# Patient Record
Sex: MALE | Race: WHITE | URBAN - METROPOLITAN AREA
[De-identification: names, ages, dates, MRNs, and addresses within clinical notes are randomized per-mention and may not be internally consistent; named-entity substitution may affect disease eponyms.]

---

## 2022-10-22 ENCOUNTER — APPOINTMENT (OUTPATIENT)
Dept: GENERAL RADIOLOGY | Age: 62
End: 2022-10-22

## 2022-10-22 ENCOUNTER — HOSPITAL ENCOUNTER (EMERGENCY)
Age: 62
Discharge: HOME | End: 2022-10-23
Attending: EMERGENCY MEDICINE

## 2022-10-22 ENCOUNTER — APPOINTMENT (OUTPATIENT)
Dept: CT IMAGING | Age: 62
End: 2022-10-22

## 2022-10-22 DIAGNOSIS — R45.89 DYSPHORIC MOOD: Primary | ICD-10-CM

## 2022-10-22 LAB
ABO/RH: NORMAL
ACETAMINOPHEN LEVEL: <5 MCG/ML (ref 10–30)
ALBUMIN SERPL-MCNC: 4 G/DL (ref 3.5–5.2)
ALP BLD-CCNC: 131 U/L (ref 40–129)
ALT SERPL-CCNC: 17 U/L (ref 0–40)
AMPHETAMINE SCREEN, URINE: NOT DETECTED
ANION GAP SERPL CALCULATED.3IONS-SCNC: 13 MMOL/L (ref 7–16)
ANTIBODY SCREEN: NORMAL
APTT: 30.6 SEC (ref 24.5–35.1)
AST SERPL-CCNC: 40 U/L (ref 0–39)
BACTERIA: ABNORMAL /HPF
BARBITURATE SCREEN URINE: NOT DETECTED
BASOPHILS ABSOLUTE: 0 E9/L (ref 0–0.2)
BASOPHILS RELATIVE PERCENT: 0.4 % (ref 0–2)
BENZODIAZEPINE SCREEN, URINE: NOT DETECTED
BILIRUB SERPL-MCNC: 1.1 MG/DL (ref 0–1.2)
BILIRUBIN URINE: ABNORMAL
BLOOD, URINE: ABNORMAL
BUN BLDV-MCNC: 21 MG/DL (ref 6–23)
BURR CELLS: ABNORMAL
CALCIUM SERPL-MCNC: 9.9 MG/DL (ref 8.6–10.2)
CANNABINOID SCREEN URINE: NOT DETECTED
CHLORIDE BLD-SCNC: 111 MMOL/L (ref 98–107)
CLARITY: CLEAR
CO2: 24 MMOL/L (ref 22–29)
COCAINE METABOLITE SCREEN URINE: NOT DETECTED
COLOR: YELLOW
CREAT SERPL-MCNC: 1.3 MG/DL (ref 0.7–1.2)
EOSINOPHILS ABSOLUTE: 0.13 E9/L (ref 0.05–0.5)
EOSINOPHILS RELATIVE PERCENT: 0.9 % (ref 0–6)
ETHANOL: <10 MG/DL (ref 0–0.08)
FENTANYL SCREEN, URINE: NOT DETECTED
GFR SERPL CREATININE-BSD FRML MDRD: >60 ML/MIN/1.73
GLUCOSE BLD-MCNC: 108 MG/DL (ref 74–99)
GLUCOSE URINE: NEGATIVE MG/DL
HCT VFR BLD CALC: 43.9 % (ref 37–54)
HEMOGLOBIN: 14.7 G/DL (ref 12.5–16.5)
INFLUENZA A: NOT DETECTED
INFLUENZA B: NOT DETECTED
INR BLD: 1.3
KETONES, URINE: 40 MG/DL
LACTIC ACID: 1.8 MMOL/L (ref 0.5–2.2)
LEUKOCYTE ESTERASE, URINE: NEGATIVE
LIPASE: 38 U/L (ref 13–60)
LYMPHOCYTES ABSOLUTE: 1.41 E9/L (ref 1.5–4)
LYMPHOCYTES RELATIVE PERCENT: 10.4 % (ref 20–42)
Lab: NORMAL
MAGNESIUM: 2.1 MG/DL (ref 1.6–2.6)
MCH RBC QN AUTO: 32.2 PG (ref 26–35)
MCHC RBC AUTO-ENTMCNC: 33.5 % (ref 32–34.5)
MCV RBC AUTO: 96.3 FL (ref 80–99.9)
METHADONE SCREEN, URINE: NOT DETECTED
MONOCYTES ABSOLUTE: 0.85 E9/L (ref 0.1–0.95)
MONOCYTES RELATIVE PERCENT: 6.1 % (ref 2–12)
NEUTROPHILS ABSOLUTE: 11.7 E9/L (ref 1.8–7.3)
NEUTROPHILS RELATIVE PERCENT: 82.6 % (ref 43–80)
NITRITE, URINE: NEGATIVE
OPIATE SCREEN URINE: NOT DETECTED
OVALOCYTES: ABNORMAL
OXYCODONE URINE: NOT DETECTED
PDW BLD-RTO: 13.2 FL (ref 11.5–15)
PH UA: 6 (ref 5–9)
PHENCYCLIDINE SCREEN URINE: NOT DETECTED
PLATELET # BLD: 284 E9/L (ref 130–450)
PMV BLD AUTO: 10.8 FL (ref 7–12)
POIKILOCYTES: ABNORMAL
POLYCHROMASIA: ABNORMAL
POTASSIUM REFLEX MAGNESIUM: 3.8 MMOL/L (ref 3.5–5)
PROTEIN UA: 100 MG/DL
PROTHROMBIN TIME: 14.4 SEC (ref 9.3–12.4)
RBC # BLD: 4.56 E12/L (ref 3.8–5.8)
RBC UA: ABNORMAL /HPF (ref 0–2)
SALICYLATE, SERUM: <0.3 MG/DL (ref 0–30)
SARS-COV-2 RNA, RT PCR: NOT DETECTED
SODIUM BLD-SCNC: 148 MMOL/L (ref 132–146)
SPECIFIC GRAVITY UA: >=1.03 (ref 1–1.03)
TOTAL PROTEIN: 7.3 G/DL (ref 6.4–8.3)
TRICYCLIC ANTIDEPRESSANTS SCREEN SERUM: NEGATIVE NG/ML
TROPONIN, HIGH SENSITIVITY: 18 NG/L (ref 0–11)
TROPONIN, HIGH SENSITIVITY: 21 NG/L (ref 0–11)
UROBILINOGEN, URINE: 0.2 E.U./DL
WBC # BLD: 14.1 E9/L (ref 4.5–11.5)
WBC UA: ABNORMAL /HPF (ref 0–5)

## 2022-10-22 PROCEDURE — 80179 DRUG ASSAY SALICYLATE: CPT

## 2022-10-22 PROCEDURE — 87636 SARSCOV2 & INF A&B AMP PRB: CPT

## 2022-10-22 PROCEDURE — 86901 BLOOD TYPING SEROLOGIC RH(D): CPT

## 2022-10-22 PROCEDURE — 85730 THROMBOPLASTIN TIME PARTIAL: CPT

## 2022-10-22 PROCEDURE — 83735 ASSAY OF MAGNESIUM: CPT

## 2022-10-22 PROCEDURE — 80143 DRUG ASSAY ACETAMINOPHEN: CPT

## 2022-10-22 PROCEDURE — 82077 ASSAY SPEC XCP UR&BREATH IA: CPT

## 2022-10-22 PROCEDURE — 93005 ELECTROCARDIOGRAM TRACING: CPT | Performed by: STUDENT IN AN ORGANIZED HEALTH CARE EDUCATION/TRAINING PROGRAM

## 2022-10-22 PROCEDURE — 81001 URINALYSIS AUTO W/SCOPE: CPT

## 2022-10-22 PROCEDURE — 2580000003 HC RX 258: Performed by: STUDENT IN AN ORGANIZED HEALTH CARE EDUCATION/TRAINING PROGRAM

## 2022-10-22 PROCEDURE — 6360000004 HC RX CONTRAST MEDICATION: Performed by: RADIOLOGY

## 2022-10-22 PROCEDURE — 83690 ASSAY OF LIPASE: CPT

## 2022-10-22 PROCEDURE — 80307 DRUG TEST PRSMV CHEM ANLYZR: CPT

## 2022-10-22 PROCEDURE — 85025 COMPLETE CBC W/AUTO DIFF WBC: CPT

## 2022-10-22 PROCEDURE — 36415 COLL VENOUS BLD VENIPUNCTURE: CPT

## 2022-10-22 PROCEDURE — 83605 ASSAY OF LACTIC ACID: CPT

## 2022-10-22 PROCEDURE — 84484 ASSAY OF TROPONIN QUANT: CPT

## 2022-10-22 PROCEDURE — 99285 EMERGENCY DEPT VISIT HI MDM: CPT

## 2022-10-22 PROCEDURE — 85610 PROTHROMBIN TIME: CPT

## 2022-10-22 PROCEDURE — 74177 CT ABD & PELVIS W/CONTRAST: CPT

## 2022-10-22 PROCEDURE — 86900 BLOOD TYPING SEROLOGIC ABO: CPT

## 2022-10-22 PROCEDURE — 80053 COMPREHEN METABOLIC PANEL: CPT

## 2022-10-22 PROCEDURE — 86850 RBC ANTIBODY SCREEN: CPT

## 2022-10-22 PROCEDURE — 71045 X-RAY EXAM CHEST 1 VIEW: CPT

## 2022-10-22 RX ORDER — 0.9 % SODIUM CHLORIDE 0.9 %
1000 INTRAVENOUS SOLUTION INTRAVENOUS ONCE
Status: COMPLETED | OUTPATIENT
Start: 2022-10-22 | End: 2022-10-22

## 2022-10-22 RX ORDER — MIDAZOLAM HYDROCHLORIDE 2 MG/2ML
5 INJECTION, SOLUTION INTRAMUSCULAR; INTRAVENOUS EVERY 6 HOURS PRN
Status: DISCONTINUED | OUTPATIENT
Start: 2022-10-22 | End: 2022-10-23 | Stop reason: HOSPADM

## 2022-10-22 RX ADMIN — IOPAMIDOL 75 ML: 755 INJECTION, SOLUTION INTRAVENOUS at 20:46

## 2022-10-22 RX ADMIN — SODIUM CHLORIDE 1000 ML: 9 INJECTION, SOLUTION INTRAVENOUS at 21:07

## 2022-10-22 ASSESSMENT — PAIN - FUNCTIONAL ASSESSMENT: PAIN_FUNCTIONAL_ASSESSMENT: NONE - DENIES PAIN

## 2022-10-22 NOTE — ED PROVIDER NOTES
[facility]  Department of Emergency Medicine     Written by: Christiane Woodson DO  Patient Name: Brittany Vergara  Attending Provider: Naren Hernandez DO  Admit Date: 10/22/2022  5:26 PM  MRN: [id number]                   : 1960        Chief Complaint   Patient presents with    Psychiatric Evaluation     generations    - Chief complaint    Patient is a 78-year-old male past medical history of hypertension, hyperlipidemia as well as depression. Patient presents with chief complaint of increased depression as well as reported nausea and vomiting. According to reports patient was sent in because he was having episodes of nausea with possible bloody emesis. However on arrival patient denies any nausea or vomiting. He states that he has been feeling more depressed lately since his wife . Patient states that symptoms have been moderate in severity and constant onset he denies any exacerbating relieving factors. Symptoms have been moderate in severity and constant onset. When asked patient denies any suicidal ideations but states he does want to rip out his heart. Patient denies any fevers, chills, chest pain, cough, constipation, diarrhea, headache, lightheadedness, numbness or tingling. The history is provided by the patient. No  was used. Review of Systems   Constitutional:  Negative for chills and fever. HENT:  Negative for ear pain, sinus pressure and sore throat. Eyes:  Negative for pain, discharge and redness. Respiratory:  Negative for cough, shortness of breath and wheezing. Cardiovascular:  Negative for chest pain. Gastrointestinal:  Negative for abdominal pain, diarrhea, nausea and vomiting. Genitourinary:  Negative for dysuria and frequency. Musculoskeletal:  Negative for arthralgias and back pain. Skin:  Negative for rash and wound. Neurological:  Negative for weakness and headaches. Hematological:  Negative for adenopathy. Psychiatric/Behavioral:  Positive for dysphoric mood. Negative for suicidal ideas. All other systems reviewed and are negative. Physical Exam  Vitals and nursing note reviewed. Constitutional:       Appearance: He is well-developed. HENT:      Head: Normocephalic and atraumatic. Eyes:      Conjunctiva/sclera: Conjunctivae normal.   Cardiovascular:      Rate and Rhythm: Normal rate and regular rhythm. Heart sounds: Normal heart sounds. No murmur heard. Pulmonary:      Effort: Pulmonary effort is normal. No respiratory distress. Breath sounds: Normal breath sounds. No wheezing or rales. Abdominal:      General: Bowel sounds are normal.      Palpations: Abdomen is soft. Tenderness: There is no abdominal tenderness. There is no guarding or rebound. Comments: Midline abdominal incision well-healed no significant tenderness rigidity rebound or guarding. Musculoskeletal:         General: No tenderness or deformity. Cervical back: Normal range of motion and neck supple. Skin:     General: Skin is warm and dry. Neurological:      Mental Status: He is alert and oriented to person, place, and time. Cranial Nerves: No cranial nerve deficit. Coordination: Coordination normal.   Psychiatric:      Comments: On psychiatric exam patient does have some flight of ideas he does appear to be mildly agitated. When asked he denies any suicidal homicidal ideations but he does state that he wants to rip out hearts. Procedures   EKG #1:  Interpreted by emergency department physician unless otherwise noted. Time:  1821    Rate: 73  Rhythm: Sinus. Interpretation: EKG obtained demonstrates sinus rhythm PACs, rate 73, left axis, QTC prolonged at 522, T wave inversions noted in V2 V3 as well as V4 and lead III.     Comparison: No previous ekg      MDM  Number of Diagnoses or Management Options  Dysphoric mood  Diagnosis management comments: Patient is a 26-year-old male past medical history of hypertension, hyperlipidemia as well as depression. Patient presents with chief complaint of increased depression as well as reported nausea and vomiting. Vital signs stable presentation. Patient is without nausea and vomiting on arrival to the ER. On physical exam heart regular rate and rhythm, lungs clear to auscultation bilaterally, abdomen soft nontender no rigidity rebound or guarding. Laboratory work obtained CBC demonstrated minimal leukocytosis 14.1. CMP demonstrated mild hyponatremia of 148, chronically elevated creatinine 1.3., urine drug screen negative, magnesium 2.1, troponin 21 on repeat 18. Urinalysis did have small amount of blood. Lactic acid 1.8. COVID flu negative. Lipase was obtained and was 38. Chest x-ray obtained demonstrated no acute abnormalities. CT scan of the abdomen pelvis demonstrated increased attenuation to the posterior aspect of the right urinary bladder possibly due to contrast filling. On repeat evaluation patient is resting comfortably in bed he continues to be without nausea and vomiting. Patient does endorse dysphoric mood as well as worsening depression since his wife . Patient is medically cleared for social work evaluation. If cleared for social work patient will be discharged back to [facility]. Final disposition pending social work evaluation. Amount and/or Complexity of Data Reviewed  Clinical lab tests: ordered and reviewed  Tests in the radiology section of CPT®: ordered and reviewed    Risk of Complications, Morbidity, and/or Mortality  Presenting problems: moderate  Diagnostic procedures: moderate  Management options: moderate    Patient Progress  Patient progress: stable           --------------------------------------------- PAST HISTORY ---------------------------------------------  Past Medical History:  has no past medical history on file. Past Surgical History:  has no past surgical history on file.     Social History:      Family History: family history is not on file. The patients home medications have been reviewed. Allergies: Patient has no known allergies.     -------------------------------------------------- RESULTS -------------------------------------------------    LABS:  Results for orders placed or performed during the hospital encounter of 10/22/22   COVID-19 & Influenza Combo    Specimen: Nasopharyngeal Swab   Result Value Ref Range    SARS-CoV-2 RNA, RT PCR NOT DETECTED NOT DETECTED    INFLUENZA A NOT DETECTED NOT DETECTED    INFLUENZA B NOT DETECTED NOT DETECTED   CBC with Auto Differential   Result Value Ref Range    WBC 14.1 (H) 4.5 - 11.5 E9/L    RBC 4.56 3.80 - 5.80 E12/L    Hemoglobin 14.7 12.5 - 16.5 g/dL    Hematocrit 43.9 37.0 - 54.0 %    MCV 96.3 80.0 - 99.9 fL    MCH 32.2 26.0 - 35.0 pg    MCHC 33.5 32.0 - 34.5 %    RDW 13.2 11.5 - 15.0 fL    Platelets 652 222 - 276 E9/L    MPV 10.8 7.0 - 12.0 fL    Neutrophils % 82.6 (H) 43.0 - 80.0 %    Lymphocytes % 10.4 (L) 20.0 - 42.0 %    Monocytes % 6.1 2.0 - 12.0 %    Eosinophils % 0.9 0.0 - 6.0 %    Basophils % 0.4 0.0 - 2.0 %    Neutrophils Absolute 11.70 (H) 1.80 - 7.30 E9/L    Lymphocytes Absolute 1.41 (L) 1.50 - 4.00 E9/L    Monocytes Absolute 0.85 0.10 - 0.95 E9/L    Eosinophils Absolute 0.13 0.05 - 0.50 E9/L    Basophils Absolute 0.00 0.00 - 0.20 E9/L    Polychromasia 1+     Poikilocytes 1+     Keenan Cells 1+     Ovalocytes 1+    Comprehensive Metabolic Panel w/ Reflex to MG   Result Value Ref Range    Sodium 148 (H) 132 - 146 mmol/L    Potassium reflex Magnesium 3.8 3.5 - 5.0 mmol/L    Chloride 111 (H) 98 - 107 mmol/L    CO2 24 22 - 29 mmol/L    Anion Gap 13 7 - 16 mmol/L    Glucose 108 (H) 74 - 99 mg/dL    BUN 21 6 - 23 mg/dL    Creatinine 1.3 (H) 0.7 - 1.2 mg/dL    Est, Glom Filt Rate >60 >=60 mL/min/1.73    Calcium 9.9 8.6 - 10.2 mg/dL    Total Protein 7.3 6.4 - 8.3 g/dL    Albumin 4.0 3.5 - 5.2 g/dL    Total Bilirubin 1.1 0.0 - 1.2 mg/dL Alkaline Phosphatase 131 (H) 40 - 129 U/L    ALT 17 0 - 40 U/L    AST 40 (H) 0 - 39 U/L   Lipase   Result Value Ref Range    Lipase 38 13 - 60 U/L   Troponin   Result Value Ref Range    Troponin, High Sensitivity 21 (H) 0 - 11 ng/L   Protime-INR   Result Value Ref Range    Protime 14.4 (H) 9.3 - 12.4 sec    INR 1.3    APTT   Result Value Ref Range    aPTT 30.6 24.5 - 35.1 sec   Urinalysis with Microscopic   Result Value Ref Range    Color, UA Yellow Straw/Yellow    Clarity, UA Clear Clear    Glucose, Ur Negative Negative mg/dL    Bilirubin Urine SMALL (A) Negative    Ketones, Urine 40 (A) Negative mg/dL    Specific Gravity, UA >=1.030 1.005 - 1.030    Blood, Urine SMALL (A) Negative    pH, UA 6.0 5.0 - 9.0    Protein,  (A) Negative mg/dL    Urobilinogen, Urine 0.2 <2.0 E.U./dL    Nitrite, Urine Negative Negative    Leukocyte Esterase, Urine Negative Negative    WBC, UA 1-3 0 - 5 /HPF    RBC, UA 5-10 (A) 0 - 2 /HPF    Bacteria, UA RARE (A) None Seen /HPF   Lactic Acid   Result Value Ref Range    Lactic Acid 1.8 0.5 - 2.2 mmol/L   Serum Drug Screen   Result Value Ref Range    Ethanol Lvl <10 mg/dL    Acetaminophen Level <5.0 (L) 10.0 - 91.3 mcg/mL    Salicylate, Serum <8.8 0.0 - 30.0 mg/dL    TCA Scrn NEGATIVE Cutoff:300 ng/mL   Urine Drug Screen   Result Value Ref Range    Amphetamine Screen, Urine NOT DETECTED Negative <1000 ng/mL    Barbiturate Screen, Ur NOT DETECTED Negative < 200 ng/mL    Benzodiazepine Screen, Urine NOT DETECTED Negative < 200 ng/mL    Cannabinoid Scrn, Ur NOT DETECTED Negative < 50ng/mL    Cocaine Metabolite Screen, Urine NOT DETECTED Negative < 300 ng/mL    Opiate Scrn, Ur NOT DETECTED Negative < 300ng/mL    PCP Screen, Urine NOT DETECTED Negative < 25 ng/mL    Methadone Screen, Urine NOT DETECTED Negative <300 ng/mL    Oxycodone Urine NOT DETECTED Negative <100 ng/mL    FENTANYL SCREEN, URINE NOT DETECTED Negative <1 ng/mL    Drug Screen Comment: see below    Magnesium   Result Value Ref Range    Magnesium 2.1 1.6 - 2.6 mg/dL   Troponin   Result Value Ref Range    Troponin, High Sensitivity 18 (H) 0 - 11 ng/L   EKG 12 Lead   Result Value Ref Range    Ventricular Rate 73 BPM    Atrial Rate 73 BPM    P-R Interval 152 ms    QRS Duration 86 ms    Q-T Interval 474 ms    QTc Calculation (Bazett) 522 ms    P Axis 45 degrees    R Axis -19 degrees    T Axis -15 degrees   TYPE AND SCREEN   Result Value Ref Range    ABO/Rh A POS     Antibody Screen NEG        RADIOLOGY:  XR CHEST PORTABLE   Final Result   No acute process. CT ABDOMEN PELVIS W IV CONTRAST Additional Contrast? None   Final Result   1. Diverticulosis. 2. No bowel obstruction, free air, or focal inflammatory changes. 3. Focus of increased attenuation located in posterior right aspect of   urinary bladder measures approximately 1.9 x 1.6 cm possibly related to   contrast filling adjacent to right ureteral vesicular junction. Short-term   follow-up recommended to exclude possibility of mass. 4. Ectatic infrarenal abdominal aorta measures up to 2.7 cm with significant   atherosclerotic plaque. 5. Exophytic cyst associated with right kidney measures up to 2.8 cm. Ultrasound follow-up could be helpful for further characterization.             ------------------------- NURSING NOTES AND VITALS REVIEWED ---------------------------  Date / Time Roomed:  10/22/2022  5:26 PM  ED Bed Assignment:  01/01    The nursing notes within the ED encounter and vital signs as below have been reviewed.      Patient Vitals for the past 24 hrs:   BP Temp Pulse Resp SpO2   10/22/22 2336 (!) 154/88 -- 73 20 98 %   10/22/22 2106 (!) 148/91 -- 67 20 98 %   10/22/22 1918 (!) 170/90 -- 63 16 99 %   10/22/22 1830 (!) 157/86 -- 66 16 97 %   10/22/22 1759 (!) 152/89 97 °F (36.1 °C) 75 18 98 %       Oxygen Saturation Interpretation: Normal    ------------------------------------------ PROGRESS NOTES ------------------------------------------  Re-evaluation(s):  Time: 0673  Patients symptoms show no change  Repeat physical examination is not changed    Counseling:  I have spoken with the patient and discussed todays results, in addition to providing specific details for the plan of care and counseling regarding the diagnosis and prognosis. Their questions are answered at this time and they are agreeable with the plan of admission.    --------------------------------- ADDITIONAL PROVIDER NOTES ---------------------------------    This patient has remained hemodynamically stable during their ED course. Diagnosis:  1. Dysphoric mood        Disposition:  Patient's disposition: Admit to mental health unit - medically cleared for admission  Patient's condition is stable. Patient was seen and evaluated by myself and my attending Mathew Espino DO. Assessment and Plan discussed with attending provider, please see attestation for final plan of care.      DO Miki Juan DO  Resident  10/23/22 8923

## 2022-10-23 VITALS
DIASTOLIC BLOOD PRESSURE: 86 MMHG | SYSTOLIC BLOOD PRESSURE: 157 MMHG | TEMPERATURE: 97 F | HEART RATE: 84 BPM | RESPIRATION RATE: 18 BRPM | OXYGEN SATURATION: 97 %

## 2022-10-23 ASSESSMENT — ENCOUNTER SYMPTOMS
ABDOMINAL PAIN: 0
EYE REDNESS: 0
COUGH: 0
SHORTNESS OF BREATH: 0
DIARRHEA: 0
BACK PAIN: 0
EYE DISCHARGE: 0
NAUSEA: 0
SORE THROAT: 0
SINUS PRESSURE: 0
WHEEZING: 0
VOMITING: 0
EYE PAIN: 0

## 2022-10-23 NOTE — ED NOTES
This patient came from [facility] for a medical issue. Patient does not need to be assessed by [facility] at this time. Patient is still pink slipped to [facility].      After patient is medically clear and stable and ready to be discharged patient will need be returned to [facility] by EMS.    N2N: Anitha Cheng [id number], MSW, LSW  10/23/22 [id number]

## 2022-10-25 LAB
EKG ATRIAL RATE: 73 BPM
EKG P AXIS: 45 DEGREES
EKG P-R INTERVAL: 152 MS
EKG Q-T INTERVAL: 474 MS
EKG QRS DURATION: 86 MS
EKG QTC CALCULATION (BAZETT): 522 MS
EKG R AXIS: -19 DEGREES
EKG T AXIS: -15 DEGREES
EKG VENTRICULAR RATE: 73 BPM

## 2022-10-25 PROCEDURE — 93010 ELECTROCARDIOGRAM REPORT: CPT | Performed by: INTERNAL MEDICINE

## 2022-11-15 LAB
ALBUMIN SERPL-MCNC: 3.2 G/DL (ref 3.5–5.2)
ALP BLD-CCNC: 105 U/L (ref 40–129)
ALT SERPL-CCNC: 7 U/L (ref 0–40)
ANION GAP SERPL CALCULATED.3IONS-SCNC: 13 MMOL/L (ref 7–16)
AST SERPL-CCNC: 22 U/L (ref 0–39)
BASOPHILS ABSOLUTE: 0.09 E9/L (ref 0–0.2)
BASOPHILS RELATIVE PERCENT: 0.8 % (ref 0–2)
BILIRUB SERPL-MCNC: 0.5 MG/DL (ref 0–1.2)
BUN BLDV-MCNC: 20 MG/DL (ref 6–23)
CALCIUM SERPL-MCNC: 9.1 MG/DL (ref 8.6–10.2)
CHLORIDE BLD-SCNC: 108 MMOL/L (ref 98–107)
CO2: 26 MMOL/L (ref 22–29)
CREAT SERPL-MCNC: 1.4 MG/DL (ref 0.7–1.2)
EOSINOPHILS ABSOLUTE: 0.26 E9/L (ref 0.05–0.5)
EOSINOPHILS RELATIVE PERCENT: 2.4 % (ref 0–6)
GFR SERPL CREATININE-BSD FRML MDRD: 56 ML/MIN/1.73
GLUCOSE BLD-MCNC: 81 MG/DL (ref 74–99)
HCT VFR BLD CALC: 38.5 % (ref 37–54)
HEMOGLOBIN: 12.3 G/DL (ref 12.5–16.5)
IMMATURE GRANULOCYTES #: 0.06 E9/L
IMMATURE GRANULOCYTES %: 0.5 % (ref 0–5)
LYMPHOCYTES ABSOLUTE: 2.24 E9/L (ref 1.5–4)
LYMPHOCYTES RELATIVE PERCENT: 20.5 % (ref 20–42)
MCH RBC QN AUTO: 31.1 PG (ref 26–35)
MCHC RBC AUTO-ENTMCNC: 31.9 % (ref 32–34.5)
MCV RBC AUTO: 97.2 FL (ref 80–99.9)
MONOCYTES ABSOLUTE: 1.25 E9/L (ref 0.1–0.95)
MONOCYTES RELATIVE PERCENT: 11.4 % (ref 2–12)
NEUTROPHILS ABSOLUTE: 7.02 E9/L (ref 1.8–7.3)
NEUTROPHILS RELATIVE PERCENT: 64.4 % (ref 43–80)
PDW BLD-RTO: 13.2 FL (ref 11.5–15)
PLATELET # BLD: 356 E9/L (ref 130–450)
PMV BLD AUTO: 11.2 FL (ref 7–12)
POTASSIUM SERPL-SCNC: 4.2 MMOL/L (ref 3.5–5)
RBC # BLD: 3.96 E12/L (ref 3.8–5.8)
SODIUM BLD-SCNC: 147 MMOL/L (ref 132–146)
TOTAL PROTEIN: 6.8 G/DL (ref 6.4–8.3)
WBC # BLD: 10.9 E9/L (ref 4.5–11.5)

## 2022-12-06 LAB
ANION GAP SERPL CALCULATED.3IONS-SCNC: 15 MMOL/L (ref 7–16)
BUN BLDV-MCNC: 14 MG/DL (ref 6–23)
CALCIUM SERPL-MCNC: 9.8 MG/DL (ref 8.6–10.2)
CHLORIDE BLD-SCNC: 108 MMOL/L (ref 98–107)
CO2: 28 MMOL/L (ref 22–29)
CREAT SERPL-MCNC: 1.5 MG/DL (ref 0.7–1.2)
GFR SERPL CREATININE-BSD FRML MDRD: 51 ML/MIN/1.73
GLUCOSE BLD-MCNC: 79 MG/DL (ref 74–99)
POTASSIUM SERPL-SCNC: 4 MMOL/L (ref 3.5–5)
SODIUM BLD-SCNC: 151 MMOL/L (ref 132–146)

## 2023-01-02 LAB
ALBUMIN SERPL-MCNC: 3.2 G/DL (ref 3.5–5.2)
ALP BLD-CCNC: 94 U/L (ref 40–129)
ALT SERPL-CCNC: <5 U/L (ref 0–40)
ANION GAP SERPL CALCULATED.3IONS-SCNC: 9 MMOL/L (ref 7–16)
AST SERPL-CCNC: 16 U/L (ref 0–39)
BASOPHILS ABSOLUTE: 0.06 E9/L (ref 0–0.2)
BASOPHILS RELATIVE PERCENT: 0.9 % (ref 0–2)
BILIRUB SERPL-MCNC: 0.3 MG/DL (ref 0–1.2)
BUN BLDV-MCNC: 15 MG/DL (ref 6–23)
CALCIUM SERPL-MCNC: 8.9 MG/DL (ref 8.6–10.2)
CHLORIDE BLD-SCNC: 111 MMOL/L (ref 98–107)
CO2: 27 MMOL/L (ref 22–29)
CREAT SERPL-MCNC: 1.1 MG/DL (ref 0.7–1.2)
EOSINOPHILS ABSOLUTE: 0.16 E9/L (ref 0.05–0.5)
EOSINOPHILS RELATIVE PERCENT: 2.5 % (ref 0–6)
GFR SERPL CREATININE-BSD FRML MDRD: >60 ML/MIN/1.73
GLUCOSE BLD-MCNC: 78 MG/DL (ref 74–99)
HCT VFR BLD CALC: 36 % (ref 37–54)
HEMOGLOBIN: 11.6 G/DL (ref 12.5–16.5)
IMMATURE GRANULOCYTES #: 0.01 E9/L
IMMATURE GRANULOCYTES %: 0.2 % (ref 0–5)
LYMPHOCYTES ABSOLUTE: 2.65 E9/L (ref 1.5–4)
LYMPHOCYTES RELATIVE PERCENT: 41.4 % (ref 20–42)
MCH RBC QN AUTO: 31 PG (ref 26–35)
MCHC RBC AUTO-ENTMCNC: 32.2 % (ref 32–34.5)
MCV RBC AUTO: 96.3 FL (ref 80–99.9)
MONOCYTES ABSOLUTE: 1.18 E9/L (ref 0.1–0.95)
MONOCYTES RELATIVE PERCENT: 18.4 % (ref 2–12)
NEUTROPHILS ABSOLUTE: 2.34 E9/L (ref 1.8–7.3)
NEUTROPHILS RELATIVE PERCENT: 36.6 % (ref 43–80)
PDW BLD-RTO: 14.7 FL (ref 11.5–15)
PLATELET # BLD: 234 E9/L (ref 130–450)
PMV BLD AUTO: 11.4 FL (ref 7–12)
POTASSIUM SERPL-SCNC: 3.8 MMOL/L (ref 3.5–5)
RBC # BLD: 3.74 E12/L (ref 3.8–5.8)
SODIUM BLD-SCNC: 147 MMOL/L (ref 132–146)
TOTAL PROTEIN: 5.9 G/DL (ref 6.4–8.3)
WBC # BLD: 6.4 E9/L (ref 4.5–11.5)

## 2023-02-07 LAB
ALBUMIN SERPL-MCNC: 3.4 G/DL (ref 3.5–5.2)
ALP BLD-CCNC: 86 U/L (ref 40–129)
ALT SERPL-CCNC: 10 U/L (ref 0–40)
ANION GAP SERPL CALCULATED.3IONS-SCNC: 7 MMOL/L (ref 7–16)
ANISOCYTOSIS: ABNORMAL
AST SERPL-CCNC: 23 U/L (ref 0–39)
BASOPHILS ABSOLUTE: 0.07 E9/L (ref 0–0.2)
BASOPHILS RELATIVE PERCENT: 0.8 % (ref 0–2)
BILIRUB SERPL-MCNC: 0.2 MG/DL (ref 0–1.2)
BUN BLDV-MCNC: 22 MG/DL (ref 6–23)
BURR CELLS: ABNORMAL
CALCIUM SERPL-MCNC: 8.8 MG/DL (ref 8.6–10.2)
CHLORIDE BLD-SCNC: 109 MMOL/L (ref 98–107)
CO2: 28 MMOL/L (ref 22–29)
CREAT SERPL-MCNC: 1.2 MG/DL (ref 0.7–1.2)
EOSINOPHILS ABSOLUTE: 0.28 E9/L (ref 0.05–0.5)
EOSINOPHILS RELATIVE PERCENT: 3.1 % (ref 0–6)
GFR SERPL CREATININE-BSD FRML MDRD: >60 ML/MIN/1.73
GLUCOSE BLD-MCNC: 79 MG/DL (ref 74–99)
HCT VFR BLD CALC: 36.6 % (ref 37–54)
HEMOGLOBIN: 11.9 G/DL (ref 12.5–16.5)
IMMATURE GRANULOCYTES #: 0.02 E9/L
IMMATURE GRANULOCYTES %: 0.2 % (ref 0–5)
LYMPHOCYTES ABSOLUTE: 3.04 E9/L (ref 1.5–4)
LYMPHOCYTES RELATIVE PERCENT: 33.2 % (ref 20–42)
MCH RBC QN AUTO: 30.6 PG (ref 26–35)
MCHC RBC AUTO-ENTMCNC: 32.5 % (ref 32–34.5)
MCV RBC AUTO: 94.1 FL (ref 80–99.9)
MONOCYTES ABSOLUTE: 1.65 E9/L (ref 0.1–0.95)
MONOCYTES RELATIVE PERCENT: 18 % (ref 2–12)
NEUTROPHILS ABSOLUTE: 4.11 E9/L (ref 1.8–7.3)
NEUTROPHILS RELATIVE PERCENT: 44.7 % (ref 43–80)
OVALOCYTES: ABNORMAL
PDW BLD-RTO: 15 FL (ref 11.5–15)
PLATELET # BLD: 243 E9/L (ref 130–450)
PMV BLD AUTO: 11.1 FL (ref 7–12)
POIKILOCYTES: ABNORMAL
POLYCHROMASIA: ABNORMAL
POTASSIUM SERPL-SCNC: 3.8 MMOL/L (ref 3.5–5)
RBC # BLD: 3.89 E12/L (ref 3.8–5.8)
SCHISTOCYTES: ABNORMAL
SODIUM BLD-SCNC: 144 MMOL/L (ref 132–146)
TARGET CELLS: ABNORMAL
TOTAL PROTEIN: 6.3 G/DL (ref 6.4–8.3)
WBC # BLD: 9.2 E9/L (ref 4.5–11.5)

## 2023-02-13 LAB
ALBUMIN SERPL-MCNC: 3.5 G/DL (ref 3.5–5.2)
ALP BLD-CCNC: 88 U/L (ref 40–129)
ALT SERPL-CCNC: 10 U/L (ref 0–40)
ANION GAP SERPL CALCULATED.3IONS-SCNC: 10 MMOL/L (ref 7–16)
AST SERPL-CCNC: 21 U/L (ref 0–39)
BASOPHILS ABSOLUTE: 0.06 E9/L (ref 0–0.2)
BASOPHILS RELATIVE PERCENT: 0.7 % (ref 0–2)
BILIRUB SERPL-MCNC: 0.2 MG/DL (ref 0–1.2)
BUN BLDV-MCNC: 19 MG/DL (ref 6–23)
CALCIUM SERPL-MCNC: 8.7 MG/DL (ref 8.6–10.2)
CHLORIDE BLD-SCNC: 110 MMOL/L (ref 98–107)
CO2: 28 MMOL/L (ref 22–29)
CREAT SERPL-MCNC: 1.2 MG/DL (ref 0.7–1.2)
EOSINOPHILS ABSOLUTE: 0.26 E9/L (ref 0.05–0.5)
EOSINOPHILS RELATIVE PERCENT: 3 % (ref 0–6)
GFR SERPL CREATININE-BSD FRML MDRD: >60 ML/MIN/1.73
GLUCOSE BLD-MCNC: 76 MG/DL (ref 74–99)
HCT VFR BLD CALC: 40.2 % (ref 37–54)
HEMOGLOBIN: 12.9 G/DL (ref 12.5–16.5)
IMMATURE GRANULOCYTES #: 0.02 E9/L
IMMATURE GRANULOCYTES %: 0.2 % (ref 0–5)
LYMPHOCYTES ABSOLUTE: 2.6 E9/L (ref 1.5–4)
LYMPHOCYTES RELATIVE PERCENT: 30.4 % (ref 20–42)
MCH RBC QN AUTO: 30.9 PG (ref 26–35)
MCHC RBC AUTO-ENTMCNC: 32.1 % (ref 32–34.5)
MCV RBC AUTO: 96.2 FL (ref 80–99.9)
MONOCYTES ABSOLUTE: 1.3 E9/L (ref 0.1–0.95)
MONOCYTES RELATIVE PERCENT: 15.2 % (ref 2–12)
NEUTROPHILS ABSOLUTE: 4.32 E9/L (ref 1.8–7.3)
NEUTROPHILS RELATIVE PERCENT: 50.5 % (ref 43–80)
PDW BLD-RTO: 14.6 FL (ref 11.5–15)
PLATELET # BLD: 247 E9/L (ref 130–450)
PMV BLD AUTO: 11.1 FL (ref 7–12)
POTASSIUM SERPL-SCNC: 3.9 MMOL/L (ref 3.5–5)
RBC # BLD: 4.18 E12/L (ref 3.8–5.8)
SODIUM BLD-SCNC: 148 MMOL/L (ref 132–146)
TOTAL PROTEIN: 6.6 G/DL (ref 6.4–8.3)
WBC # BLD: 8.6 E9/L (ref 4.5–11.5)

## 2023-02-18 ENCOUNTER — HOSPITAL ENCOUNTER (EMERGENCY)
Age: 63
Discharge: HOME | End: 2023-02-19
Attending: EMERGENCY MEDICINE
Payer: MEDICARE

## 2023-02-18 ENCOUNTER — APPOINTMENT (OUTPATIENT)
Dept: GENERAL RADIOLOGY | Age: 63
End: 2023-02-18
Payer: MEDICARE

## 2023-02-18 DIAGNOSIS — R07.9 CHEST PAIN, UNSPECIFIED TYPE: Primary | ICD-10-CM

## 2023-02-18 DIAGNOSIS — N17.9 AKI (ACUTE KIDNEY INJURY) (HCC): ICD-10-CM

## 2023-02-18 LAB
ACETAMINOPHEN LEVEL: <5 MCG/ML (ref 10–30)
ALBUMIN SERPL-MCNC: 4 G/DL (ref 3.5–5.2)
ALP BLD-CCNC: 104 U/L (ref 40–129)
ALT SERPL-CCNC: 12 U/L (ref 0–40)
ANION GAP SERPL CALCULATED.3IONS-SCNC: 9 MMOL/L (ref 7–16)
AST SERPL-CCNC: 24 U/L (ref 0–39)
BASOPHILS ABSOLUTE: 0.07 E9/L (ref 0–0.2)
BASOPHILS RELATIVE PERCENT: 0.7 % (ref 0–2)
BILIRUB SERPL-MCNC: 0.2 MG/DL (ref 0–1.2)
BUN BLDV-MCNC: 18 MG/DL (ref 6–23)
CALCIUM SERPL-MCNC: 8.9 MG/DL (ref 8.6–10.2)
CHLORIDE BLD-SCNC: 106 MMOL/L (ref 98–107)
CO2: 26 MMOL/L (ref 22–29)
CREAT SERPL-MCNC: 1.6 MG/DL (ref 0.7–1.2)
EOSINOPHILS ABSOLUTE: 0.24 E9/L (ref 0.05–0.5)
EOSINOPHILS RELATIVE PERCENT: 2.5 % (ref 0–6)
ETHANOL: <10 MG/DL (ref 0–0.08)
GFR SERPL CREATININE-BSD FRML MDRD: 48 ML/MIN/1.73
GLUCOSE BLD-MCNC: 104 MG/DL (ref 74–99)
HCT VFR BLD CALC: 43.2 % (ref 37–54)
HEMOGLOBIN: 14.1 G/DL (ref 12.5–16.5)
IMMATURE GRANULOCYTES #: 0.02 E9/L
IMMATURE GRANULOCYTES %: 0.2 % (ref 0–5)
INFLUENZA A: NOT DETECTED
INFLUENZA B: NOT DETECTED
LYMPHOCYTES ABSOLUTE: 3.35 E9/L (ref 1.5–4)
LYMPHOCYTES RELATIVE PERCENT: 34.9 % (ref 20–42)
MCH RBC QN AUTO: 30.8 PG (ref 26–35)
MCHC RBC AUTO-ENTMCNC: 32.6 % (ref 32–34.5)
MCV RBC AUTO: 94.3 FL (ref 80–99.9)
MONOCYTES ABSOLUTE: 1.32 E9/L (ref 0.1–0.95)
MONOCYTES RELATIVE PERCENT: 13.8 % (ref 2–12)
NEUTROPHILS ABSOLUTE: 4.6 E9/L (ref 1.8–7.3)
NEUTROPHILS RELATIVE PERCENT: 47.9 % (ref 43–80)
PDW BLD-RTO: 14.1 FL (ref 11.5–15)
PLATELET # BLD: 271 E9/L (ref 130–450)
PMV BLD AUTO: 11 FL (ref 7–12)
POTASSIUM SERPL-SCNC: 4.2 MMOL/L (ref 3.5–5)
PRO-BNP: 262 PG/ML (ref 0–125)
RBC # BLD: 4.58 E12/L (ref 3.8–5.8)
SALICYLATE, SERUM: 0.9 MG/DL (ref 0–30)
SARS-COV-2 RNA, RT PCR: NOT DETECTED
SODIUM BLD-SCNC: 141 MMOL/L (ref 132–146)
TOTAL PROTEIN: 7.4 G/DL (ref 6.4–8.3)
TRICYCLIC ANTIDEPRESSANTS SCREEN SERUM: NEGATIVE NG/ML
TROPONIN, HIGH SENSITIVITY: 17 NG/L (ref 0–11)
TROPONIN, HIGH SENSITIVITY: 17 NG/L (ref 0–11)
WBC # BLD: 9.6 E9/L (ref 4.5–11.5)

## 2023-02-18 PROCEDURE — 71045 X-RAY EXAM CHEST 1 VIEW: CPT

## 2023-02-18 PROCEDURE — 80053 COMPREHEN METABOLIC PANEL: CPT

## 2023-02-18 PROCEDURE — 82077 ASSAY SPEC XCP UR&BREATH IA: CPT

## 2023-02-18 PROCEDURE — 99285 EMERGENCY DEPT VISIT HI MDM: CPT

## 2023-02-18 PROCEDURE — 84484 ASSAY OF TROPONIN QUANT: CPT

## 2023-02-18 PROCEDURE — 80179 DRUG ASSAY SALICYLATE: CPT

## 2023-02-18 PROCEDURE — 85025 COMPLETE CBC W/AUTO DIFF WBC: CPT

## 2023-02-18 PROCEDURE — 6370000000 HC RX 637 (ALT 250 FOR IP): Performed by: STUDENT IN AN ORGANIZED HEALTH CARE EDUCATION/TRAINING PROGRAM

## 2023-02-18 PROCEDURE — 93005 ELECTROCARDIOGRAM TRACING: CPT | Performed by: STUDENT IN AN ORGANIZED HEALTH CARE EDUCATION/TRAINING PROGRAM

## 2023-02-18 PROCEDURE — 83880 ASSAY OF NATRIURETIC PEPTIDE: CPT

## 2023-02-18 PROCEDURE — 87636 SARSCOV2 & INF A&B AMP PRB: CPT

## 2023-02-18 PROCEDURE — 80307 DRUG TEST PRSMV CHEM ANLYZR: CPT

## 2023-02-18 PROCEDURE — 80143 DRUG ASSAY ACETAMINOPHEN: CPT

## 2023-02-18 PROCEDURE — 36415 COLL VENOUS BLD VENIPUNCTURE: CPT

## 2023-02-18 RX ORDER — ASPIRIN 81 MG/1
324 TABLET, CHEWABLE ORAL ONCE
Status: COMPLETED | OUTPATIENT
Start: 2023-02-18 | End: 2023-02-18

## 2023-02-18 RX ADMIN — ASPIRIN 324 MG: 81 TABLET, CHEWABLE ORAL at 21:42

## 2023-02-18 ASSESSMENT — PAIN - FUNCTIONAL ASSESSMENT: PAIN_FUNCTIONAL_ASSESSMENT: 0-10

## 2023-02-18 ASSESSMENT — LIFESTYLE VARIABLES
HOW MANY STANDARD DRINKS CONTAINING ALCOHOL DO YOU HAVE ON A TYPICAL DAY: PATIENT DOES NOT DRINK
HOW OFTEN DO YOU HAVE A DRINK CONTAINING ALCOHOL: NEVER

## 2023-02-18 ASSESSMENT — PAIN SCALES - GENERAL: PAINLEVEL_OUTOF10: 10

## 2023-02-18 ASSESSMENT — PAIN DESCRIPTION - LOCATION: LOCATION: CHEST

## 2023-02-18 ASSESSMENT — PAIN DESCRIPTION - DESCRIPTORS: DESCRIPTORS: DISCOMFORT

## 2023-02-19 VITALS
HEART RATE: 62 BPM | WEIGHT: 185 LBS | OXYGEN SATURATION: 98 % | BODY MASS INDEX: 25.9 KG/M2 | DIASTOLIC BLOOD PRESSURE: 57 MMHG | HEIGHT: 71 IN | RESPIRATION RATE: 13 BRPM | TEMPERATURE: 97.7 F | SYSTOLIC BLOOD PRESSURE: 144 MMHG

## 2023-02-19 RX ORDER — 0.9 % SODIUM CHLORIDE 0.9 %
1000 INTRAVENOUS SOLUTION INTRAVENOUS ONCE
Status: DISCONTINUED | OUTPATIENT
Start: 2023-02-19 | End: 2023-02-19 | Stop reason: HOSPADM

## 2023-02-19 NOTE — ED PROVIDER NOTES
[address]        Pt Name: Mallory Julian  MRN: [id number]  [place] 1960  Date of evaluation: 2/18/2023  Provider: Gwen He DO  PCP: No primary care provider on file. Note Started: 9:26 PM EST 2/18/23    CHIEF COMPLAINT       Chief Complaint   Patient presents with    Chest Pain     Started earlier today pt from generations       HISTORY OF PRESENT ILLNESS: 1 or more Elements   History From: Patient     Limitations to history: Behavior, patient is agitated and resistant to giving a history. He is stating that he does not want to be here and wants to go home. He is a poor historian. Mallory Julian is a 57 y.o. male with past medical history of cervicalgia, cervical spondylosis, CAD and hypertension with who presents to the emergency department for evaluation of chest pain. Patient is coming by EMS from generations. He apparently was having chest pain earlier. Patient states that he was given nitro as well. He is not really complaining of chest pain now. He is a very poor historian and history and review of systems is limited as such. Vaguely denying any nausea, vomiting, diaphoresis, abdominal pain, urinary or bowel changes. No known sick contacts. On initial evaluation, patient is nontoxic-appearing and in no acute distress. Nursing Notes were all reviewed and agreed with or any disagreements were addressed in the HPI.    ROS:   Pertinent positives and negatives are stated within HPI, all other systems reviewed and are negative.    --------------------------------------------- PAST HISTORY ---------------------------------------------  Past Medical History:  has no past medical history on file. Past Surgical History:  has no past surgical history on file. Social History:      Family History: family history is not on file. The patients home medications have been reviewed.     Allergies: Patient has no known allergies. ---------------------------------------------------PHYSICAL EXAM--------------------------------------    Constitutional/General: Alert and oriented x3, well appearing, non toxic in NAD  Head: Normocephalic and atraumatic  Mouth: Oropharynx clear, handling secretions, no trismus  Neck: Supple, full ROM,  Pulmonary: Lungs clear to auscultation bilaterally, no wheezes, rales, or rhonchi. Not in respiratory distress  Cardiovascular:  Regular rate. Regular rhythm. No murmurs  Chest: no chest wall tenderness  Abdomen: Soft. Non tender. Non distended. No rebound, guarding, or rigidity. No pulsatile masses appreciated. Musculoskeletal: Moves all extremities x 4. Warm and well perfused, no clubbing, cyanosis, or edema. Capillary refill <3 seconds  Skin: warm and dry. No rashes. Neurologic: GCS 15, no gross focal neurologic deficits  Psych: Agitated. -------------------------------------------------- RESULTS -------------------------------------------------  I have personally reviewed all laboratory and imaging results for this patient. Results are listed below.      LABS:  Results for orders placed or performed during the hospital encounter of 02/18/23   COVID-19 & Influenza Combo    Specimen: Nasopharyngeal Swab   Result Value Ref Range    SARS-CoV-2 RNA, RT PCR NOT DETECTED NOT DETECTED    INFLUENZA A NOT DETECTED NOT DETECTED    INFLUENZA B NOT DETECTED NOT DETECTED   CBC with Auto Differential   Result Value Ref Range    WBC 9.6 4.5 - 11.5 E9/L    RBC 4.58 3.80 - 5.80 E12/L    Hemoglobin 14.1 12.5 - 16.5 g/dL    Hematocrit 43.2 37.0 - 54.0 %    MCV 94.3 80.0 - 99.9 fL    MCH 30.8 26.0 - 35.0 pg    MCHC 32.6 32.0 - 34.5 %    RDW 14.1 11.5 - 15.0 fL    Platelets 504 638 - 330 E9/L    MPV 11.0 7.0 - 12.0 fL    Neutrophils % 47.9 43.0 - 80.0 %    Immature Granulocytes % 0.2 0.0 - 5.0 %    Lymphocytes % 34.9 20.0 - 42.0 %    Monocytes % 13.8 (H) 2.0 - 12.0 %    Eosinophils % 2.5 0.0 - 6.0 % Basophils % 0.7 0.0 - 2.0 %    Neutrophils Absolute 4.60 1.80 - 7.30 E9/L    Immature Granulocytes # 0.02 E9/L    Lymphocytes Absolute 3.35 1.50 - 4.00 E9/L    Monocytes Absolute 1.32 (H) 0.10 - 0.95 E9/L    Eosinophils Absolute 0.24 0.05 - 0.50 E9/L    Basophils Absolute 0.07 0.00 - 0.20 E9/L   CMP   Result Value Ref Range    Sodium 141 132 - 146 mmol/L    Potassium 4.2 3.5 - 5.0 mmol/L    Chloride 106 98 - 107 mmol/L    CO2 26 22 - 29 mmol/L    Anion Gap 9 7 - 16 mmol/L    Glucose 104 (H) 74 - 99 mg/dL    BUN 18 6 - 23 mg/dL    Creatinine 1.6 (H) 0.7 - 1.2 mg/dL    Est, Glom Filt Rate 48 >=60 mL/min/1.73    Calcium 8.9 8.6 - 10.2 mg/dL    Total Protein 7.4 6.4 - 8.3 g/dL    Albumin 4.0 3.5 - 5.2 g/dL    Total Bilirubin 0.2 0.0 - 1.2 mg/dL    Alkaline Phosphatase 104 40 - 129 U/L    ALT 12 0 - 40 U/L    AST 24 0 - 39 U/L   Troponin   Result Value Ref Range    Troponin, High Sensitivity 17 (H) 0 - 11 ng/L   Brain Natriuretic Peptide   Result Value Ref Range    Pro- (H) 0 - 125 pg/mL   Serum Drug Screen   Result Value Ref Range    Ethanol Lvl <10 mg/dL    Acetaminophen Level <5.0 (L) 10.0 - 85.4 mcg/mL    Salicylate, Serum 0.9 0.0 - 30.0 mg/dL    TCA Scrn NEGATIVE Cutoff:300 ng/mL   Troponin   Result Value Ref Range    Troponin, High Sensitivity 17 (H) 0 - 11 ng/L       RADIOLOGY:   Interpretation per the Radiologist below, if available at the time of this note:    XR CHEST PORTABLE   Final Result   No acute disease. RECOMMENDATION:   Careful clinical correlation and follow up recommended. No results found. No results found. See preliminary interpretation by me below. EKG: This EKG is signed and interpreted by me. Normal sinus rhythm. Intervals normal.  QTc not prolonged. No evidence of acute ST elevation MI. Nonspecific T wave abnormalities.   No significant changes compared to previous EKG on 10/22/2022.      ------------------------- NURSING NOTES AND VITALS REVIEWED ---------------------------   The nursing notes within the ED encounter and vital signs as below have been reviewed by myself.  BP (!) 144/57   Pulse 62   Temp 97.7 °F (36.5 °C)   Resp 13   Ht 5' 11\" (1.803 m)   Wt 185 lb (83.9 kg)   SpO2 98%   BMI 25.80 kg/m²   Oxygen Saturation Interpretation: Normal    The patient’s available past medical records and past encounters were reviewed.        ------------------------------ ED COURSE/MEDICAL DECISION MAKING----------------------  Medications   aspirin chewable tablet 324 mg (324 mg Oral Given 2/18/23 2142)       Medical Decision Making/Differential Diagnosis:    CC/HPI Summary, Pertinent Physical Exam Findings, Social Determinants of health, Records Reviewed, DDx, testing done/not done, ED Course, Reassessment, disposition considerations/shared decision making with patient, consults, disposition:        Medical Decision Making:   I, Dr. Adalgisa Rey am the resident physician of record.      History From: Patient    Limitations to history :  Behavior, patient is agitated and resistant to giving a history.  He is stating that he does not want to be here and wants to go home.  He is a poor historian.     Ronal Pederson is a 62 y.o. male who presents to the ED for chest pain  Vital signs upon arrival BP (!) 144/57   Pulse 62   Temp 97.7 °F (36.5 °C)   Resp 13   Ht 5' 11\" (1.803 m)   Wt 185 lb (83.9 kg)   SpO2 98%   BMI 25.80 kg/m²     On initial evaluation, patient is nontoxic-appearing, afebrile, hemodynamically stable and in no acute distress. Working diagnoses include but not limited to ACS, pneumonia, pulmonary embolism, AAA, aortic dissection, costochondritis, pleurisy, pericardial effusion and pericarditis.   Physical exam was essentially benign.  Lungs were clear to auscultation with no wheezes, rales or rhonchi.  Chest wall with no tenderness or crepitus on palpation.  Heart regular rhythm with normal rate.  No significant pretibial edema.  No other  concerning physical exam findings. Work-up in the emergency department generally unremarkable. Lab work-up as interpreted by me include CBC with no leukocytosis, left shift or significant anemia. WBC 9.6 and hemoglobin stable at 14.1. CMP remarkable for acute renal insufficiency. Patient had creatinine of 1.6/BUN 18. Baseline serum creatinine 1.2. No major electrolyte abnormalities including normal potassium of 4.2 and sodium of 141. LFTs within normal limits. Viral testing negative for COVID and influenza. Serum drug screen with unremarkable levels of ethanol, acetaminophen and salicylates. TCA negative. Cardiac evaluation benign. Patient had initial troponin 17 with a repeat of 17, delta 0. EKG sinus and nonspecific with no acute ischemic changes. Chest x-ray was clear. Imaging as interpreted by me detailed below with official radiology read above. proBNP 262. No clinical signs of fluid overload on exam including no significant lower extremity edema or crackles on auscultation of the lungs. No signs of acute respiratory distress. Patient was given oral aspirin 324 mg in the emergency department. Did attempt to give patient IV fluids as well due to JOSE however patient was adamantly refusing. He stated that he would rather be discharged back to the facility and orally rehydrate. Patient competent to make this decision. He is stable for discharge. Patient agreeable with discharge after shared decision making. He was given return precautions in case of new or in symptoms including but not limited to worsening chest pain or shortness of breath. Patient discharged in stable condition.     Non-plain film images such as CT, Ultrasound and MRI are read by the radiologist. [facility] radiographic images are visualized and preliminarily interpreted by the ED Provider with the below findings:    Chest x-ray with no obvious focal consolidation suggestive of pneumonia, large pleural effusions or pneumothorax. Normal cardiac silhouette. Discussion with Other Profesionals : None    Social Determinants : None    Records Reviewed : Care Everywhere admission note from 1/12/2022 reviewed. Patient was admitted at [facility] for COVID-pneumonia with acute hypoxic respiratory failure, JOSE and hypotension. Patient was treated with azithromycin, Rocephin and dexamethasone and IV fluids. He was discharged once clinically stable. Chronic conditions: CAD and hypertension     CONSULTS: None      Disposition:   Appropriate for outpatient management      Pt will be d/c and will follow up with his PCP . He is educated on signs and symptoms that require emergent evaluation. Pt is advised to return to the ED if his symptoms change or worsen. If his pain persists, pt may need further evaluation. Pt is agreeable to plan and all questions have been answered at this time. 1. Chest pain, unspecified type    2. JOSE (acute kidney injury) [facility])          Re-Evaluations/Consultations:             ED Course as of 02/19/23 1616   Sun Feb 19, 2023   0029 Patient is refusing IV fluids at this time. I did talk to the patient personally and he states that he does not want any fluids and would rather be discharged and orally rehydrate. Patient requesting to see his heart doctor and specialist and can do this as an outpatient. [PP]      ED Course User Index  [PP] Tania Moreno,          This patient's ED course included: History, physical examination, reevaluation prior to disposition    This patient has remained hemodynamically stable during their ED course. Counseling: The emergency provider has spoken with the patient and discussed todays results, in addition to providing specific details for the plan of care and counseling regarding the diagnosis and prognosis.   Questions are answered at this time and they are agreeable with the plan.       --------------------------------- IMPRESSION AND DISPOSITION ---------------------------------    IMPRESSION  1. Chest pain, unspecified type    2. [facility]        DISPOSITION  Disposition: Discharge to [facility]  Patient condition is stable        NOTE: This report was transcribed using voice recognition software.  Every effort was made to ensure accuracy; however, inadvertent computerized transcription errors may be present          Tiarra Hatfield DO  Resident  02/19/23 5468

## 2023-02-20 ENCOUNTER — APPOINTMENT (OUTPATIENT)
Dept: GENERAL RADIOLOGY | Age: 63
End: 2023-02-20
Payer: MEDICARE

## 2023-02-20 ENCOUNTER — HOSPITAL ENCOUNTER (OUTPATIENT)
Age: 63
Setting detail: OBSERVATION
Discharge: PSYCHIATRIC OR SUBSTANCE USE FACILITY | End: 2023-02-24
Attending: STUDENT IN AN ORGANIZED HEALTH CARE EDUCATION/TRAINING PROGRAM | Admitting: INTERNAL MEDICINE
Payer: MEDICARE

## 2023-02-20 DIAGNOSIS — R07.9 CHEST PAIN, UNSPECIFIED TYPE: Primary | ICD-10-CM

## 2023-02-20 PROBLEM — I25.10 CORONARY ARTERY DISEASE: Status: ACTIVE | Noted: 2023-02-20

## 2023-02-20 PROBLEM — I50.9 CONGESTIVE HEART FAILURE (CHF) (HCC): Status: ACTIVE | Noted: 2023-02-20

## 2023-02-20 PROBLEM — E78.5 HYPERLIPIDEMIA: Status: ACTIVE | Noted: 2023-02-20

## 2023-02-20 PROBLEM — N18.9 CHRONIC KIDNEY DISEASE: Status: ACTIVE | Noted: 2023-02-20

## 2023-02-20 PROBLEM — I10 HYPERTENSION: Status: ACTIVE | Noted: 2023-02-20

## 2023-02-20 PROBLEM — F99 PSYCHIATRIC ILLNESS: Status: ACTIVE | Noted: 2023-02-20

## 2023-02-20 LAB
ALBUMIN SERPL-MCNC: 4.1 G/DL (ref 3.5–5.2)
ALP BLD-CCNC: 103 U/L (ref 40–129)
ALT SERPL-CCNC: 11 U/L (ref 0–40)
ANION GAP SERPL CALCULATED.3IONS-SCNC: 11 MMOL/L (ref 7–16)
AST SERPL-CCNC: 21 U/L (ref 0–39)
BASOPHILS ABSOLUTE: 0.07 E9/L (ref 0–0.2)
BASOPHILS RELATIVE PERCENT: 0.8 % (ref 0–2)
BILIRUB SERPL-MCNC: 0.5 MG/DL (ref 0–1.2)
BUN BLDV-MCNC: 16 MG/DL (ref 6–23)
CALCIUM SERPL-MCNC: 8.9 MG/DL (ref 8.6–10.2)
CHLORIDE BLD-SCNC: 107 MMOL/L (ref 98–107)
CO2: 25 MMOL/L (ref 22–29)
CREAT SERPL-MCNC: 1.2 MG/DL (ref 0.7–1.2)
EKG ATRIAL RATE: 69 BPM
EKG P AXIS: 48 DEGREES
EKG P-R INTERVAL: 152 MS
EKG Q-T INTERVAL: 424 MS
EKG QRS DURATION: 86 MS
EKG QTC CALCULATION (BAZETT): 454 MS
EKG R AXIS: -37 DEGREES
EKG T AXIS: 56 DEGREES
EKG VENTRICULAR RATE: 69 BPM
EOSINOPHILS ABSOLUTE: 0.25 E9/L (ref 0.05–0.5)
EOSINOPHILS RELATIVE PERCENT: 2.8 % (ref 0–6)
GFR SERPL CREATININE-BSD FRML MDRD: >60 ML/MIN/1.73
GLUCOSE BLD-MCNC: 93 MG/DL (ref 74–99)
HCT VFR BLD CALC: 42.2 % (ref 37–54)
HEMOGLOBIN: 14.1 G/DL (ref 12.5–16.5)
IMMATURE GRANULOCYTES #: 0.03 E9/L
IMMATURE GRANULOCYTES %: 0.3 % (ref 0–5)
INR BLD: 1.5
LYMPHOCYTES ABSOLUTE: 2.57 E9/L (ref 1.5–4)
LYMPHOCYTES RELATIVE PERCENT: 29.2 % (ref 20–42)
MCH RBC QN AUTO: 30.4 PG (ref 26–35)
MCHC RBC AUTO-ENTMCNC: 33.4 % (ref 32–34.5)
MCV RBC AUTO: 90.9 FL (ref 80–99.9)
MONOCYTES ABSOLUTE: 1.02 E9/L (ref 0.1–0.95)
MONOCYTES RELATIVE PERCENT: 11.6 % (ref 2–12)
NEUTROPHILS ABSOLUTE: 4.86 E9/L (ref 1.8–7.3)
NEUTROPHILS RELATIVE PERCENT: 55.3 % (ref 43–80)
PDW BLD-RTO: 14.3 FL (ref 11.5–15)
PLATELET # BLD: 264 E9/L (ref 130–450)
PMV BLD AUTO: 10.5 FL (ref 7–12)
POTASSIUM SERPL-SCNC: 3.7 MMOL/L (ref 3.5–5)
PRO-BNP: 332 PG/ML (ref 0–125)
PROTHROMBIN TIME: 16.9 SEC (ref 9.3–12.4)
RBC # BLD: 4.64 E12/L (ref 3.8–5.8)
SODIUM BLD-SCNC: 143 MMOL/L (ref 132–146)
TOTAL PROTEIN: 7.4 G/DL (ref 6.4–8.3)
TROPONIN, HIGH SENSITIVITY: 15 NG/L (ref 0–11)
TROPONIN, HIGH SENSITIVITY: 15 NG/L (ref 0–11)
WBC # BLD: 8.8 E9/L (ref 4.5–11.5)

## 2023-02-20 PROCEDURE — 83880 ASSAY OF NATRIURETIC PEPTIDE: CPT

## 2023-02-20 PROCEDURE — 93010 ELECTROCARDIOGRAM REPORT: CPT | Performed by: INTERNAL MEDICINE

## 2023-02-20 PROCEDURE — 99285 EMERGENCY DEPT VISIT HI MDM: CPT

## 2023-02-20 PROCEDURE — 84484 ASSAY OF TROPONIN QUANT: CPT

## 2023-02-20 PROCEDURE — 71045 X-RAY EXAM CHEST 1 VIEW: CPT

## 2023-02-20 PROCEDURE — G0378 HOSPITAL OBSERVATION PER HR: HCPCS

## 2023-02-20 PROCEDURE — 6370000000 HC RX 637 (ALT 250 FOR IP): Performed by: EMERGENCY MEDICINE

## 2023-02-20 PROCEDURE — 85025 COMPLETE CBC W/AUTO DIFF WBC: CPT

## 2023-02-20 PROCEDURE — 85610 PROTHROMBIN TIME: CPT

## 2023-02-20 PROCEDURE — 80053 COMPREHEN METABOLIC PANEL: CPT

## 2023-02-20 PROCEDURE — 6370000000 HC RX 637 (ALT 250 FOR IP): Performed by: INTERNAL MEDICINE

## 2023-02-20 PROCEDURE — 36415 COLL VENOUS BLD VENIPUNCTURE: CPT

## 2023-02-20 PROCEDURE — 93005 ELECTROCARDIOGRAM TRACING: CPT | Performed by: EMERGENCY MEDICINE

## 2023-02-20 RX ORDER — POTASSIUM CHLORIDE 750 MG/1
10 TABLET, EXTENDED RELEASE ORAL DAILY
Status: DISCONTINUED | OUTPATIENT
Start: 2023-02-21 | End: 2023-02-24 | Stop reason: HOSPADM

## 2023-02-20 RX ORDER — ASPIRIN 81 MG/1
81 TABLET, CHEWABLE ORAL DAILY
Status: DISCONTINUED | OUTPATIENT
Start: 2023-02-21 | End: 2023-02-24 | Stop reason: HOSPADM

## 2023-02-20 RX ORDER — ACETAMINOPHEN 650 MG/1
650 SUPPOSITORY RECTAL EVERY 6 HOURS PRN
Status: DISCONTINUED | OUTPATIENT
Start: 2023-02-20 | End: 2023-02-24 | Stop reason: HOSPADM

## 2023-02-20 RX ORDER — ONDANSETRON 4 MG/1
4 TABLET, ORALLY DISINTEGRATING ORAL EVERY 8 HOURS PRN
Status: DISCONTINUED | OUTPATIENT
Start: 2023-02-20 | End: 2023-02-24 | Stop reason: HOSPADM

## 2023-02-20 RX ORDER — SODIUM CHLORIDE 0.9 % (FLUSH) 0.9 %
5-40 SYRINGE (ML) INJECTION PRN
Status: DISCONTINUED | OUTPATIENT
Start: 2023-02-20 | End: 2023-02-24 | Stop reason: HOSPADM

## 2023-02-20 RX ORDER — CARVEDILOL 25 MG/1
25 TABLET ORAL 2 TIMES DAILY WITH MEALS
Status: ON HOLD | COMMUNITY

## 2023-02-20 RX ORDER — DOCUSATE SODIUM 100 MG/1
100 CAPSULE, LIQUID FILLED ORAL 2 TIMES DAILY PRN
Status: ON HOLD | COMMUNITY

## 2023-02-20 RX ORDER — LISINOPRIL 20 MG/1
20 TABLET ORAL DAILY
Status: DISCONTINUED | OUTPATIENT
Start: 2023-02-21 | End: 2023-02-24 | Stop reason: HOSPADM

## 2023-02-20 RX ORDER — SODIUM CHLORIDE 9 MG/ML
INJECTION, SOLUTION INTRAVENOUS PRN
Status: DISCONTINUED | OUTPATIENT
Start: 2023-02-20 | End: 2023-02-24 | Stop reason: HOSPADM

## 2023-02-20 RX ORDER — POLYETHYLENE GLYCOL 3350 17 G/17G
17 POWDER, FOR SOLUTION ORAL DAILY PRN
Status: DISCONTINUED | OUTPATIENT
Start: 2023-02-20 | End: 2023-02-24 | Stop reason: HOSPADM

## 2023-02-20 RX ORDER — MIRTAZAPINE 30 MG/1
30 TABLET, FILM COATED ORAL NIGHTLY
Status: ON HOLD | COMMUNITY

## 2023-02-20 RX ORDER — ACETAMINOPHEN 325 MG/1
650 TABLET ORAL EVERY 6 HOURS PRN
Status: ON HOLD | COMMUNITY

## 2023-02-20 RX ORDER — CYCLOBENZAPRINE HCL 10 MG
5 TABLET ORAL 2 TIMES DAILY PRN
Status: DISCONTINUED | OUTPATIENT
Start: 2023-02-20 | End: 2023-02-24 | Stop reason: HOSPADM

## 2023-02-20 RX ORDER — ENOXAPARIN SODIUM 100 MG/ML
40 INJECTION SUBCUTANEOUS DAILY
Status: DISCONTINUED | OUTPATIENT
Start: 2023-02-21 | End: 2023-02-20

## 2023-02-20 RX ORDER — POLYETHYLENE GLYCOL 3350 17 G/17G
17 POWDER, FOR SOLUTION ORAL DAILY PRN
Status: ON HOLD | COMMUNITY

## 2023-02-20 RX ORDER — AMLODIPINE BESYLATE 10 MG/1
15 TABLET ORAL DAILY
Status: ON HOLD | COMMUNITY

## 2023-02-20 RX ORDER — LISINOPRIL 20 MG/1
20 TABLET ORAL DAILY
Status: ON HOLD | COMMUNITY

## 2023-02-20 RX ORDER — POTASSIUM CHLORIDE 750 MG/1
10 CAPSULE, EXTENDED RELEASE ORAL DAILY
Status: ON HOLD | COMMUNITY

## 2023-02-20 RX ORDER — ROSUVASTATIN CALCIUM 20 MG/1
20 TABLET, COATED ORAL DAILY
Status: ON HOLD | COMMUNITY

## 2023-02-20 RX ORDER — SODIUM CHLORIDE 0.9 % (FLUSH) 0.9 %
5-40 SYRINGE (ML) INJECTION EVERY 12 HOURS SCHEDULED
Status: DISCONTINUED | OUTPATIENT
Start: 2023-02-20 | End: 2023-02-24 | Stop reason: HOSPADM

## 2023-02-20 RX ORDER — MIRTAZAPINE 15 MG/1
30 TABLET, FILM COATED ORAL NIGHTLY
Status: DISCONTINUED | OUTPATIENT
Start: 2023-02-20 | End: 2023-02-24 | Stop reason: HOSPADM

## 2023-02-20 RX ORDER — DOCUSATE SODIUM 100 MG/1
100 CAPSULE, LIQUID FILLED ORAL 2 TIMES DAILY PRN
Status: DISCONTINUED | OUTPATIENT
Start: 2023-02-20 | End: 2023-02-24 | Stop reason: HOSPADM

## 2023-02-20 RX ORDER — PANTOPRAZOLE SODIUM 20 MG/1
20 TABLET, DELAYED RELEASE ORAL DAILY
Status: DISCONTINUED | OUTPATIENT
Start: 2023-02-21 | End: 2023-02-24 | Stop reason: HOSPADM

## 2023-02-20 RX ORDER — ONDANSETRON 2 MG/ML
4 INJECTION INTRAMUSCULAR; INTRAVENOUS EVERY 6 HOURS PRN
Status: DISCONTINUED | OUTPATIENT
Start: 2023-02-20 | End: 2023-02-24 | Stop reason: HOSPADM

## 2023-02-20 RX ORDER — ONDANSETRON 4 MG/1
4 TABLET, FILM COATED ORAL EVERY 6 HOURS PRN
Status: ON HOLD | COMMUNITY

## 2023-02-20 RX ORDER — PANTOPRAZOLE SODIUM 20 MG/1
20 TABLET, DELAYED RELEASE ORAL DAILY
Status: ON HOLD | COMMUNITY

## 2023-02-20 RX ORDER — CYCLOBENZAPRINE HCL 5 MG
5 TABLET ORAL 2 TIMES DAILY PRN
Status: ON HOLD | COMMUNITY

## 2023-02-20 RX ORDER — ROSUVASTATIN CALCIUM 20 MG/1
20 TABLET, COATED ORAL NIGHTLY
Status: DISCONTINUED | OUTPATIENT
Start: 2023-02-20 | End: 2023-02-24 | Stop reason: HOSPADM

## 2023-02-20 RX ORDER — ACETAMINOPHEN 325 MG/1
650 TABLET ORAL EVERY 6 HOURS PRN
Status: DISCONTINUED | OUTPATIENT
Start: 2023-02-20 | End: 2023-02-24 | Stop reason: HOSPADM

## 2023-02-20 RX ORDER — NITROGLYCERIN 0.4 MG/1
0.4 TABLET SUBLINGUAL EVERY 5 MIN PRN
Status: ON HOLD | COMMUNITY

## 2023-02-20 RX ADMIN — MIRTAZAPINE 30 MG: 15 TABLET, FILM COATED ORAL at 23:01

## 2023-02-20 RX ADMIN — ASPIRIN 325 MG: 325 TABLET, COATED ORAL at 19:53

## 2023-02-20 RX ADMIN — APIXABAN 5 MG: 5 TABLET, FILM COATED ORAL at 23:00

## 2023-02-20 RX ADMIN — ROSUVASTATIN CALCIUM 20 MG: 20 TABLET, FILM COATED ORAL at 23:01

## 2023-02-20 ASSESSMENT — ENCOUNTER SYMPTOMS
COUGH: 0
VOMITING: 0
SHORTNESS OF BREATH: 0
SORE THROAT: 0
BACK PAIN: 0
ABDOMINAL PAIN: 0
DIARRHEA: 0
NAUSEA: 0
EYE PAIN: 0

## 2023-02-20 ASSESSMENT — PAIN DESCRIPTION - LOCATION: LOCATION: CHEST

## 2023-02-20 ASSESSMENT — PAIN DESCRIPTION - ORIENTATION: ORIENTATION: LEFT

## 2023-02-20 ASSESSMENT — PAIN SCALES - GENERAL: PAINLEVEL_OUTOF10: 6

## 2023-02-20 ASSESSMENT — PAIN - FUNCTIONAL ASSESSMENT: PAIN_FUNCTIONAL_ASSESSMENT: 0-10

## 2023-02-20 NOTE — Clinical Note
Followed up with Iain Madrid on 2/20/2023 at 8:21 PM. Patient left the ED with a disposition of AMA on . Patient cited not wanting to stay as reason. Advised patient to follow up with a primary care physician or return to the Emergency Department if sy mptoms worsen.    Guido Tijerina, DO

## 2023-02-20 NOTE — LETTER
[place] Department Medicaid  CERTIFICATION OF NECESSITY  FOR NON-EMERGENCY TRANSPORTATION   BY GROUND AMBULANCE      Individual Information   1. Name: Logan Grijalva 2. [place] Medicaid Billing Number:    3. Address: [address] [place] [address]      Transportation Provider Information   4. Provider Name:    5. [place] [employer] Provider Number:  National Provider Identifier (NPI):      Certification  7. Criteria:  During transport, this individual requires:  [x] Medical treatment or continuous     supervision by an EMT. [] The administration or regulation of oxygen by another person. [] Supervised protective restraint. 8. Period Beginning Date:    5. Length  [x] Not more than 1 day(s)  [] One Year     Additional Information Relevant to Certification   10. Comments or Explanations, If Necessary or Appropriate   Chest pain, from [facility]       Certifying Practitioner Information   11. Name of Adrien Enriquez MD   12. [place] Medicaid Provider Number, If Applicable:  [address] Provider Identifier (NPI):      Signature Information   14. Date of Signature:  13. Name of Person Signin. Signature and Professional Designation: :Ivana Morris MD     [facility] [id number]  Rev. 2015         [facility] Encounter Date/Time: 2023 [facility] Account: [de-identified]    MRN: [id number]    Patient: Logan Grijalva    Contact Serial #: [id number]      ENCOUNTER          Patient Class: OBS Private Enc? No Unit RM BD: SEYZ  [id number]   Hospital Service:  INM   Encounter DX: Chest pain [R07.9]   ADM Provider: Jovan Quinn MD   Procedure:     ATT Provider: Ivana Morris MD   REF Provider:        Admission DX: Chest pain, Chest pain, unspecified type and DX codes: R07.9, R07.9      PATIENT                 Name: Loagn Grijalva : 1960 (62 yrs)   Address: [address] Sex: Male   City: [address] [place] [address]         Marital Status: Single   Employer: NOT EMPLOYED         Orthodox: Unknown   Primary Care Provider:           Primary Phone: Belgica Kaur Neno   Contact Name Legal Guardian? Relationship to Patient Home Phone Work Phone   1. izabella coker  2. *No Contact Specified*      Child                      GUARANTOR            Guarantor: Zandra eBjarano     : 1960   Address: [address]  Sex: Male     [place] [address]     Relation to Patient: Self       Home Phone: [phone]   Guarantor ID: [id number]       Work Phone:     Guarantor Employer: NOT EMPLOYED         Status: NOT EMPLO*      COVERAGE        PRIMARY INSURANCE   Payor: [facility] MEDICARE [facility]   Payor Address: [facility] [id number]       Group Number: [id number] Type: INDEMNITY   Subscriber Name: Fam Duncan : 1960   Subscriber ID: [id number] Pat. Rel. to Sub: Self   SECONDARY INSURANCE   Payor:   Plan:     Payor Address:  ,           Group Number:   Insurance Type:     Subscriber Name:   Subscriber :     Subscriber ID:   Pat.  Rel. to Sub:        CSN: [id number]

## 2023-02-20 NOTE — ED PROVIDER NOTES
[address]        Pt Name: Chandrakant Hirsch  MRN: [id number]  [place] 1960  Date of evaluation: 2/20/2023  Provider: Leopoldo Sol DO  PCP: No primary care provider on file. Note Started: 3:44 PM EST 2/20/23    CHIEF COMPLAINT       Chief Complaint   Patient presents with    Chest Pain     Left side chest pains that started earlier today,, from [facility], pink slipped to facility for increased depressive thoughts, denies SI/HI currently        HISTORY OF PRESENT ILLNESS: 1 or more Elements   History From: Patient    Limitations to history : None    Chandrakant Hirsch is a 57 y.o. male who presents with complaint of left-sided chest pain. He says this started earlier today, nothing is made it better or worse, not associated with headaches or vision changes, no shortness of breath, no nausea, vomiting, numbness, weakness, tingling. He was seen in the emergency department for the same thing on 2/18. He is currently coming from [facility] where he was pink slipped for increased depressive thoughts. He is denying suicidal or homicidal ideations at this time. No other acute complaints. Nursing Notes were all reviewed and agreed with or any disagreements were addressed in the HPI. REVIEW OF SYSTEMS :      Positives and Pertinent negatives as per HPI.      SURGICAL HISTORY     Past Surgical History:   Procedure Laterality Date    CARDIAC SURGERY         CURRENTMEDICATIONS       Previous Medications    ACETAMINOPHEN (TYLENOL) 325 MG TABLET    Take 650 mg by mouth every 6 hours as needed for Pain    AMLODIPINE (NORVASC) 10 MG TABLET    Take 15 mg by mouth daily    APIXABAN (ELIQUIS) 5 MG TABS TABLET    Take 5 mg by mouth 2 times daily    CARVEDILOL (COREG) 25 MG TABLET    Take 25 mg by mouth 2 times daily (with meals)    CYCLOBENZAPRINE (FLEXERIL) 5 MG TABLET    Take 5 mg by mouth 2 times daily as needed for Muscle spasms DOCUSATE SODIUM (COLACE) 100 MG CAPSULE    Take 100 mg by mouth 2 times daily as needed for Constipation    LISINOPRIL (PRINIVIL;ZESTRIL) 20 MG TABLET    Take 20 mg by mouth daily    MIRTAZAPINE (REMERON) 30 MG TABLET    Take 30 mg by mouth nightly    NITROGLYCERIN (NITROSTAT) 0.4 MG SL TABLET    Place 0.4 mg under the tongue every 5 minutes as needed for Chest pain up to max of 3 total doses. If no relief after 1 dose, call 911. ONDANSETRON (ZOFRAN) 4 MG TABLET    Take 4 mg by mouth every 6 hours as needed for Nausea or Vomiting    PANTOPRAZOLE (PROTONIX) 20 MG TABLET    Take 20 mg by mouth daily    POLYETHYLENE GLYCOL (GLYCOLAX) 17 G PACKET    Take 17 g by mouth daily as needed for Constipation    POTASSIUM CHLORIDE (MICRO-K) 10 MEQ EXTENDED RELEASE CAPSULE    Take 10 mEq by mouth daily    ROSUVASTATIN (CRESTOR) 20 MG TABLET    Take 20 mg by mouth daily       ALLERGIES     Patient has no known allergies. FAMILYHISTORY       Family History   Problem Relation Age of Onset    Heart Failure Other         SOCIAL HISTORY       Social History     Tobacco Use    Smoking status: Former     Types: Cigarettes    Smokeless tobacco: Never   Substance Use Topics    Alcohol use: Not Currently       SCREENINGS        Jimmy Coma Scale  Eye Opening: Spontaneous  Best Verbal Response: Oriented  Best Motor Response: Obeys commands  [place] Coma Scale Score: 15                CIWA Assessment  BP: 121/81  Heart Rate: 60           PHYSICAL EXAM  1 or more Elements     ED Triage Vitals [02/20/23 1528]   BP Temp Temp src Heart Rate Resp SpO2 Height Weight   121/81 97.8 °F (36.6 °C) -- 60 16 97 % -- --     Constitutional/General: Alert and oriented x3  Head: Normocephalic and atraumatic  Eyes: PERRL, EOMI, conjunctiva normal, sclera non icteric  ENT:  Oropharynx clear, handling secretions  Neck: Supple, full ROM, no stridor  Respiratory: Lungs clear to auscultation bilaterally, no wheezes, rales, or rhonchi.  Not in respiratory distress  Cardiovascular:  Regular rate. Regular rhythm. 2+ distal pulses. Equal extremity pulses. Chest: No chest wall tenderness, healed large incision overlying and extending above and below sternum  GI:  Abdomen Soft, Non tender, Non distended. No rebound, guarding, or rigidity. Musculoskeletal: Moves all extremities x 4. Warm and well perfused, no cyanosis, no edema. Capillary refill <3 seconds  Integument: skin warm and dry. No rashes. Neurologic: GCS 15, no focal deficits, symmetric strength 5/5 in the upper and lower extremities bilaterally  Psychiatric: Normal Affect      DIAGNOSTIC RESULTS   LABS:    Labs Reviewed   CBC WITH AUTO DIFFERENTIAL - Abnormal; Notable for the following components:       Result Value    Monocytes Absolute 1.02 (*)     All other components within normal limits   PROTIME-INR - Abnormal; Notable for the following components:    Protime 16.9 (*)     All other components within normal limits   TROPONIN - Abnormal; Notable for the following components:    Troponin, High Sensitivity 15 (*)     All other components within normal limits   BRAIN NATRIURETIC PEPTIDE - Abnormal; Notable for the following components:    Pro- (*)     All other components within normal limits   TROPONIN - Abnormal; Notable for the following components:    Troponin, High Sensitivity 15 (*)     All other components within normal limits   COMPREHENSIVE METABOLIC PANEL   CBC   COMPREHENSIVE METABOLIC PANEL W/ REFLEX TO MG FOR LOW K   MAGNESIUM   BRAIN NATRIURETIC PEPTIDE   PROTIME-INR   APTT   TROPONIN       As interpreted by me, the above displayed labs are abnormal. All other labs obtained during this visit were within normal range or not returned as of this dictation.   RADIOLOGY:   Non-plain film images such as CT, Ultrasound and MRI are read by the radiologist. Plain radiographic images are visualized and preliminarily interpreted by the ED Provider with the below findings:    Interpretation per the Radiologist below, if available at the time of this note:    XR CHEST PORTABLE   Final Result   Mild congestive changes, no evidence of acute cardiopulmonary disease. NM Cardiac Stress Test Nuclear Imaging    (Results Pending)     XR CHEST PORTABLE    Result Date: 2/18/2023  EXAMINATION: ONE XRAY VIEW OF THE CHEST 2/18/2023 9:27 pm COMPARISON: October 22, 2022 HISTORY: ORDERING SYSTEM PROVIDED HISTORY: chest pain TECHNOLOGIST PROVIDED HISTORY: Reason for exam:->chest pain What reading provider will be dictating this exam?->CRC FINDINGS: Normal cardiomediastinal silhouette. Lungs clear. No pneumothorax or effusion. Osseous thorax intact. Sternal wires and lower cervical ACDF noted. No acute disease. RECOMMENDATION: Careful clinical correlation and follow up recommended. No results found. PROCEDURES   Unless otherwise noted below, none    PAST MEDICAL HISTORY/Chronic Conditions Affecting Care      has a past medical history of Atrial fibrillation ([address] Chronic kidney disease, Congestive heart failure (CHF) ([address] Coronary artery disease, Hyperlipidemia, and Hypertension.      EMERGENCY DEPARTMENT COURSE    Vitals:    Vitals:    02/20/23 1528   BP: 121/81   Pulse: 60   Resp: 16   Temp: 97.8 °F (36.6 °C)   SpO2: 97%       Patient was given the following medications:  Medications   sodium chloride flush 0.9 % injection 5-40 mL (has no administration in time range)   sodium chloride flush 0.9 % injection 5-40 mL (has no administration in time range)   0.9 % sodium chloride infusion (has no administration in time range)   ondansetron (ZOFRAN-ODT) disintegrating tablet 4 mg (has no administration in time range)     Or   ondansetron (ZOFRAN) injection 4 mg (has no administration in time range)   acetaminophen (TYLENOL) tablet 650 mg (has no administration in time range)     Or   acetaminophen (TYLENOL) suppository 650 mg (has no administration in time range)   polyethylene glycol (GLYCOLAX) packet 17 g (has no administration in time range)   aspirin chewable tablet 81 mg (has no administration in time range)   perflutren lipid microspheres (DEFINITY) injection 1.5 mL (has no administration in time range)   rosuvastatin (CRESTOR) tablet 20 mg (20 mg Oral Given 2/20/23 2301)   regadenoson (LEXISCAN) injection 0.4 mg (has no administration in time range)   amLODIPine (NORVASC) tablet 15 mg (has no administration in time range)   apixaban (ELIQUIS) tablet 5 mg (5 mg Oral Given 2/20/23 2300)   cyclobenzaprine (FLEXERIL) tablet 5 mg (has no administration in time range)   docusate sodium (COLACE) capsule 100 mg (has no administration in time range)   lisinopril (PRINIVIL;ZESTRIL) tablet 20 mg (has no administration in time range)   mirtazapine (REMERON) tablet 30 mg (30 mg Oral Given 2/20/23 2301)   pantoprazole (PROTONIX) tablet 20 mg (has no administration in time range)   potassium chloride (KLOR-CON M) extended release tablet 10 mEq (has no administration in time range)   aspirin EC tablet 325 mg (325 mg Oral Given 2/20/23 1953)       Medical Decision Making/Differential Diagnosis:  CC/HPI Summary, Social Determinants of health, Records Reviewed, DDx, testing done/not done, ED Course, Reassessment, disposition considerations/shared decision making with patient, consults, disposition:        CC/HPI Summary, DDx, ED Course, Reassessment, Tests Considered, Patient expectation:   53-year-old gentleman with past medical history of atrial fibrillation on Eliquis, CKD, CHF, hypertension, hyperlipidemia, CAD with prior history of CABG presenting today with chest pain. It is left-sided, nothing makes better or worse, nonradiating, no other acute complaints. On chart review, he was seen in the emergency department for the same thing on 2/18. Differential diagnosis to include but not limited to ACS, pneumonia, musculoskeletal pain.   EKG with nonspecific ST changes that are unchanged from prior baseline, delta troponin unremarkable, no other acute abnormalities noted on lab work to explain his symptoms as BNP, electrolytes, kidney liver function are within normal limits. Chest x-ray without acute cardiopulmonary process, there are mild CHF changes. He was given aspirin while in the emergency department. On discussion with patient as he is rather high risk and this is second time being seen in the emergency department within the past few days, he would be admitted for chest pain evaluation with pending echo and stress test.  Patient was initially agreeable to and then wanting to sign out [address] and then again agreeable to admission for chest pain evaluation once he realized the gravity of the scenario. Discussed with internal medicine hospitalist physician and he will be admitted for management and evaluation of his chest pain. ED Course as of 02/20/23 2307   Mon Feb 20, 2023   1547 EKG: This EKG is signed and interpreted by me. Rate: 61  Rhythm: Sinus  Interpretation: no acute changes, no ST elevations, nonspecific ST changes unchanged from prior, QTc 434, left axis deviation  Comparison: stable as compared to patient's most recent EKG on 2/18/2023   [MM]   1913 Discussed with internal medicine hospitalist physician and he will accept for admission. [MM]   2020 Patient is signing out AMA. [MM]   2051 Patient decided that his chest pain does need to be admitted. [MM]      ED Course User Index  [MM] Karsten Toth DO        CONSULTS: (Who and What was discussed)  IP CONSULT TO INTERNAL MEDICINE    FINAL IMPRESSION      1. Chest pain, unspecified type          DISPOSITION/PLAN     DISPOSITION Admitted 02/20/2023 08:52:26 PM       (Please note that portions of this note were completed with a voice recognition program.  Efforts were made to edit the dictations but occasionally words are mis-transcribed. )    Karsten Toth DO (electronically signed)            Karsten Toth DO  Resident  02/20/23 4183

## 2023-02-21 ENCOUNTER — APPOINTMENT (OUTPATIENT)
Dept: NON INVASIVE DIAGNOSTICS | Age: 63
End: 2023-02-21
Payer: MEDICARE

## 2023-02-21 ENCOUNTER — APPOINTMENT (OUTPATIENT)
Dept: NUCLEAR MEDICINE | Age: 63
End: 2023-02-21
Payer: MEDICARE

## 2023-02-21 LAB
ALBUMIN SERPL-MCNC: 3.2 G/DL (ref 3.5–5.2)
ALP BLD-CCNC: 88 U/L (ref 40–129)
ALT SERPL-CCNC: 10 U/L (ref 0–40)
ANION GAP SERPL CALCULATED.3IONS-SCNC: 10 MMOL/L (ref 7–16)
APTT: 37.5 SEC (ref 24.5–35.1)
AST SERPL-CCNC: 18 U/L (ref 0–39)
BILIRUB SERPL-MCNC: 0.4 MG/DL (ref 0–1.2)
BUN BLDV-MCNC: 17 MG/DL (ref 6–23)
CALCIUM SERPL-MCNC: 8.5 MG/DL (ref 8.6–10.2)
CHLORIDE BLD-SCNC: 106 MMOL/L (ref 98–107)
CO2: 25 MMOL/L (ref 22–29)
CREAT SERPL-MCNC: 1.2 MG/DL (ref 0.7–1.2)
EKG ATRIAL RATE: 57 BPM
EKG ATRIAL RATE: 61 BPM
EKG P AXIS: 44 DEGREES
EKG P AXIS: 44 DEGREES
EKG P-R INTERVAL: 158 MS
EKG P-R INTERVAL: 166 MS
EKG Q-T INTERVAL: 432 MS
EKG Q-T INTERVAL: 478 MS
EKG QRS DURATION: 94 MS
EKG QRS DURATION: 94 MS
EKG QTC CALCULATION (BAZETT): 434 MS
EKG QTC CALCULATION (BAZETT): 465 MS
EKG R AXIS: -29 DEGREES
EKG R AXIS: -29 DEGREES
EKG T AXIS: 104 DEGREES
EKG T AXIS: 73 DEGREES
EKG VENTRICULAR RATE: 57 BPM
EKG VENTRICULAR RATE: 61 BPM
GFR SERPL CREATININE-BSD FRML MDRD: >60 ML/MIN/1.73
GLUCOSE BLD-MCNC: 117 MG/DL (ref 74–99)
HCT VFR BLD CALC: 38.3 % (ref 37–54)
HEMOGLOBIN: 12.6 G/DL (ref 12.5–16.5)
INR BLD: 1.5
LV EF: 56 %
LV EF: 60 %
LVEF MODALITY: NORMAL
LVEF MODALITY: NORMAL
MAGNESIUM: 2 MG/DL (ref 1.6–2.6)
MCH RBC QN AUTO: 31 PG (ref 26–35)
MCHC RBC AUTO-ENTMCNC: 32.9 % (ref 32–34.5)
MCV RBC AUTO: 94.3 FL (ref 80–99.9)
PDW BLD-RTO: 14.1 FL (ref 11.5–15)
PLATELET # BLD: 227 E9/L (ref 130–450)
PMV BLD AUTO: 10.7 FL (ref 7–12)
POTASSIUM REFLEX MAGNESIUM: 3.4 MMOL/L (ref 3.5–5)
PROTHROMBIN TIME: 15.9 SEC (ref 9.3–12.4)
RBC # BLD: 4.06 E12/L (ref 3.8–5.8)
SODIUM BLD-SCNC: 141 MMOL/L (ref 132–146)
TOTAL PROTEIN: 6.2 G/DL (ref 6.4–8.3)
TROPONIN, HIGH SENSITIVITY: 19 NG/L (ref 0–11)
WBC # BLD: 8.1 E9/L (ref 4.5–11.5)

## 2023-02-21 PROCEDURE — 83735 ASSAY OF MAGNESIUM: CPT

## 2023-02-21 PROCEDURE — 85730 THROMBOPLASTIN TIME PARTIAL: CPT

## 2023-02-21 PROCEDURE — 78452 HT MUSCLE IMAGE SPECT MULT: CPT | Performed by: INTERNAL MEDICINE

## 2023-02-21 PROCEDURE — G0378 HOSPITAL OBSERVATION PER HR: HCPCS

## 2023-02-21 PROCEDURE — A9500 TC99M SESTAMIBI: HCPCS | Performed by: RADIOLOGY

## 2023-02-21 PROCEDURE — 85027 COMPLETE CBC AUTOMATED: CPT

## 2023-02-21 PROCEDURE — 93017 CV STRESS TEST TRACING ONLY: CPT

## 2023-02-21 PROCEDURE — 6370000000 HC RX 637 (ALT 250 FOR IP): Performed by: INTERNAL MEDICINE

## 2023-02-21 PROCEDURE — 85610 PROTHROMBIN TIME: CPT

## 2023-02-21 PROCEDURE — 6370000000 HC RX 637 (ALT 250 FOR IP)

## 2023-02-21 PROCEDURE — 93005 ELECTROCARDIOGRAM TRACING: CPT

## 2023-02-21 PROCEDURE — 78452 HT MUSCLE IMAGE SPECT MULT: CPT

## 2023-02-21 PROCEDURE — 80053 COMPREHEN METABOLIC PANEL: CPT

## 2023-02-21 PROCEDURE — 93010 ELECTROCARDIOGRAM REPORT: CPT | Performed by: INTERNAL MEDICINE

## 2023-02-21 PROCEDURE — 93005 ELECTROCARDIOGRAM TRACING: CPT | Performed by: INTERNAL MEDICINE

## 2023-02-21 PROCEDURE — 6360000002 HC RX W HCPCS: Performed by: INTERNAL MEDICINE

## 2023-02-21 PROCEDURE — 3430000000 HC RX DIAGNOSTIC RADIOPHARMACEUTICAL: Performed by: RADIOLOGY

## 2023-02-21 PROCEDURE — 36415 COLL VENOUS BLD VENIPUNCTURE: CPT

## 2023-02-21 PROCEDURE — 93018 CV STRESS TEST I&R ONLY: CPT | Performed by: INTERNAL MEDICINE

## 2023-02-21 PROCEDURE — 2580000003 HC RX 258: Performed by: INTERNAL MEDICINE

## 2023-02-21 PROCEDURE — 93306 TTE W/DOPPLER COMPLETE: CPT

## 2023-02-21 PROCEDURE — 93016 CV STRESS TEST SUPVJ ONLY: CPT | Performed by: INTERNAL MEDICINE

## 2023-02-21 PROCEDURE — 84484 ASSAY OF TROPONIN QUANT: CPT

## 2023-02-21 RX ORDER — LORAZEPAM 0.5 MG/1
0.25 TABLET ORAL ONCE
Status: COMPLETED | OUTPATIENT
Start: 2023-02-21 | End: 2023-02-21

## 2023-02-21 RX ORDER — TECHNETIUM TC-99M SESTAMIBI 1 MG/10ML
35 INJECTION INTRAVENOUS
Status: DISCONTINUED | OUTPATIENT
Start: 2023-02-21 | End: 2023-02-21

## 2023-02-21 RX ORDER — TECHNETIUM TC-99M SESTAMIBI 1 MG/10ML
10.8 INJECTION INTRAVENOUS
Status: COMPLETED | OUTPATIENT
Start: 2023-02-21 | End: 2023-02-21

## 2023-02-21 RX ORDER — POTASSIUM CHLORIDE 20 MEQ/1
20 TABLET, EXTENDED RELEASE ORAL ONCE
Status: COMPLETED | OUTPATIENT
Start: 2023-02-21 | End: 2023-02-21

## 2023-02-21 RX ORDER — TECHNETIUM TC-99M SESTAMIBI 1 MG/10ML
30 INJECTION INTRAVENOUS
Status: COMPLETED | OUTPATIENT
Start: 2023-02-21 | End: 2023-02-21

## 2023-02-21 RX ORDER — TECHNETIUM TC-99M SESTAMIBI 1 MG/10ML
11.2 INJECTION INTRAVENOUS
Status: DISCONTINUED | OUTPATIENT
Start: 2023-02-21 | End: 2023-02-21

## 2023-02-21 RX ADMIN — ROSUVASTATIN CALCIUM 20 MG: 20 TABLET, FILM COATED ORAL at 20:03

## 2023-02-21 RX ADMIN — Medication 30 MILLICURIE: at 12:53

## 2023-02-21 RX ADMIN — POTASSIUM CHLORIDE 20 MEQ: 1500 TABLET, EXTENDED RELEASE ORAL at 20:04

## 2023-02-21 RX ADMIN — SODIUM CHLORIDE, PRESERVATIVE FREE 10 ML: 5 INJECTION INTRAVENOUS at 20:06

## 2023-02-21 RX ADMIN — LORAZEPAM 0.25 MG: 0.5 TABLET ORAL at 10:44

## 2023-02-21 RX ADMIN — REGADENOSON 0.4 MG: 0.08 INJECTION, SOLUTION INTRAVENOUS at 12:50

## 2023-02-21 RX ADMIN — MIRTAZAPINE 30 MG: 15 TABLET, FILM COATED ORAL at 20:03

## 2023-02-21 RX ADMIN — Medication 10.8 MILLICURIE: at 11:32

## 2023-02-21 RX ADMIN — APIXABAN 5 MG: 5 TABLET, FILM COATED ORAL at 20:03

## 2023-02-21 ASSESSMENT — PAIN SCALES - GENERAL: PAINLEVEL_OUTOF10: 0

## 2023-02-21 NOTE — PLAN OF CARE
Problem: Pain  Goal: Verbalizes/displays adequate comfort level or baseline comfort level  Outcome: Progressing     Problem: Risk for Elopement  Goal: Patient will not exit the unit/facility without proper excort  Outcome: Progressing  Flowsheets (Taken 2/21/2023 0243 by Janette Beckford RN)  Nursing Interventions for Elopement Risk: Assist with personal care needs such as toileting, eating, dressing, as needed to reduce the risk of wandering     Problem: Safety - Adult  Goal: Free from fall injury  Outcome: Progressing     Problem: Chronic Conditions and Co-morbidities  Goal: Patient's chronic conditions and co-morbidity symptoms are monitored and maintained or improved  Outcome: Progressing

## 2023-02-21 NOTE — ED NOTES
Patient has no clue what meds he takes, calling [facility] for med list. Patient has been at [facility] since October for dementia, depression, and SI. Patient's son Debo Ellis [phone] would like to be updated with any changes or concerns.     Cardiologist Dr. Manan Parsons   PCP Dr. Wilda Marks, RN  02/20/23 2011

## 2023-02-21 NOTE — PROGRESS NOTES
Lexiscan Stress Test:    Reason for study: Chest pain    Resting EKG showed Sinus rhythm, inferior and lateral T inversion  Exam:  Heart - regular, Lungs- clear    Patient received 0.4mg Lexiscan per protocol    Symptoms: No chest pain, No short of breath    EKG:  No new ischemia compared to resting EKG, Occ PVCs during Lexiscan infusion. Maximal heart rate 90         Peak /80 mmHg       Post test complications: None    SPECT image report pending.     Electronically signed by Ayesha Zapata MD on 2/21/2023 at 12:57 PM

## 2023-02-21 NOTE — PROGRESS NOTES
Hospitalist Progress Note      Synopsis:   Briefly, patient with PMH significant for A-fib, CKD, CHF, CAD, HPL, HPT [facility]. Of note patient has significant behavioral health issues, after patient's wife passed he was admitted to [facility] for suicidal ideation and subsequently tested positive for COVID-19 infection. Stress test was completed, results pending. Echocardiogram was complete showed normal EF and large anterior pericardial effusion was appreciated. Cardiothoracic surgery was consulted. Hospital day 0     Subjective:  Patient seen and examined at bedside, patient states he is still with chest pain. Stable overnight. No issues reported. Patient seen and examined  Records reviewed. Temp (24hrs), Av.9 °F (36.6 °C), Min:97.8 °F (36.6 °C), Max:98 °F (36.7 °C)    DIET: Diet NPO  CODE: Full Code    Intake/Output Summary (Last 24 hours) at 2023 1619  Last data filed at 2023 0610  Gross per 24 hour   Intake 0 ml   Output 0 ml   Net 0 ml           Objective:    /83   Pulse 57   Temp 97.8 °F (36.6 °C) (Temporal)   Resp 16   Ht 5' 11\" (1.803 m)   Wt 185 lb (83.9 kg)   SpO2 95%   BMI 25.80 kg/m²     General appearance: No apparent distress, appears stated age and cooperative. HEENT: Conjunctivae/corneas clear. Mucous membranes moist.  Neck: Supple. No JVD. Respiratory:  Clear to auscultation bilaterally. Normal respiratory effort. Cardiovascular:  IRR. S1, S2 without MRG. PV: Pulses palpable. No edema. Abdomen: Soft, non-tender, non-distended. +BS  Musculoskeletal: No obvious deformities. Skin: Normal skin color. No rashes or lesions. Good turgor. Neurologic:  Grossly non-focal. Awake, alert, following commands.    Psychiatric: Alert and oriented, thought content appropriate, normal insight and judgement    Medications:  REVIEWED DAILY    Infusion Medications    sodium chloride       Scheduled Medications    sodium chloride flush  5-40 mL IntraVENous 2 times per day    aspirin  81 mg Oral Daily    rosuvastatin  20 mg Oral Nightly    amLODIPine  15 mg Oral Daily    apixaban  5 mg Oral BID    lisinopril  20 mg Oral Daily    mirtazapine  30 mg Oral Nightly    pantoprazole  20 mg Oral Daily    potassium chloride  10 mEq Oral Daily     PRN Meds: sodium chloride flush, sodium chloride, ondansetron **OR** ondansetron, acetaminophen **OR** acetaminophen, polyethylene glycol, perflutren lipid microspheres, cyclobenzaprine, docusate sodium    Labs:     Recent Labs     02/18/23 2131 02/20/23  1554 02/21/23  0418   WBC 9.6 8.8 8.1   HGB 14.1 14.1 12.6   HCT 43.2 42.2 38.3    264 227       Recent Labs     02/18/23 2131 02/20/23  1554 02/21/23  0418    143 141   K 4.2 3.7 3.4*    107 106   CO2 26 25 25   BUN 18 16 17   CREATININE 1.6* 1.2 1.2   CALCIUM 8.9 8.9 8.5*       Recent Labs     02/18/23 2131 02/20/23  1554 02/21/23  0418   PROT 7.4 7.4 6.2*   ALKPHOS 104 103 88   ALT 12 11 10   AST 24 21 18   BILITOT 0.2 0.5 0.4       Recent Labs     02/20/23  1554 02/21/23  0418   INR 1.5 1.5       No results for input(s): Breana Jones in the last 72 hours. Chronic labs:    Lab Results   Component Value Date    INR 1.5 02/21/2023       Radiology: REVIEWED DAILY    Assessment:  Chest pain likely secondary to large pericardial effusion  Coronary artery disease  Atrial fibrillation on Eliquis  History of suicidal ideation, currently denies any thoughts of harming himself or others  Plan:  Stress test completed nuclear images pending  Reviewed echocardiogram that revealed normal EF and large pericardial effusion in light of these findings we will consult cardiothoracic surgery  Hold [facility] for now in light of possible procedure tomorrow.    Continue aspirin, lisinopril, statin, and rest of home medications  Continue to follow labs replete as indicated    DVT Prophylaxis [] Lovenox  []  Heparin [x] DOAC [] PCDs [] Ambulation    GI Prophylaxis [] PPI  [] H2 Blocker   [] Carafate  [x] Diet/Tube Feeds   Level of care [] Med/Surg  [x] Intermediate  []  ICU   Diet Diet NPO    Family contact [x]  N/A    [] At bedside  [] Phone call   Disposition Patient requires continued admission evaluation by cardiothoracic surgery for large pericardial effusion   MDM [] Low    [x] Moderate  []   High       Discharge Plan: Patient will discharge back to [facility] pending further evaluation from cardiothoracic surgery for large pericardial effusion    +++++++++++++++++++++++++++++++++++++++++++++++++  RONY Bhat/ Kt Mons[address]ais[address]  +++++++++++++++++++++++++++++++++++++++++++++++++  NOTE: This report was transcribed using voice recognition software. Every effort was made to ensure accuracy; however, inadvertent computerized transcription errors may be present.

## 2023-02-21 NOTE — CARE COORDINATION
Here as observation for chest pain from [facility], For stress test./echo. Attempted to speak to patient who is very [place]  and alert to oslef only. Call placed to [phone] spoke to Vasile Crisostomo  await call back. Call back from  [facility] @ [facility] and the dr there is not accepting patient back. Message to [facility] manager Allyson Briggs above. Electronically signed by Kt Key RN on 2/21/2023 at 11:49 AM    Call back again from [facility] @ [facility]- stating they feel he is too medically complex to accept back. Electronically signed by Kt Key RN on 2/21/2023 at 11:58 AM    Above discussed with Tremayne Olson B testing needs to be completed prior to 72 hours in order for him to go back . Called and left message for [facility] Amin [phone] regarding stress test and echo needing to be completed  ASAP so can be discharged back to [facility] (Hopefully tomorrow) once completed. Also called stress department- who states they schedule thru [facility]. Electronically signed by Kt Key RN on 2/21/2023 at 12:56 PM    Call placed to son Jose Dixon. await call back. Electronically signed by Kt Key RN on 2/21/2023 at 12:57 PM    Envelope and ambulance form in soft chart. Electronically signed by Kt Key RN on 2/21/2023 at 1:05 PM    Spoke with Marion Lin cm- with above and she will call [facility] Director of nursing. Electronically signed by Kt Key RN on 2/21/2023 at 2:11 PM    Call back from son Jose Dixon  - who states his dad was in [address] in [place] went home was picked up by police for suicide threats and sent to [facility]. His mental health issues are suicide threats and dementia. Jose Dixon is currently trying to get medicaid for him and is applying guardianship. Discharge plan is for him to return to [facility].  Electronically signed by Kt Key RN on 2/21/2023 at 3:08 PM

## 2023-02-21 NOTE — PROGRESS NOTES
Patient very upset states hes already done these tests with his cardiologist . Ree Misha we get a hold of him . (Dr Blanka Butts) lexii already ran up a flight of stairs.  Explained that this test was also ordered here

## 2023-02-21 NOTE — H&P
[facility]  - ADMISSION HISTORY AND PHYSICAL      Patient Name: Iain Madrid  Unit/Bed: Emily Barrera D/HD  YOB: 1960  Medical Record Number: [id number]  Current Hospital Day: Hospital Day: 1  Admit Date: 2/20/2023  Primary Care Provider: No primary care provider on file. Chief Complaint: chest pain    History of Present Illness: Patient seen and examined at bedside. Iain Madrid is a 57 y.o. male who presented to the emergency department on February 20 complaining of chest pain. He was transferred to the facility from [facility] after complaining of chest pain at the facility. History was limited due to patient presentation. He denied chest pain in the emergency department. However he had been in the emergency department 2 days earlier for the same complaint. He denied any nausea, vomiting, diaphoresis, abdominal pain, urinary problems, or issues with bowel movements. Initially was unwilling to stay in the hospital but then consented to do so. He has been in a psychiatric facility for multiple months at this point. He has a cardiologist he sees in [address] [facility] and is very loyal to his care. He was ultimately willing to stay as long as he did not have to see a different cardiologist and all we were going to do was a stress test.  He was unsure when his last stress test was. Records from [facility] were reviewed. He is listed in these records of having a history of coronary artery disease, congestive heart failure, atrial fibrillation but no specifics are given. He was admitted to the [facility] in January 2022 for COVID-19 infection. At that time he presented suicidal after the death of his wife. Laboratory studies in emergency department included a normal CMP. Troponin was 15 repeat 15.  proBNP was 332. CBC was unremarkable. INR was 1.5. Rapid influenza and COVID testing is negative.  Chest x-ray showed no acute process. He was given aspirin 325 mg p.o. x1 in the ED. He was admitted for further evaluation and treatment. Patient Active Problem List   Diagnosis    Chest pain    Hypertension    Hyperlipidemia    Coronary artery disease    Congestive heart failure (CHF) (HCC)    Chronic kidney disease    Psychiatric illness       Past Medical History:      Diagnosis Date    Atrial fibrillation (HCC)     Chronic kidney disease     Congestive heart failure (CHF) (HCC)     Coronary artery disease     Hyperlipidemia     Hypertension        Past Surgical History:      Procedure Laterality Date    CARDIAC SURGERY         Family History:      Problem Relation Age of Onset    Heart Failure Other        Social History     Socioeconomic History    Marital status: Single   Tobacco Use    Smoking status: Former     Types: Cigarettes    Smokeless tobacco: Never   Substance and Sexual Activity    Alcohol use: Not Currently         Home Medications:    Prior to Admission medications    Medication Sig Start Date End Date Taking?  Authorizing Provider   apixaban (ELIQUIS) 5 MG TABS tablet Take 5 mg by mouth 2 times daily   Yes Historical Provider, MD   carvedilol (COREG) 25 MG tablet Take 25 mg by mouth 2 times daily (with meals)   Yes Historical Provider, MD   mirtazapine (REMERON) 30 MG tablet Take 30 mg by mouth nightly   Yes Historical Provider, MD   rosuvastatin (CRESTOR) 20 MG tablet Take 20 mg by mouth daily   Yes Historical Provider, MD   amLODIPine (NORVASC) 10 MG tablet Take 15 mg by mouth daily   Yes Historical Provider, MD   lisinopril (PRINIVIL;ZESTRIL) 20 MG tablet Take 20 mg by mouth daily   Yes Historical Provider, MD   pantoprazole (PROTONIX) 20 MG tablet Take 20 mg by mouth daily   Yes Historical Provider, MD   potassium chloride (MICRO-K) 10 MEQ extended release capsule Take 10 mEq by mouth daily   Yes Historical Provider, MD   acetaminophen (TYLENOL) 325 MG tablet Take 650 mg by mouth every 6 hours as needed for Pain   Yes Historical Provider, MD   cyclobenzaprine (FLEXERIL) 5 MG tablet Take 5 mg by mouth 2 times daily as needed for Muscle spasms   Yes Historical Provider, MD   docusate sodium (COLACE) 100 MG capsule Take 100 mg by mouth 2 times daily as needed for Constipation   Yes Historical Provider, MD   nitroGLYCERIN (NITROSTAT) 0.4 MG SL tablet Place 0.4 mg under the tongue every 5 minutes as needed for Chest pain up to max of 3 total doses. If no relief after 1 dose, call 911. Yes Historical Provider, MD   ondansetron (ZOFRAN) 4 MG tablet Take 4 mg by mouth every 6 hours as needed for Nausea or Vomiting   Yes Historical Provider, MD   polyethylene glycol (GLYCOLAX) 17 g packet Take 17 g by mouth daily as needed for Constipation   Yes Historical Provider, MD       Hospital Medications:    Scheduled Meds:   sodium chloride flush  5-40 mL IntraVENous 2 times per day    [START ON 2/21/2023] aspirin  81 mg Oral Daily    rosuvastatin  20 mg Oral Nightly    [START ON 2/21/2023] amLODIPine  15 mg Oral Daily    apixaban  5 mg Oral BID    [START ON 2/21/2023] lisinopril  20 mg Oral Daily    mirtazapine  30 mg Oral Nightly    [START ON 2/21/2023] pantoprazole  20 mg Oral Daily    [START ON 2/21/2023] potassium chloride  10 mEq Oral Daily      Continuous Infusions:   sodium chloride       PRN Meds:    Allergies: No Known Allergies    Review of Systems:  Review of Systems   Constitutional:  Negative for chills and fever. HENT:  Negative for ear pain and sore throat. Eyes:  Negative for pain and visual disturbance. Respiratory:  Negative for cough and shortness of breath. Cardiovascular:  Positive for chest pain. Negative for palpitations and leg swelling. Gastrointestinal:  Negative for abdominal pain, diarrhea, nausea and vomiting. Genitourinary:  Negative for dysuria and hematuria. Musculoskeletal:  Negative for arthralgias and back pain. Skin:  Negative for rash and wound.    Neurological:  Negative for dizziness and headaches. Psychiatric/Behavioral:  Negative for dysphoric mood. The patient is not nervous/anxious. Objective:   Vitals: /81   Pulse 60   Temp 97.8 °F (36.6 °C)   Resp 16   SpO2 97%     CURRENT TEMPERATURE:  Temp: 97.8 °F (36.6 °C)  MAXIMUM TEMPERATURE OVER 24HRS:  Temp (24hrs), Av.8 °F (36.6 °C), Min:97.8 °F (36.6 °C), Max:97.8 °F (36.6 °C)      Physical Exam  Vitals and nursing note reviewed. Constitutional:       General: He is not in acute distress. Appearance: He is ill-appearing. He is not toxic-appearing or diaphoretic. HENT:      Head: Normocephalic and atraumatic. Right Ear: External ear normal.      Left Ear: External ear normal.      Nose: Nose normal. No congestion or rhinorrhea. Mouth/Throat:      Mouth: Mucous membranes are moist.      Pharynx: Oropharynx is clear. No oropharyngeal exudate. Eyes:      General: No scleral icterus. Right eye: No discharge. Left eye: No discharge. Conjunctiva/sclera: Conjunctivae normal.   Cardiovascular:      Rate and Rhythm: Normal rate and regular rhythm. Pulses: Normal pulses. Heart sounds: Normal heart sounds. No murmur heard. No friction rub. No gallop. Pulmonary:      Effort: Pulmonary effort is normal. No respiratory distress. Breath sounds: Normal breath sounds. Abdominal:      General: There is no distension. Palpations: Abdomen is soft. Tenderness: There is no abdominal tenderness. Musculoskeletal:      Cervical back: Normal range of motion and neck supple. No rigidity. No muscular tenderness. Right lower leg: No edema. Left lower leg: No edema. Lymphadenopathy:      Cervical: No cervical adenopathy. Skin:     General: Skin is warm. Findings: No bruising or rash. Neurological:      General: No focal deficit present. Mental Status: He is alert and oriented to person, place, and time. Mental status is at baseline. Psychiatric:         Attention and Perception: Attention normal.         Mood and Affect: Mood normal. Affect is angry. Behavior: Behavior is agitated and aggressive. Judgment: Judgment is impulsive. Diet: Diet NPO  Diet NPO    Data:     Scheduled Meds: Reviewed  Continuous Infusions:   sodium chloride         Labs  CBC:   Recent Labs     02/18/23 2131 02/20/23  1554   WBC 9.6 8.8   HGB 14.1 14.1   HCT 43.2 42.2    264       BMP:   Recent Labs     02/18/23 2131 02/20/23  1554    143   K 4.2 3.7    107   CO2 26 25   BUN 18 16   CREATININE 1.6* 1.2   GLUCOSE 104* 93     LFT's:   Recent Labs     02/18/23 2131 02/20/23  1554   AST 24 21   ALT 12 11   BILITOT 0.2 0.5   ALKPHOS 104 103     Troponin: No results for input(s): TROPONINI in the last 72 hours. BNP: No results for input(s): BNP in the last 72 hours. INR:   Recent Labs     02/20/23  1554   INR 1.5     Lipids: No results for input(s): CHOL, HDL in the last 72 hours. Invalid input(s): LDLCALCU  Urinalysis:   Lab Results   Component Value Date/Time    NITRU Negative 10/22/2022 06:27 PM    [address] 10/22/2022 06:27 PM    BACTERIA RARE 10/22/2022 06:27 PM    RBCUA 5-10 10/22/2022 06:27 PM    BLOODU SMALL 10/22/2022 06:27 PM    SPECGRAV >=1.030 10/22/2022 06:27 PM    GLUCOSEU Negative 10/22/2022 06:27 PM         I/O:  No intake/output data recorded. Radiology:  XR CHEST PORTABLE   Final Result   Mild congestive changes, no evidence of acute cardiopulmonary disease. NM Cardiac Stress Test Nuclear Imaging    (Results Pending)       Most Recent EKG: Tracing from ED reviewed and personally interpreted. This showed sinus rhythm with no significant ST changes. Left axis deviation was seen. Unchanged from his EKG 2 days prior. Assessment/Plan    Rox Fernando is a 57 y.o. male, who was admitted with Chest pain.     Active Hospital Problems    Diagnosis Date Noted    Chest pain [R07.9] 02/20/2023 Priority: Medium    Hypertension [I10] 02/20/2023     Priority: Medium    Hyperlipidemia [E78.5] 02/20/2023     Priority: Medium    Coronary artery disease [I25.10] 02/20/2023     Priority: Medium    Congestive heart failure (CHF) ([facility] [address] [I50.9] 02/20/2023     Priority: Medium    Chronic kidney disease [N18.9] 02/20/2023     Priority: Medium    Psychiatric illness [F99] 02/20/2023     Priority: Medium       Plan:    Chest Pain  -Patient has a documented cardiac history though unable to find specific details and patient history is unreliable  -He has had 2 chest pain episodes in 3 days at his behavioral health facility. He is quite agitated with conversation in general.  -Discussed the importance given his history of ruling out acute ischemic disease given risk of morbidity and mortality with nontreatment.  -Plan to obtain echocardiogram and Lexiscan nuclear stress test given A-fib history in a.m. tomorrow. If stratification puts him at low risk for acute ischemia, likely best option would be to discharge with outpatient follow-up with his regular cardiologist.  He declines to see a cardiologist in house. -Monitor overnight on telemetry and continue home cardioprotective medication regimen except hold beta-blocker. Psychiatric illness  -There is no specific documentation of psychiatric illness other than suicidality on admission to hospital in early 2022.  -He is quite agitated on evaluation in ED, which I suspect is his baseline. Given unclear impulsiveness, plan to continue psychiatric medications as ordered at facility (essentially just mirtazapine) and have sitter at bedside at all times.  -Patient does not verbalize desire for self-harm or to harm others at this time, though he asserts I am a \"quack. \"      Coronary Artery Disease  -Continue aspirin, lisinopril, statin. Hold beta-blocker for a.m. test tomorrow.     Atrial fibrillation  -Continue Eliquis for anticoagulation and monitor on telemetry to ensure no rapid ventricular response.     Prophylaxis:  1) Stress Ulcer Protocol Active: PPI  2) DVT Protocol Active: Eliquis    Code Status:Full Code  FEN: Diet NPO  Diet NPO  PT/OT Eval Status: not applicable    General Attestation  Evaluation of the patient today involved thorough review of all progress notes, laboratory tests, consults, radiology, and other diagnostic studies as appropriate.    -----------------------------  Nancy Butt MD  Internal Medicine Hospitalist

## 2023-02-22 LAB
ALBUMIN SERPL-MCNC: 3.5 G/DL (ref 3.5–5.2)
ALP BLD-CCNC: 96 U/L (ref 40–129)
ALT SERPL-CCNC: 10 U/L (ref 0–40)
ANION GAP SERPL CALCULATED.3IONS-SCNC: 8 MMOL/L (ref 7–16)
AST SERPL-CCNC: 18 U/L (ref 0–39)
BASOPHILS ABSOLUTE: 0.06 E9/L (ref 0–0.2)
BASOPHILS RELATIVE PERCENT: 0.7 % (ref 0–2)
BILIRUB SERPL-MCNC: 0.5 MG/DL (ref 0–1.2)
BUN BLDV-MCNC: 17 MG/DL (ref 6–23)
CALCIUM SERPL-MCNC: 8.8 MG/DL (ref 8.6–10.2)
CHLORIDE BLD-SCNC: 108 MMOL/L (ref 98–107)
CO2: 25 MMOL/L (ref 22–29)
CREAT SERPL-MCNC: 1.4 MG/DL (ref 0.7–1.2)
EKG ATRIAL RATE: 64 BPM
EKG P AXIS: 49 DEGREES
EKG P-R INTERVAL: 154 MS
EKG Q-T INTERVAL: 450 MS
EKG QRS DURATION: 94 MS
EKG QTC CALCULATION (BAZETT): 464 MS
EKG R AXIS: -35 DEGREES
EKG T AXIS: 71 DEGREES
EKG VENTRICULAR RATE: 64 BPM
EOSINOPHILS ABSOLUTE: 0.24 E9/L (ref 0.05–0.5)
EOSINOPHILS RELATIVE PERCENT: 2.8 % (ref 0–6)
GFR SERPL CREATININE-BSD FRML MDRD: 56 ML/MIN/1.73
GLUCOSE BLD-MCNC: 104 MG/DL (ref 74–99)
HCT VFR BLD CALC: 41.8 % (ref 37–54)
HEMOGLOBIN: 13.7 G/DL (ref 12.5–16.5)
IMMATURE GRANULOCYTES #: 0.02 E9/L
IMMATURE GRANULOCYTES %: 0.2 % (ref 0–5)
LYMPHOCYTES ABSOLUTE: 2.35 E9/L (ref 1.5–4)
LYMPHOCYTES RELATIVE PERCENT: 27.2 % (ref 20–42)
MCH RBC QN AUTO: 31 PG (ref 26–35)
MCHC RBC AUTO-ENTMCNC: 32.8 % (ref 32–34.5)
MCV RBC AUTO: 94.6 FL (ref 80–99.9)
MONOCYTES ABSOLUTE: 1.15 E9/L (ref 0.1–0.95)
MONOCYTES RELATIVE PERCENT: 13.3 % (ref 2–12)
NEUTROPHILS ABSOLUTE: 4.82 E9/L (ref 1.8–7.3)
NEUTROPHILS RELATIVE PERCENT: 55.8 % (ref 43–80)
PDW BLD-RTO: 14.2 FL (ref 11.5–15)
PLATELET # BLD: 239 E9/L (ref 130–450)
PMV BLD AUTO: 10.4 FL (ref 7–12)
POTASSIUM SERPL-SCNC: 3.9 MMOL/L (ref 3.5–5)
RBC # BLD: 4.42 E12/L (ref 3.8–5.8)
SODIUM BLD-SCNC: 141 MMOL/L (ref 132–146)
TOTAL PROTEIN: 6.6 G/DL (ref 6.4–8.3)
WBC # BLD: 8.6 E9/L (ref 4.5–11.5)

## 2023-02-22 PROCEDURE — 93010 ELECTROCARDIOGRAM REPORT: CPT | Performed by: INTERNAL MEDICINE

## 2023-02-22 PROCEDURE — 6360000002 HC RX W HCPCS: Performed by: INTERNAL MEDICINE

## 2023-02-22 PROCEDURE — 99222 1ST HOSP IP/OBS MODERATE 55: CPT | Performed by: THORACIC SURGERY (CARDIOTHORACIC VASCULAR SURGERY)

## 2023-02-22 PROCEDURE — 2580000003 HC RX 258: Performed by: INTERNAL MEDICINE

## 2023-02-22 PROCEDURE — 36415 COLL VENOUS BLD VENIPUNCTURE: CPT

## 2023-02-22 PROCEDURE — G0378 HOSPITAL OBSERVATION PER HR: HCPCS

## 2023-02-22 PROCEDURE — 96372 THER/PROPH/DIAG INJ SC/IM: CPT

## 2023-02-22 PROCEDURE — 80053 COMPREHEN METABOLIC PANEL: CPT

## 2023-02-22 PROCEDURE — 85025 COMPLETE CBC W/AUTO DIFF WBC: CPT

## 2023-02-22 PROCEDURE — 6370000000 HC RX 637 (ALT 250 FOR IP): Performed by: INTERNAL MEDICINE

## 2023-02-22 RX ORDER — HALOPERIDOL 5 MG/ML
5 INJECTION INTRAMUSCULAR ONCE
Status: DISCONTINUED | OUTPATIENT
Start: 2023-02-22 | End: 2023-02-24 | Stop reason: HOSPADM

## 2023-02-22 RX ORDER — HALOPERIDOL 5 MG/ML
5 INJECTION INTRAMUSCULAR EVERY 4 HOURS PRN
Status: DISCONTINUED | OUTPATIENT
Start: 2023-02-22 | End: 2023-02-22

## 2023-02-22 RX ORDER — HALOPERIDOL 5 MG/ML
5 INJECTION INTRAMUSCULAR EVERY 6 HOURS PRN
Status: DISCONTINUED | OUTPATIENT
Start: 2023-02-22 | End: 2023-02-24 | Stop reason: HOSPADM

## 2023-02-22 RX ORDER — HALOPERIDOL 5 MG/ML
2 INJECTION INTRAMUSCULAR EVERY 4 HOURS PRN
Status: DISCONTINUED | OUTPATIENT
Start: 2023-02-22 | End: 2023-02-22

## 2023-02-22 RX ORDER — LORAZEPAM 2 MG/ML
1 INJECTION INTRAMUSCULAR ONCE
Status: DISCONTINUED | OUTPATIENT
Start: 2023-02-22 | End: 2023-02-22

## 2023-02-22 RX ORDER — ASPIRIN 81 MG/1
81 TABLET, CHEWABLE ORAL DAILY
Qty: 30 TABLET | Refills: 3 | Status: ON HOLD | OUTPATIENT
Start: 2023-02-23

## 2023-02-22 RX ADMIN — PANTOPRAZOLE SODIUM 20 MG: 20 TABLET, DELAYED RELEASE ORAL at 08:42

## 2023-02-22 RX ADMIN — AMLODIPINE BESYLATE 15 MG: 10 TABLET ORAL at 08:42

## 2023-02-22 RX ADMIN — POTASSIUM CHLORIDE 10 MEQ: 750 TABLET, EXTENDED RELEASE ORAL at 08:43

## 2023-02-22 RX ADMIN — HALOPERIDOL LACTATE 5 MG: 5 INJECTION, SOLUTION INTRAMUSCULAR at 20:04

## 2023-02-22 RX ADMIN — LISINOPRIL 20 MG: 20 TABLET ORAL at 08:42

## 2023-02-22 RX ADMIN — ASPIRIN 81 MG 81 MG: 81 TABLET ORAL at 08:42

## 2023-02-22 RX ADMIN — SODIUM CHLORIDE, PRESERVATIVE FREE 10 ML: 5 INJECTION INTRAVENOUS at 08:44

## 2023-02-22 NOTE — PROGRESS NOTES
Patient is yelling at staff, verbally abusive to staff. Insisting on discharge, although has no idea where he is supposed to go, or where \"home\" is. Confused to where he is at present. Case Management is working on discharge.

## 2023-02-22 NOTE — CONSULTS
CTS Consult    Patient name: Marjorie Leon    Reason for consult:  Pericardial effusion     Primary Care Physician: No primary care provider on file. Date of service: 2/22/2023    Chief Complaint:  Chest pain     HPI:  56 yo male who is admitted with complaints of chest pain with hx of coronary artery disease, s/p sternotomy and CABG?, congestive heart failure, atrial fibrillation. He was admitted to the [facility] in January 2022 for COVID-19 infection. At that time he presented suicidal after the death of his wife. He was transferred here from [facility] after complaining of chest pain. He underwent workup which included TTE which was read as a large pericardial effusion without tamponade.      Allergies: No Known Allergies    Home medications:    Current Facility-Administered Medications   Medication Dose Route Frequency Provider Last Rate Last Admin    sodium chloride flush 0.9 % injection 5-40 mL  5-40 mL IntraVENous 2 times per day Ailyn Mckeon MD   10 mL at 02/21/23 2006    sodium chloride flush 0.9 % injection 5-40 mL  5-40 mL IntraVENous PRN Ailyn Mckeon MD        0.9 % sodium chloride infusion   IntraVENous PRN Ailyn Mckeon MD        ondansetron (ZOFRAN-ODT) disintegrating tablet 4 mg  4 mg Oral Q8H PRN Ailyn Mckeon MD        Or    ondansetron [facility]) injection 4 mg  4 mg IntraVENous Q6H PRN Ailyn Mckeon MD        acetaminophen (TYLENOL) tablet 650 mg  650 mg Oral Q6H PRN Ailyn Mckeon MD        Or    acetaminophen (TYLENOL) suppository 650 mg  650 mg Rectal Q6H PRN Ailyn Mckeon MD        polyethylene glycol (GLYCOLAX) packet 17 g  17 g Oral Daily PRN Ailyn Mckeon MD        aspirin chewable tablet 81 mg  81 mg Oral Daily Ailyn Mckeon MD        perflutren lipid microspheres (DEFINITY) injection 1.5 mL  1.5 mL IntraVENous ONCE PRN Ailyn Mckeon MD        rosuvastatin (CRESTOR) tablet 20 mg  20 mg Oral Nightly Ailyn Mckeon MD   20 mg at 02/21/23 2003    amLODIPine (NORVASC) tablet 15 mg  15 mg Oral Daily Keenan Tavares MD        [Held by provider] apixaban (ELIQUIS) tablet 5 mg  5 mg Oral BID Keenan Tavares MD   5 mg at 02/21/23 2003    cyclobenzaprine (FLEXERIL) tablet 5 mg  5 mg Oral BID PRN Keenan Tavares MD        docusate sodium (COLACE) capsule 100 mg  100 mg Oral BID PRN Keenan Tavares MD        lisinopril (PRINIVIL;ZESTRIL) tablet 20 mg  20 mg Oral Daily Keenan Tavares MD        mirtazapine (REMERON) tablet 30 mg  30 mg Oral Nightly Keenan Tavares MD   30 mg at 02/21/23 2003    pantoprazole (PROTONIX) tablet 20 mg  20 mg Oral Daily Keenan Tavares MD        potassium chloride (KLOR-CON M) extended release tablet 10 mEq  10 mEq Oral Daily Keenan Tvaares MD           Past Medical History:  Past Medical History:   Diagnosis Date    Atrial fibrillation (HCC)     Chronic kidney disease     Congestive heart failure (CHF) (HCC)     Coronary artery disease     Hyperlipidemia     Hypertension        Past Surgical History:  Past Surgical History:   Procedure Laterality Date    CARDIAC SURGERY         Social History:  Social History     Socioeconomic History    Marital status: Single     Spouse name: Not on file    Number of children: Not on file    Years of education: Not on file    Highest education level: Not on file   Occupational History    Not on file   Tobacco Use    Smoking status: Former     Types: Cigarettes    Smokeless tobacco: Never   Substance and Sexual Activity    Alcohol use: Not Currently    Drug use: Not on file    Sexual activity: Not on file   Other Topics Concern    Not on file   Social History Narrative    Not on file     Social Determinants of Health     Financial Resource Strain: Not on file   Food Insecurity: Not on file   Transportation Needs: Not on file   Physical Activity: Not on file   Stress: Not on file   Social Connections: Not on file   Intimate Partner Violence: Not on file   Housing Stability: Not on file  Family History:  Family History   Problem Relation Age of Onset    Heart Failure Other        Review of Systems:  Constitutional: Denies fevers, chills, or weight loss. HEENT: Denies visual changes or hearing loss. Heart: As per HPI. Lungs: Denies shortness of breath, cough, or wheezing. Gastrointestinal: Denies nausea, vomiting, constipation, or diarrhea. Genitourinary: Denies dysuria or hematuria. Psychiatric: Patient denies anxiety or depression. Neurologic: Patient denies weakness of the extremities, dizziness, or headaches. All other ROS checked and found to be negative. Labs:  Recent Labs     02/20/23  1554 02/21/23  0418   WBC 8.8 8.1   HGB 14.1 12.6   HCT 42.2 38.3    227      Recent Labs     02/20/23  1554 02/21/23  0418   BUN 16 17   CREATININE 1.2 1.2       Objective:  Vitals /80   Pulse 64   Temp 98.1 °F (36.7 °C) (Temporal)   Resp 16   Ht 5' 11\" (1.803 m)   Wt 185 lb 11.2 oz (84.2 kg)   SpO2 94%   BMI 25.90 kg/m²   General Appearance: Pleasant 57y.o. year old male who appears stated age. Communicates well, no acute distress. HEENT: Head is normocephalic, atraumatic. EOMs intact, PERRL. Trachea midline. Lungs: Normal respiratory rate and normal effort. He is not in respiratory distress. Breath sounds clear to auscultation. No wheezes. Heart: Normal rate. Regular rhythm. S1 normal and S2 normal. Positive for murmur. Chest: Symmetric chest wall expansion. Extremities: Normal range of motion. Neurological: Patient is alert and oriented to person, place and time. Patient has normal reflexes. Skin: Warm and dry. Abdomen: Abdomen is soft and non-distended. Bowel sounds are normal. There is no abdominal tenderness tenderness. There is no guarding. There is no mass. Pulses: Distal pulses are intact. Skin: Warm and dry without lesions.         Assessment/Plan  58 yo male with is s/p sternotomy in the past who presents with chest pain   ECHO reviewed - possibly small effusion with small pocket posteriorly   Currently does not appear to be affecting him.   He has a normal BP, HR, and is on room air   Does not appear to need any intervention at this time  CTS will sign off   Please call with any changes or concerns     Electronically signed by Max Pena DO on 2/22/2023 at 8:25 AM

## 2023-02-22 NOTE — DISCHARGE SUMMARY
Hospitalist Discharge Summary    Patient ID: Damaris Ross   Patient : 1960  Patient's PCP: No primary care provider on file. Admit Date: 2023   Admitting Physician: Jimmey Frankel, MD    Discharge Date:  2023   Discharge Physician: YUDITH Mejia CNP   Discharge Condition: Stable  [facility] course in brief:  (Please refer to daily progress notes for a comprehensive review of the hospitalization by requesting medical records)  Briefly, patient with PMH significant for A-fib, CKD, CHF, CAD, HPL, HPT [facility]. Of note patient has significant behavioral health issues, after patient's wife passed he was admitted to [facility] for suicidal ideation and subsequently tested positive for COVID-19 infection. Stress test was completed, was ruled read as a low to intermediate risk. Echocardiogram was complete showed normal EF and large anterior pericardial effusion was appreciated. Cardiothoracic surgery was consulted. Cardiothoracic surgery saw patient, reviewed echo and read it as possibly small effusion with small pocket posteriorly. Patient is hemodynamically stable and signed off on patient's case. Patient was advised to follow-up with cardiologist that is familiar with his case in [address] [place] [address]. S.W. Patient agreeable plan and currently chest pain-free. Patient stable from medical perspective for discharge. Consults:   IP CONSULT TO INTERNAL MEDICINE  IP CONSULT TO CARDIOTHORACIC SURGERY    Discharge Diagnoses:  Chest pain likely secondary to pericardial effusion  Coronary artery disease  Atrial fibrillation on Eliquis  History of suicidal ideation, currently denies any thoughts of harming himself or others      Discharge Instructions / Follow up:    No future appointments. The patient's condition is stable.   At this time the patient is without objective evidence of an acute process requiring continuing hospitalization or inpatient management. They are stable for discharge with outpatient follow-up. I have spoken with the patient and discussed the results of the current hospitalization, in addition to providing specific details for the plan of care and counseling regarding the diagnosis and prognosis. The plan has been discussed in detail and they are aware of the specific conditions for emergent return, as well as the importance of follow-up. Their questions are answered at this time and they are agreeable with the plan for discharge to [facility]. Continued appropriate risk factor modification of blood pressure, diabetes and serum lipids will remain essential to reducing risk of future atherosclerotic development    Activity: activity as tolerated    Physical exam:  General appearance: No apparent distress, appears stated age and cooperative. HEENT: Normal cephalic, atraumatic without obvious deformity. Pupils equal, round, and reactive to light. Extra ocular muscles intact. Conjunctivae/corneas clear. Neck: Supple, with full range of motion. No jugular venous distention. Trachea midline. Respiratory:  Clear to auscultation bilaterally. No apparent distress. Cardiovascular:  Regular rate and rhythm. S1, S2 without murmurs, rubs, or gallops. PV: Brisk capillary refill. +2 pedal and radial pulses bilaterally. No clubbing, cyanosis, edema of bilateral lower extremities. Abdomen: Soft, non-tender, non-distended. +BS  Musculoskeletal: No obvious deformities or erythematous or edematous joints. Skin: Normal skin color. No rashes or lesions. Neurologic:  Neurovascularly intact without any focal sensory/motor deficits.  Cranial nerves: II-XII intact, grossly non-focal.  Psychiatric: Alert and oriented, thought content appropriate, normal insight    Significant labs:  CBC:   Recent Labs     02/20/23  1554 02/21/23  0418 02/22/23  0849 WBC 8.8 8.1 8.6   RBC 4.64 4.06 4.42   HGB 14.1 12.6 13.7   HCT 42.2 38.3 41.8   MCV 90.9 94.3 94.6   RDW 14.3 14.1 14.2    227 239     BMP:   Recent Labs     02/20/23  1554 02/21/23  0418 02/22/23  0849    141 141   K 3.7 3.4* 3.9    106 108*   CO2 25 25 25   BUN 16 17 17   CREATININE 1.2 1.2 1.4*   MG  --  2.0  --      LFT:  Recent Labs     02/20/23  1554 02/21/23  0418 02/22/23  0849   PROT 7.4 6.2* 6.6   ALKPHOS 103 88 96   ALT 11 10 10   AST 21 18 18   BILITOT 0.5 0.4 0.5     PT/INR:   Recent Labs     02/20/23  1554 02/21/23  0418   INR 1.5 1.5   APTT  --  37.5*     BNP: No results for input(s): BNP in the last 72 hours. Hgb A1C: No results found for: LABA1C  Folate and B12: No results found for: NPLILUAW93, No results found for: FOLATE  Thyroid Studies: No results found for: TSH, Q5LXEIC, Z6YYJDL, THYROIDAB    Urinalysis:    Lab Results   Component Value Date/Time    NITRU Negative 10/22/2022 06:27 PM    WBCUA 1-3 10/22/2022 06:27 PM    BACTERIA RARE 10/22/2022 06:27 PM    RBCUA 5-10 10/22/2022 06:27 PM    BLOODU SMALL 10/22/2022 06:27 PM    SPECGRAV >=1.030 10/22/2022 06:27 PM    GLUCOSEU Negative 10/22/2022 06:27 PM       Imaging:  XR CHEST PORTABLE    Result Date: 2/20/2023  EXAMINATION: ONE XRAY VIEW OF THE CHEST 2/20/2023 4:36 pm COMPARISON: 02/18/2023. HISTORY: ORDERING SYSTEM PROVIDED HISTORY: chest pain TECHNOLOGIST PROVIDED HISTORY: Reason for exam:->chest pain What reading provider will be dictating this exam?->CRC FINDINGS: Internal fixation of the cervical spine visualized. Sternal wires visualized. Poor inspiratory effort is seen. Mild prominence of the cardiomediastinal silhouette evidence visualized demonstrates no significant change in comparison to the prior study. Peribronchial cuffing bilateral hilar prominence is seen otherwise the bronchovascular interstitial lung markings unremarkable, no evidence of focal lung infiltrate or consolidation.   The costophrenic angles are clear with no evidence of pleural fluid. No evidence of pneumothorax or parenchymal lung mass. Degenerative bone changes. Mild congestive changes, no evidence of acute cardiopulmonary disease. XR CHEST PORTABLE    Result Date: 2/18/2023  EXAMINATION: ONE XRAY VIEW OF THE CHEST 2/18/2023 9:27 pm COMPARISON: October 22, 2022 HISTORY: ORDERING SYSTEM PROVIDED HISTORY: chest pain TECHNOLOGIST PROVIDED HISTORY: Reason for exam:->chest pain What reading provider will be dictating this exam?->CRC FINDINGS: Normal cardiomediastinal silhouette. Lungs clear. No pneumothorax or effusion. Osseous thorax intact. Sternal wires and lower cervical ACDF noted. No acute disease. RECOMMENDATION: Careful clinical correlation and follow up recommended. NM Cardiac Stress Test Nuclear Imaging    Result Date: 2/21/2023  Indication:  Chest Pain. Clinical History:   Patient has no history of coronary artery disease. IMAGING: Myocardial perfusion imaging was performed at rest 30-35 minutes following the intravenous injection of 10.8 mCi of (Tc-Sestamibi) followed by 10 ml of Normal Saline. At peak exercise, the patient was injected intravenously with 30mCi of (Tc-Sestamibi) followed by 10 ml of Normal Saline. Gated post-stress tomographic imaging was performed 20-25 minutes after stress. FINDINGS: The overall quality of the study was adequate. Inferior wall fixed defect that was not present on CT attenuation corrected images suggestive attenuation artifact. There is coronary artery calcifications noted. Left ventricular cavity size was noted to be normal. Rotational analog analysis demonstrated no significant motion artifacts. The gated SPECT stress imaging in the short, vertical long, and horizontal long axis demonstrated  severe defect was present in the apical segment that was small to moderate sized by quantification. The resting images showed no significant reversibility.  Gated SPECT left ventricular ejection fraction was calculated to be 56%, with apical akinesis. The myocardial perfusion imaging was abnormal. The abnormality was a moderate-sized fixed apical defect. No indication of reversible ischemia. Left ventricular systolic function was normal, EF calculated at 56% Coronary artery calcifications is noted on low dose CT images. Overall low to intermediate risk myocardial perfusion study. No prior study available for comparison.        Discharge Medications:      Medication List        START taking these medications      aspirin 81 MG chewable tablet  Take 1 tablet by mouth daily  Start taking on: February 23, 2023            CONTINUE taking these medications      acetaminophen 325 MG tablet  Commonly known as: TYLENOL     amLODIPine 10 MG tablet  Commonly known as: NORVASC     carvedilol 25 MG tablet  Commonly known as: COREG     cyclobenzaprine 5 MG tablet  Commonly known as: FLEXERIL     docusate sodium 100 MG capsule  Commonly known as: COLACE     Eliquis 5 MG Tabs tablet  Generic drug: apixaban     lisinopril 20 MG tablet  Commonly known as: PRINIVIL;ZESTRIL     mirtazapine 30 MG tablet  Commonly known as: REMERON     nitroGLYCERIN 0.4 MG SL tablet  Commonly known as: NITROSTAT     ondansetron 4 MG tablet  Commonly known as: ZOFRAN     pantoprazole 20 MG tablet  Commonly known as: PROTONIX     polyethylene glycol 17 g packet  Commonly known as: GLYCOLAX     potassium chloride 10 MEQ extended release capsule  Commonly known as: MICRO-K     rosuvastatin 20 MG tablet  Commonly known as: CRESTOR               Where to Get Your Medications        These medications were sent to [facility], [address] [address]      Phone: [phone]   aspirin 81 MG chewable tablet         Time Spent on discharge is more than 45 minutes in the examination, evaluation, counseling and review of medications and discharge plan.    +++++++++++++++++++++++++++++++++++++++++++++++++  YUDITH Nava Chi - CNP  [employer] - Hospitalist  [address], [place]  +++++++++++++++++++++++++++++++++++++++++++++++++  NOTE: This report was transcribed using voice recognition software. Every effort was made to ensure accuracy; however, inadvertent computerized transcription errors may be present.

## 2023-02-22 NOTE — CARE COORDINATION
Per Psych NP Abhinav Kinsey, psychiatry was consulted. Per chart review, patient is from [facility]. SW Supervisor discussed with Christian Aparicio. She reported that she spoke with [facility] and the plan is for patient to return. Psych consult will be canceled. Psych NP Abhinav Kinsey aware.      JALEESA Elliott, [facility] 19 Work Supervisor

## 2023-02-22 NOTE — DISCHARGE INSTR - COC
Continuity of Care Form    Patient Name: Keagan Ramirez   :  1960  MRN:  [id number]    Admit date:  2023  Discharge date:  23      Code Status Order: Full Code   Advance Directives:     Admitting Physician:  Sommer North MD  PCP: No primary care provider on file. Discharging Nurse: Luis You RN  [address] Unit/Room#: 2061/8187-W  Discharging Unit Phone Number: [phone]    Emergency Contact:   Extended Emergency Contact Information  Primary Emergency Contact: izabella coker  Mobile Phone: [phone]  Relation: Child    Past Surgical History:  Past Surgical History:   Procedure Laterality Date    CARDIAC SURGERY         Immunization History: There is no immunization history on file for this patient.     Active Problems:  Patient Active Problem List   Diagnosis Code    Chest pain R07.9    Hypertension I10    Hyperlipidemia E78.5    Coronary artery disease I25.10    Congestive heart failure (CHF) (HCC) I50.9    Chronic kidney disease N18.9    Psychiatric illness F99       Isolation/Infection:   Isolation            No Isolation          Patient Infection Status       Infection Onset Added Last Indicated Last Indicated By Review Planned Expiration Resolved Resolved By    None active    Resolved    COVID-19 (Rule Out) 23 COVID-19 & Influenza Combo (Ordered)   23 Rule-Out Test Resulted    COVID-19 (Rule Out) 10/22/22 10/22/22 10/22/22 COVID-19 & Influenza Combo (Ordered)   10/22/22 Rule-Out Test Resulted            Nurse Assessment:  Last Vital Signs: /80   Pulse 64   Temp 98.1 °F (36.7 °C) (Temporal)   Resp 16   Ht 5' 11\" (1.803 m)   Wt 185 lb 11.2 oz (84.2 kg)   SpO2 94%   BMI 25.90 kg/m²     Last documented pain score (0-10 scale): Pain Level: 0  Last Weight:   Wt Readings from Last 1 Encounters:   23 185 lb 11.2 oz (84.2 kg)     Mental Status:  disoriented and alert    IV Access:  - None    Nursing Mobility/ADLs:  Walking Independent  Transfer  Independent  Bathing  Assisted  Dressing  Assisted  Toileting  Independent  Feeding  Independent  Med Admin  Dependent  Med Delivery   whole    Wound Care Documentation and Therapy:        Elimination:  Continence:   Bowel: Yes  Bladder: Yes  Urinary Catheter: None   Colostomy/Ileostomy/Ileal Conduit: No       Date of Last BM: 02/21/23  No intake or output data in the 24 hours ending 02/22/23 1413  No intake/output data recorded.    Safety Concerns:     At Risk for Falls    Impairments/Disabilities:      Hearing    Nutrition Therapy:  Current Nutrition Therapy:   - Oral Diet:  General, Low Fat, and low CHO, high fiber    Routes of Feeding: Oral  Liquids: Thin Liquids  Daily Fluid Restriction: no  Last Modified Barium Swallow with Video (Video Swallowing Test): not done    Treatments at the Time of Hospital Discharge:   Respiratory Treatments: ***  Oxygen Therapy:  is not on home oxygen therapy.  Ventilator:    - No ventilator support    Rehab Therapies: {THERAPEUTIC INTERVENTION:2751760530}  Weight Bearing Status/Restrictions: No weight bearing restrictions  Other Medical Equipment (for information only, NOT a DME order):  {EQUIPMENT:406231362}  Other Treatments: ***    Patient's personal belongings (please select all that are sent with patient):  None    RN SIGNATURE:  Electronically signed by Melissa Gilligan, RN on 2/22/23 at 2:17 PM EST    CASE MANAGEMENT/SOCIAL WORK SECTION    Inpatient Status Date: ***    Readmission Risk Assessment Score:  Readmission Risk              Risk of Unplanned Readmission:  0           Discharging to Facility/ Agency   Name:   Address:  Phone:  Fax:    Dialysis Facility (if applicable)   Name:  Address:  Dialysis Schedule:  Phone:  Fax:    / signature: {Esignature:[id number]}    PHYSICIAN SECTION    Prognosis: {Prognosis:4958049074}    Condition at Discharge: { Patient Condition:879369999}    Rehab Potential (if transferring to  Rehab): {Prognosis:9071492927}    Recommended Labs or Other Treatments After Discharge: ***    Physician Certification: I certify the above information and transfer of Chris Holes  is necessary for the continuing treatment of the diagnosis listed and that he requires {Admit to Appropriate Level of Care:95108} for {GREATER/LESS:634130187} 30 days.      Update Admission H&P: {CHP DME Changes in GRUUX:744214057}    PHYSICIAN SIGNATURE:  {Esignature:[id number]}

## 2023-02-22 NOTE — CARE COORDINATION
Discharge order noted. Patient was here under observation for chest pain. Per thoracic surgery note today, s/p sternotomy in the past who presents with chest pain. ECHO reviewed - possibly small effusion with small pocket posteriorly. Currently does not appear to be affecting him. He has a normal BP, HR, and is on room air. Does not appear to need any intervention at this time. CTS will sign off. Per internal med note today,  Patient was advised to follow-up with cardiologist that is familiar with his case in [id number] [place] [address]. S.W. Patient agreeable plan and currently chest pain-free. Patient stable from medical perspective for discharge. Spoke with Jeffrey Burkett, director of intake from [facility] who said patient had been psychologically cleared and too medically complex to take back. I informed her that my director spoke with the DON there and faxed stress test results and they are supposed to accept patent back. Jeffrey Burkett said she hadn't heard and she will check and call me back. I left a voicemail message as well as text message for patient's son Patricia Khan to let him know he will need to transport patient home if [facility] cannot accept back. Otoniel Cavrajal RN CM  [phone]        I received a call back from patient's son Patricia Khan who said that his father's home in [place] is not livable and he has no way to get to [facility] to pick his father up. Patricia Khan said that he thinks there is a court order for his father to stay at [facility] until his court date March 17 for Guardianship and [place] application all for placement. Patricia Khan said Adult [facility] is on the case and  is [facility] phone# [phone]. I called and spoke to [facility] who said that patient's son cannot take his father to his house because patient is aggressive and suicidal and there are young children in the home and it is not safe. She is not aware of any court order but said there is a court case with a court appointed .  Case# [id number] for the [facility] - RETREAT. Alishaneena Pitts said that [facility] has been trying to discharge this patient for weeks and made a comment last week that they will be discharging the patient to the hospital. I received a call back from Nolberto zaidi from [address] who spoke to the Director of Nursing and they said that they will not be taking patient back. I informed my manager who is in the process of reaching out to [facility] again and she will let me know. Ambulance form in envelope in soft chart will need to be signed and dated by nursing when patient is discharging. In the meantime, asking internal med for a psych eval.  Kev Garcia RN CM  [phone]        My manager spoke with Dr. Bassem Mendez at [address] to discuss patient returning. I attempted to reach intake and main number at [facility] without success. I was hung up on. Transportation set up via [facility] for [address] [facility] & [facility] for tomorrow phone# [phone]. Ambulance form in envelope in soft chart will need to be signed and dated by nursing when patient is discharging.   Kev Garcia RN CM  [phone]

## 2023-02-22 NOTE — PROGRESS NOTES
Perfect Serve message sent to Dr Daniele Greene staff Piter Molina). Patient is NPO for a stress test but appears to completed the stress yesterday. Requesting diet. Diet okay per cardiology.

## 2023-02-23 VITALS
WEIGHT: 185 LBS | HEIGHT: 71 IN | RESPIRATION RATE: 18 BRPM | BODY MASS INDEX: 25.9 KG/M2 | SYSTOLIC BLOOD PRESSURE: 149 MMHG | TEMPERATURE: 97.7 F | DIASTOLIC BLOOD PRESSURE: 102 MMHG | HEART RATE: 92 BPM | OXYGEN SATURATION: 99 %

## 2023-02-23 PROCEDURE — 6370000000 HC RX 637 (ALT 250 FOR IP): Performed by: NURSE PRACTITIONER

## 2023-02-23 PROCEDURE — 6370000000 HC RX 637 (ALT 250 FOR IP): Performed by: INTERNAL MEDICINE

## 2023-02-23 PROCEDURE — G0378 HOSPITAL OBSERVATION PER HR: HCPCS

## 2023-02-23 RX ORDER — POTASSIUM CHLORIDE 750 MG/1
10 TABLET, EXTENDED RELEASE ORAL DAILY
Status: CANCELLED | OUTPATIENT
Start: 2023-02-24

## 2023-02-23 RX ORDER — PANTOPRAZOLE SODIUM 20 MG/1
20 TABLET, DELAYED RELEASE ORAL DAILY
Status: CANCELLED | OUTPATIENT
Start: 2023-02-24

## 2023-02-23 RX ORDER — ASPIRIN 81 MG/1
81 TABLET, CHEWABLE ORAL DAILY
Status: CANCELLED | OUTPATIENT
Start: 2023-02-24

## 2023-02-23 RX ORDER — DIVALPROEX SODIUM 125 MG/1
250 CAPSULE, COATED PELLETS ORAL EVERY 12 HOURS SCHEDULED
Status: CANCELLED | OUTPATIENT
Start: 2023-02-23

## 2023-02-23 RX ORDER — CYCLOBENZAPRINE HCL 10 MG
5 TABLET ORAL 2 TIMES DAILY PRN
Status: CANCELLED | OUTPATIENT
Start: 2023-02-23

## 2023-02-23 RX ORDER — ROSUVASTATIN CALCIUM 20 MG/1
20 TABLET, COATED ORAL NIGHTLY
Status: CANCELLED | OUTPATIENT
Start: 2023-02-23

## 2023-02-23 RX ORDER — POLYETHYLENE GLYCOL 3350 17 G/17G
17 POWDER, FOR SOLUTION ORAL DAILY PRN
Status: CANCELLED | OUTPATIENT
Start: 2023-02-23

## 2023-02-23 RX ORDER — LISINOPRIL 20 MG/1
20 TABLET ORAL DAILY
Status: CANCELLED | OUTPATIENT
Start: 2023-02-24

## 2023-02-23 RX ORDER — DIVALPROEX SODIUM 125 MG/1
250 CAPSULE, COATED PELLETS ORAL EVERY 12 HOURS SCHEDULED
Status: DISCONTINUED | OUTPATIENT
Start: 2023-02-23 | End: 2023-02-24 | Stop reason: HOSPADM

## 2023-02-23 RX ORDER — MIRTAZAPINE 15 MG/1
30 TABLET, FILM COATED ORAL NIGHTLY
Status: CANCELLED | OUTPATIENT
Start: 2023-02-23

## 2023-02-23 RX ADMIN — MIRTAZAPINE 30 MG: 15 TABLET, FILM COATED ORAL at 20:39

## 2023-02-23 RX ADMIN — APIXABAN 5 MG: 5 TABLET, FILM COATED ORAL at 20:40

## 2023-02-23 RX ADMIN — ROSUVASTATIN CALCIUM 20 MG: 20 TABLET, FILM COATED ORAL at 20:39

## 2023-02-23 RX ADMIN — DIVALPROEX SODIUM 250 MG: 125 CAPSULE, COATED PELLETS ORAL at 20:39

## 2023-02-23 NOTE — PLAN OF CARE
Problem: Safety - Medical Restraint  Goal: Remains free of injury from restraints (Restraint for Interference with Medical Device)  Description: INTERVENTIONS:  1. Determine that other, less restrictive measures have been tried or would not be effective before applying the restraint  2. Evaluate the patient's condition at the time of restraint application  3. Inform patient/family regarding the reason for restraint  4.  Q2H: Monitor safety, psychosocial status, comfort, nutrition and hydration  2/23/2023 1033 by Jacque Bush RN  Outcome: Progressing  Flowsheets  Taken 2/23/2023 0956 by Jacque Bush RN  Remains free of injury from restraints (restraint for interference with medical device): Every 2 hours: Monitor safety, psychosocial status, comfort, nutrition and hydration  Taken 2/23/2023 0800 by Jacque Bush RN  Remains free of injury from restraints (restraint for interference with medical device): Every 2 hours: Monitor safety, psychosocial status, comfort, nutrition and hydration  Taken 2/92/7331 6231 by Luisito Joe RN  Remains free of injury from restraints (restraint for interference with medical device): Every 2 hours: Monitor safety, psychosocial status, comfort, nutrition and hydration  Taken 1/31/5730 3320 by Luisito Joe RN  Remains free of injury from restraints (restraint for interference with medical device): Every 2 hours: Monitor safety, psychosocial status, comfort, nutrition and hydration  Taken 9/98/0984 3644 by Luisito Joe RN  Remains free of injury from restraints (restraint for interference with medical device): Every 2 hours: Monitor safety, psychosocial status, comfort, nutrition and hydration  Taken 7/19/6078 3331 by Luisito Joe RN  Remains free of injury from restraints (restraint for interference with medical device): Every 2 hours: Monitor safety, psychosocial status, comfort, nutrition and hydration  2/22/2023 2312 by Zulay Griffin RN  Flowsheets  Taken 2/22/2023 2312  Remains free of injury from restraints (restraint for interference with medical device):   Determine that other, less restrictive measures have been tried or would not be effective before applying the restraint   Evaluate the patient's condition at the time of restraint application   Every 2 hours: Monitor safety, psychosocial status, comfort, nutrition and hydration   Inform patient/family regarding the reason for restraint  Taken 2/22/2023 2200  Remains free of injury from restraints (restraint for interference with medical device): Every 2 hours: Monitor safety, psychosocial status, comfort, nutrition and hydration  Taken 2/22/2023 2000  Remains free of injury from restraints (restraint for interference with medical device): Every 2 hours: Monitor safety, psychosocial status, comfort, nutrition and hydration  Taken 2/22/2023 1948  Remains free of injury from restraints (restraint for interference with medical device): Every 2 hours: Monitor safety, psychosocial status, comfort, nutrition and hydration

## 2023-02-23 NOTE — PROGRESS NOTES
Attempted to put on cardiac monitor and cont. Pulse ox patient refusing, yelling at nurse and thrashing in bed.

## 2023-02-23 NOTE — CARE COORDINATION
Patient remains here under observation for CP. Per internal med note from yesterday, patient is pounding on his chest, stating he wants to break his sternum, hoping to kill himself. He is threatening staff and trying to wrap things around his neck. He is becoming violent towards staff. He is from [facility] inpatient psych, there have been issues with trying to discharge him back. At 2015 patient still violent towards staff, has broken out of restraints. Per RN notes today, Restraints released for patient care and repositioning,patient remains agitated declining care,un redirectable,restraints continued, male co at bedside will monitor patient. Refused all medications. Does not want touched. Becomes immediately aggressive/angry. Psych consult order place. Await input and plan. Transportation set up via [facility] for [address] [place] [address] & [facility] if needed, phone# [phone]. Ambulance form in envelope in soft chart will need to be signed and dated by nursing when patient is discharging.     Jonathan Garland RN   [phone]

## 2023-02-23 NOTE — PROGRESS NOTES
Patient refused vitals to be taken, also refused hs meds, patient became very agitated pounding on his chest stating he wants to break his sternum and die. Also wrapped sheet around neck. Patient verbally and physically aggressive with staff, code violent called. Co at bedside.

## 2023-02-23 NOTE — PROGRESS NOTES
This patient has been discussed extensively with behavioral health leadership team as well as Dr. Jerry Rao. Per patient navigator patient has been discharged from [facility] and unfortunately will not be excepting the patient back. This patient will need admitted to [facility] S. adult psychiatric unit patient will go to room 7531 once that room is available. Psychiatry will write a pink slip for this patient as he is currently not on any legal status as he has been discharged and per patient navigator patient is not on any probate orders through [facility]. Please call extension [phone] for assistance in transferring this patient to please be advised that patients are being moved to accommodate this patient and that this patient may not be transferred for short period of time.

## 2023-02-23 NOTE — DISCHARGE SUMMARY
Hospitalist Discharge Summary    Patient ID: Harshad Salazar   Patient : 1960  Patient's PCP: No primary care provider on file. Admit Date: 2023   Admitting Physician: Colby Ravi MD    Discharge Date:  2023   Discharge Physician: YUDITH Jones CNP   Discharge Condition: Stable  Discharge Disposition: Home, Inpatient psychiatry      Hospital course in brief:  (Please refer to daily progress notes for a comprehensive review of the hospitalization by requesting medical records)          Consults:   IP CONSULT TO INTERNAL MEDICINE  IP CONSULT TO [address] [place] [address] TO PSYCHIATRY    Discharge Diagnoses:  Briefly, patient with PMH significant for A-fib, CKD, CHF, CAD, HPL, HPT [facility]. Of note patient has significant behavioral health issues, after patient's wife passed he was admitted to [facility] for suicidal ideation and subsequently tested positive for COVID-19 infection. Stress test was completed, was ruled read as a low to intermediate risk. Echocardiogram was complete showed normal EF and large anterior pericardial effusion was appreciated. Cardiothoracic surgery was consulted. Cardiothoracic surgery saw patient, reviewed echo and read it as possibly small effusion with small pocket posteriorly. Patient is hemodynamically stable and signed off on patient's case. Patient was advised to follow-up with cardiologist that is familiar with his case in [address] [place] [address]. S.W. Patient agreeable plan and currently chest pain-free. Patient stable from medical perspective for discharge to inpatient behavioral health. Patient had complicated course by agitation and refusal of [facility] to have patient return. Case was discussed extensively with psychiatry who also personally reached out to [facility]. Ultimately, [facility] continued to refuse admission despite [facility] being at [facility] for his mental health stabilization. However, psychiatry felt patient needed continue inpatient level of behavioral health care, and he was subsequently transferred to the [facility] at our facility with  pink slip being written by psychiatry. Appreciate extensive psychiatry support and case management support. Discharge Instructions / Follow up:    No future appointments. The patient's condition is stable. At this time the patient is without objective evidence of an acute process requiring continuing hospitalization or inpatient management. They are stable for discharge with outpatient follow-up. I have spoken with the patient and discussed the results of the current hospitalization, in addition to providing specific details for the plan of care and counseling regarding the diagnosis and prognosis. The plan has been discussed in detail and they are aware of the specific conditions for emergent return, as well as the importance of follow-up. Their questions are answered at this time and they are agreeable with the plan for discharge to inpatient. Continued appropriate risk factor modification of blood pressure, diabetes and serum lipids will remain essential to reducing risk of future atherosclerotic development    Activity: activity as tolerated    Physical exam:  General appearance: No apparent distress, appears stated age and cooperative. HEENT: Normal cephalic, atraumatic without obvious deformity. Pupils equal, round, and reactive to light. Extra ocular muscles intact. Conjunctivae/corneas clear. Neck: Supple, with full range of motion. No jugular venous distention. Trachea midline. Respiratory:  Clear to auscultation bilaterally. No apparent distress. Cardiovascular:  Regular rate and rhythm. S1, S2 without murmurs, rubs, or gallops. PV: Brisk capillary refill. +2 pedal and radial pulses bilaterally. No clubbing, cyanosis, edema of bilateral lower extremities.    Abdomen: Soft, non-tender, non-distended. +BS  Musculoskeletal: No obvious deformities or erythematous or edematous joints. Skin: Normal skin color. No rashes or lesions. Neurologic:  Neurovascularly intact without any focal sensory/motor deficits. Cranial nerves: II-XII intact, grossly non-focal.  Psychiatric: Agitated. Significant labs:  CBC:   Recent Labs     02/20/23  1554 02/21/23  0418 02/22/23  0849   WBC 8.8 8.1 8.6   RBC 4.64 4.06 4.42   HGB 14.1 12.6 13.7   HCT 42.2 38.3 41.8   MCV 90.9 94.3 94.6   RDW 14.3 14.1 14.2    227 239     BMP:   Recent Labs     02/20/23  1554 02/21/23  0418 02/22/23  0849    141 141   K 3.7 3.4* 3.9    106 108*   CO2 25 25 25   BUN 16 17 17   CREATININE 1.2 1.2 1.4*   MG  --  2.0  --      LFT:  Recent Labs     02/20/23  1554 02/21/23  0418 02/22/23  0849   PROT 7.4 6.2* 6.6   ALKPHOS 103 88 96   ALT 11 10 10   AST 21 18 18   BILITOT 0.5 0.4 0.5     PT/INR:   Recent Labs     02/20/23  1554 02/21/23  0418   INR 1.5 1.5   APTT  --  37.5*     BNP: No results for input(s): BNP in the last 72 hours. Hgb A1C: No results found for: LABA1C  Folate and B12: No results found for: [id number], No results found for: FOLATE  Thyroid Studies: No results found for: TSH, A6CMRGA, X8RMUBO, THYROIDAB    Urinalysis:    Lab Results   Component Value Date/Time    NITRU Negative 10/22/2022 06:27 PM    WBCUA 1-3 10/22/2022 06:27 PM    BACTERIA RARE 10/22/2022 06:27 PM    RBCUA 5-10 10/22/2022 06:27 PM    BLOODU SMALL 10/22/2022 06:27 PM    SPECGRAV >=1.030 10/22/2022 06:27 PM    GLUCOSEU Negative 10/22/2022 06:27 PM       Imaging:  XR CHEST PORTABLE    Result Date: 2/20/2023  EXAMINATION: ONE XRAY VIEW OF THE CHEST 2/20/2023 4:36 pm COMPARISON: 02/18/2023. HISTORY: ORDERING SYSTEM PROVIDED HISTORY: chest pain TECHNOLOGIST PROVIDED HISTORY: Reason for exam:->chest pain What reading provider will be dictating this exam?->CRC FINDINGS: Internal fixation of the cervical spine visualized. Sternal wires visualized. Poor inspiratory effort is seen. Mild prominence of the cardiomediastinal silhouette evidence visualized demonstrates no significant change in comparison to the prior study. Peribronchial cuffing bilateral hilar prominence is seen otherwise the bronchovascular interstitial lung markings unremarkable, no evidence of focal lung infiltrate or consolidation. The costophrenic angles are clear with no evidence of pleural fluid. No evidence of pneumothorax or parenchymal lung mass. Degenerative bone changes. Mild congestive changes, no evidence of acute cardiopulmonary disease. XR CHEST PORTABLE    Result Date: 2/18/2023  EXAMINATION: ONE XRAY VIEW OF THE CHEST 2/18/2023 9:27 pm COMPARISON: October 22, 2022 HISTORY: ORDERING SYSTEM PROVIDED HISTORY: chest pain TECHNOLOGIST PROVIDED HISTORY: Reason for exam:->chest pain What reading provider will be dictating this exam?->CRC FINDINGS: Normal cardiomediastinal silhouette. Lungs clear. No pneumothorax or effusion. Osseous thorax intact. Sternal wires and lower cervical ACDF noted. No acute disease. RECOMMENDATION: Careful clinical correlation and follow up recommended. NM Cardiac Stress Test Nuclear Imaging    Result Date: 2/21/2023  Indication:  Chest Pain. Clinical History:   Patient has no history of coronary artery disease. IMAGING: Myocardial perfusion imaging was performed at rest 30-35 minutes following the intravenous injection of 10.8 mCi of (Tc-Sestamibi) followed by 10 ml of Normal Saline. At peak exercise, the patient was injected intravenously with 30mCi of (Tc-Sestamibi) followed by 10 ml of Normal Saline. Gated post-stress tomographic imaging was performed 20-25 minutes after stress. FINDINGS: The overall quality of the study was adequate. Inferior wall fixed defect that was not present on CT attenuation corrected images suggestive attenuation artifact. There is coronary artery calcifications noted.  Left ventricular cavity size was noted to be normal. Rotational analog analysis demonstrated no significant motion artifacts. The gated SPECT stress imaging in the short, vertical long, and horizontal long axis demonstrated  severe defect was present in the apical segment that was small to moderate sized by quantification. The resting images showed no significant reversibility. Gated SPECT left ventricular ejection fraction was calculated to be 56%, with apical akinesis. The myocardial perfusion imaging was abnormal. The abnormality was a moderate-sized fixed apical defect. No indication of reversible ischemia. Left ventricular systolic function was normal, EF calculated at 56% Coronary artery calcifications is noted on low dose CT images. Overall low to intermediate risk myocardial perfusion study. No prior study available for comparison.        Discharge Medications:      Medication List        START taking these medications      aspirin 81 MG chewable tablet  Take 1 tablet by mouth daily            CONTINUE taking these medications      acetaminophen 325 MG tablet  Commonly known as: TYLENOL     amLODIPine 10 MG tablet  Commonly known as: NORVASC     carvedilol 25 MG tablet  Commonly known as: COREG     cyclobenzaprine 5 MG tablet  Commonly known as: FLEXERIL     docusate sodium 100 MG capsule  Commonly known as: COLACE     Eliquis 5 MG Tabs tablet  Generic drug: apixaban     lisinopril 20 MG tablet  Commonly known as: PRINIVIL;ZESTRIL     mirtazapine 30 MG tablet  Commonly known as: REMERON     nitroGLYCERIN 0.4 MG SL tablet  Commonly known as: NITROSTAT     ondansetron 4 MG tablet  Commonly known as: ZOFRAN     pantoprazole 20 MG tablet  Commonly known as: PROTONIX     polyethylene glycol 17 g packet  Commonly known as: GLYCOLAX     potassium chloride 10 MEQ extended release capsule  Commonly known as: MICRO-K     rosuvastatin 20 MG tablet  Commonly known as: CRESTOR               Where to Get Your Medications        These medications were sent to [facility] [address] [address]      Phone: [phone]   aspirin 81 MG chewable tablet         Time Spent on discharge is more than 45 minutes in the examination, evaluation, counseling and review of medications and discharge plan.    +++++++++++++++++++++++++++++++++++++++++++++++++  [address]  +++++++++++++++++++++++++++++++++++++++++++++++++  NOTE: This report was transcribed using voice recognition software. Every effort was made to ensure accuracy; however, inadvertent computerized transcription errors may be present.

## 2023-02-23 NOTE — CARE COORDINATION
Per psych note today, This patient will need admitted to [facility] [address]. adult psychiatric unit patient will go to room 7531 once that room is available. Psychiatry will write a pink slip for this patient as he is currently not on any legal status as he has been discharged and per patient navigator patient is not on any probate orders through [facility]. I received a call from [facility]  from [address] [facility] phone# [phone]. She is in the process of applying for Medicaid for this patient. She wanted me to ask him some questions about a [employer] that he has but I informed her that he was not in a state to ask him those questions right now. She will follow up with the  in behavioral health once he is more calm and settled to try to obtain the information needed.    Octavio Butts RN   [phone]

## 2023-02-23 NOTE — CARE COORDINATION
Spoke with Misa from [facility]. Updated her on issues with this patient surrounding our attempts to return the patient once medically stabilized which occurred yesterday. [facility] is not accepting this back and stated that they do not have an available bed. Cyn stated that she drove to [place] and viewed the homes exterior and found that is was not in deplorable conditions as the son had stated. [facility] discharge plan was home and per [facility] the patient is connected with [address]. She shared that this patient has an active probate with assigned  Galileo Finely [phone]. At present the patient is not deemed incompetent and is able to make his own decisions. Per [facility] the son has a history of not making decisions in the best interest of his father and there is an guardianship case as well. I called the [facility] board of health and spoke with Tito Ley at [phone] and she directed me [facility] at [phone] as the patient is active with this agency. I called this agency and they stated that the patient has an active chart, but has never utilized their services. Updated PAULETTE Hardin and she confirmed that [facility] is following this patient. 11:26: Spoke with Demario Cook and reviewed above  plan. She will loop in [facility] our [facility] navigator on this case. Also contacted attorney Almanza's office and spoke with [facility] on the status of the probate hearing. She believes the \"probate case is for guardianship\", await return call back for further clarification.

## 2023-02-23 NOTE — PROGRESS NOTES
Spoke with [facility] with [facility], plan will be in place for transfer to [facility]. Security will be present.

## 2023-02-23 NOTE — PROGRESS NOTES
Paged by RN that patient is pounding on his chest, stating he wants to break his sternum, hoping to kill himself. He is threatening staff and trying to wrap things around his neck. He is becoming violent towards staff  He is from [facility] inpatient psych, there have been issues with trying to discharge him back. 2015 patient still violent towards staff, has broken out of restraints  Will order another dose of 5mg haldol, place in restraints.  Tele monitoring and continuous pulse ox     Electronically signed by Sara Alba DO on 2/22/2023 at 7:50 PM

## 2023-02-23 NOTE — PROGRESS NOTES
Restraints released for patient care and repositioning,patient remains agitated declining care,un redirectable,restraints continued,co at bedside will monitor patient

## 2023-02-23 NOTE — PROGRESS NOTES
NM reviewed patient. He is in 4 point soft restraints, sleeping in bed. Male CO sitter present and sitting at the bedside. Will continue to monitor.

## 2023-02-23 NOTE — PLAN OF CARE
Problem: Pain  Goal: Verbalizes/displays adequate comfort level or baseline comfort level  2/22/2023 1545 by Jersey Walters RN  Outcome: Adequate for Discharge  2/22/2023 1027 by Jersey Walters, RN  Outcome: Progressing

## 2023-02-23 NOTE — PROGRESS NOTES
Patient refused blood work this morning. Remains in 4 point restraints. Constant observer at bedside. Refused all medications. Does not want touched. Becomes immediately aggressive/angry.

## 2023-02-23 NOTE — PROGRESS NOTES
Dr Renée Allan notified of patient behavior,new orders obtained for haldol and 4 point restraints.  Co remains at bedside

## 2023-02-23 NOTE — PROGRESS NOTES
Approached patient. Patient asleep upon entering room. Immediately began to scream at nurse. Stated \"I will not eat, drink or take any medications. I am done. \" Four point soft restraints remain in place. Able to move wrists and ankles, no edema, no redness noted. Patient has no complaints of pain. Constant observer remains at bedside.

## 2023-02-23 NOTE — PROGRESS NOTES
Patient remains in two point BUE wrist restraints. Permitted assessment and vital signs at this time. Will continue to monitor.

## 2023-02-23 NOTE — PROGRESS NOTES
Patient ate a meal with the assistance of a staff member. Also drank fluids without issue. Patient remains anxious.

## 2023-02-23 NOTE — CONSULTS
Behavioral health consult    Consulted by: Dr. Kenya Jackson  Reason for consult: Violent behavior      Chief complaint: \"I cannot hear. \"    HPI: Patient is a 55-year-old male from [facility] who presented the ED on February 20, 2023 complaining of chest pain while at [facility]. Patient was seen and evaluated on the medical unit for his chest pain he was medically cleared by cardiology he was scheduled to discharge back to [facility] however per the chart [facility] has been refusing to accept this patient back. Psychiatry is now consulted for violent behavior. I reviewed the chart patient has been threatening staff trying to wrap things around his neck became violent and broke out of his restraints. He was reportedly trying to break his sternum \"hoping to kill himself. \"  He has been refusing all medications becomes aggressive and angry when they attempt to touch the patient. He has been declining care. Per  notes there is guardianship pending for for this patient and patient has been a significant discharge disposition issue for [facility].  Psychiatry went to see the patient today at the time of our assessment he was calm however we were not able to assess this patient as every question we asked him he responded with \"I cannot hear you. \"  Per the sitter who was sitting with the patient patient is not able to hear unsure where his hearing aids are if he has hearing aids. But we were unable to asked the patient or have him answer any questions during our assessment. Our assessment is extremely limited due to his inability to communicate. History is extremely limited as patient is not able to answer any questions it appears the patient has his own home that has been declared in deplorable conditions patient has a living with a son but is unable to return there.     Unsure of any past psychiatric history patient is prescribed Remeron      Mental status examination:  Patient is unable to participate in any type of mental status examination at this time per the chart however he has been aggressive and making suicidal statements    Clinical impression:  Mood disorder    Plans and recommendations:  Discussed with Dr. Padron and the [facility] administrative team.  This patient has been aggressive on the medical floor has also made suicidal statements.  This patient will need to be discharged back to [facility] for safety and continuity of care of this patient.  [facility] management is attempting to assist with this discharge.  Behavioral health  Maribell Espana has reached out to patient navigator and I have reached out to the [facility] health recovery board to attempt to assist with communication with [facility].  Start Depakote sprinkles 250 mg twice daily to help with patient's mood

## 2023-02-24 ENCOUNTER — HOSPITAL ENCOUNTER (INPATIENT)
Age: 63
LOS: 19 days | Discharge: HOME | DRG: 884 | End: 2023-03-15
Attending: PSYCHIATRY & NEUROLOGY | Admitting: PSYCHIATRY & NEUROLOGY
Payer: MEDICARE

## 2023-02-24 PROBLEM — F23 ACUTE PSYCHOSIS (HCC): Status: ACTIVE | Noted: 2023-02-24

## 2023-02-24 LAB
ALBUMIN SERPL-MCNC: 3.7 G/DL (ref 3.5–5.2)
ALP BLD-CCNC: 104 U/L (ref 40–129)
ALT SERPL-CCNC: 14 U/L (ref 0–40)
ANION GAP SERPL CALCULATED.3IONS-SCNC: 11 MMOL/L (ref 7–16)
AST SERPL-CCNC: 30 U/L (ref 0–39)
BASOPHILS ABSOLUTE: 0.06 E9/L (ref 0–0.2)
BASOPHILS RELATIVE PERCENT: 0.6 % (ref 0–2)
BILIRUB SERPL-MCNC: 0.6 MG/DL (ref 0–1.2)
BUN BLDV-MCNC: 19 MG/DL (ref 6–23)
CALCIUM SERPL-MCNC: 8.9 MG/DL (ref 8.6–10.2)
CHLORIDE BLD-SCNC: 109 MMOL/L (ref 98–107)
CO2: 24 MMOL/L (ref 22–29)
CREAT SERPL-MCNC: 1.2 MG/DL (ref 0.7–1.2)
EOSINOPHILS ABSOLUTE: 0.3 E9/L (ref 0.05–0.5)
EOSINOPHILS RELATIVE PERCENT: 3 % (ref 0–6)
GFR SERPL CREATININE-BSD FRML MDRD: >60 ML/MIN/1.73
GLUCOSE BLD-MCNC: 96 MG/DL (ref 74–99)
HCT VFR BLD CALC: 40.4 % (ref 37–54)
HEMOGLOBIN: 13.7 G/DL (ref 12.5–16.5)
IMMATURE GRANULOCYTES #: 0.02 E9/L
IMMATURE GRANULOCYTES %: 0.2 % (ref 0–5)
LYMPHOCYTES ABSOLUTE: 2.78 E9/L (ref 1.5–4)
LYMPHOCYTES RELATIVE PERCENT: 27.4 % (ref 20–42)
MCH RBC QN AUTO: 30.4 PG (ref 26–35)
MCHC RBC AUTO-ENTMCNC: 33.9 % (ref 32–34.5)
MCV RBC AUTO: 89.8 FL (ref 80–99.9)
MONOCYTES ABSOLUTE: 1.5 E9/L (ref 0.1–0.95)
MONOCYTES RELATIVE PERCENT: 14.8 % (ref 2–12)
NEUTROPHILS ABSOLUTE: 5.49 E9/L (ref 1.8–7.3)
NEUTROPHILS RELATIVE PERCENT: 54 % (ref 43–80)
PDW BLD-RTO: 14 FL (ref 11.5–15)
PLATELET # BLD: 236 E9/L (ref 130–450)
PMV BLD AUTO: 10.4 FL (ref 7–12)
POTASSIUM SERPL-SCNC: 4.1 MMOL/L (ref 3.5–5)
RBC # BLD: 4.5 E12/L (ref 3.8–5.8)
SODIUM BLD-SCNC: 144 MMOL/L (ref 132–146)
TOTAL PROTEIN: 6.7 G/DL (ref 6.4–8.3)
WBC # BLD: 10.2 E9/L (ref 4.5–11.5)

## 2023-02-24 PROCEDURE — 85025 COMPLETE CBC W/AUTO DIFF WBC: CPT

## 2023-02-24 PROCEDURE — 80053 COMPREHEN METABOLIC PANEL: CPT

## 2023-02-24 PROCEDURE — 90792 PSYCH DIAG EVAL W/MED SRVCS: CPT | Performed by: NURSE PRACTITIONER

## 2023-02-24 PROCEDURE — G0378 HOSPITAL OBSERVATION PER HR: HCPCS

## 2023-02-24 PROCEDURE — 36415 COLL VENOUS BLD VENIPUNCTURE: CPT

## 2023-02-24 PROCEDURE — 1240000000 HC EMOTIONAL WELLNESS R&B

## 2023-02-24 RX ORDER — MECOBALAMIN 5000 MCG
5 TABLET,DISINTEGRATING ORAL DAILY
Status: DISCONTINUED | OUTPATIENT
Start: 2023-02-24 | End: 2023-03-15 | Stop reason: HOSPADM

## 2023-02-24 RX ORDER — HALOPERIDOL 5 MG/ML
3 INJECTION INTRAMUSCULAR EVERY 6 HOURS PRN
Status: DISCONTINUED | OUTPATIENT
Start: 2023-02-24 | End: 2023-03-15 | Stop reason: HOSPADM

## 2023-02-24 RX ORDER — HYDROXYZINE PAMOATE 50 MG/1
50 CAPSULE ORAL 3 TIMES DAILY PRN
Status: DISCONTINUED | OUTPATIENT
Start: 2023-02-24 | End: 2023-03-15 | Stop reason: HOSPADM

## 2023-02-24 RX ORDER — POTASSIUM CHLORIDE 750 MG/1
10 TABLET, EXTENDED RELEASE ORAL DAILY
Status: DISCONTINUED | OUTPATIENT
Start: 2023-02-24 | End: 2023-03-15 | Stop reason: HOSPADM

## 2023-02-24 RX ORDER — ASPIRIN 81 MG/1
81 TABLET, CHEWABLE ORAL DAILY
Status: DISCONTINUED | OUTPATIENT
Start: 2023-02-24 | End: 2023-03-15 | Stop reason: HOSPADM

## 2023-02-24 RX ORDER — HALOPERIDOL 2 MG/1
3 TABLET ORAL EVERY 6 HOURS PRN
Status: DISCONTINUED | OUTPATIENT
Start: 2023-02-24 | End: 2023-03-15 | Stop reason: HOSPADM

## 2023-02-24 RX ORDER — DIVALPROEX SODIUM 125 MG/1
250 CAPSULE, COATED PELLETS ORAL EVERY 12 HOURS SCHEDULED
Status: DISCONTINUED | OUTPATIENT
Start: 2023-02-24 | End: 2023-03-15 | Stop reason: HOSPADM

## 2023-02-24 RX ORDER — ACETAMINOPHEN 325 MG/1
650 TABLET ORAL EVERY 4 HOURS PRN
Status: DISCONTINUED | OUTPATIENT
Start: 2023-02-24 | End: 2023-03-15 | Stop reason: HOSPADM

## 2023-02-24 RX ORDER — PANTOPRAZOLE SODIUM 20 MG/1
20 TABLET, DELAYED RELEASE ORAL
Status: DISCONTINUED | OUTPATIENT
Start: 2023-02-24 | End: 2023-03-15 | Stop reason: HOSPADM

## 2023-02-24 RX ORDER — LANOLIN ALCOHOL/MO/W.PET/CERES
3 CREAM (GRAM) TOPICAL NIGHTLY
Status: DISCONTINUED | OUTPATIENT
Start: 2023-02-24 | End: 2023-02-24

## 2023-02-24 RX ORDER — LISINOPRIL 10 MG/1
20 TABLET ORAL DAILY
Status: DISCONTINUED | OUTPATIENT
Start: 2023-02-24 | End: 2023-03-15 | Stop reason: HOSPADM

## 2023-02-24 RX ORDER — CYCLOBENZAPRINE HCL 5 MG
5 TABLET ORAL 2 TIMES DAILY PRN
Status: DISCONTINUED | OUTPATIENT
Start: 2023-02-24 | End: 2023-03-15 | Stop reason: HOSPADM

## 2023-02-24 RX ORDER — NICOTINE 21 MG/24HR
1 PATCH, TRANSDERMAL 24 HOURS TRANSDERMAL DAILY
Status: DISCONTINUED | OUTPATIENT
Start: 2023-02-24 | End: 2023-03-15 | Stop reason: HOSPADM

## 2023-02-24 RX ORDER — MIRTAZAPINE 15 MG/1
7.5 TABLET, FILM COATED ORAL NIGHTLY
Status: DISCONTINUED | OUTPATIENT
Start: 2023-02-24 | End: 2023-03-15 | Stop reason: HOSPADM

## 2023-02-24 RX ORDER — MIRTAZAPINE 15 MG/1
30 TABLET, FILM COATED ORAL NIGHTLY
Status: DISCONTINUED | OUTPATIENT
Start: 2023-02-24 | End: 2023-02-24

## 2023-02-24 RX ORDER — ROSUVASTATIN CALCIUM 20 MG/1
20 TABLET, COATED ORAL NIGHTLY
Status: DISCONTINUED | OUTPATIENT
Start: 2023-02-24 | End: 2023-03-15 | Stop reason: HOSPADM

## 2023-02-24 RX ORDER — MAGNESIUM HYDROXIDE/ALUMINUM HYDROXICE/SIMETHICONE 120; 1200; 1200 MG/30ML; MG/30ML; MG/30ML
30 SUSPENSION ORAL PRN
Status: DISCONTINUED | OUTPATIENT
Start: 2023-02-24 | End: 2023-03-15 | Stop reason: HOSPADM

## 2023-02-24 RX ORDER — POLYETHYLENE GLYCOL 3350 17 G/17G
17 POWDER, FOR SOLUTION ORAL DAILY PRN
Status: DISCONTINUED | OUTPATIENT
Start: 2023-02-24 | End: 2023-03-15 | Stop reason: HOSPADM

## 2023-02-24 ASSESSMENT — SLEEP AND FATIGUE QUESTIONNAIRES
AVERAGE NUMBER OF SLEEP HOURS: 6
DO YOU USE A SLEEP AID: NO
DO YOU HAVE DIFFICULTY SLEEPING: NO

## 2023-02-24 ASSESSMENT — PAIN SCALES - GENERAL
PAINLEVEL_OUTOF10: 0
PAINLEVEL_OUTOF10: 0

## 2023-02-24 ASSESSMENT — LIFESTYLE VARIABLES
HOW OFTEN DO YOU HAVE A DRINK CONTAINING ALCOHOL: NEVER
HOW MANY STANDARD DRINKS CONTAINING ALCOHOL DO YOU HAVE ON A TYPICAL DAY: PATIENT DOES NOT DRINK

## 2023-02-24 NOTE — H&P
Department of Psychiatry  History and Physical - Adult     CHIEF COMPLAINT: Sleeping    Patient was seen after discussing with the treatment team and reviewing the chart    CIRCUMSTANCES OF ADMISSION:   Patient was presented to [facility] emergency department per report of chest pain from [facility].  Once admitted medically [facility] refused to accept the patient back.  He was presenting as unstable, erratic and behavioral therefore he was pink slipped for inpatient psychiatric services for stabilization.    HISTORY OF PRESENT ILLNESS:      The patient is a 62 y.o. male with significant past history of dementia and past inpatient psychiatric hospitalization at [facility], apparently this patient is from the [facility], was hospitalized at [facility] for psychiatric evaluation and stabilization, however he has been at that facility for several weeks and they were unable to dispo the patient to a facility, therefore they brought him to [facility] emergency department under the guise  of \"chest pain \"and refused to accept the patient back once he was medically cleared.  Due to his behaviors requiring restraints erratic impulsive with unstable thought process psychiatry had to pink slipped the patient for further psychiatric evaluation and stabilization.  Psychiatry has limited history on this patient, and limited access to any health information however he was medically cleared by our medical providers.  His work-up for chest pain was unremarkable however he did demonstrate significantly agitated aggressive erratic and impulsive behaviors while on the medical floor requiring restraint.  Patient was transferred from the medical floor to inpatient behavioral health on 7 S. due to his agitation he needed to be near the seclusion room for safety.  Additionally his behaviors did not complement the low stimulation milieu on 7 W. where we  would normally place a geriatric patient. Of the patient's medical record it appears the only psychotropic medication he is being treated with is mirtazapine 30 mg at at bedtime. It is unclear why this medication is being utilized when his presentation does not match the treatment. Patient has been uncooperative. Review of his record does not show any history of psychiatric illness through . He did have severe COVID-pneumonia requiring hospitalization and treatment in 2022 and his wife  the same month likely from [facility] as well. Patient is currently not cooperative for any care and is not taking any medications. Further investigation by our mental health/ team reveals that this patient is from the [facility], he has not been deemed incompetent. However he is connected to the [facility]. It has been stated that the patient's home was in deplorable condition. He apparently had covid pneumonia in 2022 and was hospitalized at that time. Review of the charting shows the patients wife was found dead at home 2022 apparently in an upstairs bedroom he was down stair and could not get to the upstairs due to illness. while he was hospitalized for covid. While hospitalized for covid he was assessed by the psychiatric provider who found no need for psychotropic medications or psychiatric intervention. Prior to his hospitalization for covid patient has PMH of heart surgery, congestive heart failure, coronary artery disease, high cholesterol, hypertension and renal disorder    Past psychiatric history:  Patient voluntary no information. We know from chart review that he has been at [facility] behavioral [facility].   He apparently has a history of dementia and chaotic relationships with his son  Presented to the ED here in 2022 with mild agitation, dysphoric mood and flight of ideas       Family psychiatric history:  Unknown     Legal history:  Unknown     Substance abuse history:  Review of patient medical record reveals he denied any alcohol or drug use. Personal, family social history:  Patient apparently from [place], cannot return to his home. He has a son who is not able to accommodate the patient in his home. Patient is apparently a   unclear what branch or how long he served. He is  from review of his medical record it appears that his wife  sometime around January 10, 2022 from COVID-19. And he was hospitalized at the same time for severe COVID-pneumonia. He has at least 1 son and a stepson.       Past Medical History:        Diagnosis Date    Atrial fibrillation ([facility] [address]     Chronic kidney disease     Congestive heart failure (CHF) (HCC)     Coronary artery disease     Hyperlipidemia     Hypertension        Medications Prior to Admission:   Medications Prior to Admission: aspirin 81 MG chewable tablet, Take 1 tablet by mouth daily  apixaban (ELIQUIS) 5 MG TABS tablet, Take 5 mg by mouth 2 times daily  carvedilol (COREG) 25 MG tablet, Take 25 mg by mouth 2 times daily (with meals)  mirtazapine (REMERON) 30 MG tablet, Take 30 mg by mouth nightly  rosuvastatin (CRESTOR) 20 MG tablet, Take 20 mg by mouth daily  amLODIPine (NORVASC) 10 MG tablet, Take 15 mg by mouth daily  lisinopril (PRINIVIL;ZESTRIL) 20 MG tablet, Take 20 mg by mouth daily  pantoprazole (PROTONIX) 20 MG tablet, Take 20 mg by mouth daily  potassium chloride (MICRO-K) 10 MEQ extended release capsule, Take 10 mEq by mouth daily  acetaminophen (TYLENOL) 325 MG tablet, Take 650 mg by mouth every 6 hours as needed for Pain  cyclobenzaprine (FLEXERIL) 5 MG tablet, Take 5 mg by mouth 2 times daily as needed for Muscle spasms  docusate sodium (COLACE) 100 MG capsule, Take 100 mg by mouth 2 times daily as needed for Constipation  nitroGLYCERIN (NITROSTAT) 0.4 MG SL tablet, Place 0.4 mg under the tongue every 5 minutes as needed for Chest pain up to max of 3 total doses. If no relief after 1 dose, call 911. ondansetron (ZOFRAN) 4 MG tablet, Take 4 mg by mouth every 6 hours as needed for Nausea or Vomiting  polyethylene glycol (GLYCOLAX) 17 g packet, Take 17 g by mouth daily as needed for Constipation    Past Surgical History:        Procedure Laterality Date    CARDIAC SURGERY         Allergies:   Patient has no known allergies. Family History  Family History   Problem Relation Age of Onset    Heart Failure Other              EXAMINATION:    REVIEW OF SYSTEMS:    ROS:  [x] All negative/unchanged except if checked. Explain positive(checked items) below:  [] Constitutional  [] Eyes  [] Ear/Nose/Mouth/Throat  [] Respiratory  [] CV  [] GI  []   [] Musculoskeletal  [] Skin/Breast  [] Neurological  [] Endocrine  [] Heme/Lymph  [] Allergic/Immunologic    Explanation:     Vitals:  BP (!) 175/93   Pulse 75   Temp 98.1 °F (36.7 °C) (Temporal)   Resp 18      Physical Examination:   Head: x  Atraumatic: x normocephalic  Skin and Mucosa        Moist x  Dry   Pale  x Normal   Neck:  Thyroid  Palpable   x  Not palpable   venus distention   adenopathy   Chest: x Clear   Rhonchi     Wheezing   CV:  sx   xS2    xNo murmer   Abdomen:  x  Soft    Tender    Viceromegaly   Extremities:  x No Edema     Edema     Cranial Nerves Examination:   CN II:   xPupils are reactive to light  Pupils are non reactive to light  CN III, IV, VI:  xNo eye deviation    No diplopia or ptosis   CN V:    xFacial Sensation is intact     Facial Sensation is not intact   CN IIIV:   x Hearing is normal to rubbing fingers   CN IX, X:     xNormal gag reflex and phonation   CN XI:   xShoulder shrug and neck rotation is normal  CNXII:    xTongue is midline no deviation or atrophy    Mental Status Examination:    Unable to perform mental status on patient.   Uncooperative      DIAGNOSIS:  Acute psychosis  Cognitive disorder   Concern for dementia with behavioral disturbance          LABS: REVIEWED TODAY:  Recent Labs     02/22/23  0849 02/24/23  0416   WBC 8.6 10.2   HGB 13.7 13.7    236     Recent Labs     02/22/23  0849 02/24/23  0416    144   K 3.9 4.1   * 109*   CO2 25 24   BUN 17 19   CREATININE 1.4* 1.2   GLUCOSE 104* 96     Recent Labs     02/22/23  0849 02/24/23  0416   BILITOT 0.5 0.6   ALKPHOS 96 104   AST 18 30   ALT 10 14     Lab Results   Component Value Date/Time    LABAMPH NOT DETECTED 10/22/2022 06:27 PM    BARBSCNU NOT DETECTED 10/22/2022 06:27 PM    LABBENZ NOT DETECTED 10/22/2022 06:27 PM    LABMETH NOT DETECTED 10/22/2022 06:27 PM    OPIATESCREENURINE NOT DETECTED 10/22/2022 06:27 PM    PHENCYCLIDINESCREENURINE NOT DETECTED 10/22/2022 06:27 PM    ETOH <10 02/18/2023 09:31 PM     No results found for: TSH, FREET4  No results found for: LITHIUM  No results found for: VALPROATE, CBMZ  No results found for: LITHIUM, VALPROATE      Radiology XR CHEST PORTABLE    Result Date: 2/20/2023  EXAMINATION: ONE XRAY VIEW OF THE CHEST 2/20/2023 4:36 pm COMPARISON: 02/18/2023. HISTORY: ORDERING SYSTEM PROVIDED HISTORY: chest pain TECHNOLOGIST PROVIDED HISTORY: Reason for exam:->chest pain What reading provider will be dictating this exam?->CRC FINDINGS: Internal fixation of the cervical spine visualized. Sternal wires visualized. Poor inspiratory effort is seen. Mild prominence of the cardiomediastinal silhouette evidence visualized demonstrates no significant change in comparison to the prior study. Peribronchial cuffing bilateral hilar prominence is seen otherwise the bronchovascular interstitial lung markings unremarkable, no evidence of focal lung infiltrate or consolidation. The costophrenic angles are clear with no evidence of pleural fluid. No evidence of pneumothorax or parenchymal lung mass. Degenerative bone changes. Mild congestive changes, no evidence of acute cardiopulmonary disease.      XR CHEST PORTABLE    Result Date: 2/18/2023  EXAMINATION: ONE XRAY VIEW OF THE CHEST 2/18/2023 9:27 pm COMPARISON: October 22, 2022 HISTORY: ORDERING SYSTEM PROVIDED HISTORY: chest pain TECHNOLOGIST PROVIDED HISTORY: Reason for exam:->chest pain What reading provider will be dictating this exam?->CRC FINDINGS: Normal cardiomediastinal silhouette. Lungs clear. No pneumothorax or effusion. Osseous thorax intact. Sternal wires and lower cervical ACDF noted. No acute disease. RECOMMENDATION: Careful clinical correlation and follow up recommended. NM Cardiac Stress Test Nuclear Imaging    Result Date: 2/21/2023  Indication:  Chest Pain. Clinical History:   Patient has no history of coronary artery disease. IMAGING: Myocardial perfusion imaging was performed at rest 30-35 minutes following the intravenous injection of 10.8 mCi of (Tc-Sestamibi) followed by 10 ml of Normal Saline. At peak exercise, the patient was injected intravenously with 30mCi of (Tc-Sestamibi) followed by 10 ml of Normal Saline. Gated post-stress tomographic imaging was performed 20-25 minutes after stress. FINDINGS: The overall quality of the study was adequate. Inferior wall fixed defect that was not present on CT attenuation corrected images suggestive attenuation artifact. There is coronary artery calcifications noted. Left ventricular cavity size was noted to be normal. Rotational analog analysis demonstrated no significant motion artifacts. The gated SPECT stress imaging in the short, vertical long, and horizontal long axis demonstrated  severe defect was present in the apical segment that was small to moderate sized by quantification. The resting images showed no significant reversibility. Gated SPECT left ventricular ejection fraction was calculated to be 56%, with apical akinesis. The myocardial perfusion imaging was abnormal. The abnormality was a moderate-sized fixed apical defect. No indication of reversible ischemia.  Left ventricular systolic function was normal, EF calculated at 56% Coronary artery calcifications is noted on low dose CT images. Overall low to intermediate risk myocardial perfusion study. No prior study available for comparison. TREATMENT PLAN:  The patient's diagnosis, treatment plan, medication management were formulated after patient was seen directly by the attending physician and myself and all relevant documentation was reviewed. Risk, benefit, side effects, possible outcomes of the medication and alternatives discussed with the patient and the patient demonstrated understanding. The patient was also educated that the outcome of treatment will depend on the medication compliance as directed by the prescribers along with regular follow-up, compliance with the labs and other work-up, as clinically indicated. Risk Management: Based on the diagnosis and assessment biopsychosocial treatment model was presented to the patient and was given the opportunity to ask any question. The patient was agreeable to the plan and all the patient's questions were answered to the patient's satisfaction. I discussed with the patient the risk, benefit, alternative and common side effects for the proposed medication treatment. The patient is consenting to this treatment. The patients risk factors have been mitigated as they have been admitted to inpatient behavioral health in an emotionally supportive environment with q 15 minute safety checks. Okay to discontinue the 1:1. They have the following      Collateral Information:  Will obtain collateral information from the family or friends. Will obtain medical records as appropriate from out patient providers  Will consult the hospitalist for a physical exam to rule out any co-morbid physical condition.     Home medication Reconciled       New Medications started during this admission :    Depakote 250 mg twice daily for mood stabilization  Decrease mirtazapine from 30 mg daily to 7.5 mg nightly  Melatonin 5 mg daily at 1800    Need MoCA  B12 and folate    Prn Haldol 5mg and Vistaril 50mg q6hr for extreme agitation. Trazodone as ordered for insomnia  Vistaril as ordered for anxiety      Psychotherapy:   Encourage participation in milieu and group therapy  Individual therapy as needed        NOTE: This report was transcribed using voice recognition software. Every effort was made to ensure accuracy; however, inadvertent computerized transcription errors may be present. Behavioral Services  Medicare Certification Upon Admission    I certify that this patient's inpatient psychiatric hospital admission is medically necessary for:    [x] (1) Treatment which could reasonably be expected to improve this patient's condition,       [x] (2) Or for diagnostic study;     AND     [x](2) The inpatient psychiatric services are provided while the individual is under the care of a physician and are included in the individualized plan of care.     Estimated length of stay/service  5 - 10 days based on stability     Plan for post-hospital care follow with OP provider     Electronically signed by YUDITH Jiménez CNP on 2/24/2023 at 8:15 AM          Electronically signed by YUDITH Jiménez CNP on 2/24/2023 at 8:15 AM

## 2023-02-24 NOTE — CARE COORDINATION
Navigator reached out to [employer]. [phone]. Received message regarding RepJhony Bustos in the [place] Dept., out of the office until 3/1/23. Instructed to call [phone]. Navigator spoke with RepJhony Price, currently there are no code restrictions on the home and the house is not red-tagged. Per Gabriel Price, the home has active utilities.     Electronically signed by JALEESA Doyle, GIOW on 2/24/2023 at 2:22 PM

## 2023-02-24 NOTE — BH NOTE
Attempted MOCA assessment. Patient yelled \"FUCKING PSYCHO\" at this staff. RN provided emotional support and stated they will try again at another time.

## 2023-02-24 NOTE — BH NOTE
Pt arrived to unit via w/c from floor 8. Pt states \"I thought I was getting the hell out of here, but then they bring me here. \" Pt upset that he didn't go home and states, \"I have elderly Aunts and Uncles that may die and I won't be there, I need to go. \" Pt concerned for belongings that are still at [facility] including clothes and his wallet. Pt states \"I don't know\" when asked why he was brought to the hospital. Pt declines to cooperate with answering questions or doing paperwork.

## 2023-02-24 NOTE — ED NOTES
This RN received call from Yamilex Lozano NP due to  being currently unavailable. Informed by Yamilex Lozano NP that this patient's transport to [address] [place] [address] must be delayed due to issue on unit. This RN called floor and notified charge nurse Oswaldo Larry who stated she was already aware of situation.       Steve Dueñas RN  02/23/23 2015

## 2023-02-24 NOTE — CARE COORDINATION
Attempted to complete leisure assessment. Pt was highly agitated and sitting on the edge of his bed shaking his head. He stated that he wants out of here and needs to see his uncle who is dying. Pt refused to answer any questions and was progressively getting more agitated the more I attempted to speak with him. Let pt rest and will attempt at a later time.

## 2023-02-24 NOTE — ED NOTES
[facility] SW called to inform that it was ok to move patient to [address] [facility] now that it is open and was informed that they called Supervisor, patient was aggressive, patient is sleeping at this time and will need a police escort and where approved to wait until morning to move the patient.      JALEESA Wells, [facility]  02/24/23 [id number]

## 2023-02-24 NOTE — ED NOTES
Instructions from Supervisors Sofía Hahn and Ilia Due to admit pt from medical unit [address] to [id number]. Rebecca in admitting informed of assigned bed, Rebecca on [facility] aware of pending admission, call to [facility], [address] at 19:23 to inform of acceptance and assigned bed, no answer. SW will attempt to call again.       [address], JALEESA, [place]  02/23/23 1924

## 2023-02-24 NOTE — CARE COORDINATION
Late Entry:    [facility] & spoke with Tana [phone]. Per Tana, there was no affidavit filed by [facility]. No commitment hearing ever on the dockets.    Navigator reviewed probate court dockets in [facility], & [facility] - No guardianship records are currently on file.     Navigator attempted to placed phone call to [facility] Adult Protective Services [phone], [phone], [phone] - No answer or vm option at any of the found numbers.    Navigator placed phone call to [facility] Clinical Director Treva Benton. Per Treva, [facility] discharged patient on 2/20/2023. Per Treva, patient was originally sent to the ER on 2/18/23 for chest pain - medically cleared and discharged back. On 2/20/23 patient began complaining of chest pain again and sent back to the ER. At that time, Treva reports patient was stable, no SI/HI, lucid, and completing his ADLs/ambulatory.    Treva reports patient was admitted to their facility for 114 days and was nearing discharge back to his residence at: [address] [address] with home healthcare to be scheduled. Home Healthcare was to be scheduled with [facility] in [facility]. Per Treva patient has an active [place] [facility] case and is assigned to a worker named Mililcent. Patient was not referred to his local area agency on aging for additional supports.    Treva reports that prior to the patient's admission, the patient's son reportedly initiated an application for guardianship through [facility]. Per Treva, [facility] did provide a second statement of expert evaluation to the son. Reportedly a hearing was scheduled for 3/17/2023, however [facility] reportedly received notification from the court that they are currently changing Magistrates and that guardianship cases will be pushed out.    Treva reports that they did complete a PASRR and that it was approved. A nursing  home medicaid application was filed but denied due to patient's assets and unwillingness to relinquish them. Patient reportedly owns 3 properties (1 in [place] & 2 in [place]), a large life insurance policy, over $78V in a bank account, and a sum of money from his wife's passing almost 1 year ago. Per Juan, patient has declined to forfeit any of his resources for nursing home placement. 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 [address] [place] [address] reports that they are willing to fax medical record for review. Navigator updated treatment team & Isac Quinteros.     Electronically signed by JALEESA Pang, GIOW on 2/24/2023 at 12:32 PM

## 2023-02-24 NOTE — PLAN OF CARE
Problem: Chronic Conditions and Co-morbidities  Goal: Patient's chronic conditions and co-morbidity symptoms are monitored and maintained or improved  Outcome: Progressing     Problem: Safety - Medical Restraint  Goal: Remains free of injury from restraints (Restraint for Interference with Medical Device)  Description: INTERVENTIONS:  1. Determine that other, less restrictive measures have been tried or would not be effective before applying the restraint  2. Evaluate the patient's condition at the time of restraint application  3. Inform patient/family regarding the reason for restraint  4. Q2H: Monitor safety, psychosocial status, comfort, nutrition and hydration  Outcome: Progressing     Problem: Anxiety  Goal: Will report anxiety at manageable levels  Description: INTERVENTIONS:  1. Administer medication as ordered  2. Teach and rehearse alternative coping skills  3. Provide emotional support with 1:1 interaction with staff  Outcome: Progressing     Problem: Coping  Goal: Pt/Family able to verbalize concerns and demonstrate effective coping strategies  Description: INTERVENTIONS:  1. Assist patient/family to identify coping skills, available support systems and cultural and spiritual values  2. Provide emotional support, including active listening and acknowledgement of concerns of patient and caregivers  3. Reduce environmental stimuli, as able  4. Instruct patient/family in relaxation techniques, as appropriate  5. Assess for spiritual pain/suffering and initiate Spiritual Care, Psychosocial Clinical Specialist consults as needed  Outcome: Progressing     Problem: Death & Dying  Goal: Pt/Family communicate acceptance of impending death and feel psychological comfort and peace  Description: INTERVENTIONS:  1. Assess patient/family anxiety and grief process related to end of life issues  2. Provide emotional and spiritual support  3.  Provide information about the patient's health status with consideration of family and cultural values  4. Communicate willingness to discuss death and facilitate grief process  with patient/family as appropriate  5. Emphasize sustaining relationships within family system and community, or ellyn/spiritual traditions  6. Initiate Spiritual Care, Psychosocial Clinical Specialist, consult as needed  Outcome: Progressing     Problem: Decision Making  Goal: Pt/Family able to effectively weigh alternatives and participate in decision making related to treatment and care  Description: INTERVENTIONS:  1. Determine when there are differences between patient's view, family's view, and healthcare provider's view of condition  2. Facilitate patient and family articulation of goals for care  3. Help patient and family identify pros/cons of alternative solutions  4. Provide information as requested by patient/family  5. Respect patient/family right to receive or not to receive information  6. Serve as a liaison between patient and family and health care team  7. Initiate Consults from Ethics, Palliative Care or initiate [address] [place] [address] as is appropriate  Outcome: Progressing     Problem: Confusion  Goal: Confusion, delirium, dementia, or psychosis is improved or at baseline  Description: INTERVENTIONS:  1. Assess for possible contributors to thought disturbance, including medications, impaired vision or hearing, underlying metabolic abnormalities, dehydration, psychiatric diagnoses, and notify attending LIP  2. [place] high risk fall precautions, as indicated  3. Provide frequent short contacts to provide reality reorientation, refocusing and direction  4. Decrease environmental stimuli, including noise as appropriate  5. Monitor and intervene to maintain adequate nutrition, hydration, elimination, sleep and activity  6. If unable to ensure safety without constant attention obtain sitter and review sitter guidelines with assigned personnel  7.  Initiate Psychosocial CNS and Spiritual Care consult, as indicated  Outcome: Progressing     Problem: Behavior  Goal: Pt/Family maintain appropriate behavior and adhere to behavioral management agreement, if implemented  Description: INTERVENTIONS:  1. Assess patient/family's coping skills and  non-compliant behavior (including use of illegal substances)  2. Notify security of behavior or suspected illegal substances which indicate the need for search of the family and/or belongings  3. Encourage verbalization of thoughts and concerns in a socially appropriate manner  4. Utilize positive, consistent limit setting strategies supporting safety of patient, staff and others  5. Encourage participation in the decision making process about the behavioral management agreement  6. If a visitor's behavior poses a threat to safety call refer to organization policy. 7. Initiate consult with , Psychosocial CNS, Spiritual Care as appropriate  Outcome: Progressing     Problem: Involuntary Admit  Goal: Will cooperate with staff recommendations and doctor's orders and will demonstrate appropriate behavior  Description: INTERVENTIONS:  1. Treat underlying conditions and offer medication as ordered  2. Educate regarding involuntary admission procedures and rules  3.  Contain excessive/inappropriate behavior per unit and hospital policies  Outcome: Progressing     Problem: Risk for Elopement  Goal: Patient will not exit the unit/facility without proper excort  Outcome: Progressing  Flowsheets (Taken 2/24/2023 1110)  Nursing Interventions for Elopement Risk: Assist with personal care needs such as toileting, eating, dressing, as needed to reduce the risk of wandering     Problem: Safety - Adult  Goal: Free from fall injury  Outcome: Progressing     Problem: ABCDS Injury Assessment  Goal: Absence of physical injury  Outcome: Progressing

## 2023-02-24 NOTE — CARE COORDINATION
SW attempted to meet with pt to complete assessment. Pt was sitting on his bed looking at the floor. SW asked pt how he is feeling and pt was not responding. Pt then started yelling that his uncle is at home and he is 80years old and he has cancer and he wants to be discharged so he can see his uncle. Pt continued to stated that he needs to be discharged to see his uncle who is going to die and if he sees him he will be able to help him. SW attempted to discuss discharge plan with pt and have him sign KALIE for family member to discuss his discharge plan and pt continued to yell at [address] and was not receptive to any information. Pt then stated that his wife, dad and friend all passed away and he needs to leave the hospital. BASSEM attempted to offer emotional support to pt and pt continued to yell at . Pt was tearful during this encounter. SW will attempt to complete assessment at a later time.

## 2023-02-24 NOTE — PROGRESS NOTES
Pt resting in bed,no signs of distress,no suicide ideation at present,co at beside will continue to monitor patient

## 2023-02-24 NOTE — CARE COORDINATION
SW Supervisor called [facility] ([phone]) to ask if they can drop off pt's belongings at the hospital. No answer, left voicemail with the intake department.      JALEESA Perez, [facility] Work Supervisor

## 2023-02-24 NOTE — BH NOTE
[facility]  Initial Interdisciplinary Treatment Plan NOTE    Review Date & Time: 0352 2.24.23    Patient was not in treatment team    Admission Type:      INVOL  Reason for admission:         Estimated Length of Stay Update:  3-5 days  Estimated Discharge Date Update: 3-5 days    EDUCATION:   Learner Progress Toward Treatment Goals: Reviewed results and recommendations of this team    Method: Small group    Outcome: Verbalized understanding    PATIENT GOALS: Patient unwilling to verbalize goal    PLAN/TREATMENT RECOMMENDATIONS UPDATE:Continue to assess need for inpatient level of care. GOALS UPDATE:   Time frame for Short-Term Goals: 24 hours.     Uday Bah RN

## 2023-02-24 NOTE — CARE COORDINATION
Navigator spoke with [facility] Clinical Director Susie Lancaster. [facility] will bring patient's belongings to the hospital on Monday 2/27/23.     Electronically signed by JALEESA Reyes, YESICA on 2/24/2023 at 2:52 PM

## 2023-02-25 LAB
CHOLESTEROL, TOTAL: 151 MG/DL (ref 0–199)
FOLATE: 11.8 NG/ML (ref 4.8–24.2)
HBA1C MFR BLD: 4.7 % (ref 4–5.6)
HDLC SERPL-MCNC: 47 MG/DL
LDL CHOLESTEROL CALCULATED: 84 MG/DL (ref 0–99)
TRIGL SERPL-MCNC: 98 MG/DL (ref 0–149)
VITAMIN B-12: 840 PG/ML (ref 211–946)
VLDLC SERPL CALC-MCNC: 20 MG/DL

## 2023-02-25 PROCEDURE — 80061 LIPID PANEL: CPT

## 2023-02-25 PROCEDURE — 6370000000 HC RX 637 (ALT 250 FOR IP): Performed by: PSYCHIATRY & NEUROLOGY

## 2023-02-25 PROCEDURE — 82607 VITAMIN B-12: CPT

## 2023-02-25 PROCEDURE — 6370000000 HC RX 637 (ALT 250 FOR IP)

## 2023-02-25 PROCEDURE — 36415 COLL VENOUS BLD VENIPUNCTURE: CPT

## 2023-02-25 PROCEDURE — 82746 ASSAY OF FOLIC ACID SERUM: CPT

## 2023-02-25 PROCEDURE — 6370000000 HC RX 637 (ALT 250 FOR IP): Performed by: NURSE PRACTITIONER

## 2023-02-25 PROCEDURE — 1240000000 HC EMOTIONAL WELLNESS R&B

## 2023-02-25 PROCEDURE — 83036 HEMOGLOBIN GLYCOSYLATED A1C: CPT

## 2023-02-25 PROCEDURE — 99232 SBSQ HOSP IP/OBS MODERATE 35: CPT | Performed by: NURSE PRACTITIONER

## 2023-02-25 RX ADMIN — APIXABAN 5 MG: 5 TABLET, FILM COATED ORAL at 21:03

## 2023-02-25 RX ADMIN — HYDROXYZINE PAMOATE 50 MG: 50 CAPSULE ORAL at 21:02

## 2023-02-25 RX ADMIN — DIVALPROEX SODIUM 250 MG: 125 CAPSULE, COATED PELLETS ORAL at 21:02

## 2023-02-25 RX ADMIN — MIRTAZAPINE 7.5 MG: 15 TABLET, FILM COATED ORAL at 21:02

## 2023-02-25 RX ADMIN — ROSUVASTATIN CALCIUM 20 MG: 20 TABLET, FILM COATED ORAL at 21:02

## 2023-02-25 RX ADMIN — Medication 5 MG: at 17:49

## 2023-02-25 ASSESSMENT — PAIN SCALES - GENERAL: PAINLEVEL_OUTOF10: 0

## 2023-02-25 NOTE — PROGRESS NOTES
BEHAVIORAL HEALTH FOLLOW-UP NOTE     2023     Patient was seen and examined in person, Chart reviewed   Patient's case discussed with staff/team    Chief Complaint: \"I want to go home to be with my uncle. \"    Interim History: Patient seen in his room he is labile tearful demanding to go home to be with his uncle who he states is dying he states his wife  2 years ago. He states his uncle \"wants to see God. \"  I explained to patient that we would need for him to agree to medications in order to plan his discharge. He states that he is not taking medications. Patient is hard of hearing he is labile volatile easily agitated he has poor insight and judgment continues to refuse all p.o. medications states that he \"does not need them. \"      Appetite: [x] Normal/Unchanged  [] Increased  [] Decreased      Sleep:       [x] Normal/Unchanged  [] Fair       [] Poor              Energy:    [x] Normal/Unchanged  [] Increased  [] Decreased        SI [] Present  [x] Absent    HI  []Present  [x] Absent     Aggression:  [] yes  [x] no    Patient is [x] able  [] unable to CONTRACT FOR SAFETY     PAST MEDICAL/PSYCHIATRIC HISTORY:   Past Medical History:   Diagnosis Date    Atrial fibrillation (HCC)     Chronic kidney disease     Congestive heart failure (CHF) (HCC)     Coronary artery disease     Hyperlipidemia     Hypertension        FAMILY/SOCIAL HISTORY:  Family History   Problem Relation Age of Onset    Heart Failure Other      Social History     Socioeconomic History    Marital status: Single     Spouse name: Not on file    Number of children: Not on file    Years of education: Not on file    Highest education level: Not on file   Occupational History    Not on file   Tobacco Use    Smoking status: Former     Types: Cigarettes    Smokeless tobacco: Never   Substance and Sexual Activity    Alcohol use: Not Currently    Drug use: Not on file    Sexual activity: Not on file   Other Topics Concern    Not on file   Social History Narrative    Not on file     Social Determinants of Health     Financial Resource Strain: Not on file   Food Insecurity: Not on file   Transportation Needs: Not on file   Physical Activity: Not on file   Stress: Not on file   Social Connections: Not on file   Intimate Partner Violence: Not on file   Housing Stability: Not on file           ROS:  [x] All negative/unchanged except if checked.  Explain positive(checked items) below:  [] Constitutional  [] Eyes  [] Ear/Nose/Mouth/Throat  [] Respiratory  [] CV  [] GI  []   [] Musculoskeletal  [] Skin/Breast  [] Neurological  [] Endocrine  [] Heme/Lymph  [] Allergic/Immunologic    Explanation:     MEDICATIONS:    Current Facility-Administered Medications:     acetaminophen (TYLENOL) tablet 650 mg, 650 mg, Oral, Q4H PRN, Ruel Mclaughlin MD    magnesium hydroxide (MILK OF MAGNESIA) 400 MG/5ML suspension 30 mL, 30 mL, Oral, Daily PRN, Ruel Mclaughlin MD    nicotine (NICODERM CQ) 21 MG/24HR 1 patch, 1 patch, TransDERmal, Daily, Ruel Mclaughlin MD    aluminum & magnesium hydroxide-simethicone (MAALOX) 200-200-20 MG/5ML suspension 30 mL, 30 mL, Oral, PRN, Ruel Mclaughlin MD    hydrOXYzine pamoate (VISTARIL) capsule 50 mg, 50 mg, Oral, TID PRN, Ruel Mclaughlin MD    haloperidol (HALDOL) tablet 3 mg, 3 mg, Oral, Q6H PRN **OR** haloperidol lactate (HALDOL) injection 3 mg, 3 mg, IntraMUSCular, Q6H PRN, Ruel Mclaughlin MD    amLODIPine (NORVASC) tablet 15 mg, 15 mg, Oral, Daily, Relda Snooks, APRN - CNP    apixaban (ELIQUIS) tablet 5 mg, 5 mg, Oral, BID, Relda Snooks, APRN - CNP    aspirin chewable tablet 81 mg, 81 mg, Oral, Daily, Relda Snooks, APRN - CNP    cyclobenzaprine (FLEXERIL) tablet 5 mg, 5 mg, Oral, BID PRN, Relda Snooks, APRN - CNP    divalproex (DEPAKOTE SPRINKLE) DR capsule 250 mg, 250 mg, Oral, 2 times per day, Relda Snooks, APRN - CNP    lisinopril (PRINIVIL;ZESTRIL) tablet 20 mg, 20 mg, Oral, Daily, Kat Lew, YUDITH - CNP    pantoprazole (PROTONIX) tablet 20 mg, 20 mg, Oral, QAM AC, Gosia Hdezds, APRN - CNP    polyethylene glycol (GLYCOLAX) packet 17 g, 17 g, Oral, Daily PRN, Gosia Castanon, APRN - CNP    potassium chloride (KLOR-CON M) extended release tablet 10 mEq, 10 mEq, Oral, Daily, [place] Lem[place]ds, APRN - CNP    rosuvastatin (CRESTOR) tablet 20 mg, 20 mg, Oral, Nightly, Gosia Hdezds, APRN - CNP    mirtazapine (REMERON) tablet 7.5 mg, 7.5 mg, Oral, Nightly, Wyatt Mayorga, APRN - CNP    melatonin disintegrating tablet 5 mg, 5 mg, Oral, Daily, Wyatt Mayorga, APRN - CNP      Examination:  BP (!) 175/93   Pulse 75   Temp 98.1 °F (36.7 °C) (Temporal)   Resp 18   Ht 6' 1\" (1.854 m)   Wt 185 lb (83.9 kg)   BMI 24.41 kg/m²   Gait - steady  Medication side effects(SE):     Mental Status Examination:    Level of consciousness:  within normal limits   Appearance:  fair grooming and fair hygiene  Behavior/Motor:  no abnormalities noted  Attitude toward examiner:  cooperative  Speech:  spontaneous, normal rate and normal volume   Mood: \" I am sad I want to see my uncle\"  Affect: Labile  Thought processes: Linear thought flight of ideas loose associations  Thought content: Devoid of any auditory visual hallucinations delusions or other perceptual normalities. Denies SI/HI intent or plan   Language: able to name objects and repeate phrases  Remote Memory: Impaired   recent Memory: Impaired  Cognition:  oriented to person, place, and time   Fund of Knowledge: Vocabulary intact, pt is aware of current events and past history  Attetion and Concentration intact  Insight poor  Judgement poor      ASSESSMENT: Patient symptoms are:  [] Well controlled  [x] Improving  [] Worsening  [] No change      Diagnosis:  Principal Problem:    Acute psychosis ([facility] [address]  Resolved Problems:    * No resolved hospital problems.  *      LABS:    Recent Labs     02/24/23  0416   WBC 10.2   HGB 13.7        Recent Labs     02/24/23  0416   NA 144   K 4.1   *   CO2 24   BUN 19   CREATININE 1.2   GLUCOSE 96     Recent Labs     02/24/23  0416   BILITOT 0.6   ALKPHOS 104   AST 30   ALT 14     Lab Results   Component Value Date/Time    LABAMPH NOT DETECTED 10/22/2022 06:27 PM    BARBSCNU NOT DETECTED 10/22/2022 06:27 PM    LABBENZ NOT DETECTED 10/22/2022 06:27 PM    LABMETH NOT DETECTED 10/22/2022 06:27 PM    OPIATESCREENURINE NOT DETECTED 10/22/2022 06:27 PM    PHENCYCLIDINESCREENURINE NOT DETECTED 10/22/2022 06:27 PM    ETOH <10 02/18/2023 09:31 PM     No results found for: TSH, FREET4  No results found for: LITHIUM  No results found for: VALPROATE, CBMZ        Treatment Plan:  Reviewed current Medications with the patient. Risks, benefits, side effects, drug-to-drug interactions and alternatives to treatment were discussed. Collateral information:   CD evaluation  Encourage patient to attend group and other milieu activities.   Discharge planning discussed with the patient and treatment team.    Continue to offer medications patient is prescribed Depakote 250 mg twice daily and Remeron 7.5 mg at bedtime    PSYCHOTHERAPY/COUNSELING:  [x] Therapeutic interview  [x] Supportive  [] CBT  [] Ongoing  [] Other    [x] Patient continues to need, on a daily basis, active treatment furnished directly by or requiring the supervision of inpatient psychiatric personnel      Anticipated Length of stay:            Electronically signed by YUDITH Carlos CNP on 1/67/3912 at 12:24 PM

## 2023-02-25 NOTE — PROGRESS NOTES
Patient declined to attend the following groups:    [employer] Activity    Will continue to encourage patient to attend programming.

## 2023-02-25 NOTE — PROGRESS NOTES
Explained to patient the benefit of taking evening Eliquis to prevent any blood clots from forming. Patient began yelling at this nurse \" I want to get the hell out of here and no one will come get me. \" Will continue to offer support. RN aware of patient refusing evening medications.

## 2023-02-25 NOTE — CARE COORDINATION
Biopsychosocial Assessment Note    Social work met with patient to complete the biopsychosocial assessment and C-SSRS.     Chief Complaint: \"I don't know why I'm here\"    Mental Status Exam: Pt is A&Ox2, minimally cooperative with assessment. PT flat, unstable, depressed, labile, irritable, guarded, and preoccupied. Pt is a poor historian. Pt is discharge focused. PT has poverty of thought content and impaired thought processes. PT has poor insight/judgment. PT denies SI/HI/AVH.     Clinical Summary:  Pt states that he is unsure why he is here. He reports that he had a heart problem, but does not think that he needs to be on the psychiatric unit. PT reports that [facility]. He reports that his family is worried about him and probably thinks that he is dead. Pt reports that his uncle is a millionaire and built an apartment for pt in [place]. Pt reports that his uncle has cancer and he is hoping to see him before he passes away. PT reports that he lost his wife 2 years ago. Pt reports that he has 3 step children, that he is not close with them. Pt reports that the only trauma he has is being here in this facility. Pt denies ever having any suicide attempts.     Per pt chart, pt is [facility] with [facility].     Risk Factors: limited support  Recent death in family  Lack of insight into need for treatment    Protective Factors:   Goals and hope for the future    Gender  [x] Male [] Female [] Transgender  [] Other    Sexual Orientation    [x] Heterosexual [] Homosexual [] Bisexual [] Other    Suicidal Ideation  [] Past [] Present [x] Denies     C-SSRS Screening Completed: Current Suicide Risk:  [] No Risk  [x] Low [] Moderate [] High    Homicidal Ideation  [] Past [] Present [x] Denies     Hallucinations/Delusions (Specify type)  [] Reports [x] Denies     Current or Past Mental Health Treatment:  [x] Yes, When and Where: [facility] for [facility] days  [] No    Substance  Use/Alcohol Use/Addiction  [] Reports [x] Denies     Tobacco Use (within the last 6 months)  [] Reports [x] Denies     Trauma History  [] Reports [x] Denies     Self Injurious/Self Mutilation Behaviors: BONIFACIO  [] Reports:    [] Past [] Present   [] Denies    Legal History:  []  Yes (Specify)    [x] No    Collateral Contact (KALIE signed)  Name: Lilly (buck)  Relationship:Uncle and Aunt  Number:     Collateral Information:      Access to Weapons per Collateral Contact: [] Reports [] Denies     After consideration of C-SSRS screening results, C-SSRS assessments, and this professional's assessment the patient's overall suicide risk assessed to be:  [] None   [] Low   [x] Moderate   [] High     [x] Discussed current suicide risk, protective and risk factors with RN and NP/Psychiatrist.    Discharge Plan:  [x] Home: to apartment uncle has for him in [place]  [] Shelter:  [] Crisis Unit:  [] Substance Abuse Rehab:  [] Nursing Facility:  [] Other (Specify):     Follow up Provider: Pt is not active

## 2023-02-25 NOTE — PLAN OF CARE
Patient denies suicidal ideation, homicidal ideations and AVH. Patient denies anxiety and depression. Patient is flat, depressed, irritable and labile. Patient appears guarded, preoccupied and withdrawn. Patient states he needs to get of the hospital, uncle is dying and he loves him more than any person ever in his life. Presents calm and cooperative during assessment. Patient is isolative to room and does not appear to be social with peers. Medications taken without issue. No complaints or concerns verbalized at this time. No unit problems reported. Will continue to observe and support. Problem: Chronic Conditions and Co-morbidities  Goal: Patient's chronic conditions and co-morbidity symptoms are monitored and maintained or improved  2/24/2023 2120 by Jez Lema RN  Outcome: Progressing     Problem: Safety - Medical Restraint  Goal: Remains free of injury from restraints (Restraint for Interference with Medical Device)  Description: INTERVENTIONS:  1. Determine that other, less restrictive measures have been tried or would not be effective before applying the restraint  2. Evaluate the patient's condition at the time of restraint application  3. Inform patient/family regarding the reason for restraint  4. Q2H: Monitor safety, psychosocial status, comfort, nutrition and hydration  2/24/2023 2120 by Jez Lema RN  Outcome: Progressing     Problem: Anxiety  Goal: Will report anxiety at manageable levels  Description: INTERVENTIONS:  1. Administer medication as ordered  2. Teach and rehearse alternative coping skills  3. Provide emotional support with 1:1 interaction with staff  2/24/2023 2120 by Jez Lema RN  Outcome: Progressing     Problem: Coping  Goal: Pt/Family able to verbalize concerns and demonstrate effective coping strategies  Description: INTERVENTIONS:  1. Assist patient/family to identify coping skills, available support systems and cultural and spiritual values  2.  Provide emotional support, including active listening and acknowledgement of concerns of patient and caregivers  3. Reduce environmental stimuli, as able  4. Instruct patient/family in relaxation techniques, as appropriate  5. Assess for spiritual pain/suffering and initiate Spiritual Care, Psychosocial Clinical Specialist consults as needed  2/24/2023 2120 by Tameka Rosario RN  Outcome: Progressing     Problem: Decision Making  Goal: Pt/Family able to effectively weigh alternatives and participate in decision making related to treatment and care  Description: INTERVENTIONS:  1. Determine when there are differences between patient's view, family's view, and healthcare provider's view of condition  2. Facilitate patient and family articulation of goals for care  3. Help patient and family identify pros/cons of alternative solutions  4. Provide information as requested by patient/family  5. Respect patient/family right to receive or not to receive information  6. Serve as a liaison between patient and family and health care team  7.  Initiate Consults from Ethics, Palliative Care or initiate [facility] as is appropriate  2/24/2023 2120 by Tameka Rosario RN  Outcome: Progressing

## 2023-02-25 NOTE — PROGRESS NOTES
Leisure assessment completed. Patient is [place] but able to answer most questions. Patient tearful during assessment speaking about his loses and uncle.

## 2023-02-26 PROCEDURE — 1240000000 HC EMOTIONAL WELLNESS R&B

## 2023-02-26 PROCEDURE — 6370000000 HC RX 637 (ALT 250 FOR IP): Performed by: NURSE PRACTITIONER

## 2023-02-26 PROCEDURE — 99232 SBSQ HOSP IP/OBS MODERATE 35: CPT | Performed by: NURSE PRACTITIONER

## 2023-02-26 PROCEDURE — 6370000000 HC RX 637 (ALT 250 FOR IP): Performed by: PSYCHIATRY & NEUROLOGY

## 2023-02-26 PROCEDURE — 6370000000 HC RX 637 (ALT 250 FOR IP)

## 2023-02-26 RX ADMIN — PANTOPRAZOLE SODIUM 20 MG: 20 TABLET, DELAYED RELEASE ORAL at 06:18

## 2023-02-26 RX ADMIN — ROSUVASTATIN CALCIUM 20 MG: 20 TABLET, FILM COATED ORAL at 21:42

## 2023-02-26 RX ADMIN — DIVALPROEX SODIUM 250 MG: 125 CAPSULE, COATED PELLETS ORAL at 09:09

## 2023-02-26 RX ADMIN — DIVALPROEX SODIUM 250 MG: 125 CAPSULE, COATED PELLETS ORAL at 21:42

## 2023-02-26 RX ADMIN — AMLODIPINE BESYLATE 15 MG: 10 TABLET ORAL at 09:09

## 2023-02-26 RX ADMIN — Medication 5 MG: at 17:48

## 2023-02-26 RX ADMIN — APIXABAN 5 MG: 5 TABLET, FILM COATED ORAL at 21:43

## 2023-02-26 RX ADMIN — APIXABAN 5 MG: 5 TABLET, FILM COATED ORAL at 09:09

## 2023-02-26 RX ADMIN — HYDROXYZINE PAMOATE 50 MG: 50 CAPSULE ORAL at 21:43

## 2023-02-26 RX ADMIN — LISINOPRIL 20 MG: 10 TABLET ORAL at 09:09

## 2023-02-26 RX ADMIN — POTASSIUM CHLORIDE 10 MEQ: 750 TABLET, EXTENDED RELEASE ORAL at 09:09

## 2023-02-26 RX ADMIN — MIRTAZAPINE 7.5 MG: 15 TABLET, FILM COATED ORAL at 21:43

## 2023-02-26 RX ADMIN — ASPIRIN 81 MG CHEWABLE TABLET 81 MG: 81 TABLET CHEWABLE at 09:09

## 2023-02-26 ASSESSMENT — PAIN SCALES - GENERAL: PAINLEVEL_OUTOF10: 0

## 2023-02-26 NOTE — PROGRESS NOTES
BEHAVIORAL HEALTH FOLLOW-UP NOTE     2/26/2023     Patient was seen and examined in person, Chart reviewed   Patient's case discussed with staff/team    Chief Complaint: \"I want to go home to be with my uncle. \"    Interim History: Patient seen in his room he continues to perseverate about going home to see his uncle who he states is dying. His speech is very loud almost yelling he is hard of hearing and having trouble with his hearing aids. He states he is fine and that we are holding him here and that he was told that he would leave 2 weeks ago. He did start taking medications. Insight and judgment is slowly improving.   He denies suicidal ideations intent or plan denies auditory or visual hallucinations      Appetite: [x] Normal/Unchanged  [] Increased  [] Decreased      Sleep:       [x] Normal/Unchanged  [] Fair       [] Poor              Energy:    [x] Normal/Unchanged  [] Increased  [] Decreased        SI [] Present  [x] Absent    HI  []Present  [x] Absent     Aggression:  [] yes  [x] no    Patient is [x] able  [] unable to CONTRACT FOR SAFETY     PAST MEDICAL/PSYCHIATRIC HISTORY:   Past Medical History:   Diagnosis Date    Atrial fibrillation ([facility] [address]     Chronic kidney disease     Congestive heart failure (CHF) (HCC)     Coronary artery disease     Hyperlipidemia     Hypertension        FAMILY/SOCIAL HISTORY:  Family History   Problem Relation Age of Onset    Heart Failure Other      Social History     Socioeconomic History    Marital status: Single     Spouse name: Not on file    Number of children: Not on file    Years of education: Not on file    Highest education level: Not on file   Occupational History    Not on file   Tobacco Use    Smoking status: Former     Types: Cigarettes    Smokeless tobacco: Never   Substance and Sexual Activity    Alcohol use: Not Currently    Drug use: Not on file    Sexual activity: Not on file   Other Topics Concern    Not on file   Social History Narrative    Not on file Social Determinants of Health     Financial Resource Strain: Not on file   Food Insecurity: Not on file   Transportation Needs: Not on file   Physical Activity: Not on file   Stress: Not on file   Social Connections: Not on file   Intimate Partner Violence: Not on file   Housing Stability: Not on file           ROS:  [x] All negative/unchanged except if checked.  Explain positive(checked items) below:  [] Constitutional  [] Eyes  [] Ear/Nose/Mouth/Throat  [] Respiratory  [] CV  [] GI  []   [] Musculoskeletal  [] Skin/Breast  [] Neurological  [] Endocrine  [] Heme/Lymph  [] Allergic/Immunologic    Explanation:     MEDICATIONS:    Current Facility-Administered Medications:     acetaminophen (TYLENOL) tablet 650 mg, 650 mg, Oral, Q4H PRN, Colton Amaro MD    magnesium hydroxide (MILK OF MAGNESIA) 400 MG/5ML suspension 30 mL, 30 mL, Oral, Daily PRN, Colton Amaro MD    nicotine (NICODERM CQ) 21 MG/24HR 1 patch, 1 patch, TransDERmal, Daily, Colton Amaro MD    aluminum & magnesium hydroxide-simethicone (MAALOX) 200-200-20 MG/5ML suspension 30 mL, 30 mL, Oral, PRN, Colton Amaro MD    hydrOXYzine pamoate (VISTARIL) capsule 50 mg, 50 mg, Oral, TID PRN, Colton Amaro MD, 50 mg at 02/25/23 2102    haloperidol (HALDOL) tablet 3 mg, 3 mg, Oral, Q6H PRN **OR** haloperidol lactate (HALDOL) injection 3 mg, 3 mg, IntraMUSCular, Q6H PRN, Colton Amaro MD    amLODIPine (NORVASC) tablet 15 mg, 15 mg, Oral, Daily, Xiomy Border, APRN - CNP, 15 mg at 02/26/23 4874    apixaban (ELIQUIS) tablet 5 mg, 5 mg, Oral, BID, Xiomy Border, APRN - CNP, 5 mg at 02/26/23 4434    aspirin chewable tablet 81 mg, 81 mg, Oral, Daily, Xiomy Border, APRN - CNP, 81 mg at 02/26/23 1464    cyclobenzaprine (FLEXERIL) tablet 5 mg, 5 mg, Oral, BID PRN, Xiomy Border, APRN - CNP    divalproex (DEPAKOTE SPRINKLE) DR capsule 250 mg, 250 mg, Oral, 2 times per day, YUDITH Starks CNP, 250 mg at 02/26/23 3579 lisinopril (PRINIVIL;ZESTRIL) tablet 20 mg, 20 mg, Oral, Daily, Carolynne Cristi, APRN - CNP, 20 mg at 02/26/23 1371    pantoprazole (PROTONIX) tablet 20 mg, 20 mg, Oral, QAM AC, Carolynne Cristi, APRN - CNP, 20 mg at 02/26/23 0618    polyethylene glycol (GLYCOLAX) packet 17 g, 17 g, Oral, Daily PRN, Damarisne Cristi, APRN - CNP    potassium chloride (KLOR-CON M) extended release tablet 10 mEq, 10 mEq, Oral, Daily, Carolynne Cristi, APRN - CNP, 10 mEq at 02/26/23 4691    rosuvastatin (CRESTOR) tablet 20 mg, 20 mg, Oral, Nightly, Carolynne Cristi, APRN - CNP, 20 mg at 02/25/23 2102    mirtazapine (REMERON) tablet 7.5 mg, 7.5 mg, Oral, Nightly, Sony Escobare, APRN - CNP, 7.5 mg at 02/25/23 2102    melatonin disintegrating tablet 5 mg, 5 mg, Oral, Daily, Harolyn Carne, APRN - CNP, 5 mg at 02/25/23 1749      Examination:  /72   Pulse 57   Temp 98.2 °F (36.8 °C) (Temporal)   Resp 16   Ht 6' 1\" (1.854 m)   Wt 185 lb (83.9 kg)   SpO2 98%   BMI 24.41 kg/m²   Gait - steady  Medication side effects(SE):     Mental Status Examination:    Level of consciousness:  within normal limits   Appearance:  fair grooming and fair hygiene  Behavior/Motor:  no abnormalities noted  Attitude toward examiner:  cooperative  Speech:  spontaneous, normal rate and normal volume   Mood: \" I am sad I want to see my uncle\"  Affect: Labile  Thought processes: Linear thought flight of ideas loose associations  Thought content: Devoid of any auditory visual hallucinations delusions or other perceptual normalities.   Denies SI/HI intent or plan   Language: able to name objects and repeate phrases  Remote Memory: Impaired   recent Memory: Impaired  Cognition:  oriented to person, place, and time   Fund of Knowledge: Vocabulary intact, pt is aware of current events and past history  Attetion and Concentration intact  Insight poor  Judgement poor      ASSESSMENT: Patient symptoms are:  [] Well controlled  [x] Improving  [] Worsening  [] No change      Diagnosis:  Principal Problem:    Acute psychosis ([facility] [place] [address]  Resolved Problems:    * No resolved hospital problems. *      LABS:    Recent Labs     02/24/23 0416   WBC 10.2   HGB 13.7        Recent Labs     02/24/23 0416      K 4.1   *   CO2 24   BUN 19   CREATININE 1.2   GLUCOSE 96     Recent Labs     02/24/23 0416   BILITOT 0.6   ALKPHOS 104   AST 30   ALT 14     Lab Results   Component Value Date/Time    LABAMPH NOT DETECTED 10/22/2022 06:27 PM    BARBSCNU NOT DETECTED 10/22/2022 06:27 PM    LABBENZ NOT DETECTED 10/22/2022 06:27 PM    LABMETH NOT DETECTED 10/22/2022 06:27 PM    OPIATESCREENURINE NOT DETECTED 10/22/2022 06:27 PM    PHENCYCLIDINESCREENURINE NOT DETECTED 10/22/2022 06:27 PM    ETOH <10 02/18/2023 09:31 PM     No results found for: TSH, FREET4  No results found for: LITHIUM  No results found for: VALPROATE, CBMZ        Treatment Plan:  Reviewed current Medications with the patient. Risks, benefits, side effects, drug-to-drug interactions and alternatives to treatment were discussed. Collateral information:   CD evaluation  Encourage patient to attend group and other milieu activities.   Discharge planning discussed with the patient and treatment team.    Continue to offer medications patient is prescribed Depakote 250 mg twice daily   continue Remeron 7.5 mg at bedtime    PSYCHOTHERAPY/COUNSELING:  [x] Therapeutic interview  [x] Supportive  [] CBT  [] Ongoing  [] Other    [x] Patient continues to need, on a daily basis, active treatment furnished directly by or requiring the supervision of inpatient psychiatric personnel      Anticipated Length of stay: 3 to 7 days based on stability            Electronically signed by YUDITH Washburn CNP on 0/19/5743 at 3:12 PM

## 2023-02-26 NOTE — PROGRESS NOTES
Pt alert, calm, and cooperative. Out on the unit watching tv. Pt is [place]. Denies SI, HI, and AVH. Pt spoke of his uncle. States he wants to go home to see him before he passes away. Per pt, he has cancer. Pt upset that his belongings are still at [employer]. Per report, CAROLIN Loyd was attempting to speak to the uncle for collateral information. Pt states he has a \"brand new apartment built right next to their house\". Pt brightened when he spoke of his aunt and uncle. States they are also his god parents. Denies any needs. Ate snack.  Will continue to monitor

## 2023-02-26 NOTE — PROGRESS NOTES
Patient attended morning community meeting. Updated on staffing assignments and daily expectations. Declined to share goal for the day.

## 2023-02-26 NOTE — PROGRESS NOTES
Patient denies SI,HI, or Hallucinations. He is hard of hearing. He isolates to his room a lot sleeping. Refused all medications. Groups and meds encouraged. Patient agitated when lab came to his room. Complaining of lab staff always draw multiple times. Patient complaints of being here, wants discharged home regarding an uncle not well. Easily agitated. Will continue to monitor.

## 2023-02-26 NOTE — PROGRESS NOTES
Patient approached this nurse and stated \"I'm peeing blood. My kidneys are shutting down. This has happened before, I'm not worried about it, but I thought you should know. \" This nurse personally inspected patient's bathroom, no evidence of this currently present. Provided patient with a urinal and instructed patient to please void into urinal. Patient instructed to inform this nurse of next void so action can be taken as necessary. Patient verbalized understanding. Will continue to monitor and support.

## 2023-02-26 NOTE — PROGRESS NOTES
Patient attended afternoon recreation activity. Active in participating in activity of patients choice. Patient was 1 of 6 in attendance.

## 2023-02-26 NOTE — GROUP NOTE
Group Therapy Note    Date: 2/26/2023    Group Start Time: 1120  Group End Time: 1200  Group Topic: Psychoeducation    [facility]  [id number], [address]                                                                        Group Therapy Note    Date: 2/26/2023  Module Name:  Coping skills for stress    Patient's Goal:  Patient will be able to id daily coping for stress management to prevent a crisis in the future. Notes:  Pleasant and sharing in group, willing to participate appropriately among others. Status After Intervention:  Improved    Participation Level:  Active Listener and Interactive    Participation Quality: Appropriate, Attentive, and Sharing      Speech:  normal      Thought Process/Content: Logical      Affective Functioning: Congruent      Mood: euthymic      Level of consciousness:  Alert, Oriented x4, and Attentive      Response to Learning: Able to verbalize/acknowledge new learning, Able to retain information, and Progressing to goal      Endings: None Reported    Modes of Intervention: Education, Support, Socialization, Exploration, and Problem-solving      Discipline Responsible: Psychoeducational Specialist      Signature:  Anthony Gonzalez

## 2023-02-26 NOTE — PLAN OF CARE
Problem: Chronic Conditions and Co-morbidities  Goal: Patient's chronic conditions and co-morbidity symptoms are monitored and maintained or improved  Outcome: Progressing     Problem: Anxiety  Goal: Will report anxiety at manageable levels  Description: INTERVENTIONS:  1. Administer medication as ordered  2. Teach and rehearse alternative coping skills  3. Provide emotional support with 1:1 interaction with staff  Outcome: Progressing     Problem: Coping  Goal: Pt/Family able to verbalize concerns and demonstrate effective coping strategies  Description: INTERVENTIONS:  1. Assist patient/family to identify coping skills, available support systems and cultural and spiritual values  2. Provide emotional support, including active listening and acknowledgement of concerns of patient and caregivers  3. Reduce environmental stimuli, as able  4. Instruct patient/family in relaxation techniques, as appropriate  5. Assess for spiritual pain/suffering and initiate Spiritual Care, Psychosocial Clinical Specialist consults as needed  Outcome: Progressing     Problem: Confusion  Goal: Confusion, delirium, dementia, or psychosis is improved or at baseline  Description: INTERVENTIONS:  1. Assess for possible contributors to thought disturbance, including medications, impaired vision or hearing, underlying metabolic abnormalities, dehydration, psychiatric diagnoses, and notify attending LIP  2. [place] high risk fall precautions, as indicated  3. Provide frequent short contacts to provide reality reorientation, refocusing and direction  4. Decrease environmental stimuli, including noise as appropriate  5. Monitor and intervene to maintain adequate nutrition, hydration, elimination, sleep and activity  6. If unable to ensure safety without constant attention obtain sitter and review sitter guidelines with assigned personnel  7.  Initiate Psychosocial CNS and Spiritual Care consult, as indicated  Outcome: Progressing     Problem: Behavior  Goal: Pt/Family maintain appropriate behavior and adhere to behavioral management agreement, if implemented  Description: INTERVENTIONS:  1. Assess patient/family's coping skills and  non-compliant behavior (including use of illegal substances)  2. Notify security of behavior or suspected illegal substances which indicate the need for search of the family and/or belongings  3. Encourage verbalization of thoughts and concerns in a socially appropriate manner  4. Utilize positive, consistent limit setting strategies supporting safety of patient, staff and others  5. Encourage participation in the decision making process about the behavioral management agreement  6. If a visitor's behavior poses a threat to safety call refer to organization policy. 7. Initiate consult with , Psychosocial CNS, Spiritual Care as appropriate  Outcome: Progressing     Problem: Involuntary Admit  Goal: Will cooperate with staff recommendations and doctor's orders and will demonstrate appropriate behavior  Description: INTERVENTIONS:  1. Treat underlying conditions and offer medication as ordered  2. Educate regarding involuntary admission procedures and rules  3. Contain excessive/inappropriate behavior per unit and hospital policies  Outcome: Progressing     Problem: Safety - Adult  Goal: Free from fall injury  Outcome: Progressing     Patient denies suicidal ideations, homicidal ideations, and hallucinations. Patient is pleasant and social with select peers. He is preoccupied with discharge. Discusses with this nurse that his uncle has cancer and hasn't been doing well, he is hopeful that he will be able to visit his uncle before he passes. Patient is medication compliant, attending groups, and is in control of his behavior.

## 2023-02-26 NOTE — GROUP NOTE
Group Therapy Note    Date: 2/26/2023    Group Start Time: 1000  Group End Time: 8241  Group Topic: Cognitive Skills    [facility]    JALEESA Patel, [facility]        Group Therapy Note    Attendees: 11       Patient's Goal:  To discuss cognitive distortions and learn to challenge negative thoughts. Notes:  PT was an active participant in group discussion. Status After Intervention:  Unchanged    Participation Level: Active Listener    Participation Quality: Appropriate and Attentive      Speech:  normal      Thought Process/Content: Linear      Affective Functioning: Blunted and Flat      Mood: euthymic      Level of consciousness:  Alert and Attentive      Response to Learning: Resistant      Endings: None Reported    Modes of Intervention: Education, Support, Socialization, Exploration, Clarifying, and Problem-solving      Discipline Responsible: /Counselor      Signature:   JALEESA Patel, [place]

## 2023-02-27 PROCEDURE — 97530 THERAPEUTIC ACTIVITIES: CPT

## 2023-02-27 PROCEDURE — 99232 SBSQ HOSP IP/OBS MODERATE 35: CPT | Performed by: NURSE PRACTITIONER

## 2023-02-27 PROCEDURE — 6370000000 HC RX 637 (ALT 250 FOR IP): Performed by: NURSE PRACTITIONER

## 2023-02-27 PROCEDURE — 1240000000 HC EMOTIONAL WELLNESS R&B

## 2023-02-27 PROCEDURE — 97161 PT EVAL LOW COMPLEX 20 MIN: CPT

## 2023-02-27 PROCEDURE — 6370000000 HC RX 637 (ALT 250 FOR IP)

## 2023-02-27 PROCEDURE — 6370000000 HC RX 637 (ALT 250 FOR IP): Performed by: PSYCHIATRY & NEUROLOGY

## 2023-02-27 PROCEDURE — 97165 OT EVAL LOW COMPLEX 30 MIN: CPT

## 2023-02-27 RX ADMIN — POTASSIUM CHLORIDE 10 MEQ: 750 TABLET, EXTENDED RELEASE ORAL at 09:50

## 2023-02-27 RX ADMIN — DIVALPROEX SODIUM 250 MG: 125 CAPSULE, COATED PELLETS ORAL at 09:51

## 2023-02-27 RX ADMIN — ASPIRIN 81 MG CHEWABLE TABLET 81 MG: 81 TABLET CHEWABLE at 09:50

## 2023-02-27 RX ADMIN — DIVALPROEX SODIUM 250 MG: 125 CAPSULE, COATED PELLETS ORAL at 21:23

## 2023-02-27 RX ADMIN — AMLODIPINE BESYLATE 15 MG: 10 TABLET ORAL at 09:50

## 2023-02-27 RX ADMIN — ROSUVASTATIN CALCIUM 20 MG: 20 TABLET, FILM COATED ORAL at 21:23

## 2023-02-27 RX ADMIN — APIXABAN 5 MG: 5 TABLET, FILM COATED ORAL at 09:51

## 2023-02-27 RX ADMIN — LISINOPRIL 20 MG: 10 TABLET ORAL at 09:50

## 2023-02-27 RX ADMIN — HYDROXYZINE PAMOATE 50 MG: 50 CAPSULE ORAL at 21:23

## 2023-02-27 RX ADMIN — Medication 5 MG: at 17:28

## 2023-02-27 RX ADMIN — PANTOPRAZOLE SODIUM 20 MG: 20 TABLET, DELAYED RELEASE ORAL at 06:30

## 2023-02-27 RX ADMIN — MIRTAZAPINE 7.5 MG: 15 TABLET, FILM COATED ORAL at 21:23

## 2023-02-27 RX ADMIN — APIXABAN 5 MG: 5 TABLET, FILM COATED ORAL at 21:23

## 2023-02-27 ASSESSMENT — PAIN SCALES - GENERAL: PAINLEVEL_OUTOF10: 0

## 2023-02-27 NOTE — CARE COORDINATION
Per Charge RN, pt's MoCA score was 13 on 2/16. Will need PT/OT evaluations to assist with discharge planning. NP aware. Assigned SW left voicemail with pt's Aunt and Uncle to obtain collateral information regarding pt's living situation. BASSEM team following.     JALEESA Hi, Jeramie Mahoney Work Supervisor

## 2023-02-27 NOTE — PROGRESS NOTES
BEHAVIORAL HEALTH FOLLOW-UP NOTE     2/27/2023     Patient was seen and examined in person, Chart reviewed   Patient's case discussed with staff/team    Chief Complaint: \"I am not supposed to be here. \"    Interim History: Patient out in the milieu watching TV he remains discharge focused states that he is supposed to be leaving. He believes that he can feel water coming onto his heart. He continues to perseverate about his uncle who he states is dying. He does not make any suicidal statements or homicidal statements. He denies auditory visual hallucinations he has been med compliant.   Appetite: [x] Normal/Unchanged  [] Increased  [] Decreased      Sleep:       [x] Normal/Unchanged  [] Fair       [] Poor              Energy:    [x] Normal/Unchanged  [] Increased  [] Decreased        SI [] Present  [x] Absent    HI  []Present  [x] Absent     Aggression:  [] yes  [x] no    Patient is [x] able  [] unable to CONTRACT FOR SAFETY     PAST MEDICAL/PSYCHIATRIC HISTORY:   Past Medical History:   Diagnosis Date    Atrial fibrillation (HCC)     Chronic kidney disease     Congestive heart failure (CHF) (HCC)     Coronary artery disease     Hyperlipidemia     Hypertension        FAMILY/SOCIAL HISTORY:  Family History   Problem Relation Age of Onset    Heart Failure Other      Social History     Socioeconomic History    Marital status: Single     Spouse name: Not on file    Number of children: Not on file    Years of education: Not on file    Highest education level: Not on file   Occupational History    Not on file   Tobacco Use    Smoking status: Former     Types: Cigarettes    Smokeless tobacco: Never   Substance and Sexual Activity    Alcohol use: Not Currently    Drug use: Not on file    Sexual activity: Not on file   Other Topics Concern    Not on file   Social History Narrative    Not on file     Social Determinants of Health     Financial Resource Strain: Not on file   Food Insecurity: Not on file   Transportation Needs: Not on file   Physical Activity: Not on file   Stress: Not on file   Social Connections: Not on file   Intimate Partner Violence: Not on file   Housing Stability: Not on file           ROS:  [x] All negative/unchanged except if checked.  Explain positive(checked items) below:  [] Constitutional  [] Eyes  [] Ear/Nose/Mouth/Throat  [] Respiratory  [] CV  [] GI  []   [] Musculoskeletal  [] Skin/Breast  [] Neurological  [] Endocrine  [] Heme/Lymph  [] Allergic/Immunologic    Explanation:     MEDICATIONS:    Current Facility-Administered Medications:     acetaminophen (TYLENOL) tablet 650 mg, 650 mg, Oral, Q4H PRN, Loco Carpenter MD    magnesium hydroxide (MILK OF MAGNESIA) 400 MG/5ML suspension 30 mL, 30 mL, Oral, Daily PRN, Loco Carpenter MD    nicotine (NICODERM CQ) 21 MG/24HR 1 patch, 1 patch, TransDERmal, Daily, Loco Carpenter MD    aluminum & magnesium hydroxide-simethicone (MAALOX) 200-200-20 MG/5ML suspension 30 mL, 30 mL, Oral, PRN, Loco Carpenter MD    hydrOXYzine pamoate (VISTARIL) capsule 50 mg, 50 mg, Oral, TID PRN, Loco Carpenter MD, 50 mg at 02/26/23 2143    haloperidol (HALDOL) tablet 3 mg, 3 mg, Oral, Q6H PRN **OR** haloperidol lactate (HALDOL) injection 3 mg, 3 mg, IntraMUSCular, Q6H PRN, Loco Carpenter MD    amLODIPine (NORVASC) tablet 15 mg, 15 mg, Oral, Daily, Karolina Days, APRN - CNP, 15 mg at 02/27/23 0950    apixaban (ELIQUIS) tablet 5 mg, 5 mg, Oral, BID, Karolina Days, APRN - CNP, 5 mg at 02/27/23 1392    aspirin chewable tablet 81 mg, 81 mg, Oral, Daily, Karolina Days, APRN - CNP, 81 mg at 02/27/23 0950    cyclobenzaprine (FLEXERIL) tablet 5 mg, 5 mg, Oral, BID PRN, Karolina Days, APRN - CNP    divalproex (DEPAKOTE SPRINKLE) DR capsule 250 mg, 250 mg, Oral, 2 times per day, Karolina Days, APRN - CNP, 250 mg at 02/27/23 0951    lisinopril (PRINIVIL;ZESTRIL) tablet 20 mg, 20 mg, Oral, Daily, YUDITH Prince CNP, 20 mg at 02/27/23 0909 pantoprazole (PROTONIX) tablet 20 mg, 20 mg, Oral, QAM AC, Aristides Larned, APRN - CNP, 20 mg at 02/27/23 0630    polyethylene glycol (GLYCOLAX) packet 17 g, 17 g, Oral, Daily PRN, Aristides Larned, APRN - CNP    potassium chloride (KLOR-CON M) extended release tablet 10 mEq, 10 mEq, Oral, Daily, Aristides Larned, APRN - CNP, 10 mEq at 02/27/23 0950    rosuvastatin (CRESTOR) tablet 20 mg, 20 mg, Oral, Nightly, Aristides Larned, APRN - CNP, 20 mg at 02/26/23 2142    mirtazapine (REMERON) tablet 7.5 mg, 7.5 mg, Oral, Nightly, Deidra Spikes, APRN - CNP, 7.5 mg at 02/26/23 2143    melatonin disintegrating tablet 5 mg, 5 mg, Oral, Daily, Deidra Spikes, APRN - CNP, 5 mg at 02/26/23 1748      Examination:  BP (!) 140/79   Pulse 80   Temp 98.3 °F (36.8 °C) (Temporal)   Resp 16   Ht 6' 1\" (1.854 m)   Wt 185 lb (83.9 kg)   SpO2 98%   BMI 24.41 kg/m²   Gait - steady  Medication side effects(SE):     Mental Status Examination:    Level of consciousness:  within normal limits   Appearance:  fair grooming and fair hygiene  Behavior/Motor:  no abnormalities noted  Attitude toward examiner:  cooperative  Speech:  spontaneous, normal rate and normal volume   Mood: \" I am sad I want to see my uncle\"  Affect: Labile  Thought processes: Linear thought flight of ideas loose associations  Thought content: Devoid of any auditory visual hallucinations delusions or other perceptual normalities.   Denies SI/HI intent or plan   Language: able to name objects and repeate phrases  Remote Memory: Impaired   recent Memory: Impaired  Cognition:  oriented to person, place, and time   Fund of Knowledge: Vocabulary intact, pt is aware of current events and past history  Attetion and Concentration intact  Insight poor  Judgement poor      ASSESSMENT: Patient symptoms are:  [] Well controlled  [x] Improving  [] Worsening  [] No change      Diagnosis:  Principal Problem:    Acute psychosis ([facility] [address]  Resolved Problems:    * No resolved hospital problems. *      LABS:    No results for input(s): WBC, HGB, PLT in the last 72 hours. No results for input(s): NA, K, CL, CO2, BUN, CREATININE, GLUCOSE in the last 72 hours. No results for input(s): BILITOT, ALKPHOS, AST, ALT in the last 72 hours. Lab Results   Component Value Date/Time    LABAMPH NOT DETECTED 10/22/2022 06:27 PM    BARBSCNU NOT DETECTED 10/22/2022 06:27 PM    LABBENZ NOT DETECTED 10/22/2022 06:27 PM    LABMETH NOT DETECTED 10/22/2022 06:27 PM    OPIATESCREENURINE NOT DETECTED 10/22/2022 06:27 PM    PHENCYCLIDINESCREENURINE NOT DETECTED 10/22/2022 06:27 PM    ETOH <10 02/18/2023 09:31 PM     No results found for: TSH, FREET4  No results found for: LITHIUM  No results found for: VALPROATE, CBMZ        Treatment Plan:  Reviewed current Medications with the patient. Risks, benefits, side effects, drug-to-drug interactions and alternatives to treatment were discussed. Collateral information:   CD evaluation  Encourage patient to attend group and other milieu activities.   Discharge planning discussed with the patient and treatment team.    Continue to offer medications patient is prescribed Depakote 250 mg twice daily   continue Remeron 7.5 mg at bedtime    PSYCHOTHERAPY/COUNSELING:  [x] Therapeutic interview  [x] Supportive  [] CBT  [] Ongoing  [] Other    [x] Patient continues to need, on a daily basis, active treatment furnished directly by or requiring the supervision of inpatient psychiatric personnel      Anticipated Length of stay: 3 to 7 days based on stability            Electronically signed by YUDITH Merritt CNP on 6/99/1229 at 4:30 PM

## 2023-02-27 NOTE — PLAN OF CARE
Problem: Chronic Conditions and Co-morbidities  Goal: Patient's chronic conditions and co-morbidity symptoms are monitored and maintained or improved  Outcome: Progressing     Problem: Anxiety  Goal: Will report anxiety at manageable levels  Description: INTERVENTIONS:  1. Administer medication as ordered  2. Teach and rehearse alternative coping skills  3. Provide emotional support with 1:1 interaction with staff  Outcome: Progressing     Problem: Coping  Goal: Pt/Family able to verbalize concerns and demonstrate effective coping strategies  Description: INTERVENTIONS:  1. Assist patient/family to identify coping skills, available support systems and cultural and spiritual values  2. Provide emotional support, including active listening and acknowledgement of concerns of patient and caregivers  3. Reduce environmental stimuli, as able  4. Instruct patient/family in relaxation techniques, as appropriate  5. Assess for spiritual pain/suffering and initiate Spiritual Care, Psychosocial Clinical Specialist consults as needed  Outcome: Progressing     Problem: Confusion  Goal: Confusion, delirium, dementia, or psychosis is improved or at baseline  Description: INTERVENTIONS:  1. Assess for possible contributors to thought disturbance, including medications, impaired vision or hearing, underlying metabolic abnormalities, dehydration, psychiatric diagnoses, and notify attending LIP  2. Grace high risk fall precautions, as indicated  3. Provide frequent short contacts to provide reality reorientation, refocusing and direction  4. Decrease environmental stimuli, including noise as appropriate  5. Monitor and intervene to maintain adequate nutrition, hydration, elimination, sleep and activity  6. If unable to ensure safety without constant attention obtain sitter and review sitter guidelines with assigned personnel  7.  Initiate Psychosocial CNS and Spiritual Care consult, as indicated  Outcome: Progressing     Problem: Behavior  Goal: Pt/Family maintain appropriate behavior and adhere to behavioral management agreement, if implemented  Description: INTERVENTIONS:  1. Assess patient/family's coping skills and  non-compliant behavior (including use of illegal substances)  2. Notify security of behavior or suspected illegal substances which indicate the need for search of the family and/or belongings  3. Encourage verbalization of thoughts and concerns in a socially appropriate manner  4. Utilize positive, consistent limit setting strategies supporting safety of patient, staff and others  5. Encourage participation in the decision making process about the behavioral management agreement  6. If a visitor's behavior poses a threat to safety call refer to organization policy. 7. Initiate consult with , Psychosocial CNS, Spiritual Care as appropriate  Outcome: Progressing     Problem: Involuntary Admit  Goal: Will cooperate with staff recommendations and doctor's orders and will demonstrate appropriate behavior  Description: INTERVENTIONS:  1. Treat underlying conditions and offer medication as ordered  2. Educate regarding involuntary admission procedures and rules  3. Contain excessive/inappropriate behavior per unit and hospital policies  Outcome: Progressing     Problem: Risk for Elopement  Goal: Patient will not exit the unit/facility without proper excort  Outcome: Progressing    Patient denies SI/HI and hallucinations. He is blunt, discharge focused, and irritable. He is out on the unit but isolative to self. Patient takes prescribed medications without issue. Will continue to offer support and comfort to patient.

## 2023-02-27 NOTE — PROGRESS NOTES
Pt brought a.m. urine to desk in the urinal to verify there is blood in his urine.  Will pass to next shift

## 2023-02-27 NOTE — PROGRESS NOTES
OCCUPATIONAL THERAPY INITIAL EVALUATION     [facility]      Date:2023                                                  Patient Name: Zandra Bejarano  MRN: [id number]  : 1960  Room: [address] [place], [place] [address]    Evaluating OT: Jason Billingsley, [address] [place], OTR/L [id number]  Referring Provider:YUDITH Seaman CNP  Specific Provider Orders: OT eval and treat   Recommended Adaptive Equipment:  TBD     Diagnosis: psychosis   Surgery: none   Pertinent Medical History: CHD, HLD, HTN, a-fib, CKD, dementia   Precautions:  Fall Risk    Assessment of current deficits   [x] Functional mobility  [x]ADLs  [x] Strength               [x]Cognition   [x] Functional transfers   [x] IADLs         [x] Safety Awareness   [x]Endurance   [] Fine Coordination              [x] Balance      [] Vision/perception   [x]Sensation    []Gross Motor Coordination  [] ROM  [] Delirium                   [] Motor Control     OT PLAN OF CARE   OT POC based on physician orders, patient diagnosis and results of clinical assessment    Frequency/Duration: 2-3 days/wk for 2 weeks PRN   Specific OT Treatment to include:   * Instruction/training on adapted ADL techniques and AE recommendations to increase functional independence within precautions       * Training on energy conservation strategies, correct breathing pattern and techniques to improve independence/tolerance for self-care routine  * Functional transfer/mobility training/DME recommendations for increased independence, safety, and fall prevention  * Patient/Family education to increase follow through with safety techniques and functional independence  * Recommendation of environmental modifications for increased safety with functional transfers/mobility and ADLs  * Therapeutic exercise to improve motor endurance, ROM, and functional strength for ADLs/functional transfers  * Therapeutic activities to facilitate/challenge dynamic balance, stand tolerance for increased safety and independence with ADLs  * Neuro-muscular re-education: facilitation of righting/equilibrium reactions, midline orientation, scapular stability/mobility, normalization of muscle tone, and facilitation of volitional active controled movement    Home Living: Pt presents from [facility]. Pt did not use any AD prior.     Pain Level: chest, RN aware  Cognition: A&O: 3/4; Follows 1 step directions, with repetition and increased time   Memory:  fair    Sequencing:  fair    Problem solving:  fair    Judgement/safety:  fair     Functional Assessment:  AM-PAC Daily Activity Raw Score: 19/24   Initial Eval Status  Date: 2/27/23 Treatment Status  Date: STGs=LTGs  Time Frame: 10-14 days   Feeding IND      Grooming SBA (standing at sink)   IND   UB Dressing SBA   IND   LB Dressing SBA (pt crossed BLEs to doff/don B socks)  IND    Bathing SBA (simulated)  SUP    Toileting SBA  ind   Bed Mobility  Log roll: NT  Supine to sit: NT   Sit to supine: NT   Log roll IND  Supine to sit: IND   Sit to supine: IND   Functional Transfers Sit to stand:SBA   Stand to sit:SBA  Commode: SBA  IND   Functional Mobility SBA (using no AD, to/from bathroom)  IND   Balance Sitting: SBA  Standing: SBA     Activity Tolerance fair     Visual/  Perceptual Glasses: none                  UE ROM: RUE:  WFL  LUE:  WFL  Strength: RUE: grossly 4/5 LUE: grossly 4/5   Strength: B WFL  Fine Motor Coordination:  WFL     Hearing: [place]  Sensation:  No c/o numbness/tingling   Tone:  WFL  Edema: none noted                            Comments:Cleared by RN to see pt. Upon arrival, patient sitting in chair and agreeable to OT session. At end of session, patient sitting in chair. Pt would benefit from continued OT to increase functional independence and quality of life.    Treatment: Completed ADLs/functional transfers, see above for assessment.  Pt appeared to have tolerated session well and appears motivated/cooperative/pleasant .  Pt demo'ing fair  understanding of education provided. Continue to educate. Eval Complexity: Low    Rehab Potential: Good for established goals, pt. assisted in establishment of goals. LTG: maximize independence with ADLs to return to PLOF    Patient instructed on diagnosis, prognosis/goals and plan of care. Demonstrated fair understanding. [] Malnutrition indicators have been identified and nursing has been notified to ensure a dietitian consult is ordered. Evaluation time includes thorough review of current medical information, gathering information on past medical & social history & PLOF, completion of standardized testing, informal observation of tasks, consultation with other medical professions/disciplines, assessment of data & development of POC/goals.      Time In: 1145       Time Out: 1200     Total treatment time: 0       Treatment Charges: Mins Units   OT Eval Low 12447 X    OT Eval Medium 64131     OT Eval High 98586     OT Re-Eval [id number]     Ther Ex  31133       Manual Therapy 62157       Thera Activities 03799       ADL/Home Mgt 19107     Neuro Re-ed 16937       Group Therapy        Orthotic manage/training  31288       Non-Billable Time           Lukasz Cole OTR/L [id number]

## 2023-02-27 NOTE — PROGRESS NOTES
Pt alert, calm, and cooperative. Pt is out on the unit watching tv. Pt is flat. Pt is [place]. Denies SI, HI, and AVH. Pt asking about going home. Assured pt they are working on it. Pt ate snack. Med compliant. Currently doin MOCA assessment. Will continue to monitor.

## 2023-02-27 NOTE — PROGRESS NOTES
Physical Therapy    Initial Assessment     Name: Maryam Phillip  : 1960  MRN: [id number]      Date of Service: 2023    Evaluating PT: Leyla Vides, PT, DPT [id number]      Room #:  7310/7310-A  Diagnosis:  Acute psychosis ([facility] [F23]  PMHx/PSHx:   has a past medical history of Atrial fibrillation ([facility] Chronic kidney disease, Congestive heart failure (CHF) ([place] [id number].), Coronary artery disease, Hyperlipidemia, and Hypertension. Precautions:  Fall Risk, Elopement Risk, Very [place]    SUBJECTIVE:    Pt lives at [facility]. Pt ambulated without AD prior to admission. OBJECTIVE:   Initial Evaluation  Date: 23 Treatment Date: Short Term/ Long Term   Goals   AM-PAC 6 Clicks      Was pt agreeable to Eval/treatment? Yes     Does pt have pain? Mild chest pain     Bed Mobility  Rolling: Independent   Supine to sit: Independent   Sit to supine: Independent   Scooting: Independent   NA   Transfers Sit to stand: Supervision  Stand to sit: Supervision  Stand pivot: Supervision without AD  Sit to stand: Independent   Stand to sit: Independent   Stand pivot: Independent    Ambulation   100 feet x2 without AD with SBA  >400 feet Independent    Stair negotiation: ascended and descended NT  NA   ROM BUE: Refer to OT note  BLE: WFL     Strength BUE: Refer to OT note  BLE: WFL     Balance Sitting EOB: Independent   Dynamic Standing: Supervision without AD  Dynamic Standing: Independent      Pt is A & O x: 4 to person, place, month/year, and situation. Sensation: Pt denies numbness and tingling of extremities. Edema: Unremarkable. Patient education  Pt educated on PT role in acute care setting.     Patient response to education:   Pt verbalized understanding Pt demonstrated skill Pt requires further education in this area   Yes NA No     ASSESSMENT:    Conditions Requiring Skilled Therapeutic Intervention:    [x]Decreased strength     []Decreased ROM  [x]Decreased functional mobility  [x]Decreased balance   [x]Decreased endurance   []Decreased posture  []Decreased sensation  []Decreased coordination   []Decreased vision  [x]Decreased safety awareness   [x]Increased pain       Comments:    Pt was in bed upon room entry; agreeable to PT evaluation. Pt is very [place]. Pt perseverated on his cardiologist and discharge. Pt was easily redirected. Pt ambulated in hallway several times without AD. Gait was slow but steady. 1 mild LOB occurred but pt was able to self correct. Pt ambulated back to room and returned to bed. O2 sat was 97% on RA with all activity. Pt was left in bed with all needs met at conclusion of session. Treatment:  Patient practiced and was instructed in the following treatment:    Therapeutic activities:  Ambulation: Pt ambulated extended distance x2 reps without AD. Vitals and symptoms were closely monitored throughout session. Pt's/family goals:  1. To return home. Prognosis is Good for reaching above PT goals. Patient and or family understand(s) diagnosis, prognosis, and plan of care. Yes. PHYSICAL THERAPY PLAN OF CARE:    PT POC is established based on physician order and patient diagnosis     Referring provider/PT Order:    Start   Ordering Provider    02/27/23 1030  PT eval and treat  Start:  02/27/23 1030,   End:  02/27/23 1030,   ONE TIME,   Standing Count:  1 Occurrences,   R         YUDITH Aguila - CNP      Diagnosis:  Acute psychosis ([facility] [F23]  Specific instructions for next treatment:  Progress activity.     Current Treatment Recommendations:     [x] Strengthening to improve independence with functional mobility   [] ROM to improve independence with functional mobility   [x] Balance Training to improve static/dynamic balance and to reduce fall risk  [x] Endurance Training to improve activity tolerance during functional mobility   [x] Transfer Training to improve safety and independence with all functional transfers   [x] Gait Training to improve gait mechanics, endurance and assess need for appropriate assistive device  [] Stair Training in preparation for safe discharge home and/or into the community   [] Positioning to prevent skin breakdown and contractures  [x] Safety and Education Training   [x] Patient/Caregiver Education   [] HEP  [] Other     PT long term treatment goals are located in above grid    Frequency of treatments: 2-5x/week x 1-2 weeks. Time in  1100  Time out  1130    Total Treatment Time  10 minutes     Evaluation Time includes thorough review of current medical information, gathering information on past medical history/social history and prior level of function, completion of standardized testing/informal observation of tasks, assessment of data and education on plan of care and goals.     CPT codes:  [x] Low Complexity PT evaluation 46465  [] Moderate Complexity PT evaluation 77911  [] High Complexity PT evaluation 25147  [] PT Re-evaluation 87196  [] Gait training 36679 0 minutes  [] Manual therapy 25259 0 minutes  [x] Therapeutic activities 65062 10 minutes  [] Therapeutic exercises 05213 0 minutes  [] Neuromuscular reeducation 33617 0 minutes     Emory Lobo, PT, DPT  [id number]

## 2023-02-27 NOTE — CARE COORDINATION
BASSEM contacted pt uncle Tim Presley) and aunt Randall   (KALIE signed). No answer, a voicemail was left.

## 2023-02-27 NOTE — GROUP NOTE
Group Therapy Note    Date: 2/27/2023    Group Start Time: 1400  Group End Time: 1430  Group Topic: Cognitive Skills    [facility]    JALEESA Castellanos LSW        Group Therapy Note    Attendees: 8       Patient's Goal: To participate in group discussion on coping skills for anxiety. Notes: Pt was an active participant in group. Status After Intervention:  Unchanged    Participation Level:  Active Listener    Participation Quality: Attentive      Speech:  normal      Thought Process/Content: Logical      Affective Functioning: Congruent      Mood: anxious      Level of consciousness:  Alert and Oriented x4      Response to Learning: Able to verbalize current knowledge/experience      Endings: None Reported    Modes of Intervention: Education, Support, Socialization, Exploration, Clarifying, and Problem-solving      Discipline Responsible: /Counselor      Signature:  JALEESA Hairston LSW

## 2023-02-27 NOTE — CARE COORDINATION
BASSEM contacted pt uncle Tim Sid Arias) and aunt Orly Dee  (KALIE signed). Someone by the name of Tomy Velazquez answered the phone and advised BASSEM that [address] [place] [address] and Orly Dee are not home. BASSEM will try again later.

## 2023-02-27 NOTE — PROGRESS NOTES
[facility]  Day 3 Interdisciplinary Treatment Plan NOTE    Review Date & Time: 2/27/23 0900    Patient was in treatment team    Estimated Length of Stay Update:  3-5 days  Estimated Discharge Date Update: 3/12/23    EDUCATION:   Learner Progress Toward Treatment Goals: Reviewed results and recommendations of this team and Reviewed goals and plan of care    Method: Small group    Outcome: Verbalized understanding    PATIENT GOALS: None at this time    PLAN/TREATMENT RECOMMENDATIONS UPDATE:Take prescribed medications, attend/participate in groups. Continue to provide emotional support to patient.     GOALS UPDATE:   Time frame for Short-Term Goals: Prior to discharge      Saeed Pina RN

## 2023-02-28 LAB
BACTERIA: ABNORMAL /HPF
BILIRUBIN URINE: NEGATIVE
BLOOD, URINE: ABNORMAL
CLARITY: CLEAR
COLOR: YELLOW
GLUCOSE URINE: NEGATIVE MG/DL
HCT VFR BLD CALC: 40.8 % (ref 37–54)
HEMOGLOBIN: 13.4 G/DL (ref 12.5–16.5)
HYALINE CASTS: ABNORMAL /LPF (ref 0–2)
KETONES, URINE: NEGATIVE MG/DL
LEUKOCYTE ESTERASE, URINE: NEGATIVE
MUCUS: PRESENT /LPF
NITRITE, URINE: NEGATIVE
PH UA: 6 (ref 5–9)
PROTEIN UA: 30 MG/DL
RBC UA: >20 /HPF (ref 0–2)
SPECIFIC GRAVITY UA: 1.01 (ref 1–1.03)
UROBILINOGEN, URINE: 0.2 E.U./DL
WBC UA: ABNORMAL /HPF (ref 0–5)

## 2023-02-28 PROCEDURE — 87088 URINE BACTERIA CULTURE: CPT

## 2023-02-28 PROCEDURE — 99231 SBSQ HOSP IP/OBS SF/LOW 25: CPT | Performed by: NURSE PRACTITIONER

## 2023-02-28 PROCEDURE — 36415 COLL VENOUS BLD VENIPUNCTURE: CPT

## 2023-02-28 PROCEDURE — 81001 URINALYSIS AUTO W/SCOPE: CPT

## 2023-02-28 PROCEDURE — 85018 HEMOGLOBIN: CPT

## 2023-02-28 PROCEDURE — 6370000000 HC RX 637 (ALT 250 FOR IP): Performed by: NURSE PRACTITIONER

## 2023-02-28 PROCEDURE — 1240000000 HC EMOTIONAL WELLNESS R&B

## 2023-02-28 PROCEDURE — 6370000000 HC RX 637 (ALT 250 FOR IP): Performed by: INTERNAL MEDICINE

## 2023-02-28 PROCEDURE — 6370000000 HC RX 637 (ALT 250 FOR IP)

## 2023-02-28 PROCEDURE — 85014 HEMATOCRIT: CPT

## 2023-02-28 RX ORDER — CEFDINIR 300 MG/1
300 CAPSULE ORAL EVERY 12 HOURS SCHEDULED
Status: DISCONTINUED | OUTPATIENT
Start: 2023-02-28 | End: 2023-03-15 | Stop reason: HOSPADM

## 2023-02-28 RX ADMIN — LISINOPRIL 20 MG: 10 TABLET ORAL at 08:50

## 2023-02-28 RX ADMIN — MIRTAZAPINE 7.5 MG: 15 TABLET, FILM COATED ORAL at 21:46

## 2023-02-28 RX ADMIN — ROSUVASTATIN CALCIUM 20 MG: 20 TABLET, FILM COATED ORAL at 21:46

## 2023-02-28 RX ADMIN — DIVALPROEX SODIUM 250 MG: 125 CAPSULE, COATED PELLETS ORAL at 21:46

## 2023-02-28 RX ADMIN — DIVALPROEX SODIUM 250 MG: 125 CAPSULE, COATED PELLETS ORAL at 08:50

## 2023-02-28 RX ADMIN — AMLODIPINE BESYLATE 15 MG: 10 TABLET ORAL at 08:51

## 2023-02-28 RX ADMIN — Medication 5 MG: at 18:24

## 2023-02-28 RX ADMIN — PANTOPRAZOLE SODIUM 20 MG: 20 TABLET, DELAYED RELEASE ORAL at 06:31

## 2023-02-28 RX ADMIN — CEFDINIR 300 MG: 300 CAPSULE ORAL at 21:46

## 2023-02-28 RX ADMIN — POTASSIUM CHLORIDE 10 MEQ: 750 TABLET, EXTENDED RELEASE ORAL at 08:51

## 2023-02-28 ASSESSMENT — PAIN SCALES - GENERAL
PAINLEVEL_OUTOF10: 0
PAINLEVEL_OUTOF10: 0

## 2023-02-28 NOTE — PLAN OF CARE
Problem: Safety - Medical Restraint  Goal: Remains free of injury from restraints (Restraint for Interference with Medical Device)  Description: INTERVENTIONS:  1. Determine that other, less restrictive measures have been tried or would not be effective before applying the restraint  2. Evaluate the patient's condition at the time of restraint application  3. Inform patient/family regarding the reason for restraint  4. Q2H: Monitor safety, psychosocial status, comfort, nutrition and hydration  2/28/2023 1019 by Olive Majano RN  Outcome: Progressing  2/28/2023 0106 by Ev Haley RN  Outcome: Progressing     Problem: Anxiety  Goal: Will report anxiety at manageable levels  Description: INTERVENTIONS:  1. Administer medication as ordered  2. Teach and rehearse alternative coping skills  3. Provide emotional support with 1:1 interaction with staff  2/28/2023 1019 by Olive Majano RN  Outcome: Progressing  2/28/2023 0106 by Ev Haley RN  Outcome: Progressing     Problem: Coping  Goal: Pt/Family able to verbalize concerns and demonstrate effective coping strategies  Description: INTERVENTIONS:  1. Assist patient/family to identify coping skills, available support systems and cultural and spiritual values  2. Provide emotional support, including active listening and acknowledgement of concerns of patient and caregivers  3. Reduce environmental stimuli, as able  4. Instruct patient/family in relaxation techniques, as appropriate  5.  Assess for spiritual pain/suffering and initiate Spiritual Care, Psychosocial Clinical Specialist consults as needed  2/28/2023 1019 by Olive Majano RN  Outcome: Progressing  2/28/2023 0106 by Ev Haley RN  Outcome: Progressing

## 2023-02-28 NOTE — PLAN OF CARE
Patient observed in day area watching tv. Patient became irritable upon assessment and stated \" I want out of here, I have fluid around my heart. This DrJhony is not my heart Dr. Benitez Stallworth". PRN Vistaril  50mg administered for anxiety. Medication compliant. No sxs of distress noted, no c/o SOB or chest pain. On Eliquis 5mg BID -no sxs of bleeding noted. Staff will continue to provide support and interventions when needed or requested. Purposeful rounds continued Q 15 minutes. Problem: Anxiety  Goal: Will report anxiety at manageable levels  Description: INTERVENTIONS:  1. Administer medication as ordered  2. Teach and rehearse alternative coping skills  3. Provide emotional support with 1:1 interaction with staff  2/28/2023 0106 by Paolo Morejon RN  Outcome: Progressing     Problem: Coping  Goal: Pt/Family able to verbalize concerns and demonstrate effective coping strategies  Description: INTERVENTIONS:  1. Assist patient/family to identify coping skills, available support systems and cultural and spiritual values  2. Provide emotional support, including active listening and acknowledgement of concerns of patient and caregivers  3. Reduce environmental stimuli, as able  4. Instruct patient/family in relaxation techniques, as appropriate  5. Assess for spiritual pain/suffering and initiate Spiritual Care, Psychosocial Clinical Specialist consults as needed  2/28/2023 0106 by Paolo Morejon RN  Outcome: Progressing     Problem: Decision Making  Goal: Pt/Family able to effectively weigh alternatives and participate in decision making related to treatment and care  Description: INTERVENTIONS:  1. Determine when there are differences between patient's view, family's view, and healthcare provider's view of condition  2. Facilitate patient and family articulation of goals for care  3. Help patient and family identify pros/cons of alternative solutions  4. Provide information as requested by patient/family  5.  Respect patient/family right to receive or not to receive information  6. Serve as a liaison between patient and family and health care team  7.  Initiate Consults from Ethics, Palliative Care or initiate [address] as is appropriate  Outcome: Progressing

## 2023-02-28 NOTE — CARE COORDINATION
BASSEM contacted pt uncle Tim Arias) and aunt Orly Dee  (KALIE signed). Orly Dee has talked with pt once in the past 4-5 years. She does not know what has been going on with pt prior to admission. Orly Dee reports Tim's cancer has returned and they are trying to fight that. She reportedly also has issues with her leg. Orly Dee reports they were asked if pt can stay with them but he cannot. Orly [place] reports pt does have his own home in [place]. She does not know if pt has access to any guns or weapons. Orly Dee cannot say if she has any concerns because she again has only talked with him once in the past several years. Orly [place] would like to be informed of pt discharge date.

## 2023-02-28 NOTE — PROGRESS NOTES
Patient resting quietly to self, respirations are even and unlabored. No sxs of distress or discomfort noted.  Purposeful rounds continued Q 15 minutes to ensure the safety of the patient while on the unit.

## 2023-02-28 NOTE — GROUP NOTE
Group Therapy Note    Date: 2/28/2023    Group Start Time: 1400  Group End Time: 1430  Group Topic: Cognitive Skills    [facility]    JALEESA Castellanos LSW        Group Therapy Note    Attendees: 7       Patient's Goal: To participate on group discussion on active listening and how to apologize. Notes: Pt was an active listener in group discussion. Status After Intervention:  Unchanged    Participation Level:  Active Listener    Participation Quality: Attentive      Speech:  loud      Thought Process/Content: Logical      Affective Functioning: Congruent      Mood: irritable      Level of consciousness:  Alert and Oriented x4      Response to Learning: Able to verbalize current knowledge/experience      Endings: None Reported    Modes of Intervention: Education, Support, Socialization, Exploration, Clarifying, and Problem-solving      Discipline Responsible: /Counselor      Signature:  JALEESA Paredes, YESICA

## 2023-02-28 NOTE — CARE COORDINATION
Per staff, patient is requesting new batteries for his hearing aides. Patient was given batteries over the weekend but stated that they are \"shot\". SW Supervisor met with patient and provided new batteries. Pt installed them and reported that he is still having difficulty hearing. RN aware.      JALEESA [place], [place] [address] Work Supervisor

## 2023-02-28 NOTE — BH NOTE
Patient denies SI/HI/AVH at this time. He states he is anxious and depressed highly due to being a patient in this facility. He takes his medication and meals well. Patient denies pain and does not attend groups.

## 2023-02-28 NOTE — PROGRESS NOTES
BEHAVIORAL HEALTH FOLLOW-UP NOTE     2/28/2023     Patient was seen and examined in person, Chart reviewed   Patient's case discussed with staff/team    Chief Complaint: \"I am not supposed to be here. \"    Interim History:   Patient out in the unit, he is talking and interacting with staff. He does have poor insight, and judgement; he signed an KALIE for his elderly aunt and uncle who are reporting they have not had contact with the patient in 4 - 5 years. They reported that he cannot stay with them. He was provided new batteries for his hearing aids, and this seems to not have provided much relief. He is discharge focused. Seems to be responding well to the depakote.      Appetite: [x] Normal/Unchanged  [] Increased  [] Decreased      Sleep:       [x] Normal/Unchanged  [] Fair       [] Poor              Energy:    [x] Normal/Unchanged  [] Increased  [] Decreased        SI [] Present  [x] Absent    HI  []Present  [x] Absent     Aggression:  [] yes  [x] no    Patient is [x] able  [] unable to CONTRACT FOR SAFETY     PAST MEDICAL/PSYCHIATRIC HISTORY:   Past Medical History:   Diagnosis Date    Atrial fibrillation (HCC)     Chronic kidney disease     Congestive heart failure (CHF) (HCC)     Coronary artery disease     Hyperlipidemia     Hypertension        FAMILY/SOCIAL HISTORY:  Family History   Problem Relation Age of Onset    Heart Failure Other      Social History     Socioeconomic History    Marital status: Single     Spouse name: Not on file    Number of children: Not on file    Years of education: Not on file    Highest education level: Not on file   Occupational History    Not on file   Tobacco Use    Smoking status: Former     Types: Cigarettes    Smokeless tobacco: Never   Substance and Sexual Activity    Alcohol use: Not Currently    Drug use: Not on file    Sexual activity: Not on file   Other Topics Concern    Not on file   Social History Narrative    Not on file     Social Determinants of Health     Financial Resource Strain: Not on file   Food Insecurity: Not on file   Transportation Needs: Not on file   Physical Activity: Not on file   Stress: Not on file   Social Connections: Not on file   Intimate Partner Violence: Not on file   Housing Stability: Not on file           ROS:  [x] All negative/unchanged except if checked.  Explain positive(checked items) below:  [] Constitutional  [] Eyes  [] Ear/Nose/Mouth/Throat  [] Respiratory  [] CV  [] GI  []   [] Musculoskeletal  [] Skin/Breast  [] Neurological  [] Endocrine  [] Heme/Lymph  [] Allergic/Immunologic    Explanation:     MEDICATIONS:    Current Facility-Administered Medications:     acetaminophen (TYLENOL) tablet 650 mg, 650 mg, Oral, Q4H PRN, Emili Yusuf MD    magnesium hydroxide (MILK OF MAGNESIA) 400 MG/5ML suspension 30 mL, 30 mL, Oral, Daily PRN, Emili Yusuf MD    nicotine (NICODERM CQ) 21 MG/24HR 1 patch, 1 patch, TransDERmal, Daily, Emili Yusuf MD    aluminum & magnesium hydroxide-simethicone (MAALOX) 200-200-20 MG/5ML suspension 30 mL, 30 mL, Oral, PRN, Emili Yusuf MD    hydrOXYzine pamoate (VISTARIL) capsule 50 mg, 50 mg, Oral, TID PRN, Emili Yusuf MD, 50 mg at 02/27/23 2123    haloperidol (HALDOL) tablet 3 mg, 3 mg, Oral, Q6H PRN **OR** haloperidol lactate (HALDOL) injection 3 mg, 3 mg, IntraMUSCular, Q6H PRN, Emili Yusuf MD    amLODIPine (NORVASC) tablet 15 mg, 15 mg, Oral, Daily, Fermin Solders, APRN - CNP, 15 mg at 02/28/23 3689    [Held by provider] apixaban (ELIQUIS) tablet 5 mg, 5 mg, Oral, BID, Fermin Solders, APRN - CNP, 5 mg at 02/27/23 2123    [Held by provider] aspirin chewable tablet 81 mg, 81 mg, Oral, Daily, Fermin Solders, APRN - CNP, 81 mg at 02/27/23 0950    cyclobenzaprine (FLEXERIL) tablet 5 mg, 5 mg, Oral, BID PRN, Fermin Solders, APRN - CNP    divalproex (DEPAKOTE SPRINKLE) DR capsule 250 mg, 250 mg, Oral, 2 times per day, YUDITH Mir CNP, 250 mg at 02/28/23 0850    lisinopril (PRINIVIL;ZESTRIL) tablet 20 mg, 20 mg, Oral, Daily, Soco Saucedo, APRN - CNP, 20 mg at 02/28/23 0850    pantoprazole (PROTONIX) tablet 20 mg, 20 mg, Oral, QAM AC, Soco Passer, APRN - CNP, 20 mg at 02/28/23 0631    polyethylene glycol (GLYCOLAX) packet 17 g, 17 g, Oral, Daily PRN, Soco Saucedo APRN - CNP    potassium chloride (KLOR-CON M) extended release tablet 10 mEq, 10 mEq, Oral, Daily, Soco Saucedo, APRN - CNP, 10 mEq at 02/28/23 0851    rosuvastatin (CRESTOR) tablet 20 mg, 20 mg, Oral, Nightly, Soco Saucedo APRN - CNP, 20 mg at 02/27/23 2123    mirtazapine (REMERON) tablet 7.5 mg, 7.5 mg, Oral, Nightly, Yohana AteALISTAIR powersN - CNP, 7.5 mg at 02/27/23 2123    melatonin disintegrating tablet 5 mg, 5 mg, Oral, Daily, Yohana Ates, APRN - CNP, 5 mg at 02/27/23 1728      Examination:  BP (!) 159/94   Pulse 85   Temp 99 °F (37.2 °C) (Temporal)   Resp 16   Ht 6' 1\" (1.854 m)   Wt 185 lb (83.9 kg)   SpO2 98%   BMI 24.41 kg/m²   Gait - steady  Medication side effects(SE):     Mental Status Examination:    Level of consciousness:  within normal limits   Appearance:  fair grooming and fair hygiene  Behavior/Motor:  no abnormalities noted  Attitude toward examiner:  cooperative  Speech:  spontaneous, normal rate and normal volume   Mood: \" I am sad I want to see my uncle\"  Affect: Labile  Thought processes: Linear thought flight of ideas loose associations  Thought content: Devoid of any auditory visual hallucinations delusions or other perceptual normalities.   Denies SI/HI intent or plan   Language: able to name objects and repeate phrases  Remote Memory: Impaired   recent Memory: Impaired  Cognition:  oriented to person, place, and time   Fund of Knowledge: Vocabulary intact, pt is aware of current events and past history  Attetion and Concentration intact  Insight poor  Judgement poor      ASSESSMENT: Patient symptoms are:  [] Well controlled  [x] Improving  [] Worsening  [] No change      Diagnosis:  Principal Problem:    Acute psychosis ([facility] [address]  Resolved Problems:    * No resolved hospital problems. *      LABS:    Recent Labs     02/28/23  0731   HGB 13.4       No results for input(s): NA, K, CL, CO2, BUN, CREATININE, GLUCOSE in the last 72 hours. No results for input(s): BILITOT, ALKPHOS, AST, ALT in the last 72 hours. Lab Results   Component Value Date/Time    LABAMPH NOT DETECTED 10/22/2022 06:27 PM    BARBSCNU NOT DETECTED 10/22/2022 06:27 PM    LABBENZ NOT DETECTED 10/22/2022 06:27 PM    LABMETH NOT DETECTED 10/22/2022 06:27 PM    OPIATESCREENURINE NOT DETECTED 10/22/2022 06:27 PM    PHENCYCLIDINESCREENURINE NOT DETECTED 10/22/2022 06:27 PM    ETOH <10 02/18/2023 09:31 PM     No results found for: TSH, FREET4  No results found for: LITHIUM  No results found for: VALPROATE, CBMZ        Treatment Plan:  Reviewed current Medications with the patient. Risks, benefits, side effects, drug-to-drug interactions and alternatives to treatment were discussed. Collateral information:   CD evaluation  Encourage patient to attend group and other milieu activities.   Discharge planning discussed with the patient and treatment team.    Continue to offer medications patient is prescribed Depakote 250 mg twice daily   continue Remeron 7.5 mg at bedtime    PSYCHOTHERAPY/COUNSELING:  [x] Therapeutic interview  [x] Supportive  [] CBT  [] Ongoing  [] Other    [x] Patient continues to need, on a daily basis, active treatment furnished directly by or requiring the supervision of inpatient psychiatric personnel      Anticipated Length of stay: 3 to 7 days based on stability            Electronically signed by YUDITH Mckenna CNP on 2/28/2023 at 3:09 PM

## 2023-02-28 NOTE — CONSULTS
Inpatient Consult    Internal medicine consulted for hematuria. Apparently he brought urine to desk to show there was blood in urine. This was not sent for analysis. Await urinalysis. Consult is difficult to obtain because he yells to me that he is deaf. He was provided with new batteries for hearing aids but they seem to have not helped. He is unable to answer any of my questioning. H&H checked, hemoglobin 13.4, which is stable from previous. Aspirin and eliquis are on hold for now. Await urinalysis, repeat CBC tomorrow. If stable and without more hematuria, restart. If he continues to have hematuria he will need a urology consult/follow up. Hematuria  Coronary artery disease-aspirin, lisinopril, statin  Pericardial effusion- was evaluated by CTS last admission, no intervention at this time, there is possible small effusion with small pocket posteriorly.    Atrial fibrillation- normally on eliquis    Vitals:    02/28/23 0850   BP: (!) 159/94   Pulse:    Resp:    Temp:    SpO2:      Gen: in no acute distress  HEENT: very [place]  Psych: has behaviors  MSK: moves all extremities  Neuro: no slurred speech    Verna Iraheta, DO    3:45 PM  2/28/2023

## 2023-02-28 NOTE — BH NOTE
Attempted to perform a MOCA at this time. Unable to do it  patient states he can hear  the directions given to complete the task despite put new batteris

## 2023-03-01 PROBLEM — F03.918 DEMENTIA WITH BEHAVIORAL DISTURBANCE: Status: ACTIVE | Noted: 2023-03-01

## 2023-03-01 PROBLEM — F09 COGNITIVE DISORDER: Status: ACTIVE | Noted: 2023-03-01

## 2023-03-01 LAB
ANION GAP SERPL CALCULATED.3IONS-SCNC: 9 MMOL/L (ref 7–16)
BUN BLDV-MCNC: 16 MG/DL (ref 6–23)
CALCIUM SERPL-MCNC: 8.6 MG/DL (ref 8.6–10.2)
CHLORIDE BLD-SCNC: 102 MMOL/L (ref 98–107)
CO2: 27 MMOL/L (ref 22–29)
CREAT SERPL-MCNC: 1.1 MG/DL (ref 0.7–1.2)
GFR SERPL CREATININE-BSD FRML MDRD: >60 ML/MIN/1.73
GLUCOSE BLD-MCNC: 94 MG/DL (ref 74–99)
HCT VFR BLD CALC: 42.3 % (ref 37–54)
HEMOGLOBIN: 14 G/DL (ref 12.5–16.5)
MCH RBC QN AUTO: 31.3 PG (ref 26–35)
MCHC RBC AUTO-ENTMCNC: 33.1 % (ref 32–34.5)
MCV RBC AUTO: 94.4 FL (ref 80–99.9)
PDW BLD-RTO: 13.6 FL (ref 11.5–15)
PLATELET # BLD: 218 E9/L (ref 130–450)
PMV BLD AUTO: 11.3 FL (ref 7–12)
POTASSIUM SERPL-SCNC: 4 MMOL/L (ref 3.5–5)
PROSTATE SPECIFIC ANTIGEN: 1.97 NG/ML (ref 0–4)
RBC # BLD: 4.48 E12/L (ref 3.8–5.8)
SODIUM BLD-SCNC: 138 MMOL/L (ref 132–146)
WBC # BLD: 7.3 E9/L (ref 4.5–11.5)

## 2023-03-01 PROCEDURE — 6370000000 HC RX 637 (ALT 250 FOR IP): Performed by: NURSE PRACTITIONER

## 2023-03-01 PROCEDURE — 99231 SBSQ HOSP IP/OBS SF/LOW 25: CPT | Performed by: NURSE PRACTITIONER

## 2023-03-01 PROCEDURE — 80048 BASIC METABOLIC PNL TOTAL CA: CPT

## 2023-03-01 PROCEDURE — 6370000000 HC RX 637 (ALT 250 FOR IP): Performed by: PSYCHIATRY & NEUROLOGY

## 2023-03-01 PROCEDURE — 1240000000 HC EMOTIONAL WELLNESS R&B

## 2023-03-01 PROCEDURE — 84153 ASSAY OF PSA TOTAL: CPT

## 2023-03-01 PROCEDURE — 6370000000 HC RX 637 (ALT 250 FOR IP): Performed by: INTERNAL MEDICINE

## 2023-03-01 PROCEDURE — 85027 COMPLETE CBC AUTOMATED: CPT

## 2023-03-01 PROCEDURE — 6370000000 HC RX 637 (ALT 250 FOR IP)

## 2023-03-01 PROCEDURE — 36415 COLL VENOUS BLD VENIPUNCTURE: CPT

## 2023-03-01 RX ORDER — CARVEDILOL 6.25 MG/1
12.5 TABLET ORAL 2 TIMES DAILY WITH MEALS
Status: DISCONTINUED | OUTPATIENT
Start: 2023-03-01 | End: 2023-03-02

## 2023-03-01 RX ADMIN — PANTOPRAZOLE SODIUM 20 MG: 20 TABLET, DELAYED RELEASE ORAL at 06:44

## 2023-03-01 RX ADMIN — CARVEDILOL 12.5 MG: 6.25 TABLET, FILM COATED ORAL at 18:07

## 2023-03-01 RX ADMIN — Medication 5 MG: at 18:08

## 2023-03-01 RX ADMIN — APIXABAN 5 MG: 5 TABLET, FILM COATED ORAL at 22:26

## 2023-03-01 RX ADMIN — CEFDINIR 300 MG: 300 CAPSULE ORAL at 22:25

## 2023-03-01 RX ADMIN — MIRTAZAPINE 7.5 MG: 15 TABLET, FILM COATED ORAL at 22:27

## 2023-03-01 RX ADMIN — ROSUVASTATIN CALCIUM 20 MG: 20 TABLET, FILM COATED ORAL at 22:26

## 2023-03-01 RX ADMIN — CEFDINIR 300 MG: 300 CAPSULE ORAL at 08:56

## 2023-03-01 RX ADMIN — AMLODIPINE BESYLATE 15 MG: 10 TABLET ORAL at 08:55

## 2023-03-01 RX ADMIN — LISINOPRIL 20 MG: 10 TABLET ORAL at 08:56

## 2023-03-01 RX ADMIN — DIVALPROEX SODIUM 250 MG: 125 CAPSULE, COATED PELLETS ORAL at 22:25

## 2023-03-01 RX ADMIN — POTASSIUM CHLORIDE 10 MEQ: 750 TABLET, EXTENDED RELEASE ORAL at 08:56

## 2023-03-01 RX ADMIN — HYDROXYZINE PAMOATE 50 MG: 50 CAPSULE ORAL at 22:27

## 2023-03-01 RX ADMIN — DIVALPROEX SODIUM 250 MG: 125 CAPSULE, COATED PELLETS ORAL at 08:55

## 2023-03-01 NOTE — PLAN OF CARE
Patient denies SI/HI/GENARO. Patient observed in day area is labile and irritable. Medication compliant. No sxs of distress noted. Staff will continue to provide support and interventions when needed or requested. Purposeful rounds continued Q 15 minutes. Problem: Safety - Medical Restraint  Goal: Remains free of injury from restraints (Restraint for Interference with Medical Device)  Description: INTERVENTIONS:  1. Determine that other, less restrictive measures have been tried or would not be effective before applying the restraint  2. Evaluate the patient's condition at the time of restraint application  3. Inform patient/family regarding the reason for restraint  4. Q2H: Monitor safety, psychosocial status, comfort, nutrition and hydration  Outcome: Progressing     Problem: Anxiety  Goal: Will report anxiety at manageable levels  Description: INTERVENTIONS:  1. Administer medication as ordered  2. Teach and rehearse alternative coping skills  3. Provide emotional support with 1:1 interaction with staff  Outcome: Progressing     Problem: Coping  Goal: Pt/Family able to verbalize concerns and demonstrate effective coping strategies  Description: INTERVENTIONS:  1. Assist patient/family to identify coping skills, available support systems and cultural and spiritual values  2. Provide emotional support, including active listening and acknowledgement of concerns of patient and caregivers  3. Reduce environmental stimuli, as able  4. Instruct patient/family in relaxation techniques, as appropriate  5.  Assess for spiritual pain/suffering and initiate Spiritual Care, Psychosocial Clinical Specialist consults as needed  Outcome: Progressing

## 2023-03-01 NOTE — PROGRESS NOTES
New consult placed to [facility] urology. Provider answering service was contacted.   [facility] Dontae is covering

## 2023-03-01 NOTE — PROGRESS NOTES
Patient engaged in recreation activity, [employer]. Patient was calm and cooperative. Patient was 1 out of 8 in attendance.

## 2023-03-01 NOTE — GROUP NOTE
Group Therapy Note    Date: 3/1/2023    Group Start Time: 1400  Group End Time: 1430  Group Topic: Cognitive Skills    [facility] 1    JALEESA Castellanos, YESICA        Group Therapy Note    Attendees: 9       Patient's Goal: to participate in group discussion on positive psychology prompt cards and self-care tips. Notes: Pt was an active listener in group discussion. Status After Intervention:  Unchanged    Participation Level:  Active Listener    Participation Quality: Appropriate and Attentive      Speech:  loud      Thought Process/Content: Logical      Affective Functioning: Congruent      Mood: irritable      Level of consciousness:  Alert and Oriented x4      Response to Learning: Able to verbalize current knowledge/experience      Endings: None Reported    Modes of Intervention: Education, Support, Socialization, Exploration, Clarifying, and Problem-solving      Discipline Responsible: /Counselor      Signature:  JALEESA Lanire, YESICA

## 2023-03-01 NOTE — PROGRESS NOTES
BEHAVIORAL HEALTH FOLLOW-UP NOTE     3/1/2023     Patient was seen and examined in person, Chart reviewed   Patient's case discussed with staff/team    Chief Complaint: \"I want to get out of here\"     Interim History:   Patient out in the unit, he is talking and interacting with staff and peers appropriately. He does have pervasive poor insight, and judgement; he refuses to sign KALIE for his son. He then makes threatening statements regarding his son to the . He is discharge focused. Seems to be responding well to the depakote. He is not demonstrating ability to make safe or reasonable decisions for himself.      MoCA 13/30     Appetite: [x] Normal/Unchanged  [] Increased  [] Decreased      Sleep:       [x] Normal/Unchanged  [] Fair       [] Poor              Energy:    [x] Normal/Unchanged  [] Increased  [] Decreased        SI [] Present  [x] Absent    HI  []Present  [x] Absent     Aggression:  [] yes  [x] no    Patient is [x] able  [] unable to CONTRACT FOR SAFETY     PAST MEDICAL/PSYCHIATRIC HISTORY:   Past Medical History:   Diagnosis Date    Atrial fibrillation (HCC)     Chronic kidney disease     Congestive heart failure (CHF) (HCC)     Coronary artery disease     Hyperlipidemia     Hypertension        FAMILY/SOCIAL HISTORY:  Family History   Problem Relation Age of Onset    Heart Failure Other      Social History     Socioeconomic History    Marital status: Single     Spouse name: Not on file    Number of children: Not on file    Years of education: Not on file    Highest education level: Not on file   Occupational History    Not on file   Tobacco Use    Smoking status: Former     Types: Cigarettes    Smokeless tobacco: Never   Substance and Sexual Activity    Alcohol use: Not Currently    Drug use: Not on file    Sexual activity: Not on file   Other Topics Concern    Not on file   Social History Narrative    Not on file     Social Determinants of Health     Financial Resource Strain: Not on file   Food Insecurity: Not on file   Transportation Needs: Not on file   Physical Activity: Not on file   Stress: Not on file   Social Connections: Not on file   Intimate Partner Violence: Not on file   Housing Stability: Not on file           ROS:  [x] All negative/unchanged except if checked.  Explain positive(checked items) below:  [] Constitutional  [] Eyes  [] Ear/Nose/Mouth/Throat  [] Respiratory  [] CV  [] GI  []   [] Musculoskeletal  [] Skin/Breast  [] Neurological  [] Endocrine  [] Heme/Lymph  [] Allergic/Immunologic    Explanation:     MEDICATIONS:    Current Facility-Administered Medications:     carvedilol (COREG) tablet 12.5 mg, 12.5 mg, Oral, BID , Everardo Cutler DO    cefdinir (OMNICEF) capsule 300 mg, 300 mg, Oral, 2 times per day, Janette Cutler DO, 300 mg at 03/01/23 4927    acetaminophen (TYLENOL) tablet 650 mg, 650 mg, Oral, Q4H PRN, Unique Ventuar MD    magnesium hydroxide (MILK OF MAGNESIA) 400 MG/5ML suspension 30 mL, 30 mL, Oral, Daily PRN, Unique Ventura MD    nicotine (NICODERM CQ) 21 MG/24HR 1 patch, 1 patch, TransDERmal, Daily, Unique Ventura MD    aluminum & magnesium hydroxide-simethicone (MAALOX) 200-200-20 MG/5ML suspension 30 mL, 30 mL, Oral, PRN, Unique Ventura MD    hydrOXYzine pamoate (VISTARIL) capsule 50 mg, 50 mg, Oral, TID PRN, Unique Ventura MD, 50 mg at 02/27/23 2123    haloperidol (HALDOL) tablet 3 mg, 3 mg, Oral, Q6H PRN **OR** haloperidol lactate (HALDOL) injection 3 mg, 3 mg, IntraMUSCular, Q6H PRN, Unique Ventura MD    amLODIPine (NORVASC) tablet 15 mg, 15 mg, Oral, Daily, YUDITH Arzate CNP, 15 mg at 03/01/23 0855    apixaban (ELIQUIS) tablet 5 mg, 5 mg, Oral, BID, YUDITH Arzate CNP, 5 mg at 02/27/23 2123    aspirin chewable tablet 81 mg, 81 mg, Oral, Daily, YUDITH Arzate CNP, 81 mg at 02/27/23 0950    cyclobenzaprine (FLEXERIL) tablet 5 mg, 5 mg, Oral, BID PRN, YUDITH Arzate CNP    divalproex (DEPAKOTE SPRINKLE) DR capsule 250 mg, 250 mg, Oral, 2 times per day, Demetrisnathalia Formica, APRN - CNP, 250 mg at 03/01/23 0855    lisinopril (PRINIVIL;ZESTRIL) tablet 20 mg, 20 mg, Oral, Daily, Demetrisoles Formica, APRN - CNP, 20 mg at 03/01/23 0856    pantoprazole (PROTONIX) tablet 20 mg, 20 mg, Oral, QAM AC, Lakesha Formica, APRN - CNP, 20 mg at 03/01/23 0644    polyethylene glycol (GLYCOLAX) packet 17 g, 17 g, Oral, Daily PRN, Lakesha Becka, APRN - CNP    potassium chloride (KLOR-CON M) extended release tablet 10 mEq, 10 mEq, Oral, Daily, Demetrisoles Formica, APRN - CNP, 10 mEq at 03/01/23 0856    rosuvastatin (CRESTOR) tablet 20 mg, 20 mg, Oral, Nightly, Lakesha Becka, APRN - CNP, 20 mg at 02/28/23 2146    mirtazapine (REMERON) tablet 7.5 mg, 7.5 mg, Oral, Nightly, Gentry Hernandezk, APRN - CNP, 7.5 mg at 02/28/23 2146    melatonin disintegrating tablet 5 mg, 5 mg, Oral, Daily, Gentry Hernandezk, APRN - CNP, 5 mg at 02/28/23 1824      Examination:  BP (!) 153/86   Pulse 75   Temp 98.3 °F (36.8 °C) (Temporal)   Resp 16   Ht 6' 1\" (1.854 m)   Wt 185 lb (83.9 kg)   SpO2 98%   BMI 24.41 kg/m²   Gait - steady  Medication side effects(SE):     Mental Status Examination:    Level of consciousness:  within normal limits   Appearance:  fair grooming and fair hygiene  Behavior/Motor:  no abnormalities noted  Attitude toward examiner:  cooperative  Speech:  spontaneous, normal rate and normal volume   Mood: \" I am sad I want to see my uncle\"  Affect: Labile  Thought processes: Linear thought flight of ideas loose associations  Thought content: Devoid of any auditory visual hallucinations delusions or other perceptual normalities.   Denies SI/HI intent or plan   Language: able to name objects and repeate phrases  Remote Memory: Impaired   recent Memory: Impaired  Cognition:  oriented to person, place, and time   Fund of Knowledge: Vocabulary intact, pt is aware of current events and past history  Attetion and Concentration intact  Insight poor  Judgement poor      ASSESSMENT: Patient symptoms are:  [] Well controlled  [] Improving  [] Worsening  [x] No change      Diagnosis:  Principal Problem:    Acute psychosis ([address]  Active Problems:    Cognitive disorder    Dementia with behavioral disturbance  Resolved Problems:    * No resolved hospital problems. *      LABS:    Recent Labs     02/28/23  0731 03/01/23  0648   WBC  --  7.3   HGB 13.4 14.0   PLT  --  218       Recent Labs     03/01/23  0648      K 4.0      CO2 27   BUN 16   CREATININE 1.1   GLUCOSE 94       No results for input(s): BILITOT, ALKPHOS, AST, ALT in the last 72 hours. Lab Results   Component Value Date/Time    LABAMPH NOT DETECTED 10/22/2022 06:27 PM    BARBSCNU NOT DETECTED 10/22/2022 06:27 PM    LABBENZ NOT DETECTED 10/22/2022 06:27 PM    LABMETH NOT DETECTED 10/22/2022 06:27 PM    OPIATESCREENURINE NOT DETECTED 10/22/2022 06:27 PM    PHENCYCLIDINESCREENURINE NOT DETECTED 10/22/2022 06:27 PM    ETOH <10 02/18/2023 09:31 PM     No results found for: TSH, FREET4  No results found for: LITHIUM  No results found for: VALPROATE, CBMZ        Treatment Plan:  Reviewed current Medications with the patient. Risks, benefits, side effects, drug-to-drug interactions and alternatives to treatment were discussed. Collateral information:   CD evaluation  Encourage patient to attend group and other milieu activities.   Discharge planning discussed with the patient and treatment team.    Continue to offer medications patient is prescribed Depakote 250 mg twice daily   continue Remeron 7.5 mg at bedtime    PSYCHOTHERAPY/COUNSELING:  [x] Therapeutic interview  [x] Supportive  [] CBT  [] Ongoing  [] Other    [x] Patient continues to need, on a daily basis, active treatment furnished directly by or requiring the supervision of inpatient psychiatric personnel      Anticipated Length of stay: 3 to 7 days based on stability            Electronically signed by Lisa Jacobson YUDITH Lopez - CNP on 3/1/2023 at 3:39 PM

## 2023-03-01 NOTE — GROUP NOTE
Group Therapy Note    Date: 3/1/2023    Group Start Time: 1100  Group End Time: 7359  Group Topic: Education Group - Inpatient    [facility] AC[facility] BH 2    Keith Olguin                                                                          Group Therapy Note    Date: 3/1/2023  Start Time: 1100  End Time:  8488  Number of Participants: 9    Type of Group: Psychoeducation    Patient's Goal:  ID what gratitude is and the benefits of gratitude. ID ways to improve gratitude. Demonstrate knowledge of gratitude exercise      Status After Intervention:  Improved    Participation Level:  Active Listener and Interactive    Participation Quality: Appropriate, Attentive, and Sharing      Speech:  normal      Thought Process/Content: Logical      Affective Functioning: Congruent      Mood: euthymic      Level of consciousness:  Alert and Attentive      Response to Learning: Able to verbalize current knowledge/experience and Able to verbalize/acknowledge new learning      Endings: None Reported    Modes of Intervention: Education and Support      Discipline Responsible: Psychoeducational Specialist      Signature:  Keith Olguin

## 2023-03-01 NOTE — BH NOTE
Patient denies SI/HI/AVH at this time. He rates depression and anxiety a zero. Denies pain takes medication and meals well. Patient does not attends groups.

## 2023-03-01 NOTE — PLAN OF CARE
Problem: Anxiety  Goal: Will report anxiety at manageable levels  Description: INTERVENTIONS:  1. Administer medication as ordered  2. Teach and rehearse alternative coping skills  3. Provide emotional support with 1:1 interaction with staff  Outcome: Progressing     Problem: Decision Making  Goal: Pt/Family able to effectively weigh alternatives and participate in decision making related to treatment and care  Description: INTERVENTIONS:  1. Determine when there are differences between patient's view, family's view, and healthcare provider's view of condition  2. Facilitate patient and family articulation of goals for care  3. Help patient and family identify pros/cons of alternative solutions  4. Provide information as requested by patient/family  5. Respect patient/family right to receive or not to receive information  6. Serve as a liaison between patient and family and health care team  7. Initiate Consults from Ethics, Palliative Care or initiate [address] [place] [address] as is appropriate  Outcome: Progressing     Problem: Confusion  Goal: Confusion, delirium, dementia, or psychosis is improved or at baseline  Description: INTERVENTIONS:  1. Assess for possible contributors to thought disturbance, including medications, impaired vision or hearing, underlying metabolic abnormalities, dehydration, psychiatric diagnoses, and notify attending LIP  2. [place] high risk fall precautions, as indicated  3. Provide frequent short contacts to provide reality reorientation, refocusing and direction  4. Decrease environmental stimuli, including noise as appropriate  5. Monitor and intervene to maintain adequate nutrition, hydration, elimination, sleep and activity  6. If unable to ensure safety without constant attention obtain sitter and review sitter guidelines with assigned personnel  7.  Initiate Psychosocial CNS and Spiritual Care consult, as indicated  Outcome: Progressing

## 2023-03-01 NOTE — CARE COORDINATION
Received resources from [facility] [place] [address]. Per Ophelia Cherry, they do cover [facility]. Renita Nugent reports there are limited programming in [place]. They offer [employer] only and their [address] is minimal due to no case management services. HHC is limited due to lack of agency availability and if a patient with high resources/assets would consider LTC, they would have to most likely be private pay until those resources are exhausted. Received additional resources regarding [address] [facility], for social work to [facility].     Electronically signed by JALEESA Hernandez, LSW on 3/1/2023 at 11:57 AM

## 2023-03-01 NOTE — CONSULTS
3/1/2023 11:58 AM  Caterina Sol  [id number]     Chief Complaint:    Hematuria      History of Present Illness: The patient is a 57 y.o. male patient who is currently [facility]. He was pink slipped due to erratic behaviors and impulsive requiring restraints. He is very difficult to obtain information from due to be extremely hard of hearing. Screams he is deaf. Urology was asked to evaluate for hematuria. He did have a urinalysis performed yesterday that was without evidence of gross hematuria but rather microscopic hematuria. He does take Eliquis and has an extensive heart history. He doesn't think he has ever had to see a Urologist in the past. Denies trouble urinating at this time. Does report history of kidney stones but denies needing intervention. His creatinine is stable at 1. 1. hemoglobin stable at 14.      Past Medical History:   Diagnosis Date    Atrial fibrillation ([facility] [address]     Chronic kidney disease     Congestive heart failure (CHF) (HCC)     Coronary artery disease     Hyperlipidemia     Hypertension          Past Surgical History:   Procedure Laterality Date    CARDIAC SURGERY         Medications Prior to Admission:    Medications Prior to Admission: aspirin 81 MG chewable tablet, Take 1 tablet by mouth daily  apixaban (ELIQUIS) 5 MG TABS tablet, Take 5 mg by mouth 2 times daily  carvedilol (COREG) 25 MG tablet, Take 25 mg by mouth 2 times daily (with meals)  mirtazapine (REMERON) 30 MG tablet, Take 30 mg by mouth nightly  rosuvastatin (CRESTOR) 20 MG tablet, Take 20 mg by mouth daily  amLODIPine (NORVASC) 10 MG tablet, Take 15 mg by mouth daily  lisinopril (PRINIVIL;ZESTRIL) 20 MG tablet, Take 20 mg by mouth daily  pantoprazole (PROTONIX) 20 MG tablet, Take 20 mg by mouth daily  potassium chloride (MICRO-K) 10 MEQ extended release capsule, Take 10 mEq by mouth daily  acetaminophen (TYLENOL) 325 MG tablet, Take 650 mg by mouth every 6 hours as needed for Pain  cyclobenzaprine (FLEXERIL) 5 MG tablet, Take 5 mg by mouth 2 times daily as needed for Muscle spasms  docusate sodium (COLACE) 100 MG capsule, Take 100 mg by mouth 2 times daily as needed for Constipation  nitroGLYCERIN (NITROSTAT) 0.4 MG SL tablet, Place 0.4 mg under the tongue every 5 minutes as needed for Chest pain up to max of 3 total doses. If no relief after 1 dose, call 911. ondansetron (ZOFRAN) 4 MG tablet, Take 4 mg by mouth every 6 hours as needed for Nausea or Vomiting  polyethylene glycol (GLYCOLAX) 17 g packet, Take 17 g by mouth daily as needed for Constipation    Allergies:    Patient has no known allergies. Social History:    reports that he has quit smoking. His smoking use included cigarettes. He has never used smokeless tobacco. He reports that he does not currently use alcohol. Family History:   Non-contributory to this Urological problem  family history includes Heart Failure in an other family member. Review of Systems:  Very difficult to obtain due to hard of hearing     Physical Exam:     Vitals:  BP (!) 153/86   Pulse 75   Temp 98.3 °F (36.8 °C) (Temporal)   Resp 16   Ht 6' 1\" (1.854 m)   Wt 185 lb (83.9 kg)   SpO2 98%   BMI 24.41 kg/m²     General:  Awake and alert, hard of hearing   HEENT:  Normocephalic, atraumatic. Lungs:  Respirations symmetric and non-labored. Abdomen:  soft, nontender, no masses  Extremities:  No clubbing, cyanosis, or edema  Skin:  Warm and dry, no open lesions or rashes  Neuro:  There are no motor or sensory deficits in the 4 quadrant extremities   Rectal: deferred  Genitourinary:  no hanson     Labs:     Recent Labs     02/28/23  0731 03/01/23  0648   WBC  --  7.3   RBC  --  4.48   HGB 13.4 14.0   HCT 40.8 42.3   MCV  --  94.4   MCH  --  31.3   MCHC  --  33.1   RDW  --  13.6   PLT  --  218   MPV  --  11.3         Recent Labs     03/01/23  0648   CREATININE 1.1       Lab Results   Component Value Date    PSA 1.97 03/01/2023           Assessment/plan:  Microscopic hematuria     Creatinine stable  Denies gross hematuria currently   PSA reviewed  Urine culture pending   UA reviewed, there is microscopic hematuria   Send a urine cytology  I will avoid a hanson in this patient, he would likely not do well with this and he is very comfortable at this time   Will need outpatient cysto       Electronically signed by YUDITH Claudio CNP on 3/1/2023 at 11:58 AM  [facility]     Agree with above  Seen and examined  Agree with the plan and treatment    [facility], DO

## 2023-03-01 NOTE — PROGRESS NOTES
Inpatient Progress Note     Chart reviewed peripherally. Hematuria- patient with microscopic hematuria. Cr stable. Hemoglobin stable. Will resume eliquis/aspirin for now. Urology following. Will need outpatient cystoscopy. Repeat H&H tomorrow. Internal medicine will sign off if stable  Coronary artery disease-aspirin, lisinopril, statin  Pericardial effusion- was evaluated by CTS last admission, no intervention at this time, there is possible small effusion with small pocket posteriorly.    Atrial fibrillation- normally on eliquis    Vitals:    03/01/23 0920   BP: (!) 153/86   Pulse: 75   Resp: 16   Temp: 98.3 °F (36.8 °C)   SpO2: 98%       Euell Bellow, DO    2:43 PM  3/1/2023

## 2023-03-01 NOTE — PROGRESS NOTES
Patient attended evening recreation group. Patients participated in tv [employer]. Patient was 1 of 7 in attendance.

## 2023-03-01 NOTE — PROGRESS NOTES
Patient attended community meeting   Was updated on expectations of the unit, staffing, and programming  Patient shared goal for today as find out when [facility] is going to bring my stuff.

## 2023-03-01 NOTE — CARE COORDINATION
Navigator placed phone call to [facility] [phone] and spoke with Arminda Gamez, patient's hearing is scheduled for 3/17/2023 @ 3:00 PM, this will not be rescheduled and is with the new . They are able to provide the link for patient to attend if interested. Navigator will follow up at a later time.     Electronically signed by JALEESA Hyman, YESICA on 3/1/2023 at 1:51 PM

## 2023-03-01 NOTE — CARE COORDINATION
SW met with pt to discuss signing an KALIE for his son. Pt immediately put his middle finger in the air. Pt reports the next time he see's his son he will \"finish him off the right way. \" Pt reports he has never seen or talked with his son and that \"mateo put me in here for 162 days. \" Pt reports his son will be sorry the next time he sees him. Pt reports he doesn't need to be here and he will be going to [facility] to get his credit card to be discharged.

## 2023-03-02 LAB
HCT VFR BLD CALC: 42 % (ref 37–54)
HEMOGLOBIN: 13.9 G/DL (ref 12.5–16.5)

## 2023-03-02 PROCEDURE — 36415 COLL VENOUS BLD VENIPUNCTURE: CPT

## 2023-03-02 PROCEDURE — 6370000000 HC RX 637 (ALT 250 FOR IP): Performed by: NURSE PRACTITIONER

## 2023-03-02 PROCEDURE — 6370000000 HC RX 637 (ALT 250 FOR IP): Performed by: PSYCHIATRY & NEUROLOGY

## 2023-03-02 PROCEDURE — 6370000000 HC RX 637 (ALT 250 FOR IP): Performed by: INTERNAL MEDICINE

## 2023-03-02 PROCEDURE — 85018 HEMOGLOBIN: CPT

## 2023-03-02 PROCEDURE — 99232 SBSQ HOSP IP/OBS MODERATE 35: CPT | Performed by: NURSE PRACTITIONER

## 2023-03-02 PROCEDURE — 1240000000 HC EMOTIONAL WELLNESS R&B

## 2023-03-02 PROCEDURE — 6370000000 HC RX 637 (ALT 250 FOR IP)

## 2023-03-02 PROCEDURE — 85014 HEMATOCRIT: CPT

## 2023-03-02 RX ORDER — CARVEDILOL 25 MG/1
25 TABLET ORAL 2 TIMES DAILY WITH MEALS
Status: DISCONTINUED | OUTPATIENT
Start: 2023-03-02 | End: 2023-03-15 | Stop reason: HOSPADM

## 2023-03-02 RX ADMIN — PANTOPRAZOLE SODIUM 20 MG: 20 TABLET, DELAYED RELEASE ORAL at 06:21

## 2023-03-02 RX ADMIN — CARVEDILOL 25 MG: 25 TABLET, FILM COATED ORAL at 17:28

## 2023-03-02 RX ADMIN — AMLODIPINE BESYLATE 15 MG: 10 TABLET ORAL at 09:31

## 2023-03-02 RX ADMIN — ROSUVASTATIN CALCIUM 20 MG: 20 TABLET, FILM COATED ORAL at 20:59

## 2023-03-02 RX ADMIN — LISINOPRIL 20 MG: 10 TABLET ORAL at 09:31

## 2023-03-02 RX ADMIN — APIXABAN 5 MG: 5 TABLET, FILM COATED ORAL at 21:01

## 2023-03-02 RX ADMIN — APIXABAN 5 MG: 5 TABLET, FILM COATED ORAL at 09:31

## 2023-03-02 RX ADMIN — POTASSIUM CHLORIDE 10 MEQ: 750 TABLET, EXTENDED RELEASE ORAL at 09:30

## 2023-03-02 RX ADMIN — ASPIRIN 81 MG CHEWABLE TABLET 81 MG: 81 TABLET CHEWABLE at 09:31

## 2023-03-02 RX ADMIN — CEFDINIR 300 MG: 300 CAPSULE ORAL at 20:59

## 2023-03-02 RX ADMIN — Medication 5 MG: at 17:28

## 2023-03-02 RX ADMIN — CEFDINIR 300 MG: 300 CAPSULE ORAL at 09:31

## 2023-03-02 RX ADMIN — DIVALPROEX SODIUM 250 MG: 125 CAPSULE, COATED PELLETS ORAL at 09:30

## 2023-03-02 RX ADMIN — HYDROXYZINE PAMOATE 50 MG: 50 CAPSULE ORAL at 20:59

## 2023-03-02 RX ADMIN — DIVALPROEX SODIUM 250 MG: 125 CAPSULE, COATED PELLETS ORAL at 20:59

## 2023-03-02 RX ADMIN — MIRTAZAPINE 7.5 MG: 15 TABLET, FILM COATED ORAL at 21:00

## 2023-03-02 RX ADMIN — CARVEDILOL 25 MG: 25 TABLET, FILM COATED ORAL at 09:31

## 2023-03-02 ASSESSMENT — PAIN SCALES - GENERAL
PAINLEVEL_OUTOF10: 0

## 2023-03-02 NOTE — PLAN OF CARE
Patient upon assessment was irritable and upset that his medications \"are all screwed up here\" , \"When the fuck am I getting out of here\", and rambling. Medication compliant. On Omnicef 300 mg BID -no sxs of adverse reactions noted. Patient refused new order to obtain urine for cytology and stated \"everything is fine\". No sxs of distress noted. Staff will continue to provide support and interventions when needed or requested. Purposeful rounds continued Q 15 minutes. Problem: Anxiety  Goal: Will report anxiety at manageable levels  Description: INTERVENTIONS:  1. Administer medication as ordered  2. Teach and rehearse alternative coping skills  3. Provide emotional support with 1:1 interaction with staff  3/2/2023 0117 by Dionne Fernandez RN  Outcome: Progressing     Problem: Behavior  Goal: Pt/Family maintain appropriate behavior and adhere to behavioral management agreement, if implemented  Description: INTERVENTIONS:  1. Assess patient/family's coping skills and  non-compliant behavior (including use of illegal substances)  2. Notify security of behavior or suspected illegal substances which indicate the need for search of the family and/or belongings  3. Encourage verbalization of thoughts and concerns in a socially appropriate manner  4. Utilize positive, consistent limit setting strategies supporting safety of patient, staff and others  5. Encourage participation in the decision making process about the behavioral management agreement  6. If a visitor's behavior poses a threat to safety call refer to organization policy. 7. Initiate consult with , Psychosocial CNS, Spiritual Care as appropriate  Outcome: Progressing      Problem: Coping  Goal: Pt/Family able to verbalize concerns and demonstrate effective coping strategies  Description: INTERVENTIONS:  1. Assist patient/family to identify coping skills, available support systems and cultural and spiritual values  2.  Provide emotional support, including active listening and acknowledgement of concerns of patient and caregivers  3. Reduce environmental stimuli, as able  4. Instruct patient/family in relaxation techniques, as appropriate  5.  Assess for spiritual pain/suffering and initiate Spiritual Care, Psychosocial Clinical Specialist consults as needed  Outcome: Progressing
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
## 2023-03-02 NOTE — PROGRESS NOTES
Inpatient Progress Note     Chart reviewed peripherally. Hematuria- patient with microscopic hematuria. Cr stable. Hemoglobin stable. Will resume eliquis/aspirin for now. Urology following. Will need outpatient cystoscopy. Coronary artery disease-aspirin, BB, statin  Pericardial effusion- was evaluated by CTS last admission, no intervention at this time, there is possible small effusion with small pocket posteriorly. Atrial fibrillation- normally on eliquis  Hypertension- resumed home meds coreg, amlodipine, lisinopril. Will sign off.  Do not hesitate to contact with new issues      Vitals:    03/02/23 0931   BP: 129/80   Pulse: 74   Resp: 16   Temp: 98.6 °F (37 °C)   SpO2:        Lai Jacome DO    9:36 AM  3/2/2023

## 2023-03-02 NOTE — PROGRESS NOTES
Patient attended afternoon recreation activity, Trivia  Patient calm and cooperative, and was   1 out of 8 in attendance.

## 2023-03-02 NOTE — PROGRESS NOTES
Patient resting quietly to self, respirations are even and unlabored. No sxs of distress or discomfort noted. PRN Vistaril 50mg administered this shift. Purposeful rounds continued Q 15 minutes to ensure the safety of the patient while on the unit.

## 2023-03-02 NOTE — PLAN OF CARE
Patient denies SI/HI/Hallucinations. Expressed HI towards  during assessment. Per report expressed towards  that he would bury his step son if he doesn't move to [place]. Read Social Work note for more information. Patient is irritable an loud. Patient is redirectable to some extent. Will continue to monitor and will intervene as needed. Problem: Chronic Conditions and Co-morbidities  Goal: Patient's chronic conditions and co-morbidity symptoms are monitored and maintained or improved  3/2/2023 1047 by Luci Abraham RN  Outcome: Progressing     Problem: Safety - Medical Restraint  Goal: Remains free of injury from restraints (Restraint for Interference with Medical Device)  Description: INTERVENTIONS:  1. Determine that other, less restrictive measures have been tried or would not be effective before applying the restraint  2. Evaluate the patient's condition at the time of restraint application  3. Inform patient/family regarding the reason for restraint  4. Q2H: Monitor safety, psychosocial status, comfort, nutrition and hydration  3/2/2023 1047 by Luci Abraham RN  Outcome: Progressing     Problem: Anxiety  Goal: Will report anxiety at manageable levels  Description: INTERVENTIONS:  1. Administer medication as ordered  2. Teach and rehearse alternative coping skills  3. Provide emotional support with 1:1 interaction with staff  3/2/2023 1047 by Luci Abraham RN  Outcome: Progressing     Problem: Coping  Goal: Pt/Family able to verbalize concerns and demonstrate effective coping strategies  Description: INTERVENTIONS:  1. Assist patient/family to identify coping skills, available support systems and cultural and spiritual values  2. Provide emotional support, including active listening and acknowledgement of concerns of patient and caregivers  3. Reduce environmental stimuli, as able  4. Instruct patient/family in relaxation techniques, as appropriate  5.  Assess for spiritual pain/suffering and initiate Spiritual Care, Psychosocial Clinical Specialist consults as needed  3/2/2023 1047 by Nayla Valles, RN  Outcome: Progressing

## 2023-03-02 NOTE — GROUP NOTE
Group Therapy Note    Date: 3/2/2023    Group Start Time: 1410  Group End Time: 9617  Group Topic: Cognitive Skills    [facility]    JALEESA Castellanos LSW        Group Therapy Note    Attendees: 8       Patient's Goal: To participate in music therapy group. Notes: pt was an active participant. Status After Intervention:  Unchanged    Participation Level:  Active Listener    Participation Quality: Appropriate      Speech:  normal      Thought Process/Content: Logical      Affective Functioning: Congruent      Mood: irritable      Level of consciousness:  Alert and Oriented x4      Response to Learning: Able to verbalize current knowledge/experience      Endings: None Reported    Modes of Intervention: Activity      Discipline Responsible: /Counselor      Signature:  JALEESA Rendon LSW

## 2023-03-02 NOTE — PROGRESS NOTES
Patient attended community meeting   Was updated on expectations of the unit, staffing, and programming  Patient was 1 in 16 in attendance. Patient shared goal for today to talk to social work.

## 2023-03-02 NOTE — GROUP NOTE
Group Therapy Note    Date: 3/2/2023  Start Time: 1611  End Time:  6490  Number of Participants: 12    Type of Group: pscyhoeducation health/wellness    Patient's Goal:  ID definition of self-love. ID ways to improve self-love. Demonstrate knowledge of self-love exercises    Status After Intervention:  Improved    Participation Level:  Active Listener and Interactive    Participation Quality: Appropriate, Attentive, and Sharing      Speech:  normal      Thought Process/Content: Logical      Affective Functioning: Congruent      Mood: euthymic      Level of consciousness:  Alert and Oriented x4      Response to Learning: Able to verbalize current knowledge/experience and Able to verbalize/acknowledge new learning      Endings: None Reported    Modes of Intervention: Education, Support, and Socialization      Discipline Responsible: Psychoeducational Specialist      Signature:  Gayle Doyle, [address]

## 2023-03-03 LAB — URINE CULTURE, ROUTINE: NORMAL

## 2023-03-03 PROCEDURE — 99231 SBSQ HOSP IP/OBS SF/LOW 25: CPT | Performed by: NURSE PRACTITIONER

## 2023-03-03 PROCEDURE — 6370000000 HC RX 637 (ALT 250 FOR IP)

## 2023-03-03 PROCEDURE — 1240000000 HC EMOTIONAL WELLNESS R&B

## 2023-03-03 PROCEDURE — 6370000000 HC RX 637 (ALT 250 FOR IP): Performed by: INTERNAL MEDICINE

## 2023-03-03 RX ADMIN — CEFDINIR 300 MG: 300 CAPSULE ORAL at 08:53

## 2023-03-03 RX ADMIN — LISINOPRIL 20 MG: 10 TABLET ORAL at 08:53

## 2023-03-03 RX ADMIN — CARVEDILOL 25 MG: 25 TABLET, FILM COATED ORAL at 08:54

## 2023-03-03 RX ADMIN — ASPIRIN 81 MG CHEWABLE TABLET 81 MG: 81 TABLET CHEWABLE at 08:54

## 2023-03-03 RX ADMIN — APIXABAN 5 MG: 5 TABLET, FILM COATED ORAL at 08:53

## 2023-03-03 RX ADMIN — PANTOPRAZOLE SODIUM 20 MG: 20 TABLET, DELAYED RELEASE ORAL at 06:47

## 2023-03-03 RX ADMIN — AMLODIPINE BESYLATE 15 MG: 10 TABLET ORAL at 08:53

## 2023-03-03 RX ADMIN — DIVALPROEX SODIUM 250 MG: 125 CAPSULE, COATED PELLETS ORAL at 08:53

## 2023-03-03 RX ADMIN — POTASSIUM CHLORIDE 10 MEQ: 750 TABLET, EXTENDED RELEASE ORAL at 08:53

## 2023-03-03 ASSESSMENT — PAIN SCALES - GENERAL
PAINLEVEL_OUTOF10: 0
PAINLEVEL_OUTOF10: 0

## 2023-03-03 NOTE — PROGRESS NOTES
Pt continues to present irritable and discharge focused. Isolative to self on unit. Refused scheduled night time medications. Adequate PO intake. Continues to deny suicidal and homicidal ideation as well as s/s of psychosis. Q15 min safety maintained.

## 2023-03-03 NOTE — PROGRESS NOTES
BEHAVIORAL HEALTH FOLLOW-UP NOTE     3/3/2023     Patient was seen and examined in person, Chart reviewed   Patient's case discussed with staff/team    Chief Complaint: Calm pleasant     Interim History:   Patient out in the unit, comfortable, no agitation, is watching TV.  He is no distress, calm and pleasant.   He is future focused, goal directed, wanting to be discharged. Has some poor understanding of the barriers to his discharge.     Appetite: [x] Normal/Unchanged  [] Increased  [] Decreased      Sleep:       [x] Normal/Unchanged  [] Fair       [] Poor              Energy:    [x] Normal/Unchanged  [] Increased  [] Decreased        SI [] Present  [x] Absent    HI  []Present  [x] Absent     Aggression:  [] yes  [x] no    Patient is [x] able  [] unable to CONTRACT FOR SAFETY     PAST MEDICAL/PSYCHIATRIC HISTORY:   Past Medical History:   Diagnosis Date    Atrial fibrillation (HCC)     Chronic kidney disease     Congestive heart failure (CHF) (HCC)     Coronary artery disease     Hyperlipidemia     Hypertension        FAMILY/SOCIAL HISTORY:  Family History   Problem Relation Age of Onset    Heart Failure Other      Social History     Socioeconomic History    Marital status: Single     Spouse name: Not on file    Number of children: Not on file    Years of education: Not on file    Highest education level: Not on file   Occupational History    Not on file   Tobacco Use    Smoking status: Former     Types: Cigarettes    Smokeless tobacco: Never   Substance and Sexual Activity    Alcohol use: Not Currently    Drug use: Not on file    Sexual activity: Not on file   Other Topics Concern    Not on file   Social History Narrative    Not on file     Social Determinants of Health     Financial Resource Strain: Not on file   Food Insecurity: Not on file   Transportation Needs: Not on file   Physical Activity: Not on file   Stress: Not on file   Social Connections: Not on file   Intimate Partner Violence: Not on file    Housing Stability: Not on file           ROS:  [x] All negative/unchanged except if checked. Explain positive(checked items) below:  [] Constitutional  [] Eyes  [] Ear/Nose/Mouth/Throat  [] Respiratory  [] CV  [] GI  []   [] Musculoskeletal  [] Skin/Breast  [] Neurological  [] Endocrine  [] Heme/Lymph  [] Allergic/Immunologic    Explanation:     MEDICATIONS:    Current Facility-Administered Medications:     carvedilol (COREG) tablet 25 mg, 25 mg, Oral, BID , Janette Cutler DO, 25 mg at 03/03/23 0854    cefdinir (OMNICEF) capsule 300 mg, 300 mg, Oral, 2 times per day, Janette Cutler DO, 300 mg at 03/03/23 0853    acetaminophen (TYLENOL) tablet 650 mg, 650 mg, Oral, Q4H PRN, Kelly Cadet MD    magnesium hydroxide (MILK OF MAGNESIA) 400 MG/5ML suspension 30 mL, 30 mL, Oral, Daily PRN, Kelly Cadet MD    nicotine (NICODERM CQ) 21 MG/24HR 1 patch, 1 patch, TransDERmal, Daily, Kelly Cadet MD    aluminum & magnesium hydroxide-simethicone (MAALOX) 200-200-20 MG/5ML suspension 30 mL, 30 mL, Oral, PRN, Kelly Cadet MD    hydrOXYzine pamoate (VISTARIL) capsule 50 mg, 50 mg, Oral, TID PRN, Kelly Cadet MD, 50 mg at 03/02/23 2059    haloperidol (HALDOL) tablet 3 mg, 3 mg, Oral, Q6H PRN **OR** haloperidol lactate (HALDOL) injection 3 mg, 3 mg, IntraMUSCular, Q6H PRN, Kelly Cadet MD    amLODIPine (NORVASC) tablet 15 mg, 15 mg, Oral, Daily, YUDITH Lynn CNP, 15 mg at 03/03/23 0853    apixaban (ELIQUIS) tablet 5 mg, 5 mg, Oral, BID, YUDITH Lynn CNP, 5 mg at 03/03/23 0853    aspirin chewable tablet 81 mg, 81 mg, Oral, Daily, YUDITH Lynn CNP, 81 mg at 03/03/23 0854    cyclobenzaprine (FLEXERIL) tablet 5 mg, 5 mg, Oral, BID PRN, YUDITH Lynn CNP    divalproex (DEPAKOTE SPRINKLE) DR capsule 250 mg, 250 mg, Oral, 2 times per day, YUDITH Lynn CNP, 250 mg at 03/03/23 0853    lisinopril (PRINIVIL;ZESTRIL) tablet 20 mg, 20 mg, Oral,  Daily, Fanymelissa Don, APRN - CNP, 20 mg at 03/03/23 0853    pantoprazole (PROTONIX) tablet 20 mg, 20 mg, Oral, QAM AC, Fanymelissa Avendanon, APRN - CNP, 20 mg at 03/03/23 3549    polyethylene glycol (GLYCOLAX) packet 17 g, 17 g, Oral, Daily PRN, Fany Don, APRN - CNP    potassium chloride (KLOR-CON M) extended release tablet 10 mEq, 10 mEq, Oral, Daily, Fany Don APRN - CNP, 10 mEq at 03/03/23 0853    rosuvastatin (CRESTOR) tablet 20 mg, 20 mg, Oral, Nightly, Fany Florencia, APRN - CNP, 20 mg at 03/02/23 2059    mirtazapine (REMERON) tablet 7.5 mg, 7.5 mg, Oral, Nightly, Blas English, APRN - CNP, 7.5 mg at 03/02/23 2100    melatonin disintegrating tablet 5 mg, 5 mg, Oral, Daily, Bostonene English APRN - CNP, 5 mg at 03/02/23 1728      Examination:  /76   Pulse 68   Temp 97.1 °F (36.2 °C) (Infrared)   Resp 18   Ht 6' 1\" (1.854 m)   Wt 185 lb (83.9 kg)   SpO2 97%   BMI 24.41 kg/m²   Gait - steady  Medication side effects(SE):     Mental Status Examination:    Level of consciousness:  within normal limits   Appearance:  fair grooming and fair hygiene  Behavior/Motor:  no abnormalities noted  Attitude toward examiner:  cooperative  Speech:  spontaneous, normal rate and normal volume   Mood: \" I am sad I want to see my uncle\"  Affect: Labile  Thought processes: Linear thought flight of ideas loose associations  Thought content: Devoid of any auditory visual hallucinations delusions or other perceptual normalities.   Denies SI/HI intent or plan   Language: able to name objects and repeate phrases  Remote Memory: Impaired   recent Memory: Impaired  Cognition:  oriented to person, place, and time   Fund of Knowledge: Vocabulary intact, pt is aware of current events and past history  Attetion and Concentration intact  Insight poor  Judgement poor      ASSESSMENT: Patient symptoms are:  [] Well controlled  [] Improving  [] Worsening  [x] No change      Diagnosis:  Principal Problem:    Acute psychosis (HCC)  Active Problems:    Cognitive disorder    Dementia with behavioral disturbance  Resolved Problems:    * No resolved hospital problems. *      LABS:    Recent Labs     03/01/23  0648 03/02/23  1001   WBC 7.3  --    HGB 14.0 13.9     --        Recent Labs     03/01/23  0648      K 4.0      CO2 27   BUN 16   CREATININE 1.1   GLUCOSE 94       No results for input(s): BILITOT, ALKPHOS, AST, ALT in the last 72 hours.    Lab Results   Component Value Date/Time    LABAMPH NOT DETECTED 10/22/2022 06:27 PM    BARBSCNU NOT DETECTED 10/22/2022 06:27 PM    LABBENZ NOT DETECTED 10/22/2022 06:27 PM    LABMETH NOT DETECTED 10/22/2022 06:27 PM    OPIATESCREENURINE NOT DETECTED 10/22/2022 06:27 PM    PHENCYCLIDINESCREENURINE NOT DETECTED 10/22/2022 06:27 PM    ETOH <10 02/18/2023 09:31 PM     No results found for: TSH, FREET4  No results found for: LITHIUM  No results found for: VALPROATE, CBMZ        Treatment Plan:  Reviewed current Medications with the patient.   Risks, benefits, side effects, drug-to-drug interactions and alternatives to treatment were discussed.  Collateral information:   CD evaluation  Encourage patient to attend group and other milieu activities.  Discharge planning discussed with the patient and treatment team.    Continue to offer medications patient is prescribed Depakote 250 mg twice daily   continue Remeron 7.5 mg at bedtime    PSYCHOTHERAPY/COUNSELING:  [x] Therapeutic interview  [x] Supportive  [] CBT  [] Ongoing  [] Other    [x] Patient continues to need, on a daily basis, active treatment furnished directly by or requiring the supervision of inpatient psychiatric personnel      Anticipated Length of stay: 3 to 7 days based on stability            Electronically signed by YUDITH Velasco CNP on 3/3/2023 at 2:10 PM

## 2023-03-03 NOTE — PROGRESS NOTES
Patient presents friendly and cooperative during assessment. Patient denies SI/HI/AVH. Patient denies HI but threatened bodily harm on judith if judith was still living in the house. Patient appears flat but becomes exaggerated and agitated when discussing judith Bang. Patient preoccupied with circumstances surrounding admission, has pressured speech. Patient is very hard of hearing and speaks very loudly. Patient appreciative of the staff. Patient denies anxiety, depression, and pain. Patient is visible on the unit and social with peers. Medications taken without issues. PRN Vistaril given. No c/o or concerns verbalized at this time. No unit problems reported. Q 15 minute purposeful rounds maintained. Will continue to observe and report.

## 2023-03-03 NOTE — CARE COORDINATION
SW Supervisor called pt's son Ailyn Bridges again ([phone]) to complete duty to warn, no answer.     JALEESA Alcantara, [facility] 19 Work Supervisor

## 2023-03-03 NOTE — PROGRESS NOTES
Patient is resting quietly in bed with eyes closed at this time. No signs of distress or discomfort noted. No PRN medications given. Safety needs met. No unit problems reported. Purposeful rounding continued.

## 2023-03-03 NOTE — PROGRESS NOTES
Pt observed sitting in bed. Continues to present anxious. Pt states, \"I feel like my heart is going to explode! \" Continues demanding discharge. Offered and refused PRN medication for anxiety. Pt states, \"I already told them, I ain't taking another damn thing. \" Encouraged to continue to follow treatment plan. Pt non receptive at this time. Able to maintain behavioral control.

## 2023-03-03 NOTE — PLAN OF CARE
Problem: Chronic Conditions and Co-morbidities  Goal: Patient's chronic conditions and co-morbidity symptoms are monitored and maintained or improved  3/3/2023 0013 by Martina Sotelo RN  Outcome: Progressing     Problem: Anxiety  Goal: Will report anxiety at manageable levels  Description: INTERVENTIONS:  1. Administer medication as ordered  2. Teach and rehearse alternative coping skills  3. Provide emotional support with 1:1 interaction with staff  3/3/2023 0013 by Martina Sotelo RN  Outcome: Progressing     Problem: Decision Making  Goal: Pt/Family able to effectively weigh alternatives and participate in decision making related to treatment and care  Description: INTERVENTIONS:  1. Determine when there are differences between patient's view, family's view, and healthcare provider's view of condition  2. Facilitate patient and family articulation of goals for care  3. Help patient and family identify pros/cons of alternative solutions  4. Provide information as requested by patient/family  5. Respect patient/family right to receive or not to receive information  6. Serve as a liaison between patient and family and health care team  7. Initiate Consults from Ethics, Palliative Care or initiate [facility] as is appropriate  Outcome: Progressing     Problem: Behavior  Goal: Pt/Family maintain appropriate behavior and adhere to behavioral management agreement, if implemented  Description: INTERVENTIONS:  1. Assess patient/family's coping skills and  non-compliant behavior (including use of illegal substances)  2. Notify security of behavior or suspected illegal substances which indicate the need for search of the family and/or belongings  3. Encourage verbalization of thoughts and concerns in a socially appropriate manner  4. Utilize positive, consistent limit setting strategies supporting safety of patient, staff and others  5.  Encourage participation in the decision making process about the behavioral management agreement  6. If a visitor's behavior poses a threat to safety call refer to organization policy. 7. Initiate consult with , Psychosocial CNS, Spiritual Care as appropriate  Outcome: Progressing     Problem: Involuntary Admit  Goal: Will cooperate with staff recommendations and doctor's orders and will demonstrate appropriate behavior  Description: INTERVENTIONS:  1. Treat underlying conditions and offer medication as ordered  2. Educate regarding involuntary admission procedures and rules  3.  Contain excessive/inappropriate behavior per unit and hospital policies  Outcome: Progressing     Problem: Pain  Goal: Verbalizes/displays adequate comfort level or baseline comfort level  Outcome: Progressing

## 2023-03-03 NOTE — PROGRESS NOTES
Assumed care of pt at 0700. Pt observed sitting in day area. Presents irritable, blunted and loud. Denies suicidal and homicidal ideation, as well as signs and symptoms of psychosis. Denies pain. Visible on unit. Isolative to himself. Adequate PO intake. Compliant with scheduled morning medications. Pt becomes increasingly irritable and begins yelling on unit, demanding discharge. Requires frequent verbal redirection. Pt able to calm self without further intervention at this time. Q15 min safety maintained.

## 2023-03-03 NOTE — PLAN OF CARE
Problem: Anxiety  Goal: Will report anxiety at manageable levels  Description: INTERVENTIONS:  1. Administer medication as ordered  2. Teach and rehearse alternative coping skills  3. Provide emotional support with 1:1 interaction with staff  3/3/2023 1000 by Shanell Pride RN  Outcome: Progressing     Problem: Coping  Goal: Pt/Family able to verbalize concerns and demonstrate effective coping strategies  Description: INTERVENTIONS:  1. Assist patient/family to identify coping skills, available support systems and cultural and spiritual values  2. Provide emotional support, including active listening and acknowledgement of concerns of patient and caregivers  3. Reduce environmental stimuli, as able  4. Instruct patient/family in relaxation techniques, as appropriate  5. Assess for spiritual pain/suffering and initiate Spiritual Care, Psychosocial Clinical Specialist consults as needed  Outcome: Progressing     Problem: Decision Making  Goal: Pt/Family able to effectively weigh alternatives and participate in decision making related to treatment and care  Description: INTERVENTIONS:  1. Determine when there are differences between patient's view, family's view, and healthcare provider's view of condition  2. Facilitate patient and family articulation of goals for care  3. Help patient and family identify pros/cons of alternative solutions  4. Provide information as requested by patient/family  5. Respect patient/family right to receive or not to receive information  6. Serve as a liaison between patient and family and health care team  7. Initiate Consults from Ethics, Palliative Care or initiate [facility] as is appropriate  3/3/2023 1000 by Shanell Pride RN  Outcome: Progressing     Problem: Behavior  Goal: Pt/Family maintain appropriate behavior and adhere to behavioral management agreement, if implemented  Description: INTERVENTIONS:  1.  Assess patient/family's coping skills and  non-compliant behavior (including use of illegal substances)  2. Notify security of behavior or suspected illegal substances which indicate the need for search of the family and/or belongings  3. Encourage verbalization of thoughts and concerns in a socially appropriate manner  4. Utilize positive, consistent limit setting strategies supporting safety of patient, staff and others  5. Encourage participation in the decision making process about the behavioral management agreement  6. If a visitor's behavior poses a threat to safety call refer to organization policy.   7. Initiate consult with , Psychosocial CNS, Spiritual Care as appropriate  3/3/2023 1000 by Araceli Suazo RN  Outcome: Progressing

## 2023-03-04 PROCEDURE — 6370000000 HC RX 637 (ALT 250 FOR IP)

## 2023-03-04 PROCEDURE — 6370000000 HC RX 637 (ALT 250 FOR IP): Performed by: NURSE PRACTITIONER

## 2023-03-04 PROCEDURE — 6370000000 HC RX 637 (ALT 250 FOR IP): Performed by: INTERNAL MEDICINE

## 2023-03-04 PROCEDURE — 1240000000 HC EMOTIONAL WELLNESS R&B

## 2023-03-04 RX ADMIN — CEFDINIR 300 MG: 300 CAPSULE ORAL at 21:08

## 2023-03-04 RX ADMIN — Medication 5 MG: at 17:44

## 2023-03-04 RX ADMIN — MIRTAZAPINE 7.5 MG: 15 TABLET, FILM COATED ORAL at 21:08

## 2023-03-04 RX ADMIN — CARVEDILOL 25 MG: 25 TABLET, FILM COATED ORAL at 17:44

## 2023-03-04 RX ADMIN — DIVALPROEX SODIUM 250 MG: 125 CAPSULE, COATED PELLETS ORAL at 21:08

## 2023-03-04 RX ADMIN — APIXABAN 5 MG: 5 TABLET, FILM COATED ORAL at 21:08

## 2023-03-04 RX ADMIN — ROSUVASTATIN CALCIUM 20 MG: 20 TABLET, FILM COATED ORAL at 21:08

## 2023-03-04 NOTE — PROGRESS NOTES
Patient continues to refuse all medications. Patient encouraged to take medications due to high risk medical conditions. Patient continues to refuse medications.

## 2023-03-04 NOTE — PROGRESS NOTES
Patient attended community meeting   Was updated on expectations of the unit, staffing, and programming  Patient was 1 out of 12 in participation. Patient shared goal for today as to get my clothes.

## 2023-03-04 NOTE — PLAN OF CARE
Patient denies SI/HI/Hallucinations. Patient underlying irritable but is better than he has been days previous. Patient continues to refuse all medications even after much encouragement. Patient in no active distress respirations even and unlabored. Will continue to monitor and will intervene as needed. Problem: Chronic Conditions and Co-morbidities  Goal: Patient's chronic conditions and co-morbidity symptoms are monitored and maintained or improved  Outcome: Progressing     Problem: Safety - Medical Restraint  Goal: Remains free of injury from restraints (Restraint for Interference with Medical Device)  Description: INTERVENTIONS:  1. Determine that other, less restrictive measures have been tried or would not be effective before applying the restraint  2. Evaluate the patient's condition at the time of restraint application  3. Inform patient/family regarding the reason for restraint  4. Q2H: Monitor safety, psychosocial status, comfort, nutrition and hydration  Outcome: Progressing     Problem: Coping  Goal: Pt/Family able to verbalize concerns and demonstrate effective coping strategies  Description: INTERVENTIONS:  1. Assist patient/family to identify coping skills, available support systems and cultural and spiritual values  2. Provide emotional support, including active listening and acknowledgement of concerns of patient and caregivers  3. Reduce environmental stimuli, as able  4. Instruct patient/family in relaxation techniques, as appropriate  5. Assess for spiritual pain/suffering and initiate Spiritual Care, Psychosocial Clinical Specialist consults as needed  Outcome: Progressing     Problem: Death & Dying  Goal: Pt/Family communicate acceptance of impending death and feel psychological comfort and peace  Description: INTERVENTIONS:  1. Assess patient/family anxiety and grief process related to end of life issues  2. Provide emotional and spiritual support  3.  Provide information about the patient's health status with consideration of family and cultural values  4. Communicate willingness to discuss death and facilitate grief process  with patient/family as appropriate  5. Emphasize sustaining relationships within family system and community, or ellyn/spiritual traditions  6.  Initiate Spiritual Care, Psychosocial Clinical Specialist, consult as needed  Outcome: Progressing

## 2023-03-04 NOTE — PLAN OF CARE
Patient up and about the unit patient denies SI/HI AVH, anxiety and depression. Patient is irritable that he didn't get to go home stating that he just wants to see his uncle. Patient states he doesn't want to take his medications because they are not giving him the same medications he is taking at home. Will continue to monitor and assess q 15 min for safety throughout the shift. Problem: Chronic Conditions and Co-morbidities  Goal: Patient's chronic conditions and co-morbidity symptoms are monitored and maintained or improved  3/3/2023 2057 by Brynn Cuadra RN  Outcome: Progressing     Problem: Safety - Medical Restraint  Goal: Remains free of injury from restraints (Restraint for Interference with Medical Device)  Description: INTERVENTIONS:  1. Determine that other, less restrictive measures have been tried or would not be effective before applying the restraint  2. Evaluate the patient's condition at the time of restraint application  3. Inform patient/family regarding the reason for restraint  4. Q2H: Monitor safety, psychosocial status, comfort, nutrition and hydration  3/3/2023 2057 by Brynn Cuadra RN  Outcome: Progressing     Problem: Anxiety  Goal: Will report anxiety at manageable levels  Description: INTERVENTIONS:  1. Administer medication as ordered  2. Teach and rehearse alternative coping skills  3. Provide emotional support with 1:1 interaction with staff  3/3/2023 2057 by Brynn Cuadra RN  Outcome: Progressing     Problem: Coping  Goal: Pt/Family able to verbalize concerns and demonstrate effective coping strategies  Description: INTERVENTIONS:  1. Assist patient/family to identify coping skills, available support systems and cultural and spiritual values  2. Provide emotional support, including active listening and acknowledgement of concerns of patient and caregivers  3. Reduce environmental stimuli, as able  4. Instruct patient/family in relaxation techniques, as appropriate  5. Assess for spiritual pain/suffering and initiate Spiritual Care, Psychosocial Clinical Specialist consults as needed  3/3/2023 2057 by Jesus Perry RN  Outcome: Progressing     Problem: Death & Dying  Goal: Pt/Family communicate acceptance of impending death and feel psychological comfort and peace  Description: INTERVENTIONS:  1. Assess patient/family anxiety and grief process related to end of life issues  2. Provide emotional and spiritual support  3. Provide information about the patient's health status with consideration of family and cultural values  4. Communicate willingness to discuss death and facilitate grief process  with patient/family as appropriate  5. Emphasize sustaining relationships within family system and community, or ellyn/spiritual traditions  6. Initiate Spiritual Care, Psychosocial Clinical Specialist, consult as needed  3/3/2023 2057 by Jesus Perry RN  Outcome: Progressing     Problem: Decision Making  Goal: Pt/Family able to effectively weigh alternatives and participate in decision making related to treatment and care  Description: INTERVENTIONS:  1. Determine when there are differences between patient's view, family's view, and healthcare provider's view of condition  2. Facilitate patient and family articulation of goals for care  3. Help patient and family identify pros/cons of alternative solutions  4. Provide information as requested by patient/family  5. Respect patient/family right to receive or not to receive information  6. Serve as a liaison between patient and family and health care team  7. Initiate Consults from Ethics, Palliative Care or initiate [address] [place] [address] as is appropriate  3/3/2023 2057 by Jesus Perry RN  Outcome: Progressing     Problem: Confusion  Goal: Confusion, delirium, dementia, or psychosis is improved or at baseline  Description: INTERVENTIONS:  1.  Assess for possible contributors to thought disturbance, including medications, impaired vision or hearing, underlying metabolic abnormalities, dehydration, psychiatric diagnoses, and notify attending LIP  2. [place] high risk fall precautions, as indicated  3. Provide frequent short contacts to provide reality reorientation, refocusing and direction  4. Decrease environmental stimuli, including noise as appropriate  5. Monitor and intervene to maintain adequate nutrition, hydration, elimination, sleep and activity  6. If unable to ensure safety without constant attention obtain sitter and review sitter guidelines with assigned personnel  7. Initiate Psychosocial CNS and Spiritual Care consult, as indicated  3/3/2023 2057 by Elaina Gold RN  Outcome: Progressing     Problem: Behavior  Goal: Pt/Family maintain appropriate behavior and adhere to behavioral management agreement, if implemented  Description: INTERVENTIONS:  1. Assess patient/family's coping skills and  non-compliant behavior (including use of illegal substances)  2. Notify security of behavior or suspected illegal substances which indicate the need for search of the family and/or belongings  3. Encourage verbalization of thoughts and concerns in a socially appropriate manner  4. Utilize positive, consistent limit setting strategies supporting safety of patient, staff and others  5. Encourage participation in the decision making process about the behavioral management agreement  6. If a visitor's behavior poses a threat to safety call refer to organization policy. 7. Initiate consult with , Psychosocial CNS, Spiritual Care as appropriate  3/3/2023 2057 by Elaina Gold RN  Outcome: Progressing     Problem: Involuntary Admit  Goal: Will cooperate with staff recommendations and doctor's orders and will demonstrate appropriate behavior  Description: INTERVENTIONS:  1. Treat underlying conditions and offer medication as ordered  2. Educate regarding involuntary admission procedures and rules  3.  Contain excessive/inappropriate behavior per unit and hospital policies  [id number]/5/[id number] by Linda Tripp RN  Outcome: Progressing     Problem: Risk for Elopement  Goal: Patient will not exit the unit/facility without proper excort  3/3/2023 2057 by Linda Tripp RN  Outcome: Progressing     Problem: Safety - Adult  Goal: Free from fall injury  3/3/2023 2057 by Linda Tripp RN  Outcome: Progressing     Problem: ABCDS Injury Assessment  Goal: Absence of physical injury  3/3/2023 2057 by Linda Tripp RN  Outcome: Progressing     Problem: Pain  Goal: Verbalizes/displays adequate comfort level or baseline comfort level  3/3/2023 2057 by Linda Tripp RN  Outcome: Progressing

## 2023-03-04 NOTE — PROGRESS NOTES
BEHAVIORAL HEALTH FOLLOW-UP NOTE     3/4/2023     Patient was seen and examined in person, Chart reviewed   Patient's case discussed with staff/team    Chief Complaint: \"I want to go home\"     Interim History:   Patient out in the unit, comfortable, no agitation, is watching TV. He is no distress, calm and pleasant. He is future focused, goal directed, wanting to be discharged. Has some poor understanding of the barriers to his discharge. He has started to refuse his mediations which does not help him work towards goal of discharge, when he sees the Dr he states loudly that he wants \"to go home and get out of here\" He has poor understanding that he has no where to go at this point and the team is working diligently to find him safe housing.      Appetite: [x] Normal/Unchanged  [] Increased  [] Decreased      Sleep:       [x] Normal/Unchanged  [] Fair       [] Poor              Energy:    [x] Normal/Unchanged  [] Increased  [] Decreased        SI [] Present  [x] Absent    HI  []Present  [x] Absent     Aggression:  [] yes  [x] no    Patient is [x] able  [] unable to CONTRACT FOR SAFETY     PAST MEDICAL/PSYCHIATRIC HISTORY:   Past Medical History:   Diagnosis Date    Atrial fibrillation (HCC)     Chronic kidney disease     Congestive heart failure (CHF) (HCC)     Coronary artery disease     Hyperlipidemia     Hypertension        FAMILY/SOCIAL HISTORY:  Family History   Problem Relation Age of Onset    Heart Failure Other      Social History     Socioeconomic History    Marital status: Single     Spouse name: Not on file    Number of children: Not on file    Years of education: Not on file    Highest education level: Not on file   Occupational History    Not on file   Tobacco Use    Smoking status: Former     Types: Cigarettes    Smokeless tobacco: Never   Substance and Sexual Activity    Alcohol use: Not Currently    Drug use: Not on file    Sexual activity: Not on file   Other Topics Concern    Not on file   Social History Narrative    Not on file     Social Determinants of Health     Financial Resource Strain: Not on file   Food Insecurity: Not on file   Transportation Needs: Not on file   Physical Activity: Not on file   Stress: Not on file   Social Connections: Not on file   Intimate Partner Violence: Not on file   Housing Stability: Not on file           ROS:  [x] All negative/unchanged except if checked.  Explain positive(checked items) below:  [] Constitutional  [] Eyes  [] Ear/Nose/Mouth/Throat  [] Respiratory  [] CV  [] GI  []   [] Musculoskeletal  [] Skin/Breast  [] Neurological  [] Endocrine  [] Heme/Lymph  [] Allergic/Immunologic    Explanation:     MEDICATIONS:    Current Facility-Administered Medications:     carvedilol (COREG) tablet 25 mg, 25 mg, Oral, BID WC, Janette Cutler, DO, 25 mg at 03/03/23 9505    cefdinir (OMNICEF) capsule 300 mg, 300 mg, Oral, 2 times per day, [place] , DO, 300 mg at 03/03/23 9231    acetaminophen (TYLENOL) tablet 650 mg, 650 mg, Oral, Q4H PRN, Bird López MD    magnesium hydroxide (MILK OF MAGNESIA) 400 MG/5ML suspension 30 mL, 30 mL, Oral, Daily PRN, Bird López MD    nicotine (NICODERM CQ) 21 MG/24HR 1 patch, 1 patch, TransDERmal, Daily, Bird López MD    aluminum & magnesium hydroxide-simethicone (MAALOX) 200-200-20 MG/5ML suspension 30 mL, 30 mL, Oral, PRN, Bird López MD    hydrOXYzine pamoate (VISTARIL) capsule 50 mg, 50 mg, Oral, TID PRN, Bird López MD, 50 mg at 03/02/23 2059    haloperidol (HALDOL) tablet 3 mg, 3 mg, Oral, Q6H PRN **OR** haloperidol lactate (HALDOL) injection 3 mg, 3 mg, IntraMUSCular, Q6H PRN, Bird López MD    amLODIPine (NORVASC) tablet 15 mg, 15 mg, Oral, Daily, YUDITH Okeefe - CNP, 15 mg at 03/03/23 0853    apixaban (ELIQUIS) tablet 5 mg, 5 mg, Oral, BID, YUDITH Okeefe - CNP, 5 mg at 03/03/23 0051    aspirin chewable tablet 81 mg, 81 mg, Oral, Daily, YUDITH Okeefe - CNP, 81 mg at 03/03/23 0854    cyclobenzaprine (FLEXERIL) tablet 5 mg, 5 mg, Oral, BID PRN, YUDITH Arzate CNP    divalproex (DEPAKOTE SPRINKLE) DR capsule 250 mg, 250 mg, Oral, 2 times per day, YUDITH Arzate - CNP, 250 mg at 03/03/23 0853    lisinopril (PRINIVIL;ZESTRIL) tablet 20 mg, 20 mg, Oral, Daily, YUDITH Arzate - CNP, 20 mg at 03/03/23 0853    pantoprazole (PROTONIX) tablet 20 mg, 20 mg, Oral, QAM AC, YUDITH Arzate - CNP, 20 mg at 03/03/23 8290    polyethylene glycol (GLYCOLAX) packet 17 g, 17 g, Oral, Daily PRN, YUDITH Arzate CNP    potassium chloride (KLOR-CON M) extended release tablet 10 mEq, 10 mEq, Oral, Daily, YUDITH Arzate CNP, 10 mEq at 03/03/23 0853    rosuvastatin (CRESTOR) tablet 20 mg, 20 mg, Oral, Nightly, YUDITH Arzate - CNP, 20 mg at 03/02/23 2059    mirtazapine (REMERON) tablet 7.5 mg, 7.5 mg, Oral, Nightly, YUDITH Mancera - CNP, 7.5 mg at 03/02/23 2100    melatonin disintegrating tablet 5 mg, 5 mg, Oral, Daily, YUDITH Mancera - CNP, 5 mg at 03/02/23 1728      Examination:  /82   Pulse 64   Temp 97.8 °F (36.6 °C) (Temporal)   Resp 16   Ht 6' 1\" (1.854 m)   Wt 185 lb (83.9 kg)   SpO2 97%   BMI 24.41 kg/m²   Gait - steady  Medication side effects(SE):     Mental Status Examination:    Level of consciousness:  within normal limits   Appearance:  fair grooming and fair hygiene  Behavior/Motor:  no abnormalities noted  Attitude toward examiner:  cooperative  Speech:  spontaneous, normal rate and normal volume   Mood: \" I am sad I want to see my uncle\"  Affect: Labile  Thought processes: Linear thought flight of ideas loose associations  Thought content: Devoid of any auditory visual hallucinations delusions or other perceptual normalities.   Denies SI/HI intent or plan   Language: able to name objects and repeate phrases  Remote Memory: Impaired   recent Memory: Impaired  Cognition:  oriented to person, place, and time Fund of Knowledge: Vocabulary intact, pt is aware of current events and past history  Attetion and Concentration intact  Insight poor  Judgement poor      ASSESSMENT: Patient symptoms are:  [] Well controlled  [] Improving  [] Worsening  [x] No change      Diagnosis:  Principal Problem:    Acute psychosis ([address]  Active Problems:    Cognitive disorder    Dementia with behavioral disturbance  Resolved Problems:    * No resolved hospital problems. *      LABS:    Recent Labs     03/02/23  1001   HGB 13.9       No results for input(s): NA, K, CL, CO2, BUN, CREATININE, GLUCOSE in the last 72 hours. No results for input(s): BILITOT, ALKPHOS, AST, ALT in the last 72 hours. Lab Results   Component Value Date/Time    LABAMPH NOT DETECTED 10/22/2022 06:27 PM    BARBSCNU NOT DETECTED 10/22/2022 06:27 PM    LABBENZ NOT DETECTED 10/22/2022 06:27 PM    LABMETH NOT DETECTED 10/22/2022 06:27 PM    OPIATESCREENURINE NOT DETECTED 10/22/2022 06:27 PM    PHENCYCLIDINESCREENURINE NOT DETECTED 10/22/2022 06:27 PM    ETOH <10 02/18/2023 09:31 PM     No results found for: TSH, FREET4  No results found for: LITHIUM  No results found for: VALPROATE, CBMZ        Treatment Plan:  Reviewed current Medications with the patient. Risks, benefits, side effects, drug-to-drug interactions and alternatives to treatment were discussed. Collateral information:   CD evaluation  Encourage patient to attend group and other milieu activities.   Discharge planning discussed with the patient and treatment team.    Continue to offer medications patient is prescribed Depakote 250 mg twice daily   continue Remeron 7.5 mg at bedtime    PSYCHOTHERAPY/COUNSELING:  [x] Therapeutic interview  [x] Supportive  [] CBT  [] Ongoing  [] Other    [x] Patient continues to need, on a daily basis, active treatment furnished directly by or requiring the supervision of inpatient psychiatric personnel      Anticipated Length of stay: 3 to 7 days based on stability Electronically signed by YUDITH Ross CNP on 3/4/2023 at 3:29 PM

## 2023-03-04 NOTE — GROUP NOTE
Group Therapy Note    Date: 3/4/2023  Start Time: 1105  End Time:  1150  Number of Participants: 10    Type of Group: Psychoeducation    Wellness Binder Information  Module Name:  Coping Skills a-z    Patient's Goal:  ID healthy vs unhealthy coping mechanisms. ID positive coping skills that start with each letter of the alphabet. Status After Intervention:  Improved    Participation Level:  Active Listener and Interactive    Participation Quality: Appropriate, Attentive, and Sharing      Speech:  normal      Thought Process/Content: Logical      Affective Functioning: Congruent      Mood: euthymic      Level of consciousness:  Alert and Attentive      Response to Learning: Able to verbalize current knowledge/experience and Able to verbalize/acknowledge new learning      Endings: None Reported    Modes of Intervention: Education and Support      Discipline Responsible: Psychoeducational Specialist      Signature:  Chang Flores, [address]

## 2023-03-05 PROCEDURE — 6370000000 HC RX 637 (ALT 250 FOR IP): Performed by: INTERNAL MEDICINE

## 2023-03-05 PROCEDURE — 1240000000 HC EMOTIONAL WELLNESS R&B

## 2023-03-05 PROCEDURE — 6370000000 HC RX 637 (ALT 250 FOR IP)

## 2023-03-05 PROCEDURE — 99231 SBSQ HOSP IP/OBS SF/LOW 25: CPT | Performed by: NURSE PRACTITIONER

## 2023-03-05 PROCEDURE — 6370000000 HC RX 637 (ALT 250 FOR IP): Performed by: NURSE PRACTITIONER

## 2023-03-05 RX ADMIN — CARVEDILOL 25 MG: 25 TABLET, FILM COATED ORAL at 17:55

## 2023-03-05 RX ADMIN — ASPIRIN 81 MG CHEWABLE TABLET 81 MG: 81 TABLET CHEWABLE at 08:36

## 2023-03-05 RX ADMIN — CARVEDILOL 25 MG: 25 TABLET, FILM COATED ORAL at 08:36

## 2023-03-05 RX ADMIN — CEFDINIR 300 MG: 300 CAPSULE ORAL at 08:39

## 2023-03-05 RX ADMIN — APIXABAN 5 MG: 5 TABLET, FILM COATED ORAL at 21:27

## 2023-03-05 RX ADMIN — CYCLOBENZAPRINE HYDROCHLORIDE 5 MG: 5 TABLET, FILM COATED ORAL at 21:27

## 2023-03-05 RX ADMIN — Medication 5 MG: at 17:55

## 2023-03-05 RX ADMIN — PANTOPRAZOLE SODIUM 20 MG: 20 TABLET, DELAYED RELEASE ORAL at 08:39

## 2023-03-05 RX ADMIN — ROSUVASTATIN CALCIUM 20 MG: 20 TABLET, FILM COATED ORAL at 21:27

## 2023-03-05 RX ADMIN — DIVALPROEX SODIUM 250 MG: 125 CAPSULE, COATED PELLETS ORAL at 21:26

## 2023-03-05 RX ADMIN — DIVALPROEX SODIUM 250 MG: 125 CAPSULE, COATED PELLETS ORAL at 08:35

## 2023-03-05 RX ADMIN — LISINOPRIL 20 MG: 10 TABLET ORAL at 08:39

## 2023-03-05 RX ADMIN — AMLODIPINE BESYLATE 15 MG: 10 TABLET ORAL at 08:38

## 2023-03-05 RX ADMIN — POTASSIUM CHLORIDE 10 MEQ: 750 TABLET, EXTENDED RELEASE ORAL at 08:36

## 2023-03-05 RX ADMIN — APIXABAN 5 MG: 5 TABLET, FILM COATED ORAL at 08:36

## 2023-03-05 RX ADMIN — MIRTAZAPINE 7.5 MG: 15 TABLET, FILM COATED ORAL at 21:26

## 2023-03-05 RX ADMIN — CEFDINIR 300 MG: 300 CAPSULE ORAL at 21:26

## 2023-03-05 NOTE — PLAN OF CARE
Patient up and about the unit patient denies SI/HI AVH, anxiety and depression. Patient remains irritable this evening but is agreeable to take his medications. Will continue to monitor and assess q 15 min for safety throughout the shift. Problem: Chronic Conditions and Co-morbidities  Goal: Patient's chronic conditions and co-morbidity symptoms are monitored and maintained or improved  3/3/2023 2057 by Nancy Mahajan RN  Outcome: Progressing     Problem: Safety - Medical Restraint  Goal: Remains free of injury from restraints (Restraint for Interference with Medical Device)  Description: INTERVENTIONS:  1. Determine that other, less restrictive measures have been tried or would not be effective before applying the restraint  2. Evaluate the patient's condition at the time of restraint application  3. Inform patient/family regarding the reason for restraint  4. Q2H: Monitor safety, psychosocial status, comfort, nutrition and hydration  3/3/2023 2057 by Nancy Mahajan RN  Outcome: Progressing     Problem: Anxiety  Goal: Will report anxiety at manageable levels  Description: INTERVENTIONS:  1. Administer medication as ordered  2. Teach and rehearse alternative coping skills  3. Provide emotional support with 1:1 interaction with staff  3/3/2023 2057 by Nancy Mahajan RN  Outcome: Progressing     Problem: Coping  Goal: Pt/Family able to verbalize concerns and demonstrate effective coping strategies  Description: INTERVENTIONS:  1. Assist patient/family to identify coping skills, available support systems and cultural and spiritual values  2. Provide emotional support, including active listening and acknowledgement of concerns of patient and caregivers  3. Reduce environmental stimuli, as able  4. Instruct patient/family in relaxation techniques, as appropriate  5.  Assess for spiritual pain/suffering and initiate Spiritual Care, Psychosocial Clinical Specialist consults as needed  3/3/2023 2057 by Nancy Mahajan RN  Outcome: Progressing     Problem: Death & Dying  Goal: Pt/Family communicate acceptance of impending death and feel psychological comfort and peace  Description: INTERVENTIONS:  1. Assess patient/family anxiety and grief process related to end of life issues  2. Provide emotional and spiritual support  3. Provide information about the patient's health status with consideration of family and cultural values  4. Communicate willingness to discuss death and facilitate grief process  with patient/family as appropriate  5. Emphasize sustaining relationships within family system and community, or ellyn/spiritual traditions  6. Initiate Spiritual Care, Psychosocial Clinical Specialist, consult as needed  3/3/2023 2057 by Beatriz Ibarra RN  Outcome: Progressing     Problem: Decision Making  Goal: Pt/Family able to effectively weigh alternatives and participate in decision making related to treatment and care  Description: INTERVENTIONS:  1. Determine when there are differences between patient's view, family's view, and healthcare provider's view of condition  2. Facilitate patient and family articulation of goals for care  3. Help patient and family identify pros/cons of alternative solutions  4. Provide information as requested by patient/family  5. Respect patient/family right to receive or not to receive information  6. Serve as a liaison between patient and family and health care team  7. Initiate Consults from Ethics, Palliative Care or initiate [facility] as is appropriate  3/3/2023 2057 by Beatriz Ibarra RN  Outcome: Progressing     Problem: Confusion  Goal: Confusion, delirium, dementia, or psychosis is improved or at baseline  Description: INTERVENTIONS:  1. Assess for possible contributors to thought disturbance, including medications, impaired vision or hearing, underlying metabolic abnormalities, dehydration, psychiatric diagnoses, and notify attending LIP  2.  [place] high risk fall precautions, as indicated  3. Provide frequent short contacts to provide reality reorientation, refocusing and direction  4. Decrease environmental stimuli, including noise as appropriate  5. Monitor and intervene to maintain adequate nutrition, hydration, elimination, sleep and activity  6. If unable to ensure safety without constant attention obtain sitter and review sitter guidelines with assigned personnel  7. Initiate Psychosocial CNS and Spiritual Care consult, as indicated  3/3/2023 2057 by Elaina Gold RN  Outcome: Progressing     Problem: Behavior  Goal: Pt/Family maintain appropriate behavior and adhere to behavioral management agreement, if implemented  Description: INTERVENTIONS:  1. Assess patient/family's coping skills and  non-compliant behavior (including use of illegal substances)  2. Notify security of behavior or suspected illegal substances which indicate the need for search of the family and/or belongings  3. Encourage verbalization of thoughts and concerns in a socially appropriate manner  4. Utilize positive, consistent limit setting strategies supporting safety of patient, staff and others  5. Encourage participation in the decision making process about the behavioral management agreement  6. If a visitor's behavior poses a threat to safety call refer to organization policy. 7. Initiate consult with , Psychosocial CNS, Spiritual Care as appropriate  3/3/2023 2057 by Elaina Gold RN  Outcome: Progressing     Problem: Involuntary Admit  Goal: Will cooperate with staff recommendations and doctor's orders and will demonstrate appropriate behavior  Description: INTERVENTIONS:  1. Treat underlying conditions and offer medication as ordered  2. Educate regarding involuntary admission procedures and rules  3.  Contain excessive/inappropriate behavior per unit and hospital policies  8/6/4132 9030 by Elaina Gold RN  Outcome: Progressing     Problem: Risk for Elopement  Goal: Patient will not exit the unit/facility without proper excort  3/3/2023 2057 by Lord Carl RN  Outcome: Progressing     Problem: Safety - Adult  Goal: Free from fall injury  3/3/2023 2057 by Lord Carl RN  Outcome: Progressing     Problem: ABCDS Injury Assessment  Goal: Absence of physical injury  3/3/2023 2057 by Lord Carl RN  Outcome: Progressing     Problem: Pain  Goal: Verbalizes/displays adequate comfort level or baseline comfort level  3/3/2023 2057 by Lord Carl RN  Outcome: Progressing

## 2023-03-05 NOTE — GROUP NOTE
Group Therapy Note    Date: 3/5/2023    Group Start Time: 2623  Group End Time: 1140  Group Topic: Education Group - Inpatient    [facility] AC[facility]  2    PRAVIN Mullins           Patient's Goal:  ID healthy boundaries and demonstrate knowledge of ways to improve boundaries. Notes:  Patient slept during group, and would arouse minimally. Status After Intervention:  Unchanged    Participation Level: None    Participation Quality: Lethargic      Speech:  normal      Thought Process/Content: Logical      Affective Functioning: Flat      Mood: tired/lethargic    Level of consciousness:  Drowsy      Response to Learning:Patient slept throughout most of group in the group area, and would arouse minimally.       Endings: None Reported    Modes of Intervention: Education, Support, and Socialization      Discipline Responsible: Psychoeducational Specialist      Signature:  Pedro Apodaca, [address]

## 2023-03-05 NOTE — PROGRESS NOTES
BEHAVIORAL HEALTH FOLLOW-UP NOTE     3/5/2023     Patient was seen and examined in person, Chart reviewed   Patient's case discussed with staff/team    Chief Complaint: Calm, in dayroom     Interim History:   Patient out in the unit, comfortable, no agitation, is watching TV. He is no distress, calm and pleasant. He is future focused, goal directed, wanting to be discharged. Has some poor understanding of the barriers to his discharge. Intermittently refusing medications.      Appetite: [x] Normal/Unchanged  [] Increased  [] Decreased      Sleep:       [x] Normal/Unchanged  [] Fair       [] Poor              Energy:    [x] Normal/Unchanged  [] Increased  [] Decreased        SI [] Present  [x] Absent    HI  []Present  [x] Absent     Aggression:  [] yes  [x] no    Patient is [x] able  [] unable to CONTRACT FOR SAFETY     PAST MEDICAL/PSYCHIATRIC HISTORY:   Past Medical History:   Diagnosis Date    Atrial fibrillation (HCC)     Chronic kidney disease     Congestive heart failure (CHF) (HCC)     Coronary artery disease     Hyperlipidemia     Hypertension        FAMILY/SOCIAL HISTORY:  Family History   Problem Relation Age of Onset    Heart Failure Other      Social History     Socioeconomic History    Marital status: Single     Spouse name: Not on file    Number of children: Not on file    Years of education: Not on file    Highest education level: Not on file   Occupational History    Not on file   Tobacco Use    Smoking status: Former     Types: Cigarettes    Smokeless tobacco: Never   Substance and Sexual Activity    Alcohol use: Not Currently    Drug use: Not on file    Sexual activity: Not on file   Other Topics Concern    Not on file   Social History Narrative    Not on file     Social Determinants of Health     Financial Resource Strain: Not on file   Food Insecurity: Not on file   Transportation Needs: Not on file   Physical Activity: Not on file   Stress: Not on file   Social Connections: Not on file Intimate Partner Violence: Not on file   Housing Stability: Not on file           ROS:  [x] All negative/unchanged except if checked.  Explain positive(checked items) below:  [] Constitutional  [] Eyes  [] Ear/Nose/Mouth/Throat  [] Respiratory  [] CV  [] GI  []   [] Musculoskeletal  [] Skin/Breast  [] Neurological  [] Endocrine  [] Heme/Lymph  [] Allergic/Immunologic    Explanation:     MEDICATIONS:    Current Facility-Administered Medications:     carvedilol (COREG) tablet 25 mg, 25 mg, Oral, BID , Janette Cutler, DO, 25 mg at 03/05/23 1774    cefdinir (OMNICEF) capsule 300 mg, 300 mg, Oral, 2 times per day, Sofía Arzate DO, 300 mg at 03/05/23 3389    acetaminophen (TYLENOL) tablet 650 mg, 650 mg, Oral, Q4H PRN, Queen Sachin MD    magnesium hydroxide (MILK OF MAGNESIA) 400 MG/5ML suspension 30 mL, 30 mL, Oral, Daily PRN, Queen Sachin MD    nicotine (NICODERM CQ) 21 MG/24HR 1 patch, 1 patch, TransDERmal, Daily, Queen Sachin MD    aluminum & magnesium hydroxide-simethicone (MAALOX) 200-200-20 MG/5ML suspension 30 mL, 30 mL, Oral, PRN, Queen Sachin MD    hydrOXYzine pamoate (VISTARIL) capsule 50 mg, 50 mg, Oral, TID PRN, Queen Sachin MD, 50 mg at 03/02/23 2059    haloperidol (HALDOL) tablet 3 mg, 3 mg, Oral, Q6H PRN **OR** haloperidol lactate (HALDOL) injection 3 mg, 3 mg, IntraMUSCular, Q6H PRN, Queen Sachin MD    amLODIPine (NORVASC) tablet 15 mg, 15 mg, Oral, Daily, Gisellepalak Muñiz, APRN - CNP, 15 mg at 03/05/23 0223    apixaban (ELIQUIS) tablet 5 mg, 5 mg, Oral, BID, Giselle Antonino, APRN - CNP, 5 mg at 03/05/23 7654    aspirin chewable tablet 81 mg, 81 mg, Oral, Daily, Giselle Antonino, APRN - CNP, 81 mg at 03/05/23 0836    cyclobenzaprine (FLEXERIL) tablet 5 mg, 5 mg, Oral, BID PRN, YUDITH Mejia CNP    divalproex (DEPAKOTE SPRINKLE) DR capsule 250 mg, 250 mg, Oral, 2 times per day, YUDITH Mejia CNP, 250 mg at 03/05/23 0835    lisinopril (PRINIVIL;ZESTRIL) tablet 20 mg, 20 mg, Oral, Daily, McLennan Cocking, APRN - CNP, 20 mg at 03/05/23 0839    pantoprazole (PROTONIX) tablet 20 mg, 20 mg, Oral, QAM AC, Mic Cocking, APRN - CNP, 20 mg at 03/05/23 1228    polyethylene glycol (GLYCOLAX) packet 17 g, 17 g, Oral, Daily PRN, McLennan Cocking, APRN - CNP    potassium chloride (KLOR-CON M) extended release tablet 10 mEq, 10 mEq, Oral, Daily, McLennan Cocking, APRN - CNP, 10 mEq at 03/05/23 0836    rosuvastatin (CRESTOR) tablet 20 mg, 20 mg, Oral, Nightly, McLennan Cocking, APRN - CNP, 20 mg at 03/04/23 2108    mirtazapine (REMERON) tablet 7.5 mg, 7.5 mg, Oral, Nightly, James Booty, APRN - CNP, 7.5 mg at 03/04/23 2108    melatonin disintegrating tablet 5 mg, 5 mg, Oral, Daily, James Booty, APRN - CNP, 5 mg at 03/04/23 1744      Examination:  /87   Pulse 66   Temp 98.6 °F (37 °C) (Temporal)   Resp 16   Ht 6' 1\" (1.854 m)   Wt 185 lb (83.9 kg)   SpO2 98%   BMI 24.41 kg/m²   Gait - steady  Medication side effects(SE):     Mental Status Examination:    Level of consciousness:  within normal limits   Appearance:  fair grooming and fair hygiene  Behavior/Motor:  no abnormalities noted  Attitude toward examiner:  cooperative  Speech:  spontaneous, normal rate and normal volume   Mood: \" I am sad I want to see my uncle\"  Affect: Labile  Thought processes: Linear thought flight of ideas loose associations  Thought content: Devoid of any auditory visual hallucinations delusions or other perceptual normalities.   Denies SI/HI intent or plan   Language: able to name objects and repeate phrases  Remote Memory: Impaired   recent Memory: Impaired  Cognition:  oriented to person, place, and time   Fund of Knowledge: Vocabulary intact, pt is aware of current events and past history  Attetion and Concentration intact  Insight poor  Judgement poor      ASSESSMENT: Patient symptoms are:  [] Well controlled  [] Improving  [] Worsening  [x] No change      Diagnosis:  Principal Problem:    Acute psychosis ([address]  Active Problems:    Cognitive disorder    Dementia with behavioral disturbance  Resolved Problems:    * No resolved hospital problems. *      LABS:    No results for input(s): WBC, HGB, PLT in the last 72 hours. No results for input(s): NA, K, CL, CO2, BUN, CREATININE, GLUCOSE in the last 72 hours. No results for input(s): BILITOT, ALKPHOS, AST, ALT in the last 72 hours. Lab Results   Component Value Date/Time    LABAMPH NOT DETECTED 10/22/2022 06:27 PM    BARBSCNU NOT DETECTED 10/22/2022 06:27 PM    LABBENZ NOT DETECTED 10/22/2022 06:27 PM    LABMETH NOT DETECTED 10/22/2022 06:27 PM    OPIATESCREENURINE NOT DETECTED 10/22/2022 06:27 PM    PHENCYCLIDINESCREENURINE NOT DETECTED 10/22/2022 06:27 PM    ETOH <10 02/18/2023 09:31 PM     No results found for: TSH, FREET4  No results found for: LITHIUM  No results found for: VALPROATE, CBMZ        Treatment Plan:  Reviewed current Medications with the patient. Risks, benefits, side effects, drug-to-drug interactions and alternatives to treatment were discussed. Collateral information:   CD evaluation  Encourage patient to attend group and other milieu activities.   Discharge planning discussed with the patient and treatment team.    Continue to offer medications patient is prescribed Depakote 250 mg twice daily   continue Remeron 7.5 mg at bedtime    PSYCHOTHERAPY/COUNSELING:  [x] Therapeutic interview  [x] Supportive  [] CBT  [] Ongoing  [] Other    [x] Patient continues to need, on a daily basis, active treatment furnished directly by or requiring the supervision of inpatient psychiatric personnel      Anticipated Length of stay: 3 to 7 days based on stability            Electronically signed by YUDITH Ryder CNP on 3/5/2023 at 12:21 PM

## 2023-03-05 NOTE — GROUP NOTE
Group Therapy Note    Date: 3/5/2023    Group Start Time: 1430  Group End Time: 1500  Group Topic: Cognitive Skills    [facility]    Thankful JALEESA Lux, YESICA        Group Therapy Note    Attendees: 9       Patient's Goal:  Pt attended group where we discussed cognitive behavioral therapy and learned how a thought influences an emotion and your emotions influences your behavior. Notes:  Pt sat in group but is irritated and not participating. Status After Intervention:  Unchanged    Participation Level: None    Participation Quality: Resistant      Speech:  pressured      Thought Process/Content: Logical      Affective Functioning: Congruent      Mood: irritable      Level of consciousness:  Inattentive      Response to Learning: Resistant      Endings: None Reported    Modes of Intervention: Education, Support, Socialization, Exploration, Clarifying, and Problem-solving      Discipline Responsible: /Counselor      Signature:   JALEESA Valentine, YESICA

## 2023-03-05 NOTE — PLAN OF CARE
Patient denies SI/HI/Hallucinations. Patient discharged focused. Patient taking medications today after much encouragement from staff about risk factors of not taking medications Patient dissmissive with assessment questions. No active distress noted. Respirations even and unlabored. Will continue to monitor with Q 15 minute rounds and will intervene as needed. Problem: Chronic Conditions and Co-morbidities  Goal: Patient's chronic conditions and co-morbidity symptoms are monitored and maintained or improved  Outcome: Progressing     Problem: Anxiety  Goal: Will report anxiety at manageable levels  Description: INTERVENTIONS:  1. Administer medication as ordered  2. Teach and rehearse alternative coping skills  3. Provide emotional support with 1:1 interaction with staff  Outcome: Progressing     Problem: Coping  Goal: Pt/Family able to verbalize concerns and demonstrate effective coping strategies  Description: INTERVENTIONS:  1. Assist patient/family to identify coping skills, available support systems and cultural and spiritual values  2. Provide emotional support, including active listening and acknowledgement of concerns of patient and caregivers  3. Reduce environmental stimuli, as able  4. Instruct patient/family in relaxation techniques, as appropriate  5. Assess for spiritual pain/suffering and initiate Spiritual Care, Psychosocial Clinical Specialist consults as needed  Outcome: Progressing     Problem: Death & Dying  Goal: Pt/Family communicate acceptance of impending death and feel psychological comfort and peace  Description: INTERVENTIONS:  1. Assess patient/family anxiety and grief process related to end of life issues  2. Provide emotional and spiritual support  3. Provide information about the patient's health status with consideration of family and cultural values  4. Communicate willingness to discuss death and facilitate grief process  with patient/family as appropriate  5.  Emphasize sustaining relationships within family system and community, or ellyn/spiritual traditions  6.  Initiate Spiritual Care, Psychosocial Clinical Specialist, consult as needed  Outcome: Progressing

## 2023-03-05 NOTE — PROGRESS NOTES
Patient attended community meeting   Was updated on expectations of the unit, staffing, and programming  Patient shared goal for today as you dont even want to know.

## 2023-03-06 PROCEDURE — 6370000000 HC RX 637 (ALT 250 FOR IP): Performed by: NURSE PRACTITIONER

## 2023-03-06 PROCEDURE — 99231 SBSQ HOSP IP/OBS SF/LOW 25: CPT | Performed by: NURSE PRACTITIONER

## 2023-03-06 PROCEDURE — 6370000000 HC RX 637 (ALT 250 FOR IP): Performed by: INTERNAL MEDICINE

## 2023-03-06 PROCEDURE — 1240000000 HC EMOTIONAL WELLNESS R&B

## 2023-03-06 PROCEDURE — 6370000000 HC RX 637 (ALT 250 FOR IP)

## 2023-03-06 RX ADMIN — DIVALPROEX SODIUM 250 MG: 125 CAPSULE, COATED PELLETS ORAL at 21:54

## 2023-03-06 RX ADMIN — POTASSIUM CHLORIDE 10 MEQ: 750 TABLET, EXTENDED RELEASE ORAL at 08:52

## 2023-03-06 RX ADMIN — ROSUVASTATIN CALCIUM 20 MG: 20 TABLET, FILM COATED ORAL at 21:12

## 2023-03-06 RX ADMIN — MIRTAZAPINE 7.5 MG: 15 TABLET, FILM COATED ORAL at 21:13

## 2023-03-06 RX ADMIN — CARVEDILOL 25 MG: 25 TABLET, FILM COATED ORAL at 18:04

## 2023-03-06 RX ADMIN — DIVALPROEX SODIUM 250 MG: 125 CAPSULE, COATED PELLETS ORAL at 08:53

## 2023-03-06 RX ADMIN — CEFDINIR 300 MG: 300 CAPSULE ORAL at 08:52

## 2023-03-06 RX ADMIN — APIXABAN 5 MG: 5 TABLET, FILM COATED ORAL at 08:52

## 2023-03-06 RX ADMIN — APIXABAN 5 MG: 5 TABLET, FILM COATED ORAL at 21:12

## 2023-03-06 RX ADMIN — CARVEDILOL 25 MG: 25 TABLET, FILM COATED ORAL at 08:52

## 2023-03-06 RX ADMIN — LISINOPRIL 20 MG: 10 TABLET ORAL at 08:53

## 2023-03-06 RX ADMIN — CEFDINIR 300 MG: 300 CAPSULE ORAL at 21:12

## 2023-03-06 RX ADMIN — Medication 5 MG: at 18:04

## 2023-03-06 RX ADMIN — PANTOPRAZOLE SODIUM 20 MG: 20 TABLET, DELAYED RELEASE ORAL at 06:30

## 2023-03-06 RX ADMIN — ASPIRIN 81 MG CHEWABLE TABLET 81 MG: 81 TABLET CHEWABLE at 08:52

## 2023-03-06 RX ADMIN — AMLODIPINE BESYLATE 15 MG: 10 TABLET ORAL at 08:53

## 2023-03-06 ASSESSMENT — PAIN SCALES - GENERAL: PAINLEVEL_OUTOF10: 0

## 2023-03-06 NOTE — CARE COORDINATION
Navigator placed phone call to [facility] and spoke with the . Patient's hearing is for Guardianship of Person Kj Ramirez. The hearing is scheduled for 3/17/23 @ 3:00 PM. Per the , patient did previously request court appointed  and contested the guardianship hearing/process. Per the , it is recommended that the patient attends the hearing virtually. The  requested the Navigator to send an email to:    [email or web address]     The court hearing link will be sent to the Navigator for the patient to access. Navigator inquired if there is a person from the court that is sent to potential castro's homes to evaluate if the house is liveable, per the  the [employer] already went out and met with patient back in December (this most likely occurred while the patient was in the hospital according to the timeframes provided by [facility]). The  reports she is not familiar with what the Investigator looks into. His court appointed  is Rand Howard [phone]. Navigator placed phone call to [facility], spoke with law  Carrshadi Guerrerobrooks is still in a hearing and will call Navigator back shortly. Per Alban Paz, they have not spoken to patient since he was at [facility]. Navigator placed phone call to [facility] [address] and spoke with  Millicent. Per Charlette Ca, they have never met patient or conducted a home visit. Their only role was to assist [facility] with the application process for Nursing Home Medicaid and try to assist with identifying placement options. There has been no house visits. Navigator placed phone call to the  provided to AMARIS Zhou by the son Debo Ellis. The number was confirmed to be [address].     Navigator placed phone call to [address] [place] [address] requesting call back to attempt to confirm medicaid application approval.    Updated treatment team.    Electronically signed by JALEESA York, GIOW on 3/6/2023 at 2:43 PM

## 2023-03-06 NOTE — GROUP NOTE
Group Therapy Note    Date: 3/6/2023    Group Start Time: 1115  Group End Time: 1150  Group Topic: Psychoeducation    [facility]  [id number] [facility], [facility]        Group Therapy Note    Attendees: 9  Healthy steps to manage stress     Patient's Goal: Patient will be able to chose a step to improve his/her own wellness. Notes:  Sat quietly in group room area.      Status After Intervention:  Unchanged    Participation Level: None    Participation Quality: Attentive      Speech:  hesitant      Thought Process/Content: Perseverating      Affective Functioning: Blunted      Mood: angry      Level of consciousness:  Alert and Oriented x4      Response to Learning: Resistant      Endings: None Reported    Modes of Intervention: Education, Support, and Socialization      Discipline Responsible: Psychoeducational Specialist      Signature:  Amber Reeder

## 2023-03-06 NOTE — PROGRESS NOTES
Patient participated in recreational activity. Patient was active participant in evening price is right vamsi. Patient was 1 of 11 in attendance.

## 2023-03-06 NOTE — CARE COORDINATION
Did Call patients  Millicent thru Sandra. Did have to ask to be transferred to speak with Millicent. Left message to call nurses station on [address] [facility] and ask for  or [facility].     [phone]

## 2023-03-06 NOTE — PLAN OF CARE
Patient denies SI/HI/Hallucinations. Patient discharged focus and irritable that he is not able to go home. Patient medication compliant, has remained in control of his behaviors. Denies anxiety and depression. No active distress noted. Respirations even and unlabored. Will continue to monitor and will intervene as needed. Problem: Chronic Conditions and Co-morbidities  Goal: Patient's chronic conditions and co-morbidity symptoms are monitored and maintained or improved  Outcome: Progressing     Problem: Safety - Medical Restraint  Goal: Remains free of injury from restraints (Restraint for Interference with Medical Device)  Description: INTERVENTIONS:  1. Determine that other, less restrictive measures have been tried or would not be effective before applying the restraint  2. Evaluate the patient's condition at the time of restraint application  3. Inform patient/family regarding the reason for restraint  4. Q2H: Monitor safety, psychosocial status, comfort, nutrition and hydration  Outcome: Progressing     Problem: Death & Dying  Goal: Pt/Family communicate acceptance of impending death and feel psychological comfort and peace  Description: INTERVENTIONS:  1. Assess patient/family anxiety and grief process related to end of life issues  2. Provide emotional and spiritual support  3. Provide information about the patient's health status with consideration of family and cultural values  4. Communicate willingness to discuss death and facilitate grief process  with patient/family as appropriate  5. Emphasize sustaining relationships within family system and community, or ellyn/spiritual traditions  6. Initiate Spiritual Care, Psychosocial Clinical Specialist, consult as needed  Outcome: Progressing     Problem: Decision Making  Goal: Pt/Family able to effectively weigh alternatives and participate in decision making related to treatment and care  Description: INTERVENTIONS:  1.  Determine when there are differences between patient's view, family's view, and healthcare provider's view of condition  2. Facilitate patient and family articulation of goals for care  3. Help patient and family identify pros/cons of alternative solutions  4. Provide information as requested by patient/family  5. Respect patient/family right to receive or not to receive information  6. Serve as a liaison between patient and family and health care team  7.  Initiate Consults from Ethics, Palliative Care or initiate [address] [facility] [address] as is appropriate  Outcome: Progressing

## 2023-03-06 NOTE — PROGRESS NOTES
Patient attended morning community meeting. Updated on staffing assignments and daily expectations.    Shared goal for the day as to want to see the heart

## 2023-03-06 NOTE — PLAN OF CARE
Patient up and about the unit patient denies SI/HI AVH, anxiety and depression. Patient is less irritable this evening and is agreeable to take his medications. Will continue to monitor and assess q 15 min for safety throughout the shift. Problem: Chronic Conditions and Co-morbidities  Goal: Patient's chronic conditions and co-morbidity symptoms are monitored and maintained or improved  3/3/2023 2057 by Liza Xiong RN  Outcome: Progressing     Problem: Safety - Medical Restraint  Goal: Remains free of injury from restraints (Restraint for Interference with Medical Device)  Description: INTERVENTIONS:  1. Determine that other, less restrictive measures have been tried or would not be effective before applying the restraint  2. Evaluate the patient's condition at the time of restraint application  3. Inform patient/family regarding the reason for restraint  4. Q2H: Monitor safety, psychosocial status, comfort, nutrition and hydration  3/3/2023 2057 by Liza Xiong RN  Outcome: Progressing     Problem: Anxiety  Goal: Will report anxiety at manageable levels  Description: INTERVENTIONS:  1. Administer medication as ordered  2. Teach and rehearse alternative coping skills  3. Provide emotional support with 1:1 interaction with staff  3/3/2023 2057 by Liza Xiong RN  Outcome: Progressing     Problem: Coping  Goal: Pt/Family able to verbalize concerns and demonstrate effective coping strategies  Description: INTERVENTIONS:  1. Assist patient/family to identify coping skills, available support systems and cultural and spiritual values  2. Provide emotional support, including active listening and acknowledgement of concerns of patient and caregivers  3. Reduce environmental stimuli, as able  4. Instruct patient/family in relaxation techniques, as appropriate  5.  Assess for spiritual pain/suffering and initiate Spiritual Care, Psychosocial Clinical Specialist consults as needed  3/3/2023 2057 by Liza Xiong RN  Outcome: Progressing     Problem: Death & Dying  Goal: Pt/Family communicate acceptance of impending death and feel psychological comfort and peace  Description: INTERVENTIONS:  1. Assess patient/family anxiety and grief process related to end of life issues  2. Provide emotional and spiritual support  3. Provide information about the patient's health status with consideration of family and cultural values  4. Communicate willingness to discuss death and facilitate grief process  with patient/family as appropriate  5. Emphasize sustaining relationships within family system and community, or ellyn/spiritual traditions  6. Initiate Spiritual Care, Psychosocial Clinical Specialist, consult as needed  3/3/2023 2057 by Vesta Concepcion RN  Outcome: Progressing     Problem: Decision Making  Goal: Pt/Family able to effectively weigh alternatives and participate in decision making related to treatment and care  Description: INTERVENTIONS:  1. Determine when there are differences between patient's view, family's view, and healthcare provider's view of condition  2. Facilitate patient and family articulation of goals for care  3. Help patient and family identify pros/cons of alternative solutions  4. Provide information as requested by patient/family  5. Respect patient/family right to receive or not to receive information  6. Serve as a liaison between patient and family and health care team  7. Initiate Consults from Ethics, Palliative Care or initiate [address] [facility] as is appropriate  3/3/2023 2057 by Vesta Concepcion RN  Outcome: Progressing     Problem: Confusion  Goal: Confusion, delirium, dementia, or psychosis is improved or at baseline  Description: INTERVENTIONS:  1. Assess for possible contributors to thought disturbance, including medications, impaired vision or hearing, underlying metabolic abnormalities, dehydration, psychiatric diagnoses, and notify attending LIP  2.  [place] high risk fall precautions, as indicated  3. Provide frequent short contacts to provide reality reorientation, refocusing and direction  4. Decrease environmental stimuli, including noise as appropriate  5. Monitor and intervene to maintain adequate nutrition, hydration, elimination, sleep and activity  6. If unable to ensure safety without constant attention obtain sitter and review sitter guidelines with assigned personnel  7. Initiate Psychosocial CNS and Spiritual Care consult, as indicated  3/3/2023 2057 by Jessie Chen RN  Outcome: Progressing     Problem: Behavior  Goal: Pt/Family maintain appropriate behavior and adhere to behavioral management agreement, if implemented  Description: INTERVENTIONS:  1. Assess patient/family's coping skills and  non-compliant behavior (including use of illegal substances)  2. Notify security of behavior or suspected illegal substances which indicate the need for search of the family and/or belongings  3. Encourage verbalization of thoughts and concerns in a socially appropriate manner  4. Utilize positive, consistent limit setting strategies supporting safety of patient, staff and others  5. Encourage participation in the decision making process about the behavioral management agreement  6. If a visitor's behavior poses a threat to safety call refer to organization policy. 7. Initiate consult with , Psychosocial CNS, Spiritual Care as appropriate  3/3/2023 2057 by Jessie Chen RN  Outcome: Progressing     Problem: Involuntary Admit  Goal: Will cooperate with staff recommendations and doctor's orders and will demonstrate appropriate behavior  Description: INTERVENTIONS:  1. Treat underlying conditions and offer medication as ordered  2. Educate regarding involuntary admission procedures and rules  3.  Contain excessive/inappropriate behavior per unit and hospital policies  7/7/3822 [id number] by Jessie Chen RN  Outcome: Progressing     Problem: Risk for Elopement  Goal: Patient will not exit the unit/facility without proper excort  3/3/2023 2057 by Cm Franklin RN  Outcome: Progressing     Problem: Safety - Adult  Goal: Free from fall injury  3/3/2023 2057 by Cm Franklin RN  Outcome: Progressing     Problem: ABCDS Injury Assessment  Goal: Absence of physical injury  3/3/2023 2057 by Cm Franklin RN  Outcome: Progressing     Problem: Pain  Goal: Verbalizes/displays adequate comfort level or baseline comfort level  3/3/2023 2057 by Cm Franklin RN  Outcome: Progressing

## 2023-03-06 NOTE — GROUP NOTE
Group Therapy Note    Date: 3/6/2023    Group Start Time: 1400  Group End Time: 1430  Group Topic: Cognitive Skills    [facility] ACUTE BH 1    JALEESA Castellanos LSW        Group Therapy Note    Attendees: 11       Patient's Goal: To participate in group discussion on values. Notes: Pt was an active participant in group discussion. Status After Intervention:  Unchanged    Participation Level:  Active Listener    Participation Quality: Attentive      Speech:  normal      Thought Process/Content: Logical      Affective Functioning: Congruent      Mood: irritable      Level of consciousness:  Alert and Oriented x4      Response to Learning: Able to verbalize current knowledge/experience      Endings: None Reported    Modes of Intervention: Education, Support, Socialization, Exploration, Clarifying, and Problem-solving      Discipline Responsible: /Counselor      Signature:  JALEESA Espitia, YESICA

## 2023-03-06 NOTE — CARE COORDINATION
SW Supervisor received call from pt's son Irving Pal ([phone]). Duty to warn completed. Pt stated that he doesn't \"understand\" why his father is upset with him, because he \"hasn't taken any of his money or the house\". Irving Pal reported that he is pt's biological son and Mali Joshi is pt's step son. He reported that prior to admission to [facility] in October 2022, patient was threatening to kill himself and became aggressive with family and police. Irving Pal reported that during pt's admission at [facility], they recommended guardianship due to his Dementia diagnosis and inability to care for himself. Irving Pal reported that he applied for guardianship and the final hearing is 3/17. He reported that his father knows about the guardianship hearing and was assigned a . Irving Pal reported that the court \"came to the house and reported that it is not livable\". He stated that pt's former wife was a \"hoarder\" and had several animals. Irving Pal reported that patient has the keys to his house, however the court stated that patient cannot live there. Irving Pal also reported that there is no family available to check on patient daily to ensure that he is taking his medications, eating and taking care of himself. Irving Pal reported that he lives an hour away, and so does pt's brothers. He stated that pt's \"Uncle Roa\" lives in [place], however he has cancer and cannot leave the home. Irving Pal reported that he spoke with the [facility] of [address] Medicaid\" Polina Exon [phone]) today and she reported that pt's nursing home medicaid will be approved on Wednesday. Irving Pal reported that his goal is to place patient somewhere [facility] Bars can help him and keep him safe\". He has no nursing facility or assisted living preference. SW Supervisor discussed the criteria for nursing home and assisted living placement.  Irving Pal verbalized understanding, stated that if he does not meet criteria he \"doesn't know\" what he will do, because he is \"not safe to live by himself\".      SW Supervisor will discuss information with Population Navigator and treatment team.    JALEESA Espana, Jeramie 19 Work Supervisor

## 2023-03-06 NOTE — CARE COORDINATION
BASSEM Supervisor received voicemail from pt's step galilea Field on Friday. Due to Merritt's work schedule, he is only available for a limited amount of time during his lunch break. BASSEM Supervisor called pt's son Livia Field again ([phone]) to complete duty to warn, no answer.      JALEESA Lorenzo, Jeramie Mahoney Work Supervisor

## 2023-03-06 NOTE — PROGRESS NOTES
[facility] Interdisciplinary Treatment Plan Note     Review Date & Time: 3/6/2023 0900    Patient was in treatment team.    Estimated Length of Stay Update:  7-10 days   Estimated Discharge Date Update: 3/9/2023    EDUCATION:   Learner Progress Toward Treatment Goals: Reviewed results and recommendations of this team, Reviewed group plan and strategies, and Reviewed signs, symptoms and risk of self harm and violent behavior    Method: Small group    Outcome: Verbalized understanding    PATIENT GOALS: want to see the heart doctor     PLAN/TREATMENT RECOMMENDATIONS UPDATE: encourage patient to attend and participate in groups.  Take medications as prescribed     GOALS UPDATE:  Time frame for Short-Term Goals: prior to discharge      Mimi Pierce RN

## 2023-03-07 PROCEDURE — 6370000000 HC RX 637 (ALT 250 FOR IP): Performed by: NURSE PRACTITIONER

## 2023-03-07 PROCEDURE — 6370000000 HC RX 637 (ALT 250 FOR IP)

## 2023-03-07 PROCEDURE — 6370000000 HC RX 637 (ALT 250 FOR IP): Performed by: INTERNAL MEDICINE

## 2023-03-07 PROCEDURE — 1240000000 HC EMOTIONAL WELLNESS R&B

## 2023-03-07 PROCEDURE — 99231 SBSQ HOSP IP/OBS SF/LOW 25: CPT | Performed by: NURSE PRACTITIONER

## 2023-03-07 RX ADMIN — APIXABAN 5 MG: 5 TABLET, FILM COATED ORAL at 11:24

## 2023-03-07 RX ADMIN — PANTOPRAZOLE SODIUM 20 MG: 20 TABLET, DELAYED RELEASE ORAL at 06:31

## 2023-03-07 RX ADMIN — ASPIRIN 81 MG CHEWABLE TABLET 81 MG: 81 TABLET CHEWABLE at 11:23

## 2023-03-07 RX ADMIN — Medication 5 MG: at 17:11

## 2023-03-07 RX ADMIN — CARVEDILOL 25 MG: 25 TABLET, FILM COATED ORAL at 17:11

## 2023-03-07 RX ADMIN — DIVALPROEX SODIUM 250 MG: 125 CAPSULE, COATED PELLETS ORAL at 11:23

## 2023-03-07 RX ADMIN — CEFDINIR 300 MG: 300 CAPSULE ORAL at 11:23

## 2023-03-07 RX ADMIN — APIXABAN 5 MG: 5 TABLET, FILM COATED ORAL at 22:57

## 2023-03-07 RX ADMIN — LISINOPRIL 20 MG: 10 TABLET ORAL at 11:23

## 2023-03-07 RX ADMIN — ROSUVASTATIN CALCIUM 20 MG: 20 TABLET, FILM COATED ORAL at 22:57

## 2023-03-07 RX ADMIN — AMLODIPINE BESYLATE 15 MG: 10 TABLET ORAL at 11:23

## 2023-03-07 RX ADMIN — CEFDINIR 300 MG: 300 CAPSULE ORAL at 22:57

## 2023-03-07 RX ADMIN — DIVALPROEX SODIUM 250 MG: 125 CAPSULE, COATED PELLETS ORAL at 22:57

## 2023-03-07 RX ADMIN — CARVEDILOL 25 MG: 25 TABLET, FILM COATED ORAL at 11:22

## 2023-03-07 RX ADMIN — MIRTAZAPINE 7.5 MG: 15 TABLET, FILM COATED ORAL at 22:57

## 2023-03-07 RX ADMIN — POTASSIUM CHLORIDE 10 MEQ: 750 TABLET, EXTENDED RELEASE ORAL at 11:22

## 2023-03-07 NOTE — BH NOTE
Patient denied SI/HI/AVH at this time. He refused to rat anxiety or depression. Patient is very discharged focus. He took medication and meals today. He did not attended groups.

## 2023-03-07 NOTE — CARE COORDINATION
Navigator received return call from [address]. Navigator returned call and left vm requesting call back.     Electronically signed by JALEESA Hyman, YESICA on 3/7/2023 at 10:19 AM

## 2023-03-07 NOTE — PROGRESS NOTES
Patient refused prescribed morning medications despite multiple attempts from this RN. Per patient, \"This place is crap! \" Patient irritable and uncooperative.

## 2023-03-07 NOTE — CARE COORDINATION
[facility] Requested hearing link for guardianship hearing scheduled for 3/17/23. Waiting on return communication.    Electronically signed by JALEESA Saldana, GIOW on 3/7/2023 at 4:16 PM

## 2023-03-07 NOTE — CARE COORDINATION
Galileo Castillo arrived to meet with patient. Patient initially complained of inability to hear but when the  expressed what he was here to visit him for, there were no other hearing/communication barrier complaints. Patient expressed frustration that his son changed his will and locked him in a facility for 160+ days, beginning with [facility]. Patient reports that he was not bothered until his wife passed away. Patient very angry with his son Karen Canseco, who he claims now is not his biological son. After meeting with the patient, Mark Daughters did express concern regarding difficulties following patient's thoughts and potential competency; but that he will do his best to represent the patient's interests. Navigator received phone call from KAREN [facility]  [facility] [phone]. Per [facility], patient was approved for [place] Medicaid effective 2/24/2023 and it is retroactive to October 2022. [place] Medicaid ID #[id number]    Per [facility], they will need to be notified once patient transfers to a nursing facility and the Medicaid will be converted to Nursing Home Medicaid. Provided ID number to [employer].     Updated treatment team.    Electronically signed by JALEESA Santiago, GIOW on 3/7/2023 at 12:39 PM

## 2023-03-07 NOTE — PROGRESS NOTES
BEHAVIORAL HEALTH FOLLOW-UP NOTE     3/7/2023     Patient was seen and examined in person, Chart reviewed   Patient's case discussed with staff/team    Chief Complaint: \"I fucking hate this place\"     Interim History:   Patient out in the unit sitting at a table, he is sharing his thoughts \"I fucking hate this place\"   He is no distress, remains in control of his behaviors, however today has significant underlying irritability. His repeat MoCA is 20/30. He is very angry and frustrated about his situation, he wants out of the hospital and perseverates on this which is understandable. He has interacted well with peers today. Court Atty from [facility] to see patient today. He is future focused, goal directed, wanting to be discharged. Has some poor understanding of the barriers to his discharge. Intermittently refusing medications.      Appetite: [x] Normal/Unchanged  [] Increased  [] Decreased      Sleep:       [x] Normal/Unchanged  [] Fair       [] Poor              Energy:    [x] Normal/Unchanged  [] Increased  [] Decreased        SI [] Present  [x] Absent    HI  []Present  [x] Absent     Aggression:  [] yes  [x] no    Patient is [x] able  [] unable to CONTRACT FOR SAFETY     PAST MEDICAL/PSYCHIATRIC HISTORY:   Past Medical History:   Diagnosis Date    Atrial fibrillation (HCC)     Chronic kidney disease     Congestive heart failure (CHF) (HCC)     Coronary artery disease     Hyperlipidemia     Hypertension        FAMILY/SOCIAL HISTORY:  Family History   Problem Relation Age of Onset    Heart Failure Other      Social History     Socioeconomic History    Marital status: Single     Spouse name: Not on file    Number of children: Not on file    Years of education: Not on file    Highest education level: Not on file   Occupational History    Not on file   Tobacco Use    Smoking status: Former     Types: Cigarettes    Smokeless tobacco: Never   Substance and Sexual Activity    Alcohol use: Not Currently Drug use: Not on file    Sexual activity: Not on file   Other Topics Concern    Not on file   Social History Narrative    Not on file     Social Determinants of Health     Financial Resource Strain: Not on file   Food Insecurity: Not on file   Transportation Needs: Not on file   Physical Activity: Not on file   Stress: Not on file   Social Connections: Not on file   Intimate Partner Violence: Not on file   Housing Stability: Not on file           ROS:  [x] All negative/unchanged except if checked.  Explain positive(checked items) below:  [] Constitutional  [] Eyes  [] Ear/Nose/Mouth/Throat  [] Respiratory  [] CV  [] GI  []   [] Musculoskeletal  [] Skin/Breast  [] Neurological  [] Endocrine  [] Heme/Lymph  [] Allergic/Immunologic    Explanation:     MEDICATIONS:    Current Facility-Administered Medications:     carvedilol (COREG) tablet 25 mg, 25 mg, Oral, BID WC, Janette Cutler, DO, 25 mg at 03/07/23 1122    cefdinir (OMNICEF) capsule 300 mg, 300 mg, Oral, 2 times per day, Benita Estevez DO, 300 mg at 03/07/23 1123    acetaminophen (TYLENOL) tablet 650 mg, 650 mg, Oral, Q4H PRN, She Hoffman MD    magnesium hydroxide (MILK OF MAGNESIA) 400 MG/5ML suspension 30 mL, 30 mL, Oral, Daily PRN, She Hoffman MD    nicotine (NICODERM CQ) 21 MG/24HR 1 patch, 1 patch, TransDERmal, Daily, She Hoffman MD    aluminum & magnesium hydroxide-simethicone (MAALOX) 200-200-20 MG/5ML suspension 30 mL, 30 mL, Oral, PRN, She Hoffman MD    hydrOXYzine pamoate (VISTARIL) capsule 50 mg, 50 mg, Oral, TID PRN, She Hoffman MD, 50 mg at 03/02/23 2059    haloperidol (HALDOL) tablet 3 mg, 3 mg, Oral, Q6H PRN **OR** haloperidol lactate (HALDOL) injection 3 mg, 3 mg, IntraMUSCular, Q6H PRN, She Hoffman MD    amLODIPine (NORVASC) tablet 15 mg, 15 mg, Oral, Daily, Fany Don APRN - CNP, 15 mg at 03/07/23 1123    apixaban (ELIQUIS) tablet 5 mg, 5 mg, Oral, BID, Fany Don APRN - CNP, 5 mg at 03/07/23 1124    aspirin chewable tablet 81 mg, 81 mg, Oral, Daily, Fanymelissa Avendanon, APRN - CNP, 81 mg at 03/07/23 1123    cyclobenzaprine (FLEXERIL) tablet 5 mg, 5 mg, Oral, BID PRN, Fany Drown, APRN - CNP, 5 mg at 03/05/23 2127    divalproex (DEPAKOTE SPRINKLE) DR capsule 250 mg, 250 mg, Oral, 2 times per day, Fanymelissa Avendanon, APRN - CNP, 250 mg at 03/07/23 1123    lisinopril (PRINIVIL;ZESTRIL) tablet 20 mg, 20 mg, Oral, Daily, Fanymelissa Avendanon, APRN - CNP, 20 mg at 03/07/23 1123    pantoprazole (PROTONIX) tablet 20 mg, 20 mg, Oral, QAM AC, Fanymelissa Avendanon, APRN - CNP, 20 mg at 03/07/23 0631    polyethylene glycol (GLYCOLAX) packet 17 g, 17 g, Oral, Daily PRN, Fany Don, APRN - CNP    potassium chloride (KLOR-CON M) extended release tablet 10 mEq, 10 mEq, Oral, Daily, Fany Juan Alberton, APRN - CNP, 10 mEq at 03/07/23 1122    rosuvastatin (CRESTOR) tablet 20 mg, 20 mg, Oral, Nightly, Fany Drown, APRN - CNP, 20 mg at 03/06/23 2112    mirtazapine (REMERON) tablet 7.5 mg, 7.5 mg, Oral, Nightly, Blas Arredondo APRN - CNP, 7.5 mg at 03/06/23 2113    melatonin disintegrating tablet 5 mg, 5 mg, Oral, Daily, Blas English APRN - CNP, 5 mg at 03/06/23 8709      Examination:  BP (!) 158/87   Pulse 63   Temp 98.1 °F (36.7 °C) (Temporal)   Resp 18   Ht 6' 1\" (1.854 m)   Wt 185 lb (83.9 kg)   SpO2 99%   BMI 24.41 kg/m²   Gait - steady  Medication side effects(SE):     Mental Status Examination:    Level of consciousness:  within normal limits   Appearance:  fair grooming and fair hygiene  Behavior/Motor:  no abnormalities noted  Attitude toward examiner:  cooperative  Speech:  spontaneous, normal rate and normal volume   Mood: \" I am sad I want to see my uncle\"  Affect: Labile  Thought processes: Linear thought flight of ideas loose associations  Thought content: Devoid of any auditory visual hallucinations delusions or other perceptual normalities.   Denies SI/HI intent or plan   Language: able to name objects and repeate phrases  Remote Memory: Impaired   recent Memory: Impaired  Cognition:  oriented to person, place, and time   Fund of Knowledge: Vocabulary intact, pt is aware of current events and past history  Attetion and Concentration intact  Insight judgment impulse control adequate    ASSESSMENT: Patient symptoms are:  [x] Well controlled  [] Improving  [] Worsening  [x] No change      Diagnosis:  Principal Problem:    Acute psychosis ([facility] [address]  Active Problems:    Cognitive disorder    Dementia with behavioral disturbance  Resolved Problems:    * No resolved hospital problems. *      LABS:    No results for input(s): WBC, HGB, PLT in the last 72 hours. No results for input(s): NA, K, CL, CO2, BUN, CREATININE, GLUCOSE in the last 72 hours. No results for input(s): BILITOT, ALKPHOS, AST, ALT in the last 72 hours. Lab Results   Component Value Date/Time    LABAMPH NOT DETECTED 10/22/2022 06:27 PM    BARBSCNU NOT DETECTED 10/22/2022 06:27 PM    LABBENZ NOT DETECTED 10/22/2022 06:27 PM    LABMETH NOT DETECTED 10/22/2022 06:27 PM    OPIATESCREENURINE NOT DETECTED 10/22/2022 06:27 PM    PHENCYCLIDINESCREENURINE NOT DETECTED 10/22/2022 06:27 PM    ETOH <10 02/18/2023 09:31 PM     No results found for: TSH, FREET4  No results found for: LITHIUM  No results found for: VALPROATE, CBMZ        Treatment Plan:  Reviewed current Medications with the patient. Risks, benefits, side effects, drug-to-drug interactions and alternatives to treatment were discussed. Collateral information:   CD evaluation  Encourage patient to attend group and other milieu activities.   Discharge planning discussed with the patient and treatment team.    Continue to offer medications patient is prescribed Depakote 250 mg twice daily   continue Remeron 7.5 mg at bedtime    PSYCHOTHERAPY/COUNSELING:  [x] Therapeutic interview  [x] Supportive  [] CBT  [] Ongoing  [] Other    [x] Patient continues to need, on a daily basis, active treatment furnished directly by or requiring the supervision of inpatient psychiatric personnel      Anticipated Length of stay: 3 to 7 days based on stability    NOTE: This report was transcribed using voice recognition software. Every effort was made to ensure accuracy; however, inadvertent computerized transcription errors may be present. Behavioral Services  Medicare Certification Upon Admission    I certify that this patient's inpatient psychiatric hospital admission is medically necessary for:    [x] (1) Treatment which could reasonably be expected to improve this patient's condition,       [x] (2) Or for diagnostic study;     AND     [x](2) The inpatient psychiatric services are provided while the individual is under the care of a physician and are included in the individualized plan of care.     Estimated length of stay/service  7 - 21 days     Plan for post-hospital care  follow with OP provider     Electronically signed by YUDITH Ross CNP on 3/10/2023 at 3:08 PM           Electronically signed by YUDITH Ross CNP on 3/7/2023 at 4:24 PM

## 2023-03-07 NOTE — PLAN OF CARE
Problem: Anxiety  Goal: Will report anxiety at manageable levels  Description: INTERVENTIONS:  1. Administer medication as ordered  2. Teach and rehearse alternative coping skills  3. Provide emotional support with 1:1 interaction with staff  3/7/2023 1130 by Olive Majano RN  Outcome: Progressing  3/7/2023 0257 by Ev Haley RN  Outcome: Progressing     Problem: Coping  Goal: Pt/Family able to verbalize concerns and demonstrate effective coping strategies  Description: INTERVENTIONS:  1. Assist patient/family to identify coping skills, available support systems and cultural and spiritual values  2. Provide emotional support, including active listening and acknowledgement of concerns of patient and caregivers  3. Reduce environmental stimuli, as able  4. Instruct patient/family in relaxation techniques, as appropriate  5.  Assess for spiritual pain/suffering and initiate Spiritual Care, Psychosocial Clinical Specialist consults as needed  3/7/2023 1130 by Olive Majano RN  Outcome: Progressing  3/7/2023 0257 by Ev Haley RN  Outcome: Progressing     Problem: Chronic Conditions and Co-morbidities  Goal: Patient's chronic conditions and co-morbidity symptoms are monitored and maintained or improved  3/7/2023 0257 by Ev Haley RN  Outcome: Progressing

## 2023-03-07 NOTE — PROGRESS NOTES
Notified by patient's primary RN that patient is now agreeable to take prescribed medications. Patient took medications without issue (see MAR).

## 2023-03-07 NOTE — CARE COORDINATION
Navigator received return call from patient , Olu Torrez. He would like to meet with patient in-person today at 11:30 AM. He will call Navigator once he arrives at the hospital. Per Miguel Call, he has not spoken to patient since he was at the [facility].     Electronically signed by JALEESA Rodriguez, YESICA on 3/7/2023 at 10:15 AM

## 2023-03-07 NOTE — PLAN OF CARE
Patient observed in day area, is labile, irritable and wants to go home. Patient stated \" I just want out of here, it's making me crazy. I'm more stressed than ever\" A&O x 3, denies pain. Medication compliant. No sxs of distress noted. Staff will continue to provide support and interventions when needed or requested. Purposeful rounds continued Q 15 minutes. Problem: Anxiety  Goal: Will report anxiety at manageable levels  Description: INTERVENTIONS:  1. Administer medication as ordered  2. Teach and rehearse alternative coping skills  3. Provide emotional support with 1:1 interaction with staff  Outcome: Progressing     Problem: Coping  Goal: Pt/Family able to verbalize concerns and demonstrate effective coping strategies  Description: INTERVENTIONS:  1. Assist patient/family to identify coping skills, available support systems and cultural and spiritual values  2. Provide emotional support, including active listening and acknowledgement of concerns of patient and caregivers  3. Reduce environmental stimuli, as able  4. Instruct patient/family in relaxation techniques, as appropriate  5. Assess for spiritual pain/suffering and initiate Spiritual Care, Psychosocial Clinical Specialist consults as needed  Outcome: Progressing     Problem: Behavior  Goal: Pt/Family maintain appropriate behavior and adhere to behavioral management agreement, if implemented  Description: INTERVENTIONS:  1. Assess patient/family's coping skills and  non-compliant behavior (including use of illegal substances)  2. Notify security of behavior or suspected illegal substances which indicate the need for search of the family and/or belongings  3. Encourage verbalization of thoughts and concerns in a socially appropriate manner  4. Utilize positive, consistent limit setting strategies supporting safety of patient, staff and others  5. Encourage participation in the decision making process about the behavioral management agreement  6. If a visitor's behavior poses a threat to safety call refer to organization policy.   7. Initiate consult with , Psychosocial CNS, Spiritual Care as appropriate  Outcome: Progressing

## 2023-03-08 PROCEDURE — 6370000000 HC RX 637 (ALT 250 FOR IP): Performed by: NURSE PRACTITIONER

## 2023-03-08 PROCEDURE — 6370000000 HC RX 637 (ALT 250 FOR IP): Performed by: INTERNAL MEDICINE

## 2023-03-08 PROCEDURE — 1240000000 HC EMOTIONAL WELLNESS R&B

## 2023-03-08 PROCEDURE — 6370000000 HC RX 637 (ALT 250 FOR IP)

## 2023-03-08 RX ADMIN — CARVEDILOL 25 MG: 25 TABLET, FILM COATED ORAL at 09:29

## 2023-03-08 RX ADMIN — ASPIRIN 81 MG CHEWABLE TABLET 81 MG: 81 TABLET CHEWABLE at 09:29

## 2023-03-08 RX ADMIN — AMLODIPINE BESYLATE 15 MG: 10 TABLET ORAL at 09:28

## 2023-03-08 RX ADMIN — CEFDINIR 300 MG: 300 CAPSULE ORAL at 09:27

## 2023-03-08 RX ADMIN — PANTOPRAZOLE SODIUM 20 MG: 20 TABLET, DELAYED RELEASE ORAL at 06:41

## 2023-03-08 RX ADMIN — MIRTAZAPINE 7.5 MG: 15 TABLET, FILM COATED ORAL at 20:53

## 2023-03-08 RX ADMIN — LISINOPRIL 20 MG: 10 TABLET ORAL at 09:28

## 2023-03-08 RX ADMIN — POTASSIUM CHLORIDE 10 MEQ: 750 TABLET, EXTENDED RELEASE ORAL at 09:30

## 2023-03-08 RX ADMIN — ROSUVASTATIN CALCIUM 20 MG: 20 TABLET, FILM COATED ORAL at 20:53

## 2023-03-08 RX ADMIN — DIVALPROEX SODIUM 250 MG: 125 CAPSULE, COATED PELLETS ORAL at 09:29

## 2023-03-08 RX ADMIN — DIVALPROEX SODIUM 250 MG: 125 CAPSULE, COATED PELLETS ORAL at 20:53

## 2023-03-08 RX ADMIN — CEFDINIR 300 MG: 300 CAPSULE ORAL at 20:53

## 2023-03-08 RX ADMIN — APIXABAN 5 MG: 5 TABLET, FILM COATED ORAL at 09:28

## 2023-03-08 RX ADMIN — APIXABAN 5 MG: 5 TABLET, FILM COATED ORAL at 20:54

## 2023-03-08 ASSESSMENT — PAIN SCALES - GENERAL: PAINLEVEL_OUTOF10: 0

## 2023-03-08 NOTE — CARE COORDINATION
BASSEM contacted [facility] in [place] [phone] ext [phone] to discuss making a referral. BASSEM spoke with Lashawn Allen. Lashawn Allen advised BASSEM to fax pt referral to . Lashawn Allen stated he will need to review the referral and then it may also need to be sent to [facility].      BASSEM faxed the referral.

## 2023-03-08 NOTE — PLAN OF CARE
Patient denies SI/HI/Hallucinations. Patient easily agitated and has some irritability. Patient denies anxiety and depression. Patient medication compliant and has remained in control of his behaviors. No active distress noted. Respirations even and unlabored. Problem: Chronic Conditions and Co-morbidities  Goal: Patient's chronic conditions and co-morbidity symptoms are monitored and maintained or improved  Outcome: Progressing     Problem: Safety - Medical Restraint  Goal: Remains free of injury from restraints (Restraint for Interference with Medical Device)  Description: INTERVENTIONS:  1. Determine that other, less restrictive measures have been tried or would not be effective before applying the restraint  2. Evaluate the patient's condition at the time of restraint application  3. Inform patient/family regarding the reason for restraint  4. Q2H: Monitor safety, psychosocial status, comfort, nutrition and hydration  Outcome: Progressing     Problem: Anxiety  Goal: Will report anxiety at manageable levels  Description: INTERVENTIONS:  1. Administer medication as ordered  2. Teach and rehearse alternative coping skills  3. Provide emotional support with 1:1 interaction with staff  Outcome: Progressing     Problem: Coping  Goal: Pt/Family able to verbalize concerns and demonstrate effective coping strategies  Description: INTERVENTIONS:  1. Assist patient/family to identify coping skills, available support systems and cultural and spiritual values  2. Provide emotional support, including active listening and acknowledgement of concerns of patient and caregivers  3. Reduce environmental stimuli, as able  4. Instruct patient/family in relaxation techniques, as appropriate  5.  Assess for spiritual pain/suffering and initiate Spiritual Care, Psychosocial Clinical Specialist consults as needed  Outcome: Progressing

## 2023-03-08 NOTE — GROUP NOTE
Group Therapy Note    Date: 3/8/2023    Group Start Time: 1410  Group End Time: 1500  Group Topic: Cognitive Skills    [facility] ACUTE BH 1    Thankful JALEESA Lux LSW        Group Therapy Note    Attendees: 7       Patient's Goal:  Pt attended group therapy today where we discussed trauma, the symptoms of and treatment modalities available. Notes:  Pt sat in group therapy but would not look at or answer any questions. Status After Intervention:  Unchanged    Participation Level: None    Participation Quality: Resistant      Speech:  mute      Thought Process/Content: Logical      Affective Functioning: Congruent      Mood: irritable      Level of consciousness:  Inattentive      Response to Learning: Resistant      Endings: None Reported    Modes of Intervention: Education, Support, Socialization, Exploration, Clarifying, and Problem-solving      Discipline Responsible: /Counselor      Signature:   JALEESA Wright LSW

## 2023-03-08 NOTE — CARE COORDINATION
Discussed pt's case and discharge options during treatment team.    Patient is an [place] resident and has been hospitalized in psychiatric facilities ([facility], [facility], [facility]) since October 2022. He was diagnosed with Acute Psychosis, Cognitive Disorder and Dementia with Behavioral Disturbance. His MOCA score on 3/7/23 was 20/30. Pt has difficulty hearing, even with his hearing aides. Per NP, pt has shown significant improvement on his prescribed mood stabilizer. Patient is currently independent with ADLs and does not meet level of care for nursing home placement at this time. Pt's son Sandra Delcid recently filed for guardianship and the guardianship hearing is scheduled for 3/17. Per pt's son Sandra Delcid, patient can return to his home, however pt's former wife was a \"hoarder\" and the house is full of belongings and garbage. Pt's son reported that he has been slowly cleaning the house. He reported that he is also paying the bills to keep the utilities on. Per [employer] [employer], pt's house is currently not \"red-tagged\" and all utilities are on. Patient is requesting to discharge from the hospital.     NP, SW, SW Supervisor and [employer] discussed discharge options. Treatment team agreed that patient would benefit from a step down to the [facility] ([facility] [place]). Assigned SW will make referral, awaiting acceptance.      JALEESA Espinoza, Jeramie Mahoney Work Supervisor

## 2023-03-08 NOTE — CARE COORDINATION
Navigator spoke with [facility] Clinician Mu [phone]. Mu reports he is the only representative assigned to review referrals for [facility] residents access to crisis unit services. Per Mu, there is not a CSU located in [place]. They contract with Miguelangel SMITH in [facility]. Per Mu, he has been in meetings all day and is preparing to go into another. He has not had an opportunity to confirm receipt or review referral. He will be in touched with the  once he reviews.    Electronically signed by JALEESA Saldana, YESICA on 3/8/2023 at 2:33 PM

## 2023-03-08 NOTE — PLAN OF CARE
Patient up and about the unit patient denies SI/HI AVH, anxiety and depression. Patient is calm and quiet this evening and is agreeable to take his medications. Will continue to monitor and assess q 15 min for safety throughout the shift. Problem: Chronic Conditions and Co-morbidities  Goal: Patient's chronic conditions and co-morbidity symptoms are monitored and maintained or improved  3/3/2023 2057 by Joyce Lin RN  Outcome: Progressing     Problem: Safety - Medical Restraint  Goal: Remains free of injury from restraints (Restraint for Interference with Medical Device)  Description: INTERVENTIONS:  1. Determine that other, less restrictive measures have been tried or would not be effective before applying the restraint  2. Evaluate the patient's condition at the time of restraint application  3. Inform patient/family regarding the reason for restraint  4. Q2H: Monitor safety, psychosocial status, comfort, nutrition and hydration  3/3/2023 2057 by Joyce Lin RN  Outcome: Progressing     Problem: Anxiety  Goal: Will report anxiety at manageable levels  Description: INTERVENTIONS:  1. Administer medication as ordered  2. Teach and rehearse alternative coping skills  3. Provide emotional support with 1:1 interaction with staff  3/3/2023 2057 by Joyce Lin RN  Outcome: Progressing     Problem: Coping  Goal: Pt/Family able to verbalize concerns and demonstrate effective coping strategies  Description: INTERVENTIONS:  1. Assist patient/family to identify coping skills, available support systems and cultural and spiritual values  2. Provide emotional support, including active listening and acknowledgement of concerns of patient and caregivers  3. Reduce environmental stimuli, as able  4. Instruct patient/family in relaxation techniques, as appropriate  5.  Assess for spiritual pain/suffering and initiate Spiritual Care, Psychosocial Clinical Specialist consults as needed  3/3/2023 2057 by Joyce Lin RN  Outcome: Progressing     Problem: Death & Dying  Goal: Pt/Family communicate acceptance of impending death and feel psychological comfort and peace  Description: INTERVENTIONS:  1. Assess patient/family anxiety and grief process related to end of life issues  2. Provide emotional and spiritual support  3. Provide information about the patient's health status with consideration of family and cultural values  4. Communicate willingness to discuss death and facilitate grief process  with patient/family as appropriate  5. Emphasize sustaining relationships within family system and community, or ellyn/spiritual traditions  6. Initiate Spiritual Care, Psychosocial Clinical Specialist, consult as needed  3/3/2023 2057 by Giulia Lima RN  Outcome: Progressing     Problem: Decision Making  Goal: Pt/Family able to effectively weigh alternatives and participate in decision making related to treatment and care  Description: INTERVENTIONS:  1. Determine when there are differences between patient's view, family's view, and healthcare provider's view of condition  2. Facilitate patient and family articulation of goals for care  3. Help patient and family identify pros/cons of alternative solutions  4. Provide information as requested by patient/family  5. Respect patient/family right to receive or not to receive information  6. Serve as a liaison between patient and family and health care team  7. Initiate Consults from Ethics, Palliative Care or initiate Family Care Conference as is appropriate  3/3/2023 2057 by Giulia Lima RN  Outcome: Progressing     Problem: Confusion  Goal: Confusion, delirium, dementia, or psychosis is improved or at baseline  Description: INTERVENTIONS:  1. Assess for possible contributors to thought disturbance, including medications, impaired vision or hearing, underlying metabolic abnormalities, dehydration, psychiatric diagnoses, and notify attending LIP  2. [place] high risk fall  precautions, as indicated  3. Provide frequent short contacts to provide reality reorientation, refocusing and direction  4. Decrease environmental stimuli, including noise as appropriate  5. Monitor and intervene to maintain adequate nutrition, hydration, elimination, sleep and activity  6. If unable to ensure safety without constant attention obtain sitter and review sitter guidelines with assigned personnel  7. Initiate Psychosocial CNS and Spiritual Care consult, as indicated  3/3/2023 2057 by Giulia Lima RN  Outcome: Progressing     Problem: Behavior  Goal: Pt/Family maintain appropriate behavior and adhere to behavioral management agreement, if implemented  Description: INTERVENTIONS:  1. Assess patient/family's coping skills and  non-compliant behavior (including use of illegal substances)  2. Notify security of behavior or suspected illegal substances which indicate the need for search of the family and/or belongings  3. Encourage verbalization of thoughts and concerns in a socially appropriate manner  4. Utilize positive, consistent limit setting strategies supporting safety of patient, staff and others  5. Encourage participation in the decision making process about the behavioral management agreement  6. If a visitor's behavior poses a threat to safety call refer to organization policy.  7. Initiate consult with , Psychosocial CNS, Spiritual Care as appropriate  3/3/2023 2057 by Giulia Lima RN  Outcome: Progressing     Problem: Involuntary Admit  Goal: Will cooperate with staff recommendations and doctor's orders and will demonstrate appropriate behavior  Description: INTERVENTIONS:  1. Treat underlying conditions and offer medication as ordered  2. Educate regarding involuntary admission procedures and rules  3. Contain excessive/inappropriate behavior per unit and hospital policies  3/3/2023 2057 by Giulia Lima RN  Outcome: Progressing     Problem: Risk for Elopement  Goal: Patient  will not exit the unit/facility without proper excort  3/3/2023 2057 by Jhonny Dove RN  Outcome: Progressing     Problem: Safety - Adult  Goal: Free from fall injury  3/3/2023 2057 by Jhonny Dove RN  Outcome: Progressing     Problem: ABCDS Injury Assessment  Goal: Absence of physical injury  3/3/2023 2057 by Jhonny Dove RN  Outcome: Progressing     Problem: Pain  Goal: Verbalizes/displays adequate comfort level or baseline comfort level  3/3/2023 2057 by Jhonny Dove RN  Outcome: Progressing

## 2023-03-08 NOTE — CARE COORDINATION
BASSEM attempted to fax pt referral to Maday Menendez with [facility] twice. Both times there was a communication error. BASSEM contacted [facility] in [place] [phone] ext [phone] and left a voicemail for Maday Menendez to confirm the fax number provided was correct.     UPDATE: BASSEM faxed pt referral to [email or web address]

## 2023-03-08 NOTE — PROGRESS NOTES
BEHAVIORAL HEALTH FOLLOW-UP NOTE     3/8/2023     Patient was seen and examined in person, Chart reviewed   Patient's case discussed with staff/team    Chief Complaint: \"I don't need to be here\"     Interim History:   Patient out in the unit sitting in the dayroom. He has poor insight, he has limited understanding into the barriers of discharge. He remains able to put things on a timeline, he is a fair historian. Very frustrated about his hospitalization. He is demonstrating some impulse issues, he can be harsh in his presentation of his frustrations. He makes statements out of anger. He is future focused, goal directed, wanting to be discharged. Intermittently refusing medications.      Appetite: [x] Normal/Unchanged  [] Increased  [] Decreased      Sleep:       [x] Normal/Unchanged  [] Fair       [] Poor              Energy:    [x] Normal/Unchanged  [] Increased  [] Decreased        SI [] Present  [x] Absent    HI  []Present  [x] Absent     Aggression:  [] yes  [x] no    Patient is [x] able  [] unable to CONTRACT FOR SAFETY     PAST MEDICAL/PSYCHIATRIC HISTORY:   Past Medical History:   Diagnosis Date    Atrial fibrillation (HCC)     Chronic kidney disease     Congestive heart failure (CHF) (HCC)     Coronary artery disease     Hyperlipidemia     Hypertension        FAMILY/SOCIAL HISTORY:  Family History   Problem Relation Age of Onset    Heart Failure Other      Social History     Socioeconomic History    Marital status: Single     Spouse name: Not on file    Number of children: Not on file    Years of education: Not on file    Highest education level: Not on file   Occupational History    Not on file   Tobacco Use    Smoking status: Former     Types: Cigarettes    Smokeless tobacco: Never   Substance and Sexual Activity    Alcohol use: Not Currently    Drug use: Not on file    Sexual activity: Not on file   Other Topics Concern    Not on file   Social History Narrative    Not on file     Social Determinants of Health     Financial Resource Strain: Not on file   Food Insecurity: Not on file   Transportation Needs: Not on file   Physical Activity: Not on file   Stress: Not on file   Social Connections: Not on file   Intimate Partner Violence: Not on file   Housing Stability: Not on file           ROS:  [x] All negative/unchanged except if checked.  Explain positive(checked items) below:  [] Constitutional  [] Eyes  [] Ear/Nose/Mouth/Throat  [] Respiratory  [] CV  [] GI  []   [] Musculoskeletal  [] Skin/Breast  [] Neurological  [] Endocrine  [] Heme/Lymph  [] Allergic/Immunologic    Explanation:     MEDICATIONS:    Current Facility-Administered Medications:     carvedilol (COREG) tablet 25 mg, 25 mg, Oral, BID WC, Janette Cutler, DO, 25 mg at 03/08/23 2213    cefdinir (OMNICEF) capsule 300 mg, 300 mg, Oral, 2 times per day, Doraine Cosmopolis, DO, 300 mg at 03/08/23 5954    acetaminophen (TYLENOL) tablet 650 mg, 650 mg, Oral, Q4H PRN, Ramon Pressley MD    magnesium hydroxide (MILK OF MAGNESIA) 400 MG/5ML suspension 30 mL, 30 mL, Oral, Daily PRN, Ramon Pressley MD    nicotine (NICODERM CQ) 21 MG/24HR 1 patch, 1 patch, TransDERmal, Daily, Ramon Pressley MD    aluminum & magnesium hydroxide-simethicone (MAALOX) 200-200-20 MG/5ML suspension 30 mL, 30 mL, Oral, PRN, Ramon Pressley MD    hydrOXYzine pamoate (VISTARIL) capsule 50 mg, 50 mg, Oral, TID PRN, Ramon Pressley MD, 50 mg at 03/02/23 2059    haloperidol (HALDOL) tablet 3 mg, 3 mg, Oral, Q6H PRN **OR** haloperidol lactate (HALDOL) injection 3 mg, 3 mg, IntraMUSCular, Q6H PRN, Ramon Pressley MD    amLODIPine (NORVASC) tablet 15 mg, 15 mg, Oral, Daily, Sajan Talley APRN - CNP, 15 mg at 03/08/23 0307    apixaban (ELIQUIS) tablet 5 mg, 5 mg, Oral, BID, Sajan Talley APRN - CNP, 5 mg at 03/08/23 6067    aspirin chewable tablet 81 mg, 81 mg, Oral, Daily, YUDITH Terry - CNP, 81 mg at 03/08/23 0929    cyclobenzaprine (FLEXERIL) tablet 5 mg, 5 mg, Oral, BID PRN, YUDITH Lynn CNP, 5 mg at 03/05/23 2127    divalproex (DEPAKOTE SPRINKLE) DR capsule 250 mg, 250 mg, Oral, 2 times per day, YUDITH Lynn CNP, 250 mg at 03/08/23 0929    lisinopril (PRINIVIL;ZESTRIL) tablet 20 mg, 20 mg, Oral, Daily, YUDITH Lynn CNP, 20 mg at 03/08/23 0928    pantoprazole (PROTONIX) tablet 20 mg, 20 mg, Oral, QAM AC, YUDITH Lynn CNP, 20 mg at 03/08/23 0641    polyethylene glycol (GLYCOLAX) packet 17 g, 17 g, Oral, Daily PRN, YUDITH Lynn CNP    potassium chloride (KLOR-CON M) extended release tablet 10 mEq, 10 mEq, Oral, Daily, YUDITH Lynn CNP, 10 mEq at 03/08/23 0930    rosuvastatin (CRESTOR) tablet 20 mg, 20 mg, Oral, Nightly, YUDITH Lynn CNP, 20 mg at 03/07/23 2257    mirtazapine (REMERON) tablet 7.5 mg, 7.5 mg, Oral, Nightly, YUDITH Velasco CNP, 7.5 mg at 03/07/23 2257    melatonin disintegrating tablet 5 mg, 5 mg, Oral, Daily, YUDITH Velasco CNP, 5 mg at 03/07/23 1711      Examination:  /85   Pulse 70   Temp 97.6 °F (36.4 °C) (Temporal)   Resp 16   Ht 6' 1\" (1.854 m)   Wt 185 lb (83.9 kg)   SpO2 98%   BMI 24.41 kg/m²   Gait - steady  Medication side effects(SE):     Mental Status Examination:    Level of consciousness:  within normal limits   Appearance:  fair grooming and fair hygiene  Behavior/Motor:  no abnormalities noted  Attitude toward examiner:  cooperative  Speech:  spontaneous, normal rate and normal volume   Mood: \" I am sad I want to see my uncle\"  Affect: Labile  Thought processes: Linear thought flight of ideas loose associations  Thought content: Devoid of any auditory visual hallucinations delusions or other perceptual normalities.  Denies SI/HI intent or plan   Language: able to name objects and repeate phrases  Remote Memory: Impaired   recent Memory: Impaired  Cognition:  oriented to person, place, and time   Fund of Knowledge: Vocabulary  intact, pt is aware of current events and past history  Attetion and Concentration intact  Insight poor  Judgement poor      ASSESSMENT: Patient symptoms are:  [] Well controlled  [] Improving  [] Worsening  [x] No change      Diagnosis:  Principal Problem:    Acute psychosis ([facility] [address]  Active Problems:    Cognitive disorder    Dementia with behavioral disturbance  Resolved Problems:    * No resolved hospital problems. *      LABS:    No results for input(s): WBC, HGB, PLT in the last 72 hours. No results for input(s): NA, K, CL, CO2, BUN, CREATININE, GLUCOSE in the last 72 hours. No results for input(s): BILITOT, ALKPHOS, AST, ALT in the last 72 hours. Lab Results   Component Value Date/Time    LABAMPH NOT DETECTED 10/22/2022 06:27 PM    BARBSCNU NOT DETECTED 10/22/2022 06:27 PM    LABBENZ NOT DETECTED 10/22/2022 06:27 PM    LABMETH NOT DETECTED 10/22/2022 06:27 PM    OPIATESCREENURINE NOT DETECTED 10/22/2022 06:27 PM    PHENCYCLIDINESCREENURINE NOT DETECTED 10/22/2022 06:27 PM    ETOH <10 02/18/2023 09:31 PM     No results found for: TSH, FREET4  No results found for: LITHIUM  No results found for: VALPROATE, CBMZ        Treatment Plan:  Reviewed current Medications with the patient. Risks, benefits, side effects, drug-to-drug interactions and alternatives to treatment were discussed. Collateral information:   CD evaluation  Encourage patient to attend group and other milieu activities.   Discharge planning discussed with the patient and treatment team.    Continue to offer medications patient is prescribed Depakote 250 mg twice daily   continue Remeron 7.5 mg at bedtime    PSYCHOTHERAPY/COUNSELING:  [x] Therapeutic interview  [x] Supportive  [] CBT  [] Ongoing  [] Other    [x] Patient continues to need, on a daily basis, active treatment furnished directly by or requiring the supervision of inpatient psychiatric personnel      Anticipated Length of stay: 3 to 7 days based on stability Electronically signed by YUDITH Barrera CNP on 3/8/2023 at 10:24 AM

## 2023-03-08 NOTE — CARE COORDINATION
Received court hearing video link from [address] [facility]:    [email or web address]      This will be provided to any receiving step-down facility to provide patient access.     Electronically signed by JALEESA Pang, YESICA on 3/8/2023 at 9:46 AM

## 2023-03-09 PROCEDURE — 6370000000 HC RX 637 (ALT 250 FOR IP): Performed by: NURSE PRACTITIONER

## 2023-03-09 PROCEDURE — 1240000000 HC EMOTIONAL WELLNESS R&B

## 2023-03-09 PROCEDURE — 6370000000 HC RX 637 (ALT 250 FOR IP): Performed by: INTERNAL MEDICINE

## 2023-03-09 PROCEDURE — 6370000000 HC RX 637 (ALT 250 FOR IP)

## 2023-03-09 RX ADMIN — ROSUVASTATIN CALCIUM 20 MG: 20 TABLET, FILM COATED ORAL at 21:34

## 2023-03-09 RX ADMIN — APIXABAN 5 MG: 5 TABLET, FILM COATED ORAL at 08:53

## 2023-03-09 RX ADMIN — CEFDINIR 300 MG: 300 CAPSULE ORAL at 08:53

## 2023-03-09 RX ADMIN — DIVALPROEX SODIUM 250 MG: 125 CAPSULE, COATED PELLETS ORAL at 21:34

## 2023-03-09 RX ADMIN — APIXABAN 5 MG: 5 TABLET, FILM COATED ORAL at 21:34

## 2023-03-09 RX ADMIN — CARVEDILOL 25 MG: 25 TABLET, FILM COATED ORAL at 08:53

## 2023-03-09 RX ADMIN — CARVEDILOL 25 MG: 25 TABLET, FILM COATED ORAL at 17:54

## 2023-03-09 RX ADMIN — ASPIRIN 81 MG CHEWABLE TABLET 81 MG: 81 TABLET CHEWABLE at 08:53

## 2023-03-09 RX ADMIN — Medication 5 MG: at 17:54

## 2023-03-09 RX ADMIN — AMLODIPINE BESYLATE 15 MG: 10 TABLET ORAL at 08:53

## 2023-03-09 RX ADMIN — DIVALPROEX SODIUM 250 MG: 125 CAPSULE, COATED PELLETS ORAL at 08:53

## 2023-03-09 RX ADMIN — MIRTAZAPINE 7.5 MG: 15 TABLET, FILM COATED ORAL at 21:34

## 2023-03-09 RX ADMIN — CEFDINIR 300 MG: 300 CAPSULE ORAL at 21:34

## 2023-03-09 RX ADMIN — POTASSIUM CHLORIDE 10 MEQ: 750 TABLET, EXTENDED RELEASE ORAL at 08:53

## 2023-03-09 RX ADMIN — PANTOPRAZOLE SODIUM 20 MG: 20 TABLET, DELAYED RELEASE ORAL at 08:53

## 2023-03-09 RX ADMIN — LISINOPRIL 20 MG: 10 TABLET ORAL at 08:53

## 2023-03-09 ASSESSMENT — PAIN SCALES - GENERAL: PAINLEVEL_OUTOF10: 0

## 2023-03-09 NOTE — PROGRESS NOTES
Patient declined to attend the following groups:    [facility]    Will continue to encourage patient to attend programming.

## 2023-03-09 NOTE — CARE COORDINATION
BASSEM contacted Lor Whitney at [facility] in [place] [phone] ext [phone]. No answer, a voicemail was left.

## 2023-03-09 NOTE — CARE COORDINATION
BASSEM contacted Jaimie Phan at [facility] in [place] [phone] ext [phone]. No answer, a voicemail was left.

## 2023-03-09 NOTE — GROUP NOTE
Group Therapy Note    Date: 3/9/2023  Start Time: 1110  End Time:  1150  Number of Participants: 11    Type of Group: Relaxation, Psycho education    Module Name: Relaxation techniques and Progressive muscle relaxation     Patient's Goal:  ID physical symptoms of anxiety and/or stress,  demonstrate deep breathing, imagery, and progressive muscle relaxation. Notes:  Patient engaged in demonstration of relaxation techniques. Patient maintained attention throughout group. Status After Intervention:  Improved    Participation Level:  Active Listener    Participation Quality: Appropriate and Attentive      Speech:  normal, loud      Thought Process/Content: Logical      Affective Functioning: Congruent      Mood: calm, cooperative      Level of consciousness:  Alert and Attentive      Response to Learning: Able to verbalize current knowledge/experience and Able to verbalize/acknowledge new learning      Endings: None Reported    Modes of Intervention: Education and Socialization      Discipline Responsible: Psychoeducational Specialist      Signature:  Masha Landis, [address]

## 2023-03-09 NOTE — GROUP NOTE
Group Therapy Note    Date: 3/9/2023    Group Start Time: 1400  Group End Time: 1430  Group Topic: Cognitive Skills    [facility]    JALEESA Castellanos, GIOW        Group Therapy Note    Attendees: 11       Patient's Goal: To participate in group discussion on forgiveness. Notes: Pt was an active listener in group discussion. Status After Intervention:  Unchanged    Participation Level:  Active Listener    Participation Quality: Appropriate and Attentive      Speech:  normal      Thought Process/Content: Logical      Affective Functioning: Congruent      Mood: irritable      Level of consciousness:  Alert and Oriented x4      Response to Learning: Able to verbalize current knowledge/experience      Endings: None Reported    Modes of Intervention: Education, Support, Socialization, Exploration, Clarifying, and Problem-solving      Discipline Responsible: /Counselor      Signature:  JALEESA Adame, LSW

## 2023-03-09 NOTE — PLAN OF CARE
Patient denies SI/HI/AVH. Denies anxiety and depression. A&O x 3, denies pain. Patient observed in day area watching tv, affect is flat and blunt,  stated   \"I just want to go home\" Medication compliant. No sxs of distress noted. No behaviors noted. Staff will continue to provide support and interventions when needed or requested. Purposeful rounds continued Q 15 minutes. Problem: Anxiety  Goal: Will report anxiety at manageable levels  Description: INTERVENTIONS:  1. Administer medication as ordered  2. Teach and rehearse alternative coping skills  3. Provide emotional support with 1:1 interaction with staff  3/8/2023 2108 by Joseline Henry RN  Outcome: Progressing     Problem: Decision Making  Goal: Pt/Family able to effectively weigh alternatives and participate in decision making related to treatment and care  Description: INTERVENTIONS:  1. Determine when there are differences between patient's view, family's view, and healthcare provider's view of condition  2. Facilitate patient and family articulation of goals for care  3. Help patient and family identify pros/cons of alternative solutions  4. Provide information as requested by patient/family  5. Respect patient/family right to receive or not to receive information  6. Serve as a liaison between patient and family and health care team  7. Initiate Consults from Ethics, Palliative Care or initiate [address] as is appropriate  Outcome: Progressing     Problem: Confusion  Goal: Confusion, delirium, dementia, or psychosis is improved or at baseline  Description: INTERVENTIONS:  1. Assess for possible contributors to thought disturbance, including medications, impaired vision or hearing, underlying metabolic abnormalities, dehydration, psychiatric diagnoses, and notify attending LIP  2. [place] high risk fall precautions, as indicated  3. Provide frequent short contacts to provide reality reorientation, refocusing and direction  4. Decrease environmental stimuli, including noise as appropriate  5. Monitor and intervene to maintain adequate nutrition, hydration, elimination, sleep and activity  6. If unable to ensure safety without constant attention obtain sitter and review sitter guidelines with assigned personnel  7.  Initiate Psychosocial CNS and Spiritual Care consult, as indicated  Outcome: Progressing

## 2023-03-09 NOTE — CARE COORDINATION
Navigator placed phone call to [facility] [phone] and spoke with [facility] confirmed fax # [phone]. Updated Mikki Holter. Packet may be too large for one fax. Recommended splitting packet into multiples to see if that will allow the fax to successfully send. Navigator following. Discussed with SW Supervisor, waiting on determination from [facility] regarding approval or denial of placement at [facility] prior to discussing any other supports with [facility].     Electronically signed by JALEESA Martinez LSW on 3/9/2023 at 2:06 PM

## 2023-03-09 NOTE — PROGRESS NOTES
Patient attended community meeting   Was updated on expectations of the unit, staffing, and programming  Patient shared goal for today as to get the hell out  Patient was 1 in 11 in attendance.

## 2023-03-10 PROCEDURE — 6370000000 HC RX 637 (ALT 250 FOR IP)

## 2023-03-10 PROCEDURE — 6370000000 HC RX 637 (ALT 250 FOR IP): Performed by: INTERNAL MEDICINE

## 2023-03-10 PROCEDURE — 6370000000 HC RX 637 (ALT 250 FOR IP): Performed by: NURSE PRACTITIONER

## 2023-03-10 PROCEDURE — 1240000000 HC EMOTIONAL WELLNESS R&B

## 2023-03-10 RX ADMIN — AMLODIPINE BESYLATE 15 MG: 10 TABLET ORAL at 10:27

## 2023-03-10 RX ADMIN — PANTOPRAZOLE SODIUM 20 MG: 20 TABLET, DELAYED RELEASE ORAL at 06:46

## 2023-03-10 RX ADMIN — LISINOPRIL 20 MG: 10 TABLET ORAL at 10:27

## 2023-03-10 RX ADMIN — CEFDINIR 300 MG: 300 CAPSULE ORAL at 20:51

## 2023-03-10 RX ADMIN — ROSUVASTATIN CALCIUM 20 MG: 20 TABLET, FILM COATED ORAL at 20:51

## 2023-03-10 RX ADMIN — ASPIRIN 81 MG CHEWABLE TABLET 81 MG: 81 TABLET CHEWABLE at 10:27

## 2023-03-10 RX ADMIN — CEFDINIR 300 MG: 300 CAPSULE ORAL at 10:28

## 2023-03-10 RX ADMIN — Medication 5 MG: at 17:35

## 2023-03-10 RX ADMIN — APIXABAN 5 MG: 5 TABLET, FILM COATED ORAL at 10:27

## 2023-03-10 RX ADMIN — CARVEDILOL 25 MG: 25 TABLET, FILM COATED ORAL at 10:28

## 2023-03-10 RX ADMIN — DIVALPROEX SODIUM 250 MG: 125 CAPSULE, COATED PELLETS ORAL at 20:51

## 2023-03-10 RX ADMIN — MIRTAZAPINE 7.5 MG: 15 TABLET, FILM COATED ORAL at 20:51

## 2023-03-10 RX ADMIN — POTASSIUM CHLORIDE 10 MEQ: 750 TABLET, EXTENDED RELEASE ORAL at 10:27

## 2023-03-10 RX ADMIN — DIVALPROEX SODIUM 250 MG: 125 CAPSULE, COATED PELLETS ORAL at 10:27

## 2023-03-10 RX ADMIN — CARVEDILOL 25 MG: 25 TABLET, FILM COATED ORAL at 17:35

## 2023-03-10 RX ADMIN — APIXABAN 5 MG: 5 TABLET, FILM COATED ORAL at 20:51

## 2023-03-10 ASSESSMENT — PAIN SCALES - GENERAL: PAINLEVEL_OUTOF10: 0

## 2023-03-10 NOTE — CARE COORDINATION
Sent communication to [phone] email [email or web address]. Waiting on return communication.     Electronically signed by JALEESA Marie LSW on 3/10/2023 at 10:31 AM

## 2023-03-10 NOTE — PROGRESS NOTES
BEHAVIORAL HEALTH FOLLOW-UP NOTE     3/10/2023     Patient was seen and examined in person, Chart reviewed   Patient's case discussed with staff/team    Chief Complaint: Relaxed in the milieu eating his breakfast    Interim History:   Patient out on the unit., he is in no distress, eating his breakfast this morning. He is no distress, remains in control of his behaviors. He has interacted well with peers today. Not voicing any suicidal or homicidal ideations. He is future focused, goal directed, wanting to be discharged. Has some poor understanding of the barriers to his discharge. Intermittently refusing medications.      Appetite: [x] Normal/Unchanged  [] Increased  [] Decreased      Sleep:       [x] Normal/Unchanged  [] Fair       [] Poor              Energy:    [x] Normal/Unchanged  [] Increased  [] Decreased        SI [] Present  [x] Absent    HI  []Present  [x] Absent     Aggression:  [] yes  [x] no    Patient is [x] able  [] unable to CONTRACT FOR SAFETY     PAST MEDICAL/PSYCHIATRIC HISTORY:   Past Medical History:   Diagnosis Date    Atrial fibrillation (HCC)     Chronic kidney disease     Congestive heart failure (CHF) (HCC)     Coronary artery disease     Hyperlipidemia     Hypertension        FAMILY/SOCIAL HISTORY:  Family History   Problem Relation Age of Onset    Heart Failure Other      Social History     Socioeconomic History    Marital status: Single     Spouse name: Not on file    Number of children: Not on file    Years of education: Not on file    Highest education level: Not on file   Occupational History    Not on file   Tobacco Use    Smoking status: Former     Types: Cigarettes    Smokeless tobacco: Never   Substance and Sexual Activity    Alcohol use: Not Currently    Drug use: Not on file    Sexual activity: Not on file   Other Topics Concern    Not on file   Social History Narrative    Not on file     Social Determinants of Health     Financial Resource Strain: Not on file   Food Insecurity: Not on file   Transportation Needs: Not on file   Physical Activity: Not on file   Stress: Not on file   Social Connections: Not on file   Intimate Partner Violence: Not on file   Housing Stability: Not on file           ROS:  [x] All negative/unchanged except if checked.  Explain positive(checked items) below:  [] Constitutional  [] Eyes  [] Ear/Nose/Mouth/Throat  [] Respiratory  [] CV  [] GI  []   [] Musculoskeletal  [] Skin/Breast  [] Neurological  [] Endocrine  [] Heme/Lymph  [] Allergic/Immunologic    Explanation:     MEDICATIONS:    Current Facility-Administered Medications:     carvedilol (COREG) tablet 25 mg, 25 mg, Oral, BID WC, Janette Cutler, DO, 25 mg at 03/10/23 1028    cefdinir (OMNICEF) capsule 300 mg, 300 mg, Oral, 2 times per day, Osborne Agent, DO, 300 mg at 03/10/23 1028    acetaminophen (TYLENOL) tablet 650 mg, 650 mg, Oral, Q4H PRN, Colton Amaro MD    magnesium hydroxide (MILK OF MAGNESIA) 400 MG/5ML suspension 30 mL, 30 mL, Oral, Daily PRN, Colton Amaro MD    nicotine (NICODERM CQ) 21 MG/24HR 1 patch, 1 patch, TransDERmal, Daily, Colton Amaro MD    aluminum & magnesium hydroxide-simethicone (MAALOX) 200-200-20 MG/5ML suspension 30 mL, 30 mL, Oral, PRN, Colton Amaro MD    hydrOXYzine pamoate (VISTARIL) capsule 50 mg, 50 mg, Oral, TID PRN, Colton Amaro MD, 50 mg at 03/02/23 2059    haloperidol (HALDOL) tablet 3 mg, 3 mg, Oral, Q6H PRN **OR** haloperidol lactate (HALDOL) injection 3 mg, 3 mg, IntraMUSCular, Q6H PRN, Colton mAaro MD    amLODIPine (NORVASC) tablet 15 mg, 15 mg, Oral, Daily, Xiomy Border, APRN - CNP, 15 mg at 03/10/23 1027    apixaban (ELIQUIS) tablet 5 mg, 5 mg, Oral, BID, Xiomy Border, APRN - CNP, 5 mg at 03/10/23 1027    aspirin chewable tablet 81 mg, 81 mg, Oral, Daily, YUDITH Starks CNP, 81 mg at 03/10/23 1027    cyclobenzaprine (FLEXERIL) tablet 5 mg, 5 mg, Oral, BID PRN, YUDITH Starks CNP, 5 mg at 03/05/23 2127    divalproex (DEPAKOTE SPRINKLE) DR capsule 250 mg, 250 mg, Oral, 2 times per day, YUDITH Rincon - CNP, 250 mg at 03/10/23 1027    lisinopril (PRINIVIL;ZESTRIL) tablet 20 mg, 20 mg, Oral, Daily, Aristideslorri Schmidt, APRN - CNP, 20 mg at 03/10/23 1027    pantoprazole (PROTONIX) tablet 20 mg, 20 mg, Oral, QAM AC, Aristideslorri Schmidt, APRN - CNP, 20 mg at 03/10/23 0646    polyethylene glycol (GLYCOLAX) packet 17 g, 17 g, Oral, Daily PRN, Aristides Schmidt APRN - CNP    potassium chloride (KLOR-CON M) extended release tablet 10 mEq, 10 mEq, Oral, Daily, Aristides Brayan, APRN - CNP, 10 mEq at 03/10/23 1027    rosuvastatin (CRESTOR) tablet 20 mg, 20 mg, Oral, Nightly, Aristides Schmidt, APRN - CNP, 20 mg at 03/09/23 2134    mirtazapine (REMERON) tablet 7.5 mg, 7.5 mg, Oral, Nightly, Deidra Macarena, APRN - CNP, 7.5 mg at 03/09/23 2134    melatonin disintegrating tablet 5 mg, 5 mg, Oral, Daily, Deidra Macarena, APRN - CNP, 5 mg at 03/09/23 1754      Examination:  /83   Pulse 72   Temp 97.8 °F (36.6 °C) (Temporal)   Resp 18   Ht 6' 1\" (1.854 m)   Wt 185 lb (83.9 kg)   SpO2 97%   BMI 24.41 kg/m²   Gait - steady  Medication side effects(SE):     Mental Status Examination:    Level of consciousness:  within normal limits   Appearance:  fair grooming and fair hygiene  Behavior/Motor:  no abnormalities noted  Attitude toward examiner:  cooperative  Speech:  spontaneous, normal rate and normal volume   Mood: \" I am stuck here\"  Affect: Relaxed  Thought processes: Linear thought flight of ideas loose associations  Thought content: Devoid of any auditory visual hallucinations delusions or other perceptual normalities.   Denies SI/HI intent or plan   Language: able to name objects and repeate phrases  Remote Memory: Impaired   recent Memory: Impaired  Cognition:  oriented to person, place, and time   Fund of Knowledge: Vocabulary intact, pt is aware of current events and past history  Attetion and Concentration intact  Insight judgment impulse control adequate    ASSESSMENT: Patient symptoms are:  [x] Well controlled  [] Improving  [] Worsening  [x] No change      Diagnosis:  Principal Problem:    Acute psychosis ([address]  Active Problems:    Cognitive disorder    Dementia with behavioral disturbance  Resolved Problems:    * No resolved hospital problems. *      LABS:    No results for input(s): WBC, HGB, PLT in the last 72 hours. No results for input(s): NA, K, CL, CO2, BUN, CREATININE, GLUCOSE in the last 72 hours. No results for input(s): BILITOT, ALKPHOS, AST, ALT in the last 72 hours. Lab Results   Component Value Date/Time    LABAMPH NOT DETECTED 10/22/2022 06:27 PM    BARBSCNU NOT DETECTED 10/22/2022 06:27 PM    LABBENZ NOT DETECTED 10/22/2022 06:27 PM    LABMETH NOT DETECTED 10/22/2022 06:27 PM    OPIATESCREENURINE NOT DETECTED 10/22/2022 06:27 PM    PHENCYCLIDINESCREENURINE NOT DETECTED 10/22/2022 06:27 PM    ETOH <10 02/18/2023 09:31 PM     No results found for: TSH, FREET4  No results found for: LITHIUM  No results found for: VALPROATE, CBMZ        Treatment Plan:  Reviewed current Medications with the patient. Risks, benefits, side effects, drug-to-drug interactions and alternatives to treatment were discussed. Collateral information:   CD evaluation  Encourage patient to attend group and other milieu activities. Discharge planning discussed with the patient and treatment team.    Continue to offer medications patient is prescribed Depakote 250 mg twice daily   continue Remeron 7.5 mg at bedtime    PSYCHOTHERAPY/COUNSELING:  [x] Therapeutic interview  [x] Supportive  [] CBT  [] Ongoing  [] Other    [x] Patient continues to need, on a daily basis, active treatment furnished directly by or requiring the supervision of inpatient psychiatric personnel      Anticipated Length of stay: 3 to 7 days based on stability    NOTE: This report was transcribed using voice recognition software. Every effort was made to ensure accuracy; however, inadvertent computerized transcription errors may be present. Behavioral Services  Medicare Certification Upon Admission    I certify that this patient's inpatient psychiatric hospital admission is medically necessary for:    [x] (1) Treatment which could reasonably be expected to improve this patient's condition,       [x] (2) Or for diagnostic study;     AND     [x](2) The inpatient psychiatric services are provided while the individual is under the care of a physician and are included in the individualized plan of care.     Estimated length of stay/service  7 - 21 days     Plan for post-hospital care  follow with OP provider     Electronically signed by YUDITH Williamson CNP on 3/10/2023 at 3:11 PM           Electronically signed by YUDITH Williamson CNP on 3/10/2023 at 3:11 PM

## 2023-03-10 NOTE — GROUP NOTE
Group Therapy Note    Date: 3/10/2023  Start Time: 1105  End Time:  1150  Number of Participants: 12    Type of Group: Psychoeducation    Wellness Binder Information  Module Name:  Positive steps to well being    Patient's Goal:  Define and discuss domains of well being. ID ways to improve well being through the domains. Notes: Patient actively engaged in group discussion and was able to identify healthy ways to improve their well being. Status After Intervention:  Improved    Participation Level:  Active Listener    Participation Quality: Appropriate and Attentive      Speech:  normal      Thought Process/Content: Logical      Affective Functioning: Congruent      Mood: calm and cooperative      Level of consciousness:  Alert and Attentive      Response to Learning: Able to verbalize current knowledge/experience and Able to verbalize/acknowledge new learning      Endings: None Reported    Modes of Intervention: Education      Discipline Responsible: Psychoeducational Specialist      Signature:  Abundio March, [address]

## 2023-03-10 NOTE — CARE COORDINATION
Sw received notification that fax did not send to [address] yesterday.  Pt navigator to send referral.

## 2023-03-10 NOTE — CARE COORDINATION
Navigator placed phone call to [address] [phone] and spoke with Staff Amaya James. Reports that Radha Mcpherson is the Supervisor and the only staff available to provide review of referrals for Crisis Unit placement at [facility]. Amaya James reports that she will reach out to him and have him contact Navigator directly.     Electronically signed by JALEESA Vigil, YESICA on 3/10/2023 at 1:44 PM

## 2023-03-10 NOTE — PROGRESS NOTES
BEHAVIORAL HEALTH FOLLOW-UP NOTE     3/10/2023     Patient was seen and examined in person, Chart reviewed   Patient's case discussed with staff/team    Chief Complaint: \"I fucking hate this place\"     Interim History:   Patient out on the unit., he is in no distress. Has some underlying irritability regarding his situation. He is no distress, remains in control of his behaviors. His repeat MoCA is 20/30. He is frustrated about his situation, he wants out of the hospital and perseverates on this which is understandable. He has interacted well with peers today. He is future focused, goal directed, wanting to be discharged. Has some poor understanding of the barriers to his discharge. Intermittently refusing medications.      Appetite: [x] Normal/Unchanged  [] Increased  [] Decreased      Sleep:       [x] Normal/Unchanged  [] Fair       [] Poor              Energy:    [x] Normal/Unchanged  [] Increased  [] Decreased        SI [] Present  [x] Absent    HI  []Present  [x] Absent     Aggression:  [] yes  [x] no    Patient is [x] able  [] unable to CONTRACT FOR SAFETY     PAST MEDICAL/PSYCHIATRIC HISTORY:   Past Medical History:   Diagnosis Date    Atrial fibrillation (HCC)     Chronic kidney disease     Congestive heart failure (CHF) (HCC)     Coronary artery disease     Hyperlipidemia     Hypertension        FAMILY/SOCIAL HISTORY:  Family History   Problem Relation Age of Onset    Heart Failure Other      Social History     Socioeconomic History    Marital status: Single     Spouse name: Not on file    Number of children: Not on file    Years of education: Not on file    Highest education level: Not on file   Occupational History    Not on file   Tobacco Use    Smoking status: Former     Types: Cigarettes    Smokeless tobacco: Never   Substance and Sexual Activity    Alcohol use: Not Currently    Drug use: Not on file    Sexual activity: Not on file   Other Topics Concern    Not on file   Social History Narrative Not on file     Social Determinants of Health     Financial Resource Strain: Not on file   Food Insecurity: Not on file   Transportation Needs: Not on file   Physical Activity: Not on file   Stress: Not on file   Social Connections: Not on file   Intimate Partner Violence: Not on file   Housing Stability: Not on file           ROS:  [x] All negative/unchanged except if checked.  Explain positive(checked items) below:  [] Constitutional  [] Eyes  [] Ear/Nose/Mouth/Throat  [] Respiratory  [] CV  [] GI  []   [] Musculoskeletal  [] Skin/Breast  [] Neurological  [] Endocrine  [] Heme/Lymph  [] Allergic/Immunologic    Explanation:     MEDICATIONS:    Current Facility-Administered Medications:     carvedilol (COREG) tablet 25 mg, 25 mg, Oral, BID WC, Janette Cutler, DO, 25 mg at 03/10/23 1028    cefdinir (OMNICEF) capsule 300 mg, 300 mg, Oral, 2 times per day, Kaveh Caruso, DO, 300 mg at 03/10/23 1028    acetaminophen (TYLENOL) tablet 650 mg, 650 mg, Oral, Q4H PRN, Joellen Chapman MD    magnesium hydroxide (MILK OF MAGNESIA) 400 MG/5ML suspension 30 mL, 30 mL, Oral, Daily PRN, Joellen Chapman MD    nicotine (NICODERM CQ) 21 MG/24HR 1 patch, 1 patch, TransDERmal, Daily, Joellen Chapman MD    aluminum & magnesium hydroxide-simethicone (MAALOX) 200-200-20 MG/5ML suspension 30 mL, 30 mL, Oral, PRN, Joellen Chapman MD    hydrOXYzine pamoate (VISTARIL) capsule 50 mg, 50 mg, Oral, TID PRN, Joellen Chapman MD, 50 mg at 03/02/23 2059    haloperidol (HALDOL) tablet 3 mg, 3 mg, Oral, Q6H PRN **OR** haloperidol lactate (HALDOL) injection 3 mg, 3 mg, IntraMUSCular, Q6H PRN, Joellen Chapman MD    amLODIPine (NORVASC) tablet 15 mg, 15 mg, Oral, Daily, Lakesha Formica, APRN - CNP, 15 mg at 03/10/23 1027    apixaban (ELIQUIS) tablet 5 mg, 5 mg, Oral, BID, Demetrisoles Formica, APRN - CNP, 5 mg at 03/10/23 1027    aspirin chewable tablet 81 mg, 81 mg, Oral, Daily, Lakesha Torres APRN - CNP, 81 mg at 03/10/23 1027 cyclobenzaprine (FLEXERIL) tablet 5 mg, 5 mg, Oral, BID PRN, Aquiles Jackson APRN - CNP, 5 mg at 03/05/23 2127    divalproex (DEPAKOTE SPRINKLE) DR capsule 250 mg, 250 mg, Oral, 2 times per day, Aquiles Jackson, APRN - CNP, 250 mg at 03/10/23 1027    lisinopril (PRINIVIL;ZESTRIL) tablet 20 mg, 20 mg, Oral, Daily, Aquiels Jackson APRN - CNP, 20 mg at 03/10/23 1027    pantoprazole (PROTONIX) tablet 20 mg, 20 mg, Oral, QAM AC, Aquiles Jackson, APRN - CNP, 20 mg at 03/10/23 0646    polyethylene glycol (GLYCOLAX) packet 17 g, 17 g, Oral, Daily PRN, Aquiles Jackson APRN - CNP    potassium chloride (KLOR-CON M) extended release tablet 10 mEq, 10 mEq, Oral, Daily, Aquiles Jackson APRN - CNP, 10 mEq at 03/10/23 1027    rosuvastatin (CRESTOR) tablet 20 mg, 20 mg, Oral, Nightly, Aquiles Jackson, APRN - CNP, 20 mg at 03/09/23 2134    mirtazapine (REMERON) tablet 7.5 mg, 7.5 mg, Oral, Nightly, Phylliss Rock, APRN - CNP, 7.5 mg at 03/09/23 2134    melatonin disintegrating tablet 5 mg, 5 mg, Oral, Daily, Phylliss Rock, APRN - CNP, 5 mg at 03/09/23 1754      Examination:  /83   Pulse 72   Temp 97.8 °F (36.6 °C) (Temporal)   Resp 18   Ht 6' 1\" (1.854 m)   Wt 185 lb (83.9 kg)   SpO2 97%   BMI 24.41 kg/m²   Gait - steady  Medication side effects(SE):     Mental Status Examination:    Level of consciousness:  within normal limits   Appearance:  fair grooming and fair hygiene  Behavior/Motor:  no abnormalities noted  Attitude toward examiner:  cooperative  Speech:  spontaneous, normal rate and normal volume   Mood: \" I am stuck here\"  Affect: Relaxed  Thought processes: Linear thought flight of ideas loose associations  Thought content: Devoid of any auditory visual hallucinations delusions or other perceptual normalities.   Denies SI/HI intent or plan   Language: able to name objects and repeate phrases  Remote Memory: Impaired   recent Memory: Impaired  Cognition:  oriented to person, place, and time   Fund of Knowledge: Vocabulary intact, pt is aware of current events and past history  Attetion and Concentration intact  Insight judgment impulse control adequate    ASSESSMENT: Patient symptoms are:  [x] Well controlled  [] Improving  [] Worsening  [x] No change      Diagnosis:  Principal Problem:    Acute psychosis ([facility] [address]  Active Problems:    Cognitive disorder    Dementia with behavioral disturbance  Resolved Problems:    * No resolved hospital problems. *      LABS:    No results for input(s): WBC, HGB, PLT in the last 72 hours. No results for input(s): NA, K, CL, CO2, BUN, CREATININE, GLUCOSE in the last 72 hours. No results for input(s): BILITOT, ALKPHOS, AST, ALT in the last 72 hours. Lab Results   Component Value Date/Time    LABAMPH NOT DETECTED 10/22/2022 06:27 PM    BARBSCNU NOT DETECTED 10/22/2022 06:27 PM    LABBENZ NOT DETECTED 10/22/2022 06:27 PM    LABMETH NOT DETECTED 10/22/2022 06:27 PM    OPIATESCREENURINE NOT DETECTED 10/22/2022 06:27 PM    PHENCYCLIDINESCREENURINE NOT DETECTED 10/22/2022 06:27 PM    ETOH <10 02/18/2023 09:31 PM     No results found for: TSH, FREET4  No results found for: LITHIUM  No results found for: VALPROATE, CBMZ        Treatment Plan:  Reviewed current Medications with the patient. Risks, benefits, side effects, drug-to-drug interactions and alternatives to treatment were discussed. Collateral information:   CD evaluation  Encourage patient to attend group and other milieu activities.   Discharge planning discussed with the patient and treatment team.    Continue to offer medications patient is prescribed Depakote 250 mg twice daily   continue Remeron 7.5 mg at bedtime    PSYCHOTHERAPY/COUNSELING:  [x] Therapeutic interview  [x] Supportive  [] CBT  [] Ongoing  [] Other    [x] Patient continues to need, on a daily basis, active treatment furnished directly by or requiring the supervision of inpatient psychiatric personnel      Anticipated Length of stay: 3 to 7 days based on stability    NOTE: This report was transcribed using voice recognition software. Every effort was made to ensure accuracy; however, inadvertent computerized transcription errors may be present. Behavioral Services  Medicare Certification Upon Admission    I certify that this patient's inpatient psychiatric hospital admission is medically necessary for:    [x] (1) Treatment which could reasonably be expected to improve this patient's condition,       [x] (2) Or for diagnostic study;     AND     [x](2) The inpatient psychiatric services are provided while the individual is under the care of a physician and are included in the individualized plan of care.     Estimated length of stay/service  7 - 21 days     Plan for post-hospital care  follow with OP provider     Electronically signed by YUDITH Damon CNP on 3/10/2023 at 3:10 PM           Electronically signed by YUDITH Damon CNP on 3/10/2023 at 3:10 PM

## 2023-03-10 NOTE — PROGRESS NOTES
BEHAVIORAL HEALTH FOLLOW-UP NOTE     3/10/2023     Patient was seen and examined in person, Chart reviewed   Patient's case discussed with staff/team    Chief Complaint:  \"I want out of here\"     Interim History:   Patient out on the unit., he is in no distress. He is interacting appropriately with peers, watching TV and laughing. He is frustrated to remain in the hospital. Taking his medications, maintains all ADLs without need for prompting   He is future focused, goal directed, wanting to be discharged. Has some poor understanding of the barriers to his discharge.      Appetite: [x] Normal/Unchanged  [] Increased  [] Decreased      Sleep:       [x] Normal/Unchanged  [] Fair       [] Poor              Energy:    [x] Normal/Unchanged  [] Increased  [] Decreased        SI [] Present  [x] Absent    HI  []Present  [x] Absent     Aggression:  [] yes  [x] no    Patient is [x] able  [] unable to CONTRACT FOR SAFETY     PAST MEDICAL/PSYCHIATRIC HISTORY:   Past Medical History:   Diagnosis Date    Atrial fibrillation (HCC)     Chronic kidney disease     Congestive heart failure (CHF) (HCC)     Coronary artery disease     Hyperlipidemia     Hypertension        FAMILY/SOCIAL HISTORY:  Family History   Problem Relation Age of Onset    Heart Failure Other      Social History     Socioeconomic History    Marital status: Single     Spouse name: Not on file    Number of children: Not on file    Years of education: Not on file    Highest education level: Not on file   Occupational History    Not on file   Tobacco Use    Smoking status: Former     Types: Cigarettes    Smokeless tobacco: Never   Substance and Sexual Activity    Alcohol use: Not Currently    Drug use: Not on file    Sexual activity: Not on file   Other Topics Concern    Not on file   Social History Narrative    Not on file     Social Determinants of Health     Financial Resource Strain: Not on file   Food Insecurity: Not on file   Transportation Needs: Not on file Physical Activity: Not on file   Stress: Not on file   Social Connections: Not on file   Intimate Partner Violence: Not on file   Housing Stability: Not on file           ROS:  [x] All negative/unchanged except if checked.  Explain positive(checked items) below:  [] Constitutional  [] Eyes  [] Ear/Nose/Mouth/Throat  [] Respiratory  [] CV  [] GI  []   [] Musculoskeletal  [] Skin/Breast  [] Neurological  [] Endocrine  [] Heme/Lymph  [] Allergic/Immunologic    Explanation:     MEDICATIONS:    Current Facility-Administered Medications:     carvedilol (COREG) tablet 25 mg, 25 mg, Oral, BID , Janette Cutler, DO, 25 mg at 03/10/23 1028    cefdinir (OMNICEF) capsule 300 mg, 300 mg, Oral, 2 times per day, Eve Roxbury, DO, 300 mg at 03/10/23 1028    acetaminophen (TYLENOL) tablet 650 mg, 650 mg, Oral, Q4H PRN, Loco Carpenter MD    magnesium hydroxide (MILK OF MAGNESIA) 400 MG/5ML suspension 30 mL, 30 mL, Oral, Daily PRN, Loco Carpenter MD    nicotine (NICODERM CQ) 21 MG/24HR 1 patch, 1 patch, TransDERmal, Daily, Loco Carpenter MD    aluminum & magnesium hydroxide-simethicone (MAALOX) 200-200-20 MG/5ML suspension 30 mL, 30 mL, Oral, PRN, Loco Carpenter MD    hydrOXYzine pamoate (VISTARIL) capsule 50 mg, 50 mg, Oral, TID PRN, Loco Carpenter MD, 50 mg at 03/02/23 2059    haloperidol (HALDOL) tablet 3 mg, 3 mg, Oral, Q6H PRN **OR** haloperidol lactate (HALDOL) injection 3 mg, 3 mg, IntraMUSCular, Q6H PRN, Loco Carpenter MD    amLODIPine (NORVASC) tablet 15 mg, 15 mg, Oral, Daily, Karolina Days, APRN - CNP, 15 mg at 03/10/23 1027    apixaban (ELIQUIS) tablet 5 mg, 5 mg, Oral, BID, Karolina Days, APRN - CNP, 5 mg at 03/10/23 1027    aspirin chewable tablet 81 mg, 81 mg, Oral, Daily, Karolina Days, APRN - CNP, 81 mg at 03/10/23 1027    cyclobenzaprine (FLEXERIL) tablet 5 mg, 5 mg, Oral, BID PRN, YUDITH Prince CNP, 5 mg at 03/05/23 2127    divalproex (DEPAKOTE SPRINKLE) DR capsule 250 mg, 250 mg, Oral, 2 times per day, Mic Cocking, APRN - CNP, 250 mg at 03/10/23 1027    lisinopril (PRINIVIL;ZESTRIL) tablet 20 mg, 20 mg, Oral, Daily, Mic Cocking, APRN - CNP, 20 mg at 03/10/23 1027    pantoprazole (PROTONIX) tablet 20 mg, 20 mg, Oral, QAM AC, Mic Cocking, APRN - CNP, 20 mg at 03/10/23 0646    polyethylene glycol (GLYCOLAX) packet 17 g, 17 g, Oral, Daily PRN, Mic Cocking, APRN - CNP    potassium chloride (KLOR-CON M) extended release tablet 10 mEq, 10 mEq, Oral, Daily, Mic Cocking, APRN - CNP, 10 mEq at 03/10/23 1027    rosuvastatin (CRESTOR) tablet 20 mg, 20 mg, Oral, Nightly, Scotland Cocking, APRN - CNP, 20 mg at 03/09/23 2134    mirtazapine (REMERON) tablet 7.5 mg, 7.5 mg, Oral, Nightly, James Booty, APRN - CNP, 7.5 mg at 03/09/23 2134    melatonin disintegrating tablet 5 mg, 5 mg, Oral, Daily, James Booty, APRN - CNP, 5 mg at 03/09/23 1754      Examination:  /83   Pulse 72   Temp 97.8 °F (36.6 °C) (Temporal)   Resp 18   Ht 6' 1\" (1.854 m)   Wt 185 lb (83.9 kg)   SpO2 97%   BMI 24.41 kg/m²   Gait - steady  Medication side effects(SE):     Mental Status Examination:    Level of consciousness:  within normal limits   Appearance:  fair grooming and fair hygiene  Behavior/Motor:  no abnormalities noted  Attitude toward examiner:  cooperative  Speech:  spontaneous, normal rate and normal volume   Mood: \" I am stuck here\"  Affect: Relaxed  Thought processes: Linear thought flight of ideas loose associations  Thought content: Devoid of any auditory visual hallucinations delusions or other perceptual normalities.   Denies SI/HI intent or plan   Language: able to name objects and repeate phrases  Remote Memory: Impaired   recent Memory: Impaired  Cognition:  oriented to person, place, and time   Fund of Knowledge: Vocabulary intact, pt is aware of current events and past history  Attetion and Concentration intact  Insight judgment impulse control adequate    ASSESSMENT: Patient symptoms are:  [x] Well controlled  [] Improving  [] Worsening  [] No change      Diagnosis:  Principal Problem:    Acute psychosis ([address]  Active Problems:    Cognitive disorder    Dementia with behavioral disturbance  Resolved Problems:    * No resolved hospital problems. *      LABS:    No results for input(s): WBC, HGB, PLT in the last 72 hours. No results for input(s): NA, K, CL, CO2, BUN, CREATININE, GLUCOSE in the last 72 hours. No results for input(s): BILITOT, ALKPHOS, AST, ALT in the last 72 hours. Lab Results   Component Value Date/Time    LABAMPH NOT DETECTED 10/22/2022 06:27 PM    BARBSCNU NOT DETECTED 10/22/2022 06:27 PM    LABBENZ NOT DETECTED 10/22/2022 06:27 PM    LABMETH NOT DETECTED 10/22/2022 06:27 PM    OPIATESCREENURINE NOT DETECTED 10/22/2022 06:27 PM    PHENCYCLIDINESCREENURINE NOT DETECTED 10/22/2022 06:27 PM    ETOH <10 02/18/2023 09:31 PM     No results found for: TSH, FREET4  No results found for: LITHIUM  No results found for: VALPROATE, CBMZ        Treatment Plan:  Reviewed current Medications with the patient. Risks, benefits, side effects, drug-to-drug interactions and alternatives to treatment were discussed. Collateral information:   CD evaluation  Encourage patient to attend group and other milieu activities. Discharge planning discussed with the patient and treatment team.    Continue to offer medications patient is prescribed Depakote 250 mg twice daily   continue Remeron 7.5 mg at bedtime    PSYCHOTHERAPY/COUNSELING:  [x] Therapeutic interview  [x] Supportive  [] CBT  [] Ongoing  [] Other    [x] Patient continues to need, on a daily basis, active treatment furnished directly by or requiring the supervision of inpatient psychiatric personnel      Anticipated Length of stay: 3 to 7 days based on stability    NOTE: This report was transcribed using voice recognition software.  Every effort was made to ensure accuracy; however, inadvertent computerized transcription errors may be present. Behavioral Services  Medicare Certification Upon Admission    I certify that this patient's inpatient psychiatric hospital admission is medically necessary for:    [x] (1) Treatment which could reasonably be expected to improve this patient's condition,       [x] (2) Or for diagnostic study;     AND     [x](2) The inpatient psychiatric services are provided while the individual is under the care of a physician and are included in the individualized plan of care.     Estimated length of stay/service  7 - 21 days     Plan for post-hospital care  follow with OP provider     Electronically signed by YUDITH White CNP on 3/10/2023 at 3:13 PM           Electronically signed by YUDITH White CNP on 3/10/2023 at 3:13 PM

## 2023-03-10 NOTE — PLAN OF CARE
Patient denies SI/HI/AVH. Denies anxiety and depression. A&O x 3, denies pain. Observed in day area affect is flat, sad and cooperative. Medication compliant. No sxs of distress noted. No behaviors noted. Staff will continue to provide support and interventions when needed or requested. Purposeful rounds continued Q 15 minutes. Problem: Anxiety  Goal: Will report anxiety at manageable levels  Description: INTERVENTIONS:  1. Administer medication as ordered  2. Teach and rehearse alternative coping skills  3. Provide emotional support with 1:1 interaction with staff  Outcome: Progressing     Problem: Coping  Goal: Pt/Family able to verbalize concerns and demonstrate effective coping strategies  Description: INTERVENTIONS:  1. Assist patient/family to identify coping skills, available support systems and cultural and spiritual values  2. Provide emotional support, including active listening and acknowledgement of concerns of patient and caregivers  3. Reduce environmental stimuli, as able  4. Instruct patient/family in relaxation techniques, as appropriate  5. Assess for spiritual pain/suffering and initiate Spiritual Care, Psychosocial Clinical Specialist consults as needed  Outcome: Progressing     Problem: Decision Making  Goal: Pt/Family able to effectively weigh alternatives and participate in decision making related to treatment and care  Description: INTERVENTIONS:  1. Determine when there are differences between patient's view, family's view, and healthcare provider's view of condition  2. Facilitate patient and family articulation of goals for care  3. Help patient and family identify pros/cons of alternative solutions  4. Provide information as requested by patient/family  5. Respect patient/family right to receive or not to receive information  6. Serve as a liaison between patient and family and health care team  7.  Initiate Consults from Ethics, Palliative Care or initiate [address] as is appropriate  Outcome: Progressing     Problem: Confusion  Goal: Confusion, delirium, dementia, or psychosis is improved or at baseline  Description: INTERVENTIONS:  1. Assess for possible contributors to thought disturbance, including medications, impaired vision or hearing, underlying metabolic abnormalities, dehydration, psychiatric diagnoses, and notify attending LIP  2. [place] high risk fall precautions, as indicated  3. Provide frequent short contacts to provide reality reorientation, refocusing and direction  4. Decrease environmental stimuli, including noise as appropriate  5. Monitor and intervene to maintain adequate nutrition, hydration, elimination, sleep and activity  6. If unable to ensure safety without constant attention obtain sitter and review sitter guidelines with assigned personnel  7. Initiate Psychosocial CNS and Spiritual Care consult, as indicated  Outcome: Progressing     Problem: Behavior  Goal: Pt/Family maintain appropriate behavior and adhere to behavioral management agreement, if implemented  Description: INTERVENTIONS:  1. Assess patient/family's coping skills and  non-compliant behavior (including use of illegal substances)  2. Notify security of behavior or suspected illegal substances which indicate the need for search of the family and/or belongings  3. Encourage verbalization of thoughts and concerns in a socially appropriate manner  4. Utilize positive, consistent limit setting strategies supporting safety of patient, staff and others  5. Encourage participation in the decision making process about the behavioral management agreement  6. If a visitor's behavior poses a threat to safety call refer to organization policy.  7. Initiate consult with , Psychosocial CNS, Spiritual Care as appropriate  Outcome: Progressing

## 2023-03-10 NOTE — PLAN OF CARE
Patient alert and oriented x4 he is pleasant and cooperative with staff and peers. He denies SI/HI and does not report auditory or visual hallucinations. He open with discussion about being in the hospital for an extended time. He feels he is ready to be discharged and take care of hisself. Patient does complain of anxiety that does cause him some physical discomfort. He is taking medications and eating meals with peers. Will continue to monitor. Problem: Chronic Conditions and Co-morbidities  Goal: Patient's chronic conditions and co-morbidity symptoms are monitored and maintained or improved  Outcome: Progressing     Problem: Anxiety  Goal: Will report anxiety at manageable levels  Description: INTERVENTIONS:  1. Administer medication as ordered  2. Teach and rehearse alternative coping skills  3. Provide emotional support with 1:1 interaction with staff  Outcome: Progressing     Problem: Coping  Goal: Pt/Family able to verbalize concerns and demonstrate effective coping strategies  Description: INTERVENTIONS:  1. Assist patient/family to identify coping skills, available support systems and cultural and spiritual values  2. Provide emotional support, including active listening and acknowledgement of concerns of patient and caregivers  3. Reduce environmental stimuli, as able  4. Instruct patient/family in relaxation techniques, as appropriate  5. Assess for spiritual pain/suffering and initiate Spiritual Care, Psychosocial Clinical Specialist consults as needed  Outcome: Progressing     Problem: Safety - Medical Restraint  Goal: Remains free of injury from restraints (Restraint for Interference with Medical Device)  Description: INTERVENTIONS:  1. Determine that other, less restrictive measures have been tried or would not be effective before applying the restraint  2. Evaluate the patient's condition at the time of restraint application  3.  Inform patient/family regarding the reason for restraint  4.  Q2H: Monitor safety, psychosocial status, comfort, nutrition and hydration  Outcome: Completed

## 2023-03-10 NOTE — PROGRESS NOTES
Patient attended community meeting   Was updated on expectations of the unit, staffing, and programming  Patient shared goal for today as to get out of here. Patient was 1 of 10 in attendance.

## 2023-03-10 NOTE — CARE COORDINATION
Navigator placed phone call to [facility] [phone] and left vm for Rep. Wild Gracia requesting return call to discuss the status of the referral for crisis unit placement at [facility].     Electronically signed by JALEESA Hyman, YESICA on 3/10/2023 at 10:18 AM

## 2023-03-10 NOTE — PROGRESS NOTES
Patient attended afternoon recreation activity, movie  Patient calm and cooperative. Patient was 1 of 6 in attendance.

## 2023-03-11 PROCEDURE — 1240000000 HC EMOTIONAL WELLNESS R&B

## 2023-03-11 PROCEDURE — 6370000000 HC RX 637 (ALT 250 FOR IP): Performed by: INTERNAL MEDICINE

## 2023-03-11 PROCEDURE — 6370000000 HC RX 637 (ALT 250 FOR IP)

## 2023-03-11 PROCEDURE — 6370000000 HC RX 637 (ALT 250 FOR IP): Performed by: NURSE PRACTITIONER

## 2023-03-11 RX ADMIN — CEFDINIR 300 MG: 300 CAPSULE ORAL at 09:16

## 2023-03-11 RX ADMIN — POTASSIUM CHLORIDE 10 MEQ: 750 TABLET, EXTENDED RELEASE ORAL at 09:16

## 2023-03-11 RX ADMIN — AMLODIPINE BESYLATE 15 MG: 10 TABLET ORAL at 09:17

## 2023-03-11 RX ADMIN — CEFDINIR 300 MG: 300 CAPSULE ORAL at 20:54

## 2023-03-11 RX ADMIN — APIXABAN 5 MG: 5 TABLET, FILM COATED ORAL at 20:54

## 2023-03-11 RX ADMIN — CARVEDILOL 25 MG: 25 TABLET, FILM COATED ORAL at 09:16

## 2023-03-11 RX ADMIN — PANTOPRAZOLE SODIUM 20 MG: 20 TABLET, DELAYED RELEASE ORAL at 09:16

## 2023-03-11 RX ADMIN — DIVALPROEX SODIUM 250 MG: 125 CAPSULE, COATED PELLETS ORAL at 09:16

## 2023-03-11 RX ADMIN — Medication 5 MG: at 17:21

## 2023-03-11 RX ADMIN — DIVALPROEX SODIUM 250 MG: 125 CAPSULE, COATED PELLETS ORAL at 20:54

## 2023-03-11 RX ADMIN — APIXABAN 5 MG: 5 TABLET, FILM COATED ORAL at 09:16

## 2023-03-11 RX ADMIN — ASPIRIN 81 MG CHEWABLE TABLET 81 MG: 81 TABLET CHEWABLE at 09:16

## 2023-03-11 RX ADMIN — CARVEDILOL 25 MG: 25 TABLET, FILM COATED ORAL at 17:21

## 2023-03-11 RX ADMIN — LISINOPRIL 20 MG: 10 TABLET ORAL at 09:17

## 2023-03-11 RX ADMIN — MIRTAZAPINE 7.5 MG: 15 TABLET, FILM COATED ORAL at 20:53

## 2023-03-11 RX ADMIN — ROSUVASTATIN CALCIUM 20 MG: 20 TABLET, FILM COATED ORAL at 20:54

## 2023-03-11 ASSESSMENT — PAIN SCALES - GENERAL
PAINLEVEL_OUTOF10: 0
PAINLEVEL_OUTOF10: 0

## 2023-03-11 NOTE — PLAN OF CARE
Problem: Chronic Conditions and Co-morbidities  Goal: Patient's chronic conditions and co-morbidity symptoms are monitored and maintained or improved  3/11/2023 0056 by Praneeth Chapman RN  Outcome: Progressing  3/10/2023 1227 by Daniel Mejía RN  Outcome: Progressing     Problem: Anxiety  Goal: Will report anxiety at manageable levels  Description: INTERVENTIONS:  1. Administer medication as ordered  2. Teach and rehearse alternative coping skills  3. Provide emotional support with 1:1 interaction with staff  3/11/2023 0056 by Praneeth Chapman RN  Outcome: Progressing  3/10/2023 1227 by Daniel Mejía RN  Outcome: Progressing     Problem: Death & Dying  Goal: Pt/Family communicate acceptance of impending death and feel psychological comfort and peace  Description: INTERVENTIONS:  1. Assess patient/family anxiety and grief process related to end of life issues  2. Provide emotional and spiritual support  3. Provide information about the patient's health status with consideration of family and cultural values  4. Communicate willingness to discuss death and facilitate grief process  with patient/family as appropriate  5. Emphasize sustaining relationships within family system and community, or ellyn/spiritual traditions  6. Initiate Spiritual Care, Psychosocial Clinical Specialist, consult as needed  Outcome: Progressing     Problem: Decision Making  Goal: Pt/Family able to effectively weigh alternatives and participate in decision making related to treatment and care  Description: INTERVENTIONS:  1. Determine when there are differences between patient's view, family's view, and healthcare provider's view of condition  2. Facilitate patient and family articulation of goals for care  3. Help patient and family identify pros/cons of alternative solutions  4. Provide information as requested by patient/family  5. Respect patient/family right to receive or not to receive information  6.  Serve as a liaison between patient and family and health care team  7. Initiate Consults from Ethics, Palliative Care or initiate Family Care Conference as is appropriate  Outcome: Progressing

## 2023-03-11 NOTE — PROGRESS NOTES
BEHAVIORAL HEALTH FOLLOW-UP NOTE     3/11/2023     Patient was seen and examined in person, Chart reviewed   Patient's case discussed with staff/team    Chief Complaint: \"I am angry I should not still be here\"    Interim History:   Patient seen up on the unit states he is angry that he still in the hospital states he should not still be here. He has poor insight and judgment regarding circumstance of hospitalization need for treatment he states that he has a hearing coming up this week with his  he is happy to have his  help him with his hearing.   Angry irritable affect denies suicidal ideations intent or plan denies auditory visual hallucinations    Appetite: [x] Normal/Unchanged  [] Increased  [] Decreased      Sleep:       [x] Normal/Unchanged  [] Fair       [] Poor              Energy:    [x] Normal/Unchanged  [] Increased  [] Decreased        SI [] Present  [x] Absent    HI  []Present  [x] Absent     Aggression:  [] yes  [x] no    Patient is [x] able  [] unable to CONTRACT FOR SAFETY     PAST MEDICAL/PSYCHIATRIC HISTORY:   Past Medical History:   Diagnosis Date    Atrial fibrillation ([facility] [address]     Chronic kidney disease     Congestive heart failure (CHF) (HCC)     Coronary artery disease     Hyperlipidemia     Hypertension        FAMILY/SOCIAL HISTORY:  Family History   Problem Relation Age of Onset    Heart Failure Other      Social History     Socioeconomic History    Marital status: Single     Spouse name: Not on file    Number of children: Not on file    Years of education: Not on file    Highest education level: Not on file   Occupational History    Not on file   Tobacco Use    Smoking status: Former     Types: Cigarettes    Smokeless tobacco: Never   Substance and Sexual Activity    Alcohol use: Not Currently    Drug use: Not on file    Sexual activity: Not on file   Other Topics Concern    Not on file   Social History Narrative    Not on file     Social Determinants of Health     Financial Resource Strain: Not on file   Food Insecurity: Not on file   Transportation Needs: Not on file   Physical Activity: Not on file   Stress: Not on file   Social Connections: Not on file   Intimate Partner Violence: Not on file   Housing Stability: Not on file           ROS:  [x] All negative/unchanged except if checked.  Explain positive(checked items) below:  [] Constitutional  [] Eyes  [] Ear/Nose/Mouth/Throat  [] Respiratory  [] CV  [] GI  []   [] Musculoskeletal  [] Skin/Breast  [] Neurological  [] Endocrine  [] Heme/Lymph  [] Allergic/Immunologic    Explanation:     MEDICATIONS:    Current Facility-Administered Medications:     carvedilol (COREG) tablet 25 mg, 25 mg, Oral, BID WC, Janette Cutler, DO, 25 mg at 03/11/23 5901    cefdinir (OMNICEF) capsule 300 mg, 300 mg, Oral, 2 times per day, Heena Monroy DO, 300 mg at 03/11/23 1131    acetaminophen (TYLENOL) tablet 650 mg, 650 mg, Oral, Q4H PRN, Christina Waldrop MD    magnesium hydroxide (MILK OF MAGNESIA) 400 MG/5ML suspension 30 mL, 30 mL, Oral, Daily PRN, Christina Samples, MD    nicotine (NICODERM CQ) 21 MG/24HR 1 patch, 1 patch, TransDERmal, Daily, Christina Waldrop MD    aluminum & magnesium hydroxide-simethicone (MAALOX) 200-200-20 MG/5ML suspension 30 mL, 30 mL, Oral, PRN, Christina Waldrop MD    hydrOXYzine pamoate (VISTARIL) capsule 50 mg, 50 mg, Oral, TID PRN, Christina Waldrop MD, 50 mg at 03/02/23 2059    haloperidol (HALDOL) tablet 3 mg, 3 mg, Oral, Q6H PRN **OR** haloperidol lactate (HALDOL) injection 3 mg, 3 mg, IntraMUSCular, Q6H PRN, Christina Waldrop MD    amLODIPine (NORVASC) tablet 15 mg, 15 mg, Oral, Daily, Aquiles Jacskon APRN - CNP, 15 mg at 03/11/23 0917    apixaban (ELIQUIS) tablet 5 mg, 5 mg, Oral, BID, Aquiles Jackson APRN - CNP, 5 mg at 03/11/23 2248    aspirin chewable tablet 81 mg, 81 mg, Oral, Daily, Aquiles Jackson APRN - CNP, 81 mg at 03/11/23 0916    cyclobenzaprine (FLEXERIL) tablet 5 mg, 5 mg, Oral, BID PRN, Tim Garcia, APRN - CNP, 5 mg at 03/05/23 2127    divalproex (DEPAKOTE SPRINKLE) DR capsule 250 mg, 250 mg, Oral, 2 times per day, Tim Garcia, APRN - CNP, 250 mg at 03/11/23 0916    lisinopril (PRINIVIL;ZESTRIL) tablet 20 mg, 20 mg, Oral, Daily, Tim Garcia, APRN - CNP, 20 mg at 03/11/23 0917    pantoprazole (PROTONIX) tablet 20 mg, 20 mg, Oral, QAM AC, Tim Garcia, APRN - CNP, 20 mg at 03/11/23 0916    polyethylene glycol (GLYCOLAX) packet 17 g, 17 g, Oral, Daily PRN, Jenajhon ALISTAIR GarciaN - CNP    potassium chloride (KLOR-CON M) extended release tablet 10 mEq, 10 mEq, Oral, Daily, Jenajhonus MilesALISTAIR alN - CNP, 10 mEq at 03/11/23 0916    rosuvastatin (CRESTOR) tablet 20 mg, 20 mg, Oral, Nightly, Tim Garcia, APRN - CNP, 20 mg at 03/10/23 2051    mirtazapine (REMERON) tablet 7.5 mg, 7.5 mg, Oral, Nightly, Lawrance Prude, APRN - CNP, 7.5 mg at 03/10/23 2051    melatonin disintegrating tablet 5 mg, 5 mg, Oral, Daily, Lawrance Prude, APRN - CNP, 5 mg at 03/10/23 1735      Examination:  /77   Pulse 65   Temp 98.3 °F (36.8 °C) (Temporal)   Resp 16   Ht 6' 1\" (1.854 m)   Wt 185 lb (83.9 kg)   SpO2 96%   BMI 24.41 kg/m²   Gait - steady  Medication side effects(SE):     Mental Status Examination:    Level of consciousness:  within normal limits   Appearance:  fair grooming and fair hygiene  Behavior/Motor:  no abnormalities noted  Attitude toward examiner:  cooperative  Speech:  spontaneous, normal rate and normal volume   Mood: \" I am stuck here\"  Affect: Relaxed  Thought processes: Linear thought flight of ideas loose associations  Thought content: Devoid of any auditory visual hallucinations delusions or other perceptual normalities.   Denies SI/HI intent or plan   Language: able to name objects and repeate phrases  Remote Memory: Impaired   recent Memory: Impaired  Cognition:  oriented to person, place, and time   Fund of Knowledge: Vocabulary intact, pt is aware of current events and past history  Attetion and Concentration intact  Insight judgment impulse control adequate    ASSESSMENT: Patient symptoms are:  [x] Well controlled  [] Improving  [] Worsening  [x] No change      Diagnosis:  Principal Problem:    Cognitive disorder  Active Problems:    Acute psychosis ([facility] [address]    Dementia with behavioral disturbance  Resolved Problems:    * No resolved hospital problems. *      LABS:    No results for input(s): WBC, HGB, PLT in the last 72 hours. No results for input(s): NA, K, CL, CO2, BUN, CREATININE, GLUCOSE in the last 72 hours. No results for input(s): BILITOT, ALKPHOS, AST, ALT in the last 72 hours. Lab Results   Component Value Date/Time    LABAMPH NOT DETECTED 10/22/2022 06:27 PM    BARBSCNU NOT DETECTED 10/22/2022 06:27 PM    LABBENZ NOT DETECTED 10/22/2022 06:27 PM    LABMETH NOT DETECTED 10/22/2022 06:27 PM    OPIATESCREENURINE NOT DETECTED 10/22/2022 06:27 PM    PHENCYCLIDINESCREENURINE NOT DETECTED 10/22/2022 06:27 PM    ETOH <10 02/18/2023 09:31 PM     No results found for: TSH, FREET4  No results found for: LITHIUM  No results found for: VALPROATE, CBMZ        Treatment Plan:  Reviewed current Medications with the patient. Risks, benefits, side effects, drug-to-drug interactions and alternatives to treatment were discussed. Collateral information:   CD evaluation  Encourage patient to attend group and other milieu activities.   Discharge planning discussed with the patient and treatment team.    Continue to offer medications patient is prescribed Depakote 250 mg twice daily   continue Remeron 7.5 mg at bedtime    PSYCHOTHERAPY/COUNSELING:  [x] Therapeutic interview  [x] Supportive  [] CBT  [] Ongoing  [] Other    [x] Patient continues to need, on a daily basis, active treatment furnished directly by or requiring the supervision of inpatient psychiatric personnel      Anticipated Length of stay: 3 to 7 days based on stability    NOTE: This report was transcribed using voice recognition software. Every effort was made to ensure accuracy; however, inadvertent computerized transcription errors may be present. Behavioral Services  Medicare Certification Upon Admission    I certify that this patient's inpatient psychiatric hospital admission is medically necessary for:    [x] (1) Treatment which could reasonably be expected to improve this patient's condition,       [x] (2) Or for diagnostic study;     AND     [x](2) The inpatient psychiatric services are provided while the individual is under the care of a physician and are included in the individualized plan of care.     Estimated length of stay/service  7 - 21 days     Plan for post-hospital care  follow with OP provider     Electronically signed by YUDITH Garcia CNP on 0/99/6210 at 12:54 PM           Electronically signed by YUDITH Garcia CNP on 4/42/8803 at 12:54 PM

## 2023-03-11 NOTE — PLAN OF CARE
Problem: Chronic Conditions and Co-morbidities  Goal: Patient's chronic conditions and co-morbidity symptoms are monitored and maintained or improved  3/11/2023 1005 by Barbara Cardoza RN  Outcome: Progressing     Problem: Anxiety  Goal: Will report anxiety at manageable levels  Description: INTERVENTIONS:  1. Administer medication as ordered  2. Teach and rehearse alternative coping skills  3. Provide emotional support with 1:1 interaction with staff  3/11/2023 1005 by Barbara Cardoza RN  Outcome: Progressing     Problem: Coping  Goal: Pt/Family able to verbalize concerns and demonstrate effective coping strategies  Description: INTERVENTIONS:  1. Assist patient/family to identify coping skills, available support systems and cultural and spiritual values  2. Provide emotional support, including active listening and acknowledgement of concerns of patient and caregivers  3. Reduce environmental stimuli, as able  4. Instruct patient/family in relaxation techniques, as appropriate  5. Assess for spiritual pain/suffering and initiate Spiritual Care, Psychosocial Clinical Specialist consults as needed  3/11/2023 1005 by Barbara Cardoza RN  Outcome: Progressing     Problem: Behavior  Goal: Pt/Family maintain appropriate behavior and adhere to behavioral management agreement, if implemented  Description: INTERVENTIONS:  1. Assess patient/family's coping skills and  non-compliant behavior (including use of illegal substances)  2. Notify security of behavior or suspected illegal substances which indicate the need for search of the family and/or belongings  3. Encourage verbalization of thoughts and concerns in a socially appropriate manner  4. Utilize positive, consistent limit setting strategies supporting safety of patient, staff and others  5. Encourage participation in the decision making process about the behavioral management agreement  6.  If a visitor's behavior poses a threat to safety call refer to organization policy. 7. Initiate consult with , Psychosocial CNS, Spiritual Care as appropriate  3/11/2023 1005 by Mimi Pierce RN  Outcome: Progressing    Patient denies SI/HI and hallucinations. Patient is irritable and withdrawn. He continues to focus on discharge. He is out on the unit for meals and groups. Patient takes prescribed medications without issue. Will continue to offer support and comfort to patient.

## 2023-03-11 NOTE — PROGRESS NOTES
Patient was out on unit ,social with select peers. Denies suicidal,homicidal or AV hallucinations. No anxiety or depression verbalized. Patient verbalizes upset due to his ex wifes wants her soin to have guardianship and patient refuse. Patient verbalizes that he wants his step son Brittani Schmitz to be his guardian and told his  to get a hold of him. Verbalizes appetite is good. Sleeping fine. Compliant with medications   attending group  Will continue to monitor and provide safe environment with safety rounds every 15 minutes .

## 2023-03-12 PROCEDURE — 1240000000 HC EMOTIONAL WELLNESS R&B

## 2023-03-12 PROCEDURE — 6370000000 HC RX 637 (ALT 250 FOR IP): Performed by: NURSE PRACTITIONER

## 2023-03-12 PROCEDURE — 6370000000 HC RX 637 (ALT 250 FOR IP)

## 2023-03-12 PROCEDURE — 6370000000 HC RX 637 (ALT 250 FOR IP): Performed by: INTERNAL MEDICINE

## 2023-03-12 RX ADMIN — POTASSIUM CHLORIDE 10 MEQ: 750 TABLET, EXTENDED RELEASE ORAL at 09:18

## 2023-03-12 RX ADMIN — Medication 5 MG: at 17:04

## 2023-03-12 RX ADMIN — CARVEDILOL 25 MG: 25 TABLET, FILM COATED ORAL at 17:04

## 2023-03-12 RX ADMIN — DIVALPROEX SODIUM 250 MG: 125 CAPSULE, COATED PELLETS ORAL at 20:46

## 2023-03-12 RX ADMIN — ROSUVASTATIN CALCIUM 20 MG: 20 TABLET, FILM COATED ORAL at 20:46

## 2023-03-12 RX ADMIN — MIRTAZAPINE 7.5 MG: 15 TABLET, FILM COATED ORAL at 20:46

## 2023-03-12 RX ADMIN — LISINOPRIL 20 MG: 10 TABLET ORAL at 09:18

## 2023-03-12 RX ADMIN — CEFDINIR 300 MG: 300 CAPSULE ORAL at 20:45

## 2023-03-12 RX ADMIN — APIXABAN 5 MG: 5 TABLET, FILM COATED ORAL at 09:18

## 2023-03-12 RX ADMIN — CARVEDILOL 25 MG: 25 TABLET, FILM COATED ORAL at 09:18

## 2023-03-12 RX ADMIN — ASPIRIN 81 MG CHEWABLE TABLET 81 MG: 81 TABLET CHEWABLE at 09:18

## 2023-03-12 RX ADMIN — CEFDINIR 300 MG: 300 CAPSULE ORAL at 09:18

## 2023-03-12 RX ADMIN — PANTOPRAZOLE SODIUM 20 MG: 20 TABLET, DELAYED RELEASE ORAL at 09:18

## 2023-03-12 RX ADMIN — DIVALPROEX SODIUM 250 MG: 125 CAPSULE, COATED PELLETS ORAL at 09:18

## 2023-03-12 RX ADMIN — APIXABAN 5 MG: 5 TABLET, FILM COATED ORAL at 20:46

## 2023-03-12 RX ADMIN — AMLODIPINE BESYLATE 15 MG: 10 TABLET ORAL at 09:18

## 2023-03-12 ASSESSMENT — PAIN SCALES - GENERAL: PAINLEVEL_OUTOF10: 0

## 2023-03-12 NOTE — PROGRESS NOTES
Patient declined invitation to the following groups    [employer]. Patient will continue to be provided with opportunities to enhance leisure skills/interests and/or coping mechanisms.

## 2023-03-12 NOTE — PLAN OF CARE
Problem: Chronic Conditions and Co-morbidities  Goal: Patient's chronic conditions and co-morbidity symptoms are monitored and maintained or improved  Outcome: Progressing     Problem: Anxiety  Goal: Will report anxiety at manageable levels  Description: INTERVENTIONS:  1. Administer medication as ordered  2. Teach and rehearse alternative coping skills  3. Provide emotional support with 1:1 interaction with staff  Outcome: Progressing     Problem: Coping  Goal: Pt/Family able to verbalize concerns and demonstrate effective coping strategies  Description: INTERVENTIONS:  1. Assist patient/family to identify coping skills, available support systems and cultural and spiritual values  2. Provide emotional support, including active listening and acknowledgement of concerns of patient and caregivers  3. Reduce environmental stimuli, as able  4. Instruct patient/family in relaxation techniques, as appropriate  5. Assess for spiritual pain/suffering and initiate Spiritual Care, Psychosocial Clinical Specialist consults as needed  Outcome: Progressing     Problem: Decision Making  Goal: Pt/Family able to effectively weigh alternatives and participate in decision making related to treatment and care  Description: INTERVENTIONS:  1. Determine when there are differences between patient's view, family's view, and healthcare provider's view of condition  2. Facilitate patient and family articulation of goals for care  3. Help patient and family identify pros/cons of alternative solutions  4. Provide information as requested by patient/family  5. Respect patient/family right to receive or not to receive information  6. Serve as a liaison between patient and family and health care team  7. Initiate Consults from Ethics, Palliative Care or initiate Family Care Conference as is appropriate  Outcome: Progressing     Problem: Behavior  Goal: Pt/Family maintain appropriate behavior and adhere to behavioral management agreement, if  implemented  Description: INTERVENTIONS:  1. Assess patient/family's coping skills and  non-compliant behavior (including use of illegal substances)  2. Notify security of behavior or suspected illegal substances which indicate the need for search of the family and/or belongings  3. Encourage verbalization of thoughts and concerns in a socially appropriate manner  4. Utilize positive, consistent limit setting strategies supporting safety of patient, staff and others  5. Encourage participation in the decision making process about the behavioral management agreement  6. If a visitor's behavior poses a threat to safety call refer to organization policy. 7. Initiate consult with , Psychosocial CNS, Spiritual Care as appropriate  Outcome: Progressing     Problem: Risk for Elopement  Goal: Patient will not exit the unit/facility without proper excort  Outcome: Progressing     Problem: Safety - Adult  Goal: Free from fall injury  Outcome: Progressing     Problem: ABCDS Injury Assessment  Goal: Absence of physical injury  Outcome: Progressing     Patient denies suicidal ideations, homicidal ideations, and hallucinations. Underlying irritability. Discharge focused. He is medication compliant and is in control of his behavior.

## 2023-03-12 NOTE — PROGRESS NOTES
BEHAVIORAL HEALTH FOLLOW-UP NOTE     3/12/2023     Patient was seen and examined in person, Chart reviewed   Patient's case discussed with staff/team    Chief Complaint: Misinterpreting due to inability to hear correctly      Interim History:   I saw patient this morning in his room is clear that he is having a difficult time hearing me to answer questions appropriately he automatically becomes angry misinterpreting the questions are being asked as he is not able to hear well.   He radha volatile and impulsive denies suicidal homicidal ideations intent or plan denies auditory visual loose [place] very poor insight and judgment      Appetite: [x] Normal/Unchanged  [] Increased  [] Decreased      Sleep:       [x] Normal/Unchanged  [] Fair       [] Poor              Energy:    [x] Normal/Unchanged  [] Increased  [] Decreased        SI [] Present  [x] Absent    HI  []Present  [x] Absent     Aggression:  [] yes  [x] no    Patient is [x] able  [] unable to CONTRACT FOR SAFETY     PAST MEDICAL/PSYCHIATRIC HISTORY:   Past Medical History:   Diagnosis Date    Atrial fibrillation (HCC)     Chronic kidney disease     Congestive heart failure (CHF) (HCC)     Coronary artery disease     Hyperlipidemia     Hypertension        FAMILY/SOCIAL HISTORY:  Family History   Problem Relation Age of Onset    Heart Failure Other      Social History     Socioeconomic History    Marital status: Single     Spouse name: Not on file    Number of children: Not on file    Years of education: Not on file    Highest education level: Not on file   Occupational History    Not on file   Tobacco Use    Smoking status: Former     Types: Cigarettes    Smokeless tobacco: Never   Substance and Sexual Activity    Alcohol use: Not Currently    Drug use: Not on file    Sexual activity: Not on file   Other Topics Concern    Not on file   Social History Narrative    Not on file     Social Determinants of Health     Financial Resource Strain: Not on file   Food Insecurity: Not on file   Transportation Needs: Not on file   Physical Activity: Not on file   Stress: Not on file   Social Connections: Not on file   Intimate Partner Violence: Not on file   Housing Stability: Not on file           ROS:  [x] All negative/unchanged except if checked. Explain positive(checked items) below:  [] Constitutional  [] Eyes  [] Ear/Nose/Mouth/Throat  [] Respiratory  [] CV  [] GI  []   [] Musculoskeletal  [] Skin/Breast  [] Neurological  [] Endocrine  [] Heme/Lymph  [] Allergic/Immunologic    Explanation:     MEDICATIONS:    Current Facility-Administered Medications:     carvedilol (COREG) tablet 25 mg, 25 mg, Oral, BID WC, Janette Cutler DO, 25 mg at 03/12/23 0918    cefdinir (OMNICEF) capsule 300 mg, 300 mg, Oral, 2 times per day, Janette Cutler DO, 300 mg at 03/12/23 0918    acetaminophen (TYLENOL) tablet 650 mg, 650 mg, Oral, Q4H PRN, Kelly Cadet MD    magnesium hydroxide (MILK OF MAGNESIA) 400 MG/5ML suspension 30 mL, 30 mL, Oral, Daily PRN, Kelly Cadet MD    nicotine (NICODERM CQ) 21 MG/24HR 1 patch, 1 patch, TransDERmal, Daily, Kelly Cadet MD    aluminum & magnesium hydroxide-simethicone (MAALOX) 200-200-20 MG/5ML suspension 30 mL, 30 mL, Oral, PRN, Kelly Cadet MD    hydrOXYzine pamoate (VISTARIL) capsule 50 mg, 50 mg, Oral, TID PRN, Kelly Cadet MD, 50 mg at 03/02/23 2059    haloperidol (HALDOL) tablet 3 mg, 3 mg, Oral, Q6H PRN **OR** haloperidol lactate (HALDOL) injection 3 mg, 3 mg, IntraMUSCular, Q6H PRN, Kelly Cadet MD    amLODIPine (NORVASC) tablet 15 mg, 15 mg, Oral, Daily, YDUITH Lynn CNP, 15 mg at 03/12/23 0918    apixaban (ELIQUIS) tablet 5 mg, 5 mg, Oral, BID, YUDITH Lynn CNP, 5 mg at 03/12/23 0918    aspirin chewable tablet 81 mg, 81 mg, Oral, Daily, YUDITH Lynn CNP, 81 mg at 03/12/23 0918    cyclobenzaprine (FLEXERIL) tablet 5 mg, 5 mg, Oral, BID PRN, YUDITH Lynn CNP, 5 mg at  03/05/23 2127    divalproex (DEPAKOTE SPRINKLE) DR capsule 250 mg, 250 mg, Oral, 2 times per day, YUDITH Boswell CNP, 250 mg at 03/12/23 9159    lisinopril (PRINIVIL;ZESTRIL) tablet 20 mg, 20 mg, Oral, Daily, YUDITH Boswell CNP, 20 mg at 03/12/23 0918    pantoprazole (PROTONIX) tablet 20 mg, 20 mg, Oral, QAM AC, YUDITH Boswell CNP, 20 mg at 03/12/23 8758    polyethylene glycol (GLYCOLAX) packet 17 g, 17 g, Oral, Daily PRN, YUDITH Boswell CNP    potassium chloride (KLOR-CON M) extended release tablet 10 mEq, 10 mEq, Oral, Daily, YUDITH Boswell CNP, 10 mEq at 03/12/23 4055    rosuvastatin (CRESTOR) tablet 20 mg, 20 mg, Oral, Nightly, YUDITH Boswell CNP, 20 mg at 03/11/23 2054    mirtazapine (REMERON) tablet 7.5 mg, 7.5 mg, Oral, Nightly, YUDITH Ross CNP, 7.5 mg at 03/11/23 2053    melatonin disintegrating tablet 5 mg, 5 mg, Oral, Daily, YUDITH Ross CNP, 5 mg at 03/11/23 1721      Examination:  /77   Pulse 67   Temp 97.9 °F (36.6 °C) (Temporal)   Resp 16   Ht 6' 1\" (1.854 m)   Wt 185 lb (83.9 kg)   SpO2 96%   BMI 24.41 kg/m²   Gait - steady  Medication side effects(SE):     Mental Status Examination:    Level of consciousness:  within normal limits   Appearance:  fair grooming and fair hygiene  Behavior/Motor:  no abnormalities noted  Attitude toward examiner:  cooperative  Speech:  spontaneous, normal rate and normal volume   Mood: \" I am stuck here\"  Affect: Relaxed  Thought processes: Linear thought flight of ideas loose associations  Thought content: Devoid of any auditory visual hallucinations delusions or other perceptual normalities.   Denies SI/HI intent or plan   Language: able to name objects and repeate phrases  Remote Memory: Impaired   recent Memory: Impaired  Cognition:  oriented to person, place, and time   Fund of Knowledge: Vocabulary intact, pt is aware of current events and past history  Attetion and Concentration intact  Insight judgment impulse control adequate    ASSESSMENT: Patient symptoms are:  [x] Well controlled  [] Improving  [] Worsening  [x] No change      Diagnosis:  Principal Problem:    Cognitive disorder  Active Problems:    Acute psychosis ([address]    Dementia with behavioral disturbance  Resolved Problems:    * No resolved hospital problems. *      LABS:    No results for input(s): WBC, HGB, PLT in the last 72 hours. No results for input(s): NA, K, CL, CO2, BUN, CREATININE, GLUCOSE in the last 72 hours. No results for input(s): BILITOT, ALKPHOS, AST, ALT in the last 72 hours. Lab Results   Component Value Date/Time    LABAMPH NOT DETECTED 10/22/2022 06:27 PM    BARBSCNU NOT DETECTED 10/22/2022 06:27 PM    LABBENZ NOT DETECTED 10/22/2022 06:27 PM    LABMETH NOT DETECTED 10/22/2022 06:27 PM    OPIATESCREENURINE NOT DETECTED 10/22/2022 06:27 PM    PHENCYCLIDINESCREENURINE NOT DETECTED 10/22/2022 06:27 PM    ETOH <10 02/18/2023 09:31 PM     No results found for: TSH, FREET4  No results found for: LITHIUM  No results found for: VALPROATE, CBMZ        Treatment Plan:  Reviewed current Medications with the patient. Risks, benefits, side effects, drug-to-drug interactions and alternatives to treatment were discussed. Collateral information:   CD evaluation  Encourage patient to attend group and other milieu activities. Discharge planning discussed with the patient and treatment team.    Continue to offer medications patient is prescribed Depakote 250 mg twice daily   continue Remeron 7.5 mg at bedtime    PSYCHOTHERAPY/COUNSELING:  [x] Therapeutic interview  [x] Supportive  [] CBT  [] Ongoing  [] Other    [x] Patient continues to need, on a daily basis, active treatment furnished directly by or requiring the supervision of inpatient psychiatric personnel      Anticipated Length of stay: 3 to 7 days based on stability    NOTE: This report was transcribed using voice recognition software. Every effort was made to ensure accuracy; however, inadvertent computerized transcription errors may be present. Behavioral Services  Medicare Certification Upon Admission    I certify that this patient's inpatient psychiatric hospital admission is medically necessary for:    [x] (1) Treatment which could reasonably be expected to improve this patient's condition,       [x] (2) Or for diagnostic study;     AND     [x](2) The inpatient psychiatric services are provided while the individual is under the care of a physician and are included in the individualized plan of care.     Estimated length of stay/service  7 - 21 days     Plan for post-hospital care  follow with OP provider     Electronically signed by Pieter Runner, APRN - CNP on 1/28/2537 at 12:10 PM           Electronically signed by Pieter Runner, APRN - CNP on 0/17/5005 at 12:10 PM

## 2023-03-12 NOTE — PROGRESS NOTES
Patient was out on unit,watching tv with peers. Denies SI,HI or AV hallucinations. Denies anxiety or depression. \" I'm just angry that I'm still here and not able to be to see my great grand daughter and Uncle whom is dying with cancer. \"  Bebeto Rasmussen that he can't wait for my  to get a hold of my step son or nephew. Compliant with medications. Attends group. Will continue to monitor and provide safe environment with 15 minute rounds.

## 2023-03-12 NOTE — PLAN OF CARE
Problem: Chronic Conditions and Co-morbidities  Goal: Patient's chronic conditions and co-morbidity symptoms are monitored and maintained or improved  Outcome: Progressing     Problem: Anxiety  Goal: Will report anxiety at manageable levels  Description: INTERVENTIONS:  1. Administer medication as ordered  2. Teach and rehearse alternative coping skills  3. Provide emotional support with 1:1 interaction with staff  Outcome: Progressing     Problem: Behavior  Goal: Pt/Family maintain appropriate behavior and adhere to behavioral management agreement, if implemented  Description: INTERVENTIONS:  1. Assess patient/family's coping skills and  non-compliant behavior (including use of illegal substances)  2. Notify security of behavior or suspected illegal substances which indicate the need for search of the family and/or belongings  3. Encourage verbalization of thoughts and concerns in a socially appropriate manner  4. Utilize positive, consistent limit setting strategies supporting safety of patient, staff and others  5. Encourage participation in the decision making process about the behavioral management agreement  6. If a visitor's behavior poses a threat to safety call refer to organization policy. 7. Initiate consult with , Psychosocial CNS, Spiritual Care as appropriate  Outcome: Progressing     Problem: Involuntary Admit  Goal: Will cooperate with staff recommendations and doctor's orders and will demonstrate appropriate behavior  Description: INTERVENTIONS:  1. Treat underlying conditions and offer medication as ordered  2. Educate regarding involuntary admission procedures and rules  3. Contain excessive/inappropriate behavior per unit and hospital policies  Outcome: Progressing     Problem: Decision Making  Goal: Pt/Family able to effectively weigh alternatives and participate in decision making related to treatment and care  Description: INTERVENTIONS:  1.  Determine when there are differences between patient's view, family's view, and healthcare provider's view of condition  2. Facilitate patient and family articulation of goals for care  3. Help patient and family identify pros/cons of alternative solutions  4. Provide information as requested by patient/family  5. Respect patient/family right to receive or not to receive information  6. Serve as a liaison between patient and family and health care team  7.  Initiate Consults from Ethics, Palliative Care or initiate [address] as is appropriate  Outcome: Progressing

## 2023-03-13 PROCEDURE — 6370000000 HC RX 637 (ALT 250 FOR IP): Performed by: INTERNAL MEDICINE

## 2023-03-13 PROCEDURE — 1240000000 HC EMOTIONAL WELLNESS R&B

## 2023-03-13 PROCEDURE — 6370000000 HC RX 637 (ALT 250 FOR IP): Performed by: NURSE PRACTITIONER

## 2023-03-13 PROCEDURE — 6370000000 HC RX 637 (ALT 250 FOR IP)

## 2023-03-13 RX ADMIN — APIXABAN 5 MG: 5 TABLET, FILM COATED ORAL at 20:43

## 2023-03-13 RX ADMIN — LISINOPRIL 20 MG: 10 TABLET ORAL at 09:44

## 2023-03-13 RX ADMIN — POTASSIUM CHLORIDE 10 MEQ: 750 TABLET, EXTENDED RELEASE ORAL at 09:43

## 2023-03-13 RX ADMIN — PANTOPRAZOLE SODIUM 20 MG: 20 TABLET, DELAYED RELEASE ORAL at 06:34

## 2023-03-13 RX ADMIN — DIVALPROEX SODIUM 250 MG: 125 CAPSULE, COATED PELLETS ORAL at 20:44

## 2023-03-13 RX ADMIN — ROSUVASTATIN CALCIUM 20 MG: 20 TABLET, FILM COATED ORAL at 20:44

## 2023-03-13 RX ADMIN — CEFDINIR 300 MG: 300 CAPSULE ORAL at 09:41

## 2023-03-13 RX ADMIN — AMLODIPINE BESYLATE 15 MG: 10 TABLET ORAL at 09:44

## 2023-03-13 RX ADMIN — ASPIRIN 81 MG CHEWABLE TABLET 81 MG: 81 TABLET CHEWABLE at 09:41

## 2023-03-13 RX ADMIN — MIRTAZAPINE 7.5 MG: 15 TABLET, FILM COATED ORAL at 20:44

## 2023-03-13 RX ADMIN — CARVEDILOL 25 MG: 25 TABLET, FILM COATED ORAL at 17:59

## 2023-03-13 RX ADMIN — CARVEDILOL 25 MG: 25 TABLET, FILM COATED ORAL at 09:42

## 2023-03-13 RX ADMIN — CEFDINIR 300 MG: 300 CAPSULE ORAL at 20:44

## 2023-03-13 RX ADMIN — APIXABAN 5 MG: 5 TABLET, FILM COATED ORAL at 09:42

## 2023-03-13 RX ADMIN — Medication 5 MG: at 17:59

## 2023-03-13 RX ADMIN — DIVALPROEX SODIUM 250 MG: 125 CAPSULE, COATED PELLETS ORAL at 09:40

## 2023-03-13 ASSESSMENT — PAIN SCALES - GENERAL: PAINLEVEL_OUTOF10: 0

## 2023-03-13 NOTE — PLAN OF CARE
Problem: Anxiety  Goal: Will report anxiety at manageable levels  Description: INTERVENTIONS:  1. Administer medication as ordered  2. Teach and rehearse alternative coping skills  3. Provide emotional support with 1:1 interaction with staff  3/13/2023 0845 by Antonio Cho RN  Outcome: Progressing  3/12/2023 2201 by Sky Amaya RN  Outcome: Progressing     Problem: Coping  Goal: Pt/Family able to verbalize concerns and demonstrate effective coping strategies  Description: INTERVENTIONS:  1. Assist patient/family to identify coping skills, available support systems and cultural and spiritual values  2. Provide emotional support, including active listening and acknowledgement of concerns of patient and caregivers  3. Reduce environmental stimuli, as able  4. Instruct patient/family in relaxation techniques, as appropriate  5. Assess for spiritual pain/suffering and initiate Spiritual Care, Psychosocial Clinical Specialist consults as needed  3/13/2023 0845 by Antonio Cho RN  Outcome: Progressing  3/12/2023 2201 by Sky Amaya RN  Outcome: Progressing     Problem: Decision Making  Goal: Pt/Family able to effectively weigh alternatives and participate in decision making related to treatment and care  Description: INTERVENTIONS:  1. Determine when there are differences between patient's view, family's view, and healthcare provider's view of condition  2. Facilitate patient and family articulation of goals for care  3. Help patient and family identify pros/cons of alternative solutions  4. Provide information as requested by patient/family  5. Respect patient/family right to receive or not to receive information  6. Serve as a liaison between patient and family and health care team  7.  Initiate Consults from Ethics, Palliative Care or initiate [address] as is appropriate  3/13/2023 0845 by Antonio Cho RN  Outcome: Progressing  3/12/2023 2201 by Sky Amaya RN  Outcome: Progressing

## 2023-03-13 NOTE — CARE COORDINATION
Jose received call from 2011 [facility] at [facility]. He asked sw multiple questions about pt current status and reason for wanting step down unit for pt. Jose informed 2011 [facility] that the treatment team feels pt would benefit from a transition as he has been hospitalized. He states that he will check bed availability and contact sw shortly. Jose received return call from 2011 [facility] who states that they do not currently have any bed availability and that they do not anticipate any openings for several weeks. 2011 [facility] was unable to provide any further detail to jose regarding bed availability. Treatment team notified.

## 2023-03-13 NOTE — BH NOTE
Patient was out on unit, social with peers. Brightened with conversation. Denies suicidal,homicidal or AV hallucinations. Denies anxiety or depression. \"I just want to see my granddaughter, Tammie Martins and my step son Lyn Cohen. \"  Verbalizes appetite is good. Sleeping great. Compliant with medications. Attended group. Will continue to monitor and provided safe environment with continued 15 minute rounds.

## 2023-03-13 NOTE — CARE COORDINATION
SW Supervisor met with patient, discussed discharge plan. Pt was alert/oriented x4, pleasant, very hard of hearing, stated that he is ready to be discharged home. Pt reported that he plans on discharging to his home, which he reported is at [address]". Pt reported that SW that he has a court hearing on \"\" and he \"cannot let Ela Kim win\". Pt stated several times that he is concerned his son has taken some of his belongings. Pt gave staff permission to go through his belongings to confirm that he has keys to his house. RN found a key ring, which patient confirmed had his house keys, truck keys and his  wife's wedding ring attached. Per SW Supervisor's previous conversation with pt's son Ela Kim, patient can return to his home, however it is \"full of belongings and garbage\". Per pt's son and [employer], pt's bills are being paid and his utilities are on. Pt is currently alert/oriented x4 and able to complete ADLs independently. SW Supervisor discussed the discharge option of returning home with APS and [facility] referral with treatment team. Awaiting final discharge decision.      JALEESA Perez, [facility] Work Supervisor

## 2023-03-13 NOTE — BH NOTE
Patient denies SI/HI/AVH at this time. He takes his medications and meals well. Rates anxiety and depression a zero. Patient denies pain and he does attend groups.

## 2023-03-13 NOTE — CARE COORDINATION
Navigator placed phone call to [facility]. Eulis Hashimoto reports that he will review the referral for [address] [place] [address] today and check bed availability. He will call Navigator or SW back today with determination.

## 2023-03-13 NOTE — PLAN OF CARE
Problem: Anxiety  Goal: Will report anxiety at manageable levels  Description: INTERVENTIONS:  1. Administer medication as ordered  2. Teach and rehearse alternative coping skills  3. Provide emotional support with 1:1 interaction with staff  3/12/2023 2201 by Jazzmine Hyman RN  Outcome: Progressing  3/12/2023 1621 by Hayden Abel RN  Outcome: Progressing     Problem: Coping  Goal: Pt/Family able to verbalize concerns and demonstrate effective coping strategies  Description: INTERVENTIONS:  1. Assist patient/family to identify coping skills, available support systems and cultural and spiritual values  2. Provide emotional support, including active listening and acknowledgement of concerns of patient and caregivers  3. Reduce environmental stimuli, as able  4. Instruct patient/family in relaxation techniques, as appropriate  5. Assess for spiritual pain/suffering and initiate Spiritual Care, Psychosocial Clinical Specialist consults as needed  3/12/2023 2201 by Jazzmine Hyman RN  Outcome: Progressing  3/12/2023 1621 by Hayden Abel RN  Outcome: Progressing     Problem: Decision Making  Goal: Pt/Family able to effectively weigh alternatives and participate in decision making related to treatment and care  Description: INTERVENTIONS:  1. Determine when there are differences between patient's view, family's view, and healthcare provider's view of condition  2. Facilitate patient and family articulation of goals for care  3. Help patient and family identify pros/cons of alternative solutions  4. Provide information as requested by patient/family  5. Respect patient/family right to receive or not to receive information  6. Serve as a liaison between patient and family and health care team  7.  Initiate Consults from Ethics, Palliative Care or initiate [address] as is appropriate  3/12/2023 2201 by Jazzmine Hyman RN  Outcome: Progressing  3/12/2023 1621 by Hayden Abel RN  Outcome: Progressing     Problem: Behavior  Goal: Pt/Family maintain appropriate behavior and adhere to behavioral management agreement, if implemented  Description: INTERVENTIONS:  1. Assess patient/family's coping skills and  non-compliant behavior (including use of illegal substances)  2. Notify security of behavior or suspected illegal substances which indicate the need for search of the family and/or belongings  3. Encourage verbalization of thoughts and concerns in a socially appropriate manner  4. Utilize positive, consistent limit setting strategies supporting safety of patient, staff and others  5. Encourage participation in the decision making process about the behavioral management agreement  6. If a visitor's behavior poses a threat to safety call refer to organization policy. 7. Initiate consult with , Psychosocial CNS, Spiritual Care as appropriate  3/12/2023 2201 by Aaron Mckeon RN  Outcome: Progressing  3/12/2023 1621 by Vazquez Carlisle RN  Outcome: Progressing     Problem: Involuntary Admit  Goal: Will cooperate with staff recommendations and doctor's orders and will demonstrate appropriate behavior  Description: INTERVENTIONS:  1. Treat underlying conditions and offer medication as ordered  2. Educate regarding involuntary admission procedures and rules  3.  Contain excessive/inappropriate behavior per unit and hospital policies  Outcome: Progressing

## 2023-03-13 NOTE — GROUP NOTE
Group Therapy Note    Date: 3/13/2023  Start Time: 1105  End Time:  1150  Number of Participants: 11    Type of Group: Psychoeducation    Wellness Binder Information  Module Name:  Symptoms of Stress and Stress Management     Patient's Goal:  ID symptoms of stress and discuss and describe ways to improve stressors through stress management techniques. Notes:  Patient passively engaged in discussion of symptoms of stress. Was able to ID symptoms of stress and ways to improve stress through the use of stress management techniques. Status After Intervention:  Improved    Participation Level:  Active Listener    Participation Quality: Appropriate, Attentive      Speech:  normal      Thought Process/Content: Logical      Affective Functioning: Congruent      Mood: euthymic      Level of consciousness:  Alert      Response to Learning: Able to verbalize current knowledge/experience, Able to verbalize/acknowledge new learning, and Able to retain information      Endings: None Reported    Modes of Intervention: Education, Support, and Socialization      Discipline Responsible: Psychoeducational Specialist      Signature:  Elieser Seo, [address]

## 2023-03-13 NOTE — PROGRESS NOTES
Patient attended community meeting   Was updated on expectations of the unit, staffing, and programming  Patient shared goal for today as to see my   Patient was 1 of 11 in attendance.

## 2023-03-13 NOTE — PROGRESS NOTES
[id number] [facility]  Interdisciplinary Treatment Plan Note     Review Date & Time: 03/13/2023 0900    Patient was in treatment team.    Estimated Length of Stay Update:  11-14   Estimated Discharge Date Update: 03/15/2023    EDUCATION:   Learner Progress Toward Treatment Goals: Reviewed results and recommendations of this team    Method: Small group    Outcome: Verbalized understanding    PATIENT GOALS: \"See my . \"    PLAN/TREATMENT RECOMMENDATIONS UPDATE: Encourage patient to attend and participate in groups. Take medication as prescribed. GOALS UPDATE:  Time frame for Short-Term Goals: Prior to discharge.       Romain Ellison RN

## 2023-03-13 NOTE — GROUP NOTE
Group Therapy Note    Date: 3/13/2023    Group Start Time: 1400  Group End Time: 1440  Group Topic: Cognitive Skills    [facility] [place] [address], MSW, LSW        Group Therapy Note    Attendees: 11       Patient's Goal:  Pt will discuss current boundaries and learn new ways to set boundaries. Notes:  Pt was an active participant in group discussion. Status After Intervention:  Unchanged    Participation Level: None    Participation Quality: Appropriate      Speech:  mute      Thought Process/Content: Linear      Affective Functioning: Flat      Mood: euthymic      Level of consciousness:  Alert and Attentive      Response to Learning: Resistant      Endings: None Reported    Modes of Intervention: Education, Support, Socialization, Exploration, Clarifying, and Problem-solving      Discipline Responsible: /Counselor      Signature:   JALEESA Wolf, [facility]

## 2023-03-13 NOTE — PROGRESS NOTES
BEHAVIORAL HEALTH FOLLOW-UP NOTE     3/13/2023     Patient was seen and examined in person, Chart reviewed   Patient's case discussed with staff/team    Chief Complaint: Misinterpreting due to inability to hear correctly      Interim History:   Patient out in the day room in the milieu. Low threshold for frustration. No behaviors.   He is eating well, sleeping well and taking his medications he is present in groups      Appetite: [x] Normal/Unchanged  [] Increased  [] Decreased      Sleep:       [x] Normal/Unchanged  [] Fair       [] Poor              Energy:    [x] Normal/Unchanged  [] Increased  [] Decreased        SI [] Present  [x] Absent    HI  []Present  [x] Absent     Aggression:  [] yes  [x] no    Patient is [x] able  [] unable to CONTRACT FOR SAFETY     PAST MEDICAL/PSYCHIATRIC HISTORY:   Past Medical History:   Diagnosis Date    Atrial fibrillation ([facility] [address]     Chronic kidney disease     Congestive heart failure (CHF) (HCC)     Coronary artery disease     Hyperlipidemia     Hypertension        FAMILY/SOCIAL HISTORY:  Family History   Problem Relation Age of Onset    Heart Failure Other      Social History     Socioeconomic History    Marital status: Single     Spouse name: Not on file    Number of children: Not on file    Years of education: Not on file    Highest education level: Not on file   Occupational History    Not on file   Tobacco Use    Smoking status: Former     Types: Cigarettes    Smokeless tobacco: Never   Substance and Sexual Activity    Alcohol use: Not Currently    Drug use: Not on file    Sexual activity: Not on file   Other Topics Concern    Not on file   Social History Narrative    Not on file     Social Determinants of Health     Financial Resource Strain: Not on file   Food Insecurity: Not on file   Transportation Needs: Not on file   Physical Activity: Not on file   Stress: Not on file   Social Connections: Not on file   Intimate Partner Violence: Not on file   Housing Stability: Not on file           ROS:  [x] All negative/unchanged except if checked.  Explain positive(checked items) below:  [] Constitutional  [] Eyes  [] Ear/Nose/Mouth/Throat  [] Respiratory  [] CV  [] GI  []   [] Musculoskeletal  [] Skin/Breast  [] Neurological  [] Endocrine  [] Heme/Lymph  [] Allergic/Immunologic    Explanation:     MEDICATIONS:    Current Facility-Administered Medications:     carvedilol (COREG) tablet 25 mg, 25 mg, Oral, BID WC, Janette Cutler, DO, 25 mg at 03/13/23 0078    cefdinir (OMNICEF) capsule 300 mg, 300 mg, Oral, 2 times per day, Donnald Ax, DO, 300 mg at 03/13/23 0941    acetaminophen (TYLENOL) tablet 650 mg, 650 mg, Oral, Q4H PRN, Brittany Cruz MD    magnesium hydroxide (MILK OF MAGNESIA) 400 MG/5ML suspension 30 mL, 30 mL, Oral, Daily PRN, Brittany Cruz MD    nicotine (NICODERM CQ) 21 MG/24HR 1 patch, 1 patch, TransDERmal, Daily, Brittany Cruz MD    aluminum & magnesium hydroxide-simethicone (MAALOX) 200-200-20 MG/5ML suspension 30 mL, 30 mL, Oral, PRN, Brittany Cruz MD    hydrOXYzine pamoate (VISTARIL) capsule 50 mg, 50 mg, Oral, TID PRN, Brittany Cruz MD, 50 mg at 03/02/23 2059    haloperidol (HALDOL) tablet 3 mg, 3 mg, Oral, Q6H PRN **OR** haloperidol lactate (HALDOL) injection 3 mg, 3 mg, IntraMUSCular, Q6H PRN, Brittany Cruz MD    amLODIPine (NORVASC) tablet 15 mg, 15 mg, Oral, Daily, YUDITH Boswell CNP, 15 mg at 03/13/23 0944    apixaban (ELIQUIS) tablet 5 mg, 5 mg, Oral, BID, YUDITH Boswell CNP, 5 mg at 03/13/23 4704    aspirin chewable tablet 81 mg, 81 mg, Oral, Daily, YUDITH Boswell CNP, 81 mg at 03/13/23 0941    cyclobenzaprine (FLEXERIL) tablet 5 mg, 5 mg, Oral, BID PRN, YUDITH Boswell CNP, 5 mg at 03/05/23 2127    divalproex (DEPAKOTE SPRINKLE) DR capsule 250 mg, 250 mg, Oral, 2 times per day, YUDITH Boswell CNP, 250 mg at 03/13/23 0940    lisinopril (PRINIVIL;ZESTRIL) tablet 20 mg, 20 mg, Oral, Daily, Wyvonna Erichsen, APRN - CNP, 20 mg at 03/13/23 0944    pantoprazole (PROTONIX) tablet 20 mg, 20 mg, Oral, QAM AC, Wyvonna Erichsen, APRN - CNP, 20 mg at 03/13/23 9485    polyethylene glycol (GLYCOLAX) packet 17 g, 17 g, Oral, Daily PRN, Wyvonna Erichsen, APRN - CNP    potassium chloride (KLOR-CON M) extended release tablet 10 mEq, 10 mEq, Oral, Daily, Wyvonna Erichsen, APRN - CNP, 10 mEq at 03/13/23 0943    rosuvastatin (CRESTOR) tablet 20 mg, 20 mg, Oral, Nightly, Wyvonna Erichsen, APRN - CNP, 20 mg at 03/12/23 2046    mirtazapine (REMERON) tablet 7.5 mg, 7.5 mg, Oral, Nightly, Edelmira Dent, APRN - CNP, 7.5 mg at 03/12/23 2046    melatonin disintegrating tablet 5 mg, 5 mg, Oral, Daily, Edelmira Dent, APRN - CNP, 5 mg at 03/12/23 1704      Examination:  /65   Pulse 63   Temp 98 °F (36.7 °C) (Temporal)   Resp 16   Ht 6' 1\" (1.854 m)   Wt 185 lb (83.9 kg)   SpO2 96%   BMI 24.41 kg/m²   Gait - steady  Medication side effects(SE):     Mental Status Examination:    Level of consciousness:  within normal limits   Appearance:  fair grooming and fair hygiene  Behavior/Motor:  no abnormalities noted  Attitude toward examiner:  cooperative  Speech:  spontaneous, normal rate and normal volume   Mood: \" I am stuck here\"  Affect: Relaxed  Thought processes: Linear thought flight of ideas loose associations  Thought content: Devoid of any auditory visual hallucinations delusions or other perceptual normalities.   Denies SI/HI intent or plan   Language: able to name objects and repeate phrases  Remote Memory: Impaired   recent Memory: Impaired  Cognition:  oriented to person, place, and time   Fund of Knowledge: Vocabulary intact, pt is aware of current events and past history  Attetion and Concentration intact  Insight judgment impulse control adequate    ASSESSMENT: Patient symptoms are:  [x] Well controlled  [] Improving  [] Worsening  [] No change      Diagnosis:  Principal Problem:    Cognitive disorder  Active Problems:    Acute psychosis ([facility] [address]    Dementia with behavioral disturbance  Resolved Problems:    * No resolved hospital problems. *      LABS:    No results for input(s): WBC, HGB, PLT in the last 72 hours. No results for input(s): NA, K, CL, CO2, BUN, CREATININE, GLUCOSE in the last 72 hours. No results for input(s): BILITOT, ALKPHOS, AST, ALT in the last 72 hours. Lab Results   Component Value Date/Time    LABAMPH NOT DETECTED 10/22/2022 06:27 PM    BARBSCNU NOT DETECTED 10/22/2022 06:27 PM    LABBENZ NOT DETECTED 10/22/2022 06:27 PM    LABMETH NOT DETECTED 10/22/2022 06:27 PM    OPIATESCREENURINE NOT DETECTED 10/22/2022 06:27 PM    PHENCYCLIDINESCREENURINE NOT DETECTED 10/22/2022 06:27 PM    ETOH <10 02/18/2023 09:31 PM     No results found for: TSH, FREET4  No results found for: LITHIUM  No results found for: VALPROATE, CBMZ        Treatment Plan:  Reviewed current Medications with the patient. Risks, benefits, side effects, drug-to-drug interactions and alternatives to treatment were discussed. Collateral information:   CD evaluation  Encourage patient to attend group and other milieu activities. Discharge planning discussed with the patient and treatment team.    Continue to offer medications patient is prescribed Depakote 250 mg twice daily   continue Remeron 7.5 mg at bedtime    PSYCHOTHERAPY/COUNSELING:  [x] Therapeutic interview  [x] Supportive  [] CBT  [] Ongoing  [] Other    [x] Patient continues to need, on a daily basis, active treatment furnished directly by or requiring the supervision of inpatient psychiatric personnel      Anticipated Length of stay: 3 to 7 days based on stability    NOTE: This report was transcribed using voice recognition software. Every effort was made to ensure accuracy; however, inadvertent computerized transcription errors may be present.     Behavioral Services  Medicare Certification Upon Admission    I certify that this patient's inpatient psychiatric hospital admission is medically necessary for:    [x] (1) Treatment which could reasonably be expected to improve this patient's condition,       [x] (2) Or for diagnostic study;     AND     [x](2) The inpatient psychiatric services are provided while the individual is under the care of a physician and are included in the individualized plan of care.     Estimated length of stay/service  7 - 21 days     Plan for post-hospital care  follow with OP provider     Electronically signed by YUDITH Schroeder CNP on 3/13/2023 at 2:49 PM           Electronically signed by YUDITH Schroeder CNP on 3/13/2023 at 2:49 PM

## 2023-03-14 PROCEDURE — 6370000000 HC RX 637 (ALT 250 FOR IP): Performed by: INTERNAL MEDICINE

## 2023-03-14 PROCEDURE — 6370000000 HC RX 637 (ALT 250 FOR IP): Performed by: NURSE PRACTITIONER

## 2023-03-14 PROCEDURE — 1240000000 HC EMOTIONAL WELLNESS R&B

## 2023-03-14 PROCEDURE — 6370000000 HC RX 637 (ALT 250 FOR IP)

## 2023-03-14 RX ORDER — AMLODIPINE BESYLATE 5 MG/1
15 TABLET ORAL DAILY
Qty: 90 TABLET | Refills: 0 | Status: SHIPPED | OUTPATIENT
Start: 2023-03-15 | End: 2023-04-14

## 2023-03-14 RX ORDER — DIVALPROEX SODIUM 125 MG/1
250 CAPSULE, COATED PELLETS ORAL EVERY 12 HOURS SCHEDULED
Qty: 120 CAPSULE | Refills: 0 | Status: SHIPPED | OUTPATIENT
Start: 2023-03-14 | End: 2023-04-13

## 2023-03-14 RX ORDER — MIRTAZAPINE 7.5 MG/1
7.5 TABLET, FILM COATED ORAL NIGHTLY
Qty: 30 TABLET | Refills: 0 | Status: SHIPPED | OUTPATIENT
Start: 2023-03-14 | End: 2023-04-13

## 2023-03-14 RX ORDER — POTASSIUM CHLORIDE 750 MG/1
10 TABLET, EXTENDED RELEASE ORAL DAILY
Qty: 30 TABLET | Refills: 0 | Status: SHIPPED | OUTPATIENT
Start: 2023-03-15 | End: 2023-04-14

## 2023-03-14 RX ORDER — MECOBALAMIN 5000 MCG
5 TABLET,DISINTEGRATING ORAL DAILY
COMMUNITY
Start: 2023-03-14

## 2023-03-14 RX ADMIN — CARVEDILOL 25 MG: 25 TABLET, FILM COATED ORAL at 09:25

## 2023-03-14 RX ADMIN — CEFDINIR 300 MG: 300 CAPSULE ORAL at 09:23

## 2023-03-14 RX ADMIN — Medication 5 MG: at 17:49

## 2023-03-14 RX ADMIN — PANTOPRAZOLE SODIUM 20 MG: 20 TABLET, DELAYED RELEASE ORAL at 06:42

## 2023-03-14 RX ADMIN — APIXABAN 5 MG: 5 TABLET, FILM COATED ORAL at 09:25

## 2023-03-14 RX ADMIN — AMLODIPINE BESYLATE 15 MG: 10 TABLET ORAL at 09:24

## 2023-03-14 RX ADMIN — DIVALPROEX SODIUM 250 MG: 125 CAPSULE, COATED PELLETS ORAL at 09:22

## 2023-03-14 RX ADMIN — ASPIRIN 81 MG CHEWABLE TABLET 81 MG: 81 TABLET CHEWABLE at 09:25

## 2023-03-14 RX ADMIN — LISINOPRIL 20 MG: 10 TABLET ORAL at 09:23

## 2023-03-14 RX ADMIN — POTASSIUM CHLORIDE 10 MEQ: 750 TABLET, EXTENDED RELEASE ORAL at 09:23

## 2023-03-14 RX ADMIN — CARVEDILOL 25 MG: 25 TABLET, FILM COATED ORAL at 17:49

## 2023-03-14 NOTE — BH NOTE
Patient denies SI/HI/AVH at this time. He does not rate anxiety or depression. Patient takes his medication and meals well. He denies pain presently and attends groups.

## 2023-03-14 NOTE — CARE COORDINATION
BASSEM contacted [facility]. [phone] and scheduled a follow up appointment for 3/20.

## 2023-03-14 NOTE — PROGRESS NOTES
Patient presents calm and cooperative during assessment. Patient A/O x 4 and extremely hard of hearing. Patient denies SI/AVH. Patient denied HI but reports wanting to Libvane Almanzason's ass if I ever see him again. \" Patient states Jacobo Prater was told to stay off patient's property and \"no trespassing\" signs were posted on patient's property. Patient reports will shoot stepson Jacobo Prater if he enters patient's property. Patient denies anxiety, depression, and pain. Patient flat on approach but brighter with conversation. Patient becomes exaggerated when discussing hospitalization and discharge plans. Patient reports needing to see patient's  to prevent Corie Fields from getting guardianship over patient. No paranoia or delusions reported or observed. Patient is visible on the unit, social with peers, and medication compliant. No PRN's given. No c/o or concerns verbalized at this time. No unit problems reported. Q 15 minute purposeful rounds maintained. Will continue to observe and report.

## 2023-03-14 NOTE — PROGRESS NOTES
BEHAVIORAL HEALTH FOLLOW-UP NOTE     3/14/2023     Patient was seen and examined in person, Chart reviewed   Patient's case discussed with staff/team    Chief Complaint:       Interim History:   Patient out in the day room in the milieu. Low threshold for frustration. No behaviors. He is eating well, sleeping well and taking his medications he is present in groups. No psychiatric symptoms. He is med compliant. Tolerating his hospitalization to the best of his ability. Optimistic that patient will be discharging soon.        Appetite: [x] Normal/Unchanged  [] Increased  [] Decreased      Sleep:       [x] Normal/Unchanged  [] Fair       [] Poor              Energy:    [x] Normal/Unchanged  [] Increased  [] Decreased        SI [] Present  [x] Absent    HI  []Present  [x] Absent     Aggression:  [] yes  [x] no    Patient is [x] able  [] unable to CONTRACT FOR SAFETY     PAST MEDICAL/PSYCHIATRIC HISTORY:   Past Medical History:   Diagnosis Date    Atrial fibrillation (HCC)     Chronic kidney disease     Congestive heart failure (CHF) (HCC)     Coronary artery disease     Hyperlipidemia     Hypertension        FAMILY/SOCIAL HISTORY:  Family History   Problem Relation Age of Onset    Heart Failure Other      Social History     Socioeconomic History    Marital status: Single     Spouse name: Not on file    Number of children: Not on file    Years of education: Not on file    Highest education level: Not on file   Occupational History    Not on file   Tobacco Use    Smoking status: Former     Types: Cigarettes    Smokeless tobacco: Never   Substance and Sexual Activity    Alcohol use: Not Currently    Drug use: Not on file    Sexual activity: Not on file   Other Topics Concern    Not on file   Social History Narrative    Not on file     Social Determinants of Health     Financial Resource Strain: Not on file   Food Insecurity: Not on file   Transportation Needs: Not on file   Physical Activity: Not on file   Stress: Not on file   Social Connections: Not on file   Intimate Partner Violence: Not on file   Housing Stability: Not on file           ROS:  [x] All negative/unchanged except if checked.  Explain positive(checked items) below:  [] Constitutional  [] Eyes  [] Ear/Nose/Mouth/Throat  [] Respiratory  [] CV  [] GI  []   [] Musculoskeletal  [] Skin/Breast  [] Neurological  [] Endocrine  [] Heme/Lymph  [] Allergic/Immunologic    Explanation:     MEDICATIONS:    Current Facility-Administered Medications:     carvedilol (COREG) tablet 25 mg, 25 mg, Oral, BID WC, Janette Cutler, DO, 25 mg at 03/14/23 7372    cefdinir (OMNICEF) capsule 300 mg, 300 mg, Oral, 2 times per day, Pravin Liat, DO, 300 mg at 03/14/23 2470    acetaminophen (TYLENOL) tablet 650 mg, 650 mg, Oral, Q4H PRN, Livia Moreno MD    magnesium hydroxide (MILK OF MAGNESIA) 400 MG/5ML suspension 30 mL, 30 mL, Oral, Daily PRN, Livia Moreno MD    nicotine (NICODERM CQ) 21 MG/24HR 1 patch, 1 patch, TransDERmal, Daily, Livia Moreno MD    aluminum & magnesium hydroxide-simethicone (MAALOX) 200-200-20 MG/5ML suspension 30 mL, 30 mL, Oral, PRN, Livia Moreno MD    hydrOXYzine pamoate (VISTARIL) capsule 50 mg, 50 mg, Oral, TID PRN, Livia Moreno MD, 50 mg at 03/02/23 2059    haloperidol (HALDOL) tablet 3 mg, 3 mg, Oral, Q6H PRN **OR** haloperidol lactate (HALDOL) injection 3 mg, 3 mg, IntraMUSCular, Q6H PRN, Livia Moreno MD    amLODIPine (NORVASC) tablet 15 mg, 15 mg, Oral, Daily, YUDITH Huddleston - CNP, 15 mg at 03/14/23 9991    apixaban (ELIQUIS) tablet 5 mg, 5 mg, Oral, BID, YUDITH Huddleston - CNP, 5 mg at 03/14/23 4143    aspirin chewable tablet 81 mg, 81 mg, Oral, Daily, YUDITH Huddleston - CNP, 81 mg at 03/14/23 0925    cyclobenzaprine (FLEXERIL) tablet 5 mg, 5 mg, Oral, BID PRN, YUDITH Huddleston - CNP, 5 mg at 03/05/23 2127    divalproex (DEPAKOTE SPRINKLE) DR capsule 250 mg, 250 mg, Oral, 2 times per day, Lucinda Jimenez Bartlett, APRN - CNP, 250 mg at 03/14/23 0922    lisinopril (PRINIVIL;ZESTRIL) tablet 20 mg, 20 mg, Oral, Daily, YUDITH Lynn CNP, 20 mg at 03/14/23 0923    pantoprazole (PROTONIX) tablet 20 mg, 20 mg, Oral, QAM AC, YUDITH Lynn CNP, 20 mg at 03/14/23 0642    polyethylene glycol (GLYCOLAX) packet 17 g, 17 g, Oral, Daily PRN, YUDITH Lynn CNP    potassium chloride (KLOR-CON M) extended release tablet 10 mEq, 10 mEq, Oral, Daily, YUDITH Lynn CNP, 10 mEq at 03/14/23 0923    rosuvastatin (CRESTOR) tablet 20 mg, 20 mg, Oral, Nightly, YUDITH Lynn CNP, 20 mg at 03/13/23 2044    mirtazapine (REMERON) tablet 7.5 mg, 7.5 mg, Oral, Nightly, YUDITH Velasco CNP, 7.5 mg at 03/13/23 2044    melatonin disintegrating tablet 5 mg, 5 mg, Oral, Daily, YUDITH Velasco CNP, 5 mg at 03/13/23 1759      Examination:  /64   Pulse 77   Temp 98.3 °F (36.8 °C) (Temporal)   Resp 18   Ht 6' 1\" (1.854 m)   Wt 185 lb (83.9 kg)   SpO2 96%   BMI 24.41 kg/m²   Gait - steady  Medication side effects(SE):     Mental Status Examination:    Level of consciousness:  within normal limits   Appearance:  fair grooming and fair hygiene  Behavior/Motor:  no abnormalities noted  Attitude toward examiner:  cooperative  Speech:  spontaneous, normal rate and normal volume   Mood: \" I am stuck here\"  Affect: Relaxed  Thought processes: Linear thought flight of ideas loose associations  Thought content: Devoid of any auditory visual hallucinations delusions or other perceptual normalities.  Denies SI/HI intent or plan   Language: able to name objects and repeate phrases  Remote Memory: Impaired   recent Memory: Impaired  Cognition:  oriented to person, place, and time   Fund of Knowledge: Vocabulary intact, pt is aware of current events and past history  Attetion and Concentration intact  Insight judgment impulse control adequate    ASSESSMENT: Patient symptoms are:  [x] Well  controlled  [] Improving  [] Worsening  [] No change      Diagnosis:  Principal Problem:    Cognitive disorder  Active Problems:    Acute psychosis ([facility] [address]    Dementia with behavioral disturbance  Resolved Problems:    * No resolved hospital problems. *      LABS:    No results for input(s): WBC, HGB, PLT in the last 72 hours. No results for input(s): NA, K, CL, CO2, BUN, CREATININE, GLUCOSE in the last 72 hours. No results for input(s): BILITOT, ALKPHOS, AST, ALT in the last 72 hours. Lab Results   Component Value Date/Time    LABAMPH NOT DETECTED 10/22/2022 06:27 PM    BARBSCNU NOT DETECTED 10/22/2022 06:27 PM    LABBENZ NOT DETECTED 10/22/2022 06:27 PM    LABMETH NOT DETECTED 10/22/2022 06:27 PM    OPIATESCREENURINE NOT DETECTED 10/22/2022 06:27 PM    PHENCYCLIDINESCREENURINE NOT DETECTED 10/22/2022 06:27 PM    ETOH <10 02/18/2023 09:31 PM     No results found for: TSH, FREET4  No results found for: LITHIUM  No results found for: VALPROATE, CBMZ        Treatment Plan:  Reviewed current Medications with the patient. Risks, benefits, side effects, drug-to-drug interactions and alternatives to treatment were discussed. Collateral information:   CD evaluation  Encourage patient to attend group and other milieu activities. Discharge planning discussed with the patient and treatment team.    Continue to offer medications patient is prescribed Depakote 250 mg twice daily   continue Remeron 7.5 mg at bedtime    PSYCHOTHERAPY/COUNSELING:  [x] Therapeutic interview  [x] Supportive  [] CBT  [] Ongoing  [] Other    [x] Patient continues to need, on a daily basis, active treatment furnished directly by or requiring the supervision of inpatient psychiatric personnel      Anticipated Length of stay: 3 to 7 days based on stability    NOTE: This report was transcribed using voice recognition software.  Every effort was made to ensure accuracy; however, inadvertent computerized transcription errors may be present. Behavioral Services  Medicare Certification Upon Admission    I certify that this patient's inpatient psychiatric hospital admission is medically necessary for:    [x] (1) Treatment which could reasonably be expected to improve this patient's condition,       [x] (2) Or for diagnostic study;     AND     [x](2) The inpatient psychiatric services are provided while the individual is under the care of a physician and are included in the individualized plan of care.     Estimated length of stay/service  7 - 21 days     Plan for post-hospital care  follow with OP provider     Electronically signed by Teddie Bloch, APRN - CNP on 3/14/2023 at 2:43 PM           Electronically signed by Teddie Bloch, APRN - CNP on 3/14/2023 at 2:43 PM

## 2023-03-14 NOTE — PLAN OF CARE
Problem: Anxiety  Goal: Will report anxiety at manageable levels  Description: INTERVENTIONS:  1. Administer medication as ordered  2. Teach and rehearse alternative coping skills  3. Provide emotional support with 1:1 interaction with staff  3/14/2023 1422 by Jason Ballard RN  Outcome: Progressing  3/14/2023 0031 by Liz Monge RN  Outcome: Progressing     Problem: Coping  Goal: Pt/Family able to verbalize concerns and demonstrate effective coping strategies  Description: INTERVENTIONS:  1. Assist patient/family to identify coping skills, available support systems and cultural and spiritual values  2. Provide emotional support, including active listening and acknowledgement of concerns of patient and caregivers  3. Reduce environmental stimuli, as able  4. Instruct patient/family in relaxation techniques, as appropriate  5. Assess for spiritual pain/suffering and initiate Spiritual Care, Psychosocial Clinical Specialist consults as needed  3/14/2023 1422 by Jason Ballard RN  Outcome: Progressing  3/14/2023 0031 by Liz Monge RN  Outcome: Progressing     Problem: Decision Making  Goal: Pt/Family able to effectively weigh alternatives and participate in decision making related to treatment and care  Description: INTERVENTIONS:  1. Determine when there are differences between patient's view, family's view, and healthcare provider's view of condition  2. Facilitate patient and family articulation of goals for care  3. Help patient and family identify pros/cons of alternative solutions  4. Provide information as requested by patient/family  5. Respect patient/family right to receive or not to receive information  6. Serve as a liaison between patient and family and health care team  7.  Initiate Consults from Ethics, Palliative Care or initiate [facility] as is appropriate  3/14/2023 1422 by Jason Ballard RN  Outcome: Progressing  3/14/2023 0031 by Liz Monge RN  Outcome: Progressing

## 2023-03-14 NOTE — PLAN OF CARE
Problem: Anxiety  Goal: Will report anxiety at manageable levels  Description: INTERVENTIONS:  1. Administer medication as ordered  2. Teach and rehearse alternative coping skills  3. Provide emotional support with 1:1 interaction with staff  Outcome: Progressing     Problem: Coping  Goal: Pt/Family able to verbalize concerns and demonstrate effective coping strategies  Description: INTERVENTIONS:  1. Assist patient/family to identify coping skills, available support systems and cultural and spiritual values  2. Provide emotional support, including active listening and acknowledgement of concerns of patient and caregivers  3. Reduce environmental stimuli, as able  4. Instruct patient/family in relaxation techniques, as appropriate  5. Assess for spiritual pain/suffering and initiate Spiritual Care, Psychosocial Clinical Specialist consults as needed  Outcome: Progressing     Problem: Death & Dying  Goal: Pt/Family communicate acceptance of impending death and feel psychological comfort and peace  Description: INTERVENTIONS:  1. Assess patient/family anxiety and grief process related to end of life issues  2. Provide emotional and spiritual support  3. Provide information about the patient's health status with consideration of family and cultural values  4. Communicate willingness to discuss death and facilitate grief process  with patient/family as appropriate  5. Emphasize sustaining relationships within family system and community, or ellyn/spiritual traditions  6. Initiate Spiritual Care, Psychosocial Clinical Specialist, consult as needed  Outcome: Progressing     Problem: Decision Making  Goal: Pt/Family able to effectively weigh alternatives and participate in decision making related to treatment and care  Description: INTERVENTIONS:  1. Determine when there are differences between patient's view, family's view, and healthcare provider's view of condition  2.  Facilitate patient and family articulation of goals for care  3. Help patient and family identify pros/cons of alternative solutions  4. Provide information as requested by patient/family  5. Respect patient/family right to receive or not to receive information  6. Serve as a liaison between patient and family and health care team  7. Initiate Consults from Ethics, Palliative Care or initiate [facility] as is appropriate  Outcome: Progressing     Problem: Confusion  Goal: Confusion, delirium, dementia, or psychosis is improved or at baseline  Description: INTERVENTIONS:  1. Assess for possible contributors to thought disturbance, including medications, impaired vision or hearing, underlying metabolic abnormalities, dehydration, psychiatric diagnoses, and notify attending LIP  2. [place] high risk fall precautions, as indicated  3. Provide frequent short contacts to provide reality reorientation, refocusing and direction  4. Decrease environmental stimuli, including noise as appropriate  5. Monitor and intervene to maintain adequate nutrition, hydration, elimination, sleep and activity  6. If unable to ensure safety without constant attention obtain sitter and review sitter guidelines with assigned personnel  7. Initiate Psychosocial CNS and Spiritual Care consult, as indicated  Outcome: Progressing     Problem: Behavior  Goal: Pt/Family maintain appropriate behavior and adhere to behavioral management agreement, if implemented  Description: INTERVENTIONS:  1. Assess patient/family's coping skills and  non-compliant behavior (including use of illegal substances)  2. Notify security of behavior or suspected illegal substances which indicate the need for search of the family and/or belongings  3. Encourage verbalization of thoughts and concerns in a socially appropriate manner  4. Utilize positive, consistent limit setting strategies supporting safety of patient, staff and others  5.  Encourage participation in the decision making process about the behavioral management agreement  6. If a visitor's behavior poses a threat to safety call refer to organization policy. 7. Initiate consult with , Psychosocial CNS, Spiritual Care as appropriate  Outcome: Progressing     Problem: Involuntary Admit  Goal: Will cooperate with staff recommendations and doctor's orders and will demonstrate appropriate behavior  Description: INTERVENTIONS:  1. Treat underlying conditions and offer medication as ordered  2. Educate regarding involuntary admission procedures and rules  3.  Contain excessive/inappropriate behavior per unit and hospital policies  Outcome: Progressing     Problem: Risk for Elopement  Goal: Patient will not exit the unit/facility without proper excort  Outcome: Progressing     Problem: Safety - Adult  Goal: Free from fall injury  Outcome: Progressing     Problem: ABCDS Injury Assessment  Goal: Absence of physical injury  Outcome: Progressing     Problem: Pain  Goal: Verbalizes/displays adequate comfort level or baseline comfort level  Outcome: Progressing

## 2023-03-14 NOTE — CARE COORDINATION
Per chart review, patient made a statement to the RN that he \"will shoot judith Lorenzana if he enters his property\". SW Supervisor called pt's son Dasia Lorenzana ([phone]) to inquire if there are guns or weapons in pt's home and to complete duty to warn. No answer, left voicemail.     JALEESA Cotton, [facility] 19 Work Supervisor'

## 2023-03-14 NOTE — PROGRESS NOTES
Patient attended evening recreation activity of patients choice. Patient's given choice of movie, coloring or word finds. Patient was 1 of 5 was in attendance.

## 2023-03-14 NOTE — GROUP NOTE
Group Therapy Note    Date: 3/14/2023    Group Start Time: 1110  Group End Time: 1150  Group Topic: Psychoeducation    [facility]  [id number] [place], [place]                                         Group Therapy Note    Date: 3/14/2023  Number of Participants: 10    Type of Group: Psychoeducation    Wellness Binder Information  Module Name:  basic changes to improve his/her own wellness     Patient's Goal:  Patient will be able to identify small steps to improve ones wellness. Notes:  pleasant and appeared to be actively listening in group. Status After Intervention:  Improved    Participation Level:  Active Listener and Interactive    Participation Quality: Appropriate, Attentive, and Sharing      Speech:  normal      Thought Process/Content: Logical      Affective Functioning: Congruent      Mood: euthymic      Level of consciousness:  Alert, Oriented x4, and Attentive      Response to Learning: Able to verbalize/acknowledge new learning, Able to retain information, and Progressing to goal      Endings: None Reported    Modes of Intervention: Education, Support, Socialization, Exploration, and Problem-solving      Discipline Responsible: Psychoeducational Specialist      Signature:  Mychal Kang

## 2023-03-14 NOTE — GROUP NOTE
Group Therapy Note    Date: 3/14/2023    Group Start Time: 1400  Group End Time: 1430  Group Topic: Cognitive Skills    [facility]    JALEESA Castellanos, YESICA        Group Therapy Note    Attendees: 11       Patient's Goal: To participate in group discussion on active listening skills. Notes: Pt was an active participant in group discussion. Status After Intervention:  Unchanged    Participation Level:  Active Listener    Participation Quality: Appropriate and Attentive      Speech:  normal      Thought Process/Content: Logical      Affective Functioning: Congruent      Mood: anxious      Level of consciousness:  Alert and Oriented x4      Response to Learning: Able to verbalize current knowledge/experience      Endings: None Reported    Modes of Intervention: Education, Support, Socialization, Exploration, Clarifying, and Problem-solving      Discipline Responsible: /Counselor      Signature:  JALEESA Espitia, YESICA

## 2023-03-15 VITALS
TEMPERATURE: 98.3 F | RESPIRATION RATE: 18 BRPM | DIASTOLIC BLOOD PRESSURE: 71 MMHG | HEIGHT: 73 IN | BODY MASS INDEX: 24.52 KG/M2 | OXYGEN SATURATION: 96 % | HEART RATE: 73 BPM | SYSTOLIC BLOOD PRESSURE: 138 MMHG | WEIGHT: 185 LBS

## 2023-03-15 PROCEDURE — 6370000000 HC RX 637 (ALT 250 FOR IP): Performed by: INTERNAL MEDICINE

## 2023-03-15 PROCEDURE — 6370000000 HC RX 637 (ALT 250 FOR IP)

## 2023-03-15 RX ADMIN — CARVEDILOL 25 MG: 25 TABLET, FILM COATED ORAL at 08:20

## 2023-03-15 RX ADMIN — AMLODIPINE BESYLATE 15 MG: 10 TABLET ORAL at 08:20

## 2023-03-15 RX ADMIN — LISINOPRIL 20 MG: 10 TABLET ORAL at 08:20

## 2023-03-15 RX ADMIN — ASPIRIN 81 MG CHEWABLE TABLET 81 MG: 81 TABLET CHEWABLE at 08:20

## 2023-03-15 RX ADMIN — CEFDINIR 300 MG: 300 CAPSULE ORAL at 08:20

## 2023-03-15 RX ADMIN — DIVALPROEX SODIUM 250 MG: 125 CAPSULE, COATED PELLETS ORAL at 08:21

## 2023-03-15 RX ADMIN — APIXABAN 5 MG: 5 TABLET, FILM COATED ORAL at 08:21

## 2023-03-15 RX ADMIN — PANTOPRAZOLE SODIUM 20 MG: 20 TABLET, DELAYED RELEASE ORAL at 06:41

## 2023-03-15 RX ADMIN — POTASSIUM CHLORIDE 10 MEQ: 750 TABLET, EXTENDED RELEASE ORAL at 08:21

## 2023-03-15 NOTE — PROGRESS NOTES
Patient attended community meeting   Was updated on expectations of the unit, staffing, and programming  Patient shared goal for today as wanting to go home. Patient was 1 of 9 in attendance.

## 2023-03-15 NOTE — BH NOTE
Pt discharged with followings belongings:   Dental Appliances: None  Vision - Corrective Lenses: None  Hearing Aid: Bilateral hearing aids  Jewelry: None  Body Piercings Removed: No  Clothing: Shirt, Pajamas  Other Valuables: Other (Comment) (hearing aides)   Valuables sent home with patient. Valuables retrieved from safe, Security envelope number:   wallet and returned to patient. Patient left department with Departure Mode: In cab via Mobility at Departure: Ambulatory, discharged to Discharged to: Private Residence. Patient education on aftercare instructions:  yes  Patient verbalize understanding of AVS:   yes. Given suicide prevention handout . Discharged in stable condition.   Status EXAM upon discharge:  Mental Status and Behavioral Exam  Normal: No  Level of Assistance: Independent/Self  Facial Expression: Flat, Sad  Affect: Blunt  Level of Consciousness: Alert  Frequency of Checks: 4 times per hour, close  Mood:Normal: No  Mood: Irritable, Sad, Angry  Motor Activity:Normal: No  Motor Activity: Decreased  Eye Contact: Poor  Observed Behavior: Guarded  Sexual Misconduct History: Current - no  Involved In Any Sexual Misconduct With Others? : No  History of Sexually Inappropriate Behavior When Previously Hospitalized?: No  Uncontrollable/Compulsive Masturbation?: No  Difficulty Controlling Sexual Impulses?: No  Preception: Others (comment) (BONIFACIO - patient refused to answer assessment questions)  Attention:Normal: No  Attention: Unable to concentrate  Thought Processes: Other (comment) (impaired)  Thought Content:Normal: No  Thought Content: Other (comment) (BONIFACIO - patient refused to answer assessment questions)  Depression Symptoms: Impaired concentration  Anxiety Symptoms: Generalized  [place] Symptoms: Poor judgment, Pressured speech  Hallucinations: None  Delusions: No  Memory:Normal: No  Memory: Poor recent  Insight and Judgment: No  Insight and Judgment: Poor judgment, Poor insight    Claude Hernandez RN

## 2023-03-15 NOTE — GROUP NOTE
Group Therapy Note    Date: 3/15/2023  Start Time: 1105  End Time:  1135  Number of Participants: 7    Type of Group: Psychoeducation    Wellness Binder Information  Module Name:  Owing your feelings    Patient's Goal:  ID ways to identify emotions and ways to express emotions in a positive manner.     Notes:  Patient engaged in discussion of emotions and was calm and cooperative.     Status After Intervention:  Improved    Participation Level: Active Listener, maintained eye contact    Participation Quality: Appropriate and Attentive      Speech:  normal      Thought Process/Content: Logical      Affective Functioning: Congruent      Mood: euthymic      Level of consciousness:  Alert and Attentive      Response to Learning: Able to verbalize current knowledge/experience and Able to verbalize/acknowledge new learning      Endings: None Reported    Modes of Intervention: Education, Support, and Socialization      Discipline Responsible: Psychoeducational Specialist      Signature:  PRAVIN Whitt

## 2023-03-15 NOTE — CARE COORDINATION
BASSEM contacted [facility] APS  Shayy De La Torre [phone] and informed her of pt discharge for today. BASSEM advised Millicent that pt has been referred to [facility] and [facility] for outpatient services. Bassem contacted [facility] [phone] and made a referral to [facility]. BASSEM contacted [facility] [phone] and spoke with Lalitha Vogel. BASSEM informed Adry of the information and threats made towards pt [employer]. BASSEM advised Adry that pt judith is also aware of the threats. BASSEM provided Adry with pt address. BASSEM supervisor approved a med voucher for pt $5.80 copay along with a taxi voucher home.     In order to ensure appropriate transition and discharge planning is in place, the following documents have been transmitted to [facility], as the new outpatient provider:    The d/c diagnosis was transmitted to the next care provider  The reason for hospitalization was transmitted to the next care provider  The d/c medications (dosage and indication) were transmitted to the next care provider   The continuing care plan was transmitted to the next care provider

## 2023-03-15 NOTE — PLAN OF CARE
Problem: Involuntary Admit  Goal: Will cooperate with staff recommendations and doctor's orders and will demonstrate appropriate behavior  Description: INTERVENTIONS:  1. Treat underlying conditions and offer medication as ordered  2. Educate regarding involuntary admission procedures and rules  3. Contain excessive/inappropriate behavior per unit and hospital policies  Outcome: Progressing     Problem: Risk for Elopement  Goal: Patient will not exit the unit/facility without proper excort  Outcome: Progressing     Problem: Safety - Adult  Goal: Free from fall injury  Outcome: Progressing     Problem: ABCDS Injury Assessment  Goal: Absence of physical injury  Outcome: Progressing     Problem: Pain  Goal: Verbalizes/displays adequate comfort level or baseline comfort level  Outcome: Progressing     Problem: Death & Dying  Goal: Pt/Family communicate acceptance of impending death and feel psychological comfort and peace  Description: INTERVENTIONS:  1. Assess patient/family anxiety and grief process related to end of life issues  2. Provide emotional and spiritual support  3. Provide information about the patient's health status with consideration of family and cultural values  4. Communicate willingness to discuss death and facilitate grief process  with patient/family as appropriate  5. Emphasize sustaining relationships within family system and community, or ellyn/spiritual traditions  6. Initiate Spiritual Care, Psychosocial Clinical Specialist, consult as needed  Outcome: Progressing     Problem: Confusion  Goal: Confusion, delirium, dementia, or psychosis is improved or at baseline  Description: INTERVENTIONS:  1. Assess for possible contributors to thought disturbance, including medications, impaired vision or hearing, underlying metabolic abnormalities, dehydration, psychiatric diagnoses, and notify attending LIP  2. [place] high risk fall precautions, as indicated  3.  Provide frequent short contacts to provide reality reorientation, refocusing and direction  4. Decrease environmental stimuli, including noise as appropriate  5. Monitor and intervene to maintain adequate nutrition, hydration, elimination, sleep and activity  6. If unable to ensure safety without constant attention obtain sitter and review sitter guidelines with assigned personnel  7. Initiate Psychosocial CNS and Spiritual Care consult, as indicated  Outcome: Progressing     Problem: Anxiety  Goal: Will report anxiety at manageable levels  Description: INTERVENTIONS:  1. Administer medication as ordered  2. Teach and rehearse alternative coping skills  3. Provide emotional support with 1:1 interaction with staff  3/14/2023 2237 by Sandoval Nails RN  Outcome: Not Progressing  3/14/2023 1422 by Harrison Durham RN  Outcome: Progressing     Problem: Coping  Goal: Pt/Family able to verbalize concerns and demonstrate effective coping strategies  Description: INTERVENTIONS:  1. Assist patient/family to identify coping skills, available support systems and cultural and spiritual values  2. Provide emotional support, including active listening and acknowledgement of concerns of patient and caregivers  3. Reduce environmental stimuli, as able  4. Instruct patient/family in relaxation techniques, as appropriate  5. Assess for spiritual pain/suffering and initiate Spiritual Care, Psychosocial Clinical Specialist consults as needed  3/14/2023 2237 by Sandoval Naisl RN  Outcome: Not Progressing  3/14/2023 1422 by Harrison Durham RN  Outcome: Progressing     Problem: Decision Making  Goal: Pt/Family able to effectively weigh alternatives and participate in decision making related to treatment and care  Description: INTERVENTIONS:  1. Determine when there are differences between patient's view, family's view, and healthcare provider's view of condition  2. Facilitate patient and family articulation of goals for care  3.  Help patient and family identify pros/cons of alternative solutions  4. Provide information as requested by patient/family  5. Respect patient/family right to receive or not to receive information  6. Serve as a liaison between patient and family and health care team  7. Initiate Consults from Ethics, Palliative Care or initiate [facility] as is appropriate  3/14/2023 2237 by Angelica Dover RN  Outcome: Not Progressing  3/14/2023 1422 by Nohemi Carlisle RN  Outcome: Progressing     Problem: Behavior  Goal: Pt/Family maintain appropriate behavior and adhere to behavioral management agreement, if implemented  Description: INTERVENTIONS:  1. Assess patient/family's coping skills and  non-compliant behavior (including use of illegal substances)  2. Notify security of behavior or suspected illegal substances which indicate the need for search of the family and/or belongings  3. Encourage verbalization of thoughts and concerns in a socially appropriate manner  4. Utilize positive, consistent limit setting strategies supporting safety of patient, staff and others  5. Encourage participation in the decision making process about the behavioral management agreement  6. If a visitor's behavior poses a threat to safety call refer to organization policy.   7. Initiate consult with , Psychosocial CNS, Spiritual Care as appropriate  Outcome: Not Progressing

## 2023-03-15 NOTE — PROGRESS NOTES
Patient uncooperative with assessment. Patient appears agitated, angry, and sad. Patient refuses VS, assessment, medications. Patient lying in bed with eyes closed. After several attempts of calling patient's name, patient eventually rolled over. Patient stated \"I don't give a damn about medications. I don't give a damn about vitals. I don't fucking care. \" Patient then rolled over and refused to answer any other attempts at conversation. Will attempt to re-assess at a later time. Purposeful rounding continued. Will continue to observe and report.

## 2023-03-15 NOTE — DISCHARGE SUMMARY
DISCHARGE SUMMARY      Patient ID:  Sonia Katz  [id number]  57 y.o.  1960    Admit date: 2/24/2023    Discharge date and time: 3/15/2023    Admitting Physician: Velma Talbot MD     Discharge Physician: Dr Ezekiel Gutierres MD    Discharge Diagnoses:   Patient Active Problem List   Diagnosis    Chest pain    Hypertension    Hyperlipidemia    Coronary artery disease    Congestive heart failure (CHF) (HCC)    Chronic kidney disease    Psychiatric illness    Acute psychosis ([address]    Cognitive disorder    Dementia with behavioral disturbance       Admission Condition: poor    Discharged Condition: stable    Admission Circumstance:   Patient brought to the ED from [facility] with reports of chest pain, his medical work up was negative, and [facility] refused  to take their patient back stating that he was discharged from their services.        PAST MEDICAL/PSYCHIATRIC HISTORY:   Past Medical History:   Diagnosis Date    Atrial fibrillation (HCC)     Chronic kidney disease     Congestive heart failure (CHF) (HCC)     Coronary artery disease     Hyperlipidemia     Hypertension        FAMILY/SOCIAL HISTORY:  Family History   Problem Relation Age of Onset    Heart Failure Other      Social History     Socioeconomic History    Marital status: Single     Spouse name: Not on file    Number of children: Not on file    Years of education: Not on file    Highest education level: Not on file   Occupational History    Not on file   Tobacco Use    Smoking status: Former     Types: Cigarettes    Smokeless tobacco: Never   Substance and Sexual Activity    Alcohol use: Not Currently    Drug use: Not on file    Sexual activity: Not on file   Other Topics Concern    Not on file   Social History Narrative    Not on file     Social Determinants of Health     Financial Resource Strain: Not on file   Food Insecurity: Not on file   Transportation Needs: Not on file   Physical Activity: Not on file   Stress: Not on file   Social Connections: Not on file   Intimate Partner Violence: Not on file   Housing Stability: Not on file       MEDICATIONS:    Current Facility-Administered Medications:     carvedilol (COREG) tablet 25 mg, 25 mg, Oral, BID WC, Janette Cutler, DO, 25 mg at 03/15/23 0820    cefdinir (OMNICEF) capsule 300 mg, 300 mg, Oral, 2 times per day, Janette Cutler DO, 300 mg at 03/15/23 0820    acetaminophen (TYLENOL) tablet 650 mg, 650 mg, Oral, Q4H PRN, Bird López MD    magnesium hydroxide (MILK OF MAGNESIA) 400 MG/5ML suspension 30 mL, 30 mL, Oral, Daily PRN, Bird López MD    nicotine (NICODERM CQ) 21 MG/24HR 1 patch, 1 patch, TransDERmal, Daily, Bird López MD    aluminum & magnesium hydroxide-simethicone (MAALOX) 200-200-20 MG/5ML suspension 30 mL, 30 mL, Oral, PRN, Bird López MD    hydrOXYzine pamoate (VISTARIL) capsule 50 mg, 50 mg, Oral, TID PRN, Bird López MD, 50 mg at 03/02/23 2059    haloperidol (HALDOL) tablet 3 mg, 3 mg, Oral, Q6H PRN **OR** haloperidol lactate (HALDOL) injection 3 mg, 3 mg, IntraMUSCular, Q6H PRN, Bird López MD    amLODIPine (NORVASC) tablet 15 mg, 15 mg, Oral, Daily, YUDITH Okeefe - CNP, 15 mg at 03/15/23 0820    apixaban (ELIQUIS) tablet 5 mg, 5 mg, Oral, BID, YUDITH Okeefe - CNP, 5 mg at 03/15/23 6811    aspirin chewable tablet 81 mg, 81 mg, Oral, Daily, YUDITH Okeefe - CNP, 81 mg at 03/15/23 0820    cyclobenzaprine (FLEXERIL) tablet 5 mg, 5 mg, Oral, BID PRN, YUDITH Okeefe - CNP, 5 mg at 03/05/23 2127    divalproex (DEPAKOTE SPRINKLE) DR capsule 250 mg, 250 mg, Oral, 2 times per day, YUDITH Okeefe CNP, 250 mg at 03/15/23 9700    lisinopril (PRINIVIL;ZESTRIL) tablet 20 mg, 20 mg, Oral, Daily, YUDITH Okeefe CNP, 20 mg at 03/15/23 0820    pantoprazole (PROTONIX) tablet 20 mg, 20 mg, Oral, QAM AC, YUDITH Okeefe CNP, 20 mg at 03/15/23 0641    polyethylene glycol (GLYCOLAX) packet 17 g, 17 g, Oral, Daily PRN, YUDITH Huddleston - CNP    potassium chloride (KLOR-CON M) extended release tablet 10 mEq, 10 mEq, Oral, Daily, Kadeem Colin APRN - CNP, 10 mEq at 03/15/23 6125    rosuvastatin (CRESTOR) tablet 20 mg, 20 mg, Oral, Nightly, Kadeem oClin, APRN - CNP, 20 mg at 03/13/23 2044    mirtazapine (REMERON) tablet 7.5 mg, 7.5 mg, Oral, Nightly, Sony Coates APRN - CNP, 7.5 mg at 03/13/23 2044    melatonin disintegrating tablet 5 mg, 5 mg, Oral, Daily, Sony Coates APRN - CNP, 5 mg at 03/14/23 1749    Examination:  /71   Pulse 73   Temp 98.3 °F (36.8 °C) (Temporal)   Resp 18   Ht 6' 1\" (1.854 m)   Wt 185 lb (83.9 kg)   SpO2 96%   BMI 24.41 kg/m²   Gait - steady    HOSPITAL COURSE[de-identified]  Following admission to the hospital, patient had a complete physical exam and blood work up, which he was medically cleared and admitted to [facility] for psychiatric evaluation and stabilization. The patient was monitored closely with suicide and appropriate precautions. He was started on Depakote 250 mg twice daily for mood stabilization and he was tapered off of the mirtazapine. He did well on this combination of medications his mood stabilized. This [facility] at the [facility] and then transferred to the [facility] in the [facility]. Per documentation they were having a difficult time with discharging the patient, it appears there were some outside guardianship proceedings taking place and it was rumored that the patient's home was in deplorable conditions. 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   There are a lot of social issues, there was a lot of fragmentation of services out of [facility] the patient's MoCA score here was 20 out of 30 which we believe is higher however he has very bad hearing and we think this impaired his MoCA score. On assessment the patient is able to place all events of his hospitalization including events leading up to his hospitalization on a very accurate timeline. When you speak with the patient he remained linear goal-directed future focused. He did demonstrate some impulsivity and some anger control issues however this seems to be well controlled with the depakote. The patient was very frustrated that he had been hospitalized in the psychiatric facility since October 2022 and he wanted to go home. As our social work team worked on the patient's case we found out that he was actually very successful individual at [facility] owning multiple homes in properties, we did have some concern that he was being taken advantage of with this guardianship hearing but we have no way to know what events led up to the recommendation for guardianship. Our experience with this patient did not support a need for guardianship. He was well-groomed he attended to all of his ADLs he was linear goal-directed organized he took his medications he did not require prompting for anything he interacted well with peers and staff on the unit and demonstrated no need for guardianship.  was able to stay in contact with one of the patient's sons who said there was no reason that the patient could not return home and none of them objected to him returning to the home. We were able to verify that there were no guns or weapons within the home and the son verified that they had been removed a long time ago. There was no acute indication to keep the patient in the psychiatric setting and therefore once we were able to verify that there was no access to weapons we were able to discharge the patient home in psychiatrically stable condition.   He was very angry with his sons he believed that his sons are the reason that he was hospitalized and he was very angry to learn that there was a guardianship hearing. He had the wherewithal to request his own  for the guardianship hearing and worked with this . The guardianship hearing is scheduled for Friday, March 17, 2023 at 3 PM in [facility] court. Patient was discharged to his home we did verify that all utilities were turned on he did have access to his vehicle including his car keys which was on the property. The patient had a plan to ask his neighbor to take him for groceries once he was home and we sent a care package of food and sandwiches from the hospital home with the patient just to ensure that he had food if he was not able to get to the grocery store the same day as discharge. We also made sure that the patient had his medications in hand when he was discharged. The patient was not suicidal homicidal psychotic or manic and he did not meet criteria for inpatient level of psychiatric care. He was encouraged to participate in group and other milieu activity and started to feel better with this combination of treatment. There has been significant progress in the improvement of symptoms since admission. The patient has been an active participant in his treatment, and discharge The patient has been an active participant in his treatment, and discharge planning. The patient was able to spontaneously describe with richness of detail their future plans and goals. Patient was no longer suicidal, homicidal, manic or psychotic. He received the required treatment with medication, participated in group milieu, remained engaged in unit activities, learned appropriate coping skills. He was seen to be watching television socializing with peers using the phone. There were no mention or gestures of self-harm or harm to others. His mental status has returned to baseline.   The treatment team believes the patient obtain maximum benefit out of this hospitalization and does not meet the criteria for inpatient hospitalization anymore. However he will continue to benefit from outpatient follow-up and treatment to maintain stability. Collateral information has been obtained and reconciled and there are no concerns about his safety. He has no access to guns or weapons. He appreciates the help that he received here. This patient no longer meets criteria for inpatient hospitalization. He was discharged home to his family in psychiatrically stable condition. No active SI/HI  Appetite:  [x] Normal  [] Increased  [] Decreased    Sleep:       [x] Normal  [] Fair       [] Poor            Energy:    [x] Normal  [] Increased  [] Decreased     SI [] Present  [x] Absent  HI  []Present  [x] Absent   Aggression:  [] yes  [x] no  Patient is [x] able  [] unable to CONTRACT FOR SAFETY   Medication side effects(SE):  [x] None(Psych. Meds.) [] Other      Mental Status Examination on discharge:    Level of consciousness:  within normal limits   Appearance:  well-appearing  Behavior/Motor:  no abnormalities noted  Attitude toward examiner:  attentive and good eye contact  Speech:  spontaneous, normal rate and normal volume   Mood: euthymic  Affect:  mood congruent  Thought processes: Future focused, linear, goal directed, and coherent   Thought content: The patient is devoid of suicidal or homicidal ideation intent or plan. Devoid of auditory or visual hallucinations or other perceptual disturbances, there are no overt or covert signs of psychosis or paranoia. There are no neurovegetative signs of depression.   Cognition:  oriented to person, place, and time   Concentration intact  Memory intact  Insight good   Judgement fair   Fund of Knowledge adequate      ASSESSMENT:  Patient symptoms are:  [x] Well controlled  [x] Improving  [] Worsening  [] No change    Reason for more than one antipsychotic:  [x] N/A  [] 3 Failed Monotherapy attempts (Drugs tried:)  [] Crossover to a new antipsychotic  [] Taper to Monotherapy from Polypharmacy  [] Augmentation of clozapine therapy due to treatment resistance to single therapy    Diagnosis:  Cognitive disorder      LABS:    No results for input(s): WBC, HGB, PLT in the last 72 hours. No results for input(s): NA, K, CL, CO2, BUN, CREATININE, GLUCOSE in the last 72 hours. No results for input(s): BILITOT, ALKPHOS, AST, ALT in the last 72 hours. Lab Results   Component Value Date/Time    LABAMPH NOT DETECTED 10/22/2022 06:27 PM    BARBSCNU NOT DETECTED 10/22/2022 06:27 PM    LABBENZ NOT DETECTED 10/22/2022 06:27 PM    LABMETH NOT DETECTED 10/22/2022 06:27 PM    OPIATESCREENURINE NOT DETECTED 10/22/2022 06:27 PM    PHENCYCLIDINESCREENURINE NOT DETECTED 10/22/2022 06:27 PM    ETOH <10 02/18/2023 09:31 PM     No results found for: TSH, FREET4  No results found for: LITHIUM  No results found for: VALPROATE, CBMZ    RISK ASSESSMENT AT DISCHARGE: Low risk for suicide and homicide. Treatment Plan:  The patient's diagnosis, treatment plan, medication management were formulated after patient was seen directly by the attending physician and myself and all relevant documentation was reviewed. Risk, benefit, side effects, possible outcomes of the medication and alternatives discussed with the patient and the patient demonstrated understanding. The patient was also educated that the outcome of treatment will depend on the medication compliance as directed by the prescribers along with regular follow-up, compliance with the labs and other work-up, as clinically indicated. Risks, benefits, side effects, drug-to-drug interactions and alternatives to treatment were discussed. Encourage patient to attend outpatient follow up appointment and therapy, outpatient follow-up appointment scheduled prior to discharge.     Patient was advised to call the outpatient provider, visit the nearest ED or call 911 if symptoms are not manageable. Patient's family member was contacted prior to the discharge, patient has been discharged home in psychiatrically stable condition.          Medication List        START taking these medications      divalproex 125 MG DR capsule  Commonly known as: DEPAKOTE SPRINKLE  Take 2 capsules by mouth every 12 hours     melatonin 5 MG Tbdp disintegrating tablet  Take 1 tablet by mouth daily     potassium chloride 10 MEQ extended release tablet  Commonly known as: KLOR-CON M  Take 1 tablet by mouth daily  Replaces: potassium chloride 10 MEQ extended release capsule            CHANGE how you take these medications      amLODIPine 5 MG tablet  Commonly known as: NORVASC  Take 3 tablets by mouth daily  What changed: medication strength     mirtazapine 7.5 MG tablet  Commonly known as: REMERON  Take 1 tablet by mouth nightly  What changed:   medication strength  how much to take            CONTINUE taking these medications      acetaminophen 325 MG tablet  Commonly known as: TYLENOL     apixaban 5 MG Tabs tablet  Commonly known as: ELIQUIS     aspirin 81 MG chewable tablet  Take 1 tablet by mouth daily     carvedilol 25 MG tablet  Commonly known as: COREG     cyclobenzaprine 5 MG tablet  Commonly known as: FLEXERIL     docusate sodium 100 MG capsule  Commonly known as: COLACE     lisinopril 20 MG tablet  Commonly known as: PRINIVIL;ZESTRIL     nitroGLYCERIN 0.4 MG SL tablet  Commonly known as: NITROSTAT     pantoprazole 20 MG tablet  Commonly known as: PROTONIX     polyethylene glycol 17 g packet  Commonly known as: GLYCOLAX     rosuvastatin 20 MG tablet  Commonly known as: CRESTOR            STOP taking these medications      ondansetron 4 MG tablet  Commonly known as: ZOFRAN     potassium chloride 10 MEQ extended release capsule  Commonly known as: MICRO-K  Replaced by: potassium chloride 10 MEQ extended release tablet               Where to Get Your Medications        These medications were sent to [facility] [address] [address]      Phone: [phone]   amLODIPine 5 MG tablet  divalproex 125 MG DR capsule  mirtazapine 7.5 MG tablet  potassium chloride 10 MEQ extended release tablet       You can get these medications from any pharmacy    You don't need a prescription for these medications  melatonin 5 MG Tbdp disintegrating tablet     NOTE: This report was transcribed using voice recognition software. Every effort was made to ensure accuracy; however, inadvertent computerized transcription errors may be present.       TIME SPEND - 35 MINUTES TO COMPLETE THE EVALUATION, DISCHARGE SUMMARY, MEDICATION RECONCILIATION AND FOLLOW UP CARE     Signed:  YUDITH Silva CNP  3/15/2023  10:42 AM
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
## 2023-03-15 NOTE — CARE COORDINATION
Navigator placed phone call to Robel Ram [phone] notifying of discharge home planned for today. Patient will not be in the hospital at the time of his guardianship hearing scheduled for Friday 3/17/23. Left vm requesting call back if any additional questions.     Electronically signed by JALEESA Jesus, YESICA on 3/15/2023 at 11:11 AM

## 2023-03-15 NOTE — PROGRESS NOTES
CLINICAL PHARMACY NOTE: MEDS TO BEDS    Total # of Prescriptions Filled: 4   The following medications were delivered to the patient:  Divalproex sodium sprinkles 125  Potassium chloride er 10  Mirtazapine 7.5  Amlodipine 5    Additional Documentation:

## 2023-04-17 PROBLEM — I73.9 PVD (PERIPHERAL VASCULAR DISEASE) (CMS-HCC): Status: ACTIVE | Noted: 2023-04-17

## 2023-04-17 PROBLEM — J12.82 PNEUMONIA DUE TO COVID-19 VIRUS: Status: ACTIVE | Noted: 2022-01-12

## 2023-04-17 PROBLEM — R41.82 AMS (ALTERED MENTAL STATUS): Status: ACTIVE | Noted: 2023-04-17

## 2023-04-17 PROBLEM — E55.9 VITAMIN D DEFICIENCY: Status: ACTIVE | Noted: 2023-04-17

## 2023-04-17 PROBLEM — R45.851 SUICIDAL IDEATIONS: Status: ACTIVE | Noted: 2023-04-17

## 2023-04-17 PROBLEM — E78.5 HYPERLIPIDEMIA: Status: ACTIVE | Noted: 2023-04-17

## 2023-04-17 PROBLEM — F43.21 GRIEF: Status: ACTIVE | Noted: 2023-04-17

## 2023-04-17 PROBLEM — G89.29 BACK PAIN, CHRONIC: Status: ACTIVE | Noted: 2023-04-17

## 2023-04-17 PROBLEM — R07.81 RIB PAIN ON LEFT SIDE: Status: ACTIVE | Noted: 2023-04-17

## 2023-04-17 PROBLEM — E05.90 HYPERTHYROIDISM: Status: ACTIVE | Noted: 2023-04-17

## 2023-04-17 PROBLEM — M54.9 BACK PAIN, CHRONIC: Status: ACTIVE | Noted: 2023-04-17

## 2023-04-17 PROBLEM — I50.9 CHF (CONGESTIVE HEART FAILURE) (MULTI): Status: ACTIVE | Noted: 2023-04-17

## 2023-04-17 PROBLEM — R11.2 NAUSEA WITH VOMITING: Status: ACTIVE | Noted: 2023-04-17

## 2023-04-17 PROBLEM — F33.2 SEVERE EPISODE OF RECURRENT MAJOR DEPRESSIVE DISORDER, WITHOUT PSYCHOTIC FEATURES (MULTI): Status: ACTIVE | Noted: 2023-04-17

## 2023-04-17 PROBLEM — I25.5 ISCHEMIC CARDIOMYOPATHY: Status: ACTIVE | Noted: 2023-04-17

## 2023-04-17 PROBLEM — D64.9 ANEMIA: Status: ACTIVE | Noted: 2023-04-17

## 2023-04-17 PROBLEM — R06.02 SOB (SHORTNESS OF BREATH): Status: ACTIVE | Noted: 2023-04-17

## 2023-04-17 PROBLEM — H91.93 DECREASED HEARING OF BOTH EARS: Status: ACTIVE | Noted: 2023-04-17

## 2023-04-17 PROBLEM — I51.9 HEART DISEASE: Status: ACTIVE | Noted: 2023-04-17

## 2023-04-17 PROBLEM — J30.9 ALLERGIC RHINITIS: Status: ACTIVE | Noted: 2023-04-17

## 2023-04-17 PROBLEM — R06.89 DIFFICULTY BREATHING: Status: ACTIVE | Noted: 2023-04-17

## 2023-04-17 PROBLEM — M79.89 LEG SWELLING: Status: ACTIVE | Noted: 2023-04-17

## 2023-04-17 PROBLEM — R79.89 ELEVATED TSH: Status: ACTIVE | Noted: 2023-04-17

## 2023-04-17 PROBLEM — R42 DIZZINESS: Status: ACTIVE | Noted: 2023-04-17

## 2023-04-17 PROBLEM — N18.30 STAGE 3 CHRONIC KIDNEY DISEASE (MULTI): Status: ACTIVE | Noted: 2023-04-17

## 2023-04-17 PROBLEM — F32.A DEPRESSION: Status: ACTIVE | Noted: 2023-04-17

## 2023-04-17 PROBLEM — R07.9 CHEST PAIN: Status: ACTIVE | Noted: 2023-04-10

## 2023-04-17 PROBLEM — S39.012A LUMBAR STRAIN: Status: ACTIVE | Noted: 2023-04-17

## 2023-04-17 PROBLEM — R25.2 MUSCLE CRAMPING: Status: ACTIVE | Noted: 2023-04-17

## 2023-04-17 PROBLEM — M10.9 GOUT: Status: ACTIVE | Noted: 2023-04-17

## 2023-04-17 PROBLEM — R51.9 HEADACHE: Status: ACTIVE | Noted: 2023-04-17

## 2023-04-17 PROBLEM — S22.42XA MULTIPLE CLOSED FRACTURES OF RIBS OF LEFT SIDE: Status: ACTIVE | Noted: 2023-04-17

## 2023-04-17 PROBLEM — R47.01 APHASIA OF UNKNOWN ORIGIN: Status: ACTIVE | Noted: 2023-04-17

## 2023-04-17 PROBLEM — I25.10 ATHEROSCLEROSIS OF CORONARY ARTERY: Status: ACTIVE | Noted: 2023-04-17

## 2023-04-17 PROBLEM — H90.6 MIXED HEARING LOSS, BILATERAL: Status: ACTIVE | Noted: 2023-04-17

## 2023-04-17 PROBLEM — R60.0 LOWER LEG EDEMA: Status: ACTIVE | Noted: 2023-04-17

## 2023-04-17 PROBLEM — H93.13 SUBJECTIVE TINNITUS OF BOTH EARS: Status: ACTIVE | Noted: 2023-04-17
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
## 2023-04-17 PROBLEM — I10 HYPERTENSION: Status: ACTIVE | Noted: 2023-04-17

## 2023-04-17 PROBLEM — E87.6 HYPOKALEMIA: Status: ACTIVE | Noted: 2023-04-17

## 2023-04-17 PROBLEM — R53.83 FATIGUE: Status: ACTIVE | Noted: 2023-04-17

## 2023-04-17 PROBLEM — Z95.1 HISTORY OF CORONARY ARTERY BYPASS GRAFT: Status: ACTIVE | Noted: 2023-04-17

## 2023-04-17 PROBLEM — U07.1 PNEUMONIA DUE TO COVID-19 VIRUS: Status: ACTIVE | Noted: 2022-01-12

## 2023-04-17 PROBLEM — M62.830 BACK MUSCLE SPASM: Status: ACTIVE | Noted: 2023-04-17

## 2023-04-17 PROBLEM — L03.90 CELLULITIS: Status: ACTIVE | Noted: 2023-04-17

## 2023-04-17 RX ORDER — CLOPIDOGREL BISULFATE 75 MG/1
75 TABLET ORAL
Qty: 30 TABLET | Refills: 177 | COMMUNITY
Start: 2008-10-15 | End: 2023-05-09 | Stop reason: SDUPTHER

## 2023-04-17 RX ORDER — ISOSORBIDE MONONITRATE 30 MG/1
30 TABLET, EXTENDED RELEASE ORAL
Qty: 30 TABLET | Refills: 23 | COMMUNITY
Start: 2021-06-02 | End: 2023-05-09 | Stop reason: SDUPTHER

## 2023-04-17 RX ORDER — HYDROCHLOROTHIAZIDE 25 MG/1
25 TABLET ORAL
COMMUNITY
Start: 2022-01-19 | End: 2023-04-19 | Stop reason: WASHOUT

## 2023-04-17 RX ORDER — LIDOCAINE 560 MG/1
PATCH PERCUTANEOUS; TOPICAL; TRANSDERMAL
COMMUNITY
Start: 2022-04-19 | End: 2023-04-19 | Stop reason: WASHOUT

## 2023-04-17 RX ORDER — NITROGLYCERIN 0.4 MG/1
TABLET SUBLINGUAL
COMMUNITY
Start: 2008-10-15 | End: 2023-04-19 | Stop reason: SDUPTHER

## 2023-04-17 RX ORDER — POTASSIUM CHLORIDE 750 MG/1
CAPSULE, EXTENDED RELEASE ORAL 2 TIMES DAILY
COMMUNITY
Start: 2018-12-26 | End: 2023-04-19 | Stop reason: WASHOUT

## 2023-04-17 RX ORDER — PREDNISONE 20 MG/1
1 TABLET ORAL DAILY
COMMUNITY
Start: 2022-03-30

## 2023-04-17 RX ORDER — HYDRALAZINE HYDROCHLORIDE 25 MG/1
TABLET, FILM COATED ORAL
COMMUNITY
End: 2023-04-19 | Stop reason: WASHOUT

## 2023-04-17 RX ORDER — ESCITALOPRAM OXALATE 20 MG/1
1 TABLET ORAL DAILY
COMMUNITY
Start: 2020-12-29 | End: 2023-04-19 | Stop reason: ALTCHOICE

## 2023-04-17 RX ORDER — ACETAMINOPHEN 325 MG/1
TABLET ORAL EVERY 6 HOURS PRN
COMMUNITY
Start: 2018-05-23

## 2023-04-17 RX ORDER — NIACIN 1000 MG/1
TABLET, EXTENDED RELEASE ORAL
COMMUNITY
Start: 2008-10-15 | End: 2023-04-19 | Stop reason: SDUPTHER

## 2023-04-17 RX ORDER — OXYCODONE AND ACETAMINOPHEN 5; 325 MG/1; MG/1
TABLET ORAL
COMMUNITY
Start: 2022-03-28 | End: 2023-04-19 | Stop reason: WASHOUT

## 2023-04-17 RX ORDER — ATORVASTATIN CALCIUM 80 MG/1
1 TABLET, FILM COATED ORAL NIGHTLY
COMMUNITY
End: 2023-04-19 | Stop reason: DRUGHIGH

## 2023-04-17 RX ORDER — ESOMEPRAZOLE MAGNESIUM 40 MG/1
CAPSULE, DELAYED RELEASE ORAL
COMMUNITY
Start: 2008-10-15 | End: 2023-04-19 | Stop reason: SDUPTHER

## 2023-04-17 RX ORDER — CARVEDILOL 25 MG/1
TABLET ORAL 2 TIMES DAILY
COMMUNITY
Start: 2022-06-06 | End: 2023-04-19 | Stop reason: DRUGHIGH

## 2023-04-17 RX ORDER — DEXTROMETHORPHAN HBR. AND GUAIFENESIN 10; 100 MG/5ML; MG/5ML
10 SOLUTION ORAL EVERY 4 HOURS PRN
COMMUNITY
Start: 2023-04-12 | End: 2023-04-19 | Stop reason: ALTCHOICE

## 2023-04-17 RX ORDER — FUROSEMIDE 40 MG/1
1 TABLET ORAL DAILY
COMMUNITY
Start: 2019-10-24 | End: 2023-04-19 | Stop reason: ALTCHOICE

## 2023-04-17 RX ORDER — LISINOPRIL 20 MG/1
1 TABLET ORAL DAILY
COMMUNITY
Start: 2019-10-21 | End: 2023-04-19 | Stop reason: WASHOUT

## 2023-04-17 RX ORDER — CALCIUM CITRATE/VITAMIN D3 200MG-6.25
TABLET ORAL
COMMUNITY
Start: 2022-04-05 | End: 2023-04-19 | Stop reason: WASHOUT

## 2023-04-17 RX ORDER — ASPIRIN 81 MG/1
1 TABLET ORAL DAILY
COMMUNITY
Start: 2018-07-06 | End: 2023-04-19 | Stop reason: WASHOUT

## 2023-04-17 RX ORDER — AMLODIPINE BESYLATE 5 MG/1
5 TABLET ORAL
Qty: 30 TABLET | Refills: 177 | COMMUNITY
Start: 2008-10-15 | End: 2023-04-19 | Stop reason: WASHOUT

## 2023-04-19 ENCOUNTER — OFFICE VISIT (OUTPATIENT)
Dept: PRIMARY CARE | Facility: CLINIC | Age: 63
End: 2023-04-19
Payer: MEDICARE

## 2023-04-19 VITALS
BODY MASS INDEX: 26.5 KG/M2 | OXYGEN SATURATION: 98 % | WEIGHT: 189.3 LBS | HEIGHT: 71 IN | HEART RATE: 73 BPM | SYSTOLIC BLOOD PRESSURE: 140 MMHG | DIASTOLIC BLOOD PRESSURE: 90 MMHG | TEMPERATURE: 98 F

## 2023-04-19 DIAGNOSIS — T59.811A SMOKE INHALATION: Primary | ICD-10-CM

## 2023-04-19 DIAGNOSIS — I10 PRIMARY HYPERTENSION: ICD-10-CM

## 2023-04-19 DIAGNOSIS — K21.9 GASTROESOPHAGEAL REFLUX DISEASE WITHOUT ESOPHAGITIS: ICD-10-CM

## 2023-04-19 DIAGNOSIS — I25.10 ATHEROSCLEROSIS OF CORONARY ARTERY OF NATIVE HEART WITHOUT ANGINA PECTORIS, UNSPECIFIED VESSEL OR LESION TYPE: ICD-10-CM

## 2023-04-19 DIAGNOSIS — I50.9 CHRONIC CONGESTIVE HEART FAILURE, UNSPECIFIED HEART FAILURE TYPE (MULTI): ICD-10-CM

## 2023-04-19 DIAGNOSIS — Z09 HOSPITAL DISCHARGE FOLLOW-UP: ICD-10-CM

## 2023-04-19 DIAGNOSIS — F33.9 RECURRENT MAJOR DEPRESSIVE DISORDER, REMISSION STATUS UNSPECIFIED (CMS-HCC): ICD-10-CM

## 2023-04-19 PROBLEM — R60.0 LOWER LEG EDEMA: Status: RESOLVED | Noted: 2023-04-17 | Resolved: 2023-04-19
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
## 2023-04-19 PROBLEM — D64.9 ANEMIA: Status: RESOLVED | Noted: 2023-04-17 | Resolved: 2023-04-19

## 2023-04-19 PROBLEM — L03.90 CELLULITIS: Status: RESOLVED | Noted: 2023-04-17 | Resolved: 2023-04-19

## 2023-04-19 PROBLEM — R53.83 FATIGUE: Status: RESOLVED | Noted: 2023-04-17 | Resolved: 2023-04-19

## 2023-04-19 PROBLEM — R11.2 NAUSEA WITH VOMITING: Status: RESOLVED | Noted: 2023-04-17 | Resolved: 2023-04-19

## 2023-04-19 PROBLEM — R06.89 DIFFICULTY BREATHING: Status: RESOLVED | Noted: 2023-04-17 | Resolved: 2023-04-19

## 2023-04-19 PROBLEM — M62.830 BACK MUSCLE SPASM: Status: RESOLVED | Noted: 2023-04-17 | Resolved: 2023-04-19

## 2023-04-19 PROBLEM — R42 DIZZINESS: Status: RESOLVED | Noted: 2023-04-17 | Resolved: 2023-04-19

## 2023-04-19 PROBLEM — S22.42XA MULTIPLE CLOSED FRACTURES OF RIBS OF LEFT SIDE: Status: RESOLVED | Noted: 2023-04-17 | Resolved: 2023-04-19

## 2023-04-19 PROBLEM — R51.9 HEADACHE: Status: RESOLVED | Noted: 2023-04-17 | Resolved: 2023-04-19

## 2023-04-19 PROBLEM — M79.89 LEG SWELLING: Status: RESOLVED | Noted: 2023-04-17 | Resolved: 2023-04-19

## 2023-04-19 PROBLEM — R25.2 MUSCLE CRAMPING: Status: RESOLVED | Noted: 2023-04-17 | Resolved: 2023-04-19

## 2023-04-19 PROBLEM — I63.9 CVA (CEREBRAL VASCULAR ACCIDENT) (MULTI): Status: ACTIVE | Noted: 2023-04-19

## 2023-04-19 PROBLEM — R47.01 APHASIA OF UNKNOWN ORIGIN: Status: RESOLVED | Noted: 2023-04-17 | Resolved: 2023-04-19

## 2023-04-19 PROBLEM — R06.02 SOB (SHORTNESS OF BREATH): Status: RESOLVED | Noted: 2023-04-17 | Resolved: 2023-04-19

## 2023-04-19 PROBLEM — U07.1 PNEUMONIA DUE TO COVID-19 VIRUS: Status: RESOLVED | Noted: 2022-01-12 | Resolved: 2023-04-19

## 2023-04-19 PROBLEM — J12.82 PNEUMONIA DUE TO COVID-19 VIRUS: Status: RESOLVED | Noted: 2022-01-12 | Resolved: 2023-04-19

## 2023-04-19 PROBLEM — R41.82 AMS (ALTERED MENTAL STATUS): Status: RESOLVED | Noted: 2023-04-17 | Resolved: 2023-04-19

## 2023-04-19 PROBLEM — R07.9 CHEST PAIN: Status: RESOLVED | Noted: 2023-04-10 | Resolved: 2023-04-19

## 2023-04-19 PROBLEM — R79.89 ELEVATED TSH: Status: RESOLVED | Noted: 2023-04-17 | Resolved: 2023-04-19

## 2023-04-19 PROCEDURE — 3077F SYST BP >= 140 MM HG: CPT | Performed by: NURSE PRACTITIONER

## 2023-04-19 PROCEDURE — 1036F TOBACCO NON-USER: CPT | Performed by: NURSE PRACTITIONER

## 2023-04-19 PROCEDURE — 3080F DIAST BP >= 90 MM HG: CPT | Performed by: NURSE PRACTITIONER

## 2023-04-19 PROCEDURE — 99214 OFFICE O/P EST MOD 30 MIN: CPT | Performed by: NURSE PRACTITIONER

## 2023-04-19 RX ORDER — NIACIN 1000 MG/1
1000 TABLET, EXTENDED RELEASE ORAL NIGHTLY
Qty: 90 TABLET | Refills: 0 | Status: SHIPPED | OUTPATIENT
Start: 2023-04-19 | End: 2023-05-02 | Stop reason: WASHOUT

## 2023-04-19 RX ORDER — AMLODIPINE BESYLATE 5 MG/1
5 TABLET ORAL DAILY
Qty: 90 TABLET | COMMUNITY
Start: 2023-04-19 | End: 2023-05-09 | Stop reason: SDUPTHER

## 2023-04-19 RX ORDER — CARVEDILOL 3.12 MG/1
3.12 TABLET ORAL
COMMUNITY
Start: 2023-04-19 | End: 2023-05-09 | Stop reason: SDUPTHER

## 2023-04-19 RX ORDER — ATORVASTATIN CALCIUM 40 MG/1
40 TABLET, FILM COATED ORAL NIGHTLY
COMMUNITY
Start: 2023-04-19 | End: 2023-05-09 | Stop reason: SDUPTHER

## 2023-04-19 RX ORDER — NITROGLYCERIN 0.4 MG/1
0.4 TABLET SUBLINGUAL EVERY 5 MIN PRN
Qty: 25 TABLET | Refills: 0 | Status: SHIPPED | OUTPATIENT
Start: 2023-04-19

## 2023-04-19 RX ORDER — ESOMEPRAZOLE MAGNESIUM 40 MG/1
40 CAPSULE, DELAYED RELEASE ORAL
Qty: 90 CAPSULE | Refills: 0 | Status: SHIPPED | OUTPATIENT
Start: 2023-04-19 | End: 2023-05-02 | Stop reason: WASHOUT

## 2023-04-19 ASSESSMENT — ENCOUNTER SYMPTOMS
DIARRHEA: 0
ABDOMINAL PAIN: 0
DIZZINESS: 0
VOMITING: 0
HEADACHES: 0
SHORTNESS OF BREATH: 0
UNEXPECTED WEIGHT CHANGE: 0
NUMBNESS: 0
EYES NEGATIVE: 1
OCCASIONAL FEELINGS OF UNSTEADINESS: 0
MUSCULOSKELETAL NEGATIVE: 1
PALPITATIONS: 0
DEPRESSION: 0
ALLERGIC/IMMUNOLOGIC NEGATIVE: 1
COUGH: 1
CONSTIPATION: 0
GASTROINTESTINAL NEGATIVE: 1
SPEECH DIFFICULTY: 0
SORE THROAT: 0
ACTIVITY CHANGE: 0
LOSS OF SENSATION IN FEET: 0
NERVOUS/ANXIOUS: 1
FATIGUE: 1
ENDOCRINE NEGATIVE: 1
HEMATOLOGIC/LYMPHATIC NEGATIVE: 1
CHILLS: 0
NAUSEA: 0
WHEEZING: 0

## 2023-04-19 ASSESSMENT — PATIENT HEALTH QUESTIONNAIRE - PHQ9
2. FEELING DOWN, DEPRESSED OR HOPELESS: NOT AT ALL
1. LITTLE INTEREST OR PLEASURE IN DOING THINGS: NOT AT ALL
SUM OF ALL RESPONSES TO PHQ9 QUESTIONS 1 AND 2: 0

## 2023-04-19 NOTE — ASSESSMENT & PLAN NOTE
Continue current medications  -Low fat/low cholesterol, low sodium, carbohydrate controlled diet  -Exercise as tolerated 150 minutes/week, increase activity gradually  -Maintain healthy weight

## 2023-04-19 NOTE — ASSESSMENT & PLAN NOTE
Euvolemic  -2 GM sodium diet  -Weigh yourself every morning before breakfast  -Call the office if you have a weight gain of 3 or more pounds in one day or 5 or more pounds in one week
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
## 2023-04-28 RX ORDER — ROSUVASTATIN CALCIUM 20 MG/1
20 TABLET, COATED ORAL DAILY
COMMUNITY
End: 2023-05-02 | Stop reason: WASHOUT

## 2023-04-28 RX ORDER — CALCIUM CARB/MAGNESIUM CARB 311-232MG
1 TABLET ORAL DAILY
COMMUNITY
Start: 2023-03-14

## 2023-04-28 RX ORDER — MIRTAZAPINE 7.5 MG/1
1 TABLET, FILM COATED ORAL NIGHTLY
COMMUNITY
Start: 2023-03-14 | End: 2023-05-02 | Stop reason: WASHOUT

## 2023-04-28 RX ORDER — SERTRALINE HYDROCHLORIDE 50 MG/1
1 TABLET, FILM COATED ORAL DAILY
COMMUNITY
Start: 2022-10-18 | End: 2023-05-02 | Stop reason: WASHOUT

## 2023-04-28 RX ORDER — LISINOPRIL 20 MG/1
20 TABLET ORAL DAILY
COMMUNITY

## 2023-04-28 RX ORDER — POTASSIUM CHLORIDE 750 MG/1
1 TABLET, FILM COATED, EXTENDED RELEASE ORAL DAILY
COMMUNITY
Start: 2023-03-15 | End: 2023-05-02 | Stop reason: WASHOUT

## 2023-04-28 RX ORDER — PANTOPRAZOLE SODIUM 20 MG/1
20 TABLET, DELAYED RELEASE ORAL DAILY
COMMUNITY

## 2023-04-28 RX ORDER — QUETIAPINE FUMARATE 50 MG/1
1 TABLET, FILM COATED ORAL 2 TIMES DAILY
COMMUNITY
Start: 2022-10-18 | End: 2023-05-02 | Stop reason: WASHOUT

## 2023-04-28 RX ORDER — DIVALPROEX SODIUM 125 MG/1
2 CAPSULE, COATED PELLETS ORAL EVERY 12 HOURS
COMMUNITY
Start: 2023-03-14 | End: 2023-05-02 | Stop reason: WASHOUT

## 2023-05-01 ENCOUNTER — APPOINTMENT (OUTPATIENT)
Dept: PRIMARY CARE | Facility: CLINIC | Age: 63
End: 2023-05-01
Payer: MEDICARE

## 2023-05-01 ENCOUNTER — OFFICE VISIT (OUTPATIENT)
Dept: PRIMARY CARE | Facility: CLINIC | Age: 63
End: 2023-05-01
Payer: MEDICARE

## 2023-05-01 VITALS
OXYGEN SATURATION: 97 % | TEMPERATURE: 97.6 F | BODY MASS INDEX: 26.78 KG/M2 | DIASTOLIC BLOOD PRESSURE: 88 MMHG | HEART RATE: 77 BPM | WEIGHT: 192 LBS | SYSTOLIC BLOOD PRESSURE: 130 MMHG

## 2023-05-01 DIAGNOSIS — F33.2 SEVERE EPISODE OF RECURRENT MAJOR DEPRESSIVE DISORDER, WITHOUT PSYCHOTIC FEATURES (MULTI): ICD-10-CM

## 2023-05-01 DIAGNOSIS — I25.10 ATHEROSCLEROSIS OF CORONARY ARTERY OF NATIVE HEART WITHOUT ANGINA PECTORIS, UNSPECIFIED VESSEL OR LESION TYPE: ICD-10-CM

## 2023-05-01 DIAGNOSIS — I10 PRIMARY HYPERTENSION: Primary | ICD-10-CM

## 2023-05-01 DIAGNOSIS — I63.9 CEREBROVASCULAR ACCIDENT (CVA), UNSPECIFIED MECHANISM (MULTI): ICD-10-CM

## 2023-05-01 DIAGNOSIS — G51.0 BELL'S PALSY: ICD-10-CM

## 2023-05-01 DIAGNOSIS — H54.7 VISION LOSS: ICD-10-CM

## 2023-05-01 DIAGNOSIS — I50.9 CONGESTIVE HEART FAILURE, UNSPECIFIED HF CHRONICITY, UNSPECIFIED HEART FAILURE TYPE (MULTI): ICD-10-CM

## 2023-05-01 PROCEDURE — 3075F SYST BP GE 130 - 139MM HG: CPT | Performed by: NURSE PRACTITIONER

## 2023-05-01 PROCEDURE — 99214 OFFICE O/P EST MOD 30 MIN: CPT | Performed by: NURSE PRACTITIONER

## 2023-05-01 PROCEDURE — 1036F TOBACCO NON-USER: CPT | Performed by: NURSE PRACTITIONER

## 2023-05-01 PROCEDURE — 3079F DIAST BP 80-89 MM HG: CPT | Performed by: NURSE PRACTITIONER

## 2023-05-01 PROCEDURE — 93000 ELECTROCARDIOGRAM COMPLETE: CPT | Performed by: NURSE PRACTITIONER

## 2023-05-01 NOTE — PROGRESS NOTES
Subjective   Patient ID: Fernando Sweet is a 62 y.o. male who presents for er follow up for bells palsy (Lt side of face).    Presented to [facility] ER 4/24 for left facial droop, possible stroke. CT was negative for acute finding, showed remote infarcts previously known. Diagnosed with Bell's palsy, treated with prednisone, symptoms have improved. Patient has not seen neurology since last year. Given referral to neurology. Labs and CT reviewed from [facility] via Care Everywhere.  Patient is deaf, picking up new hearing aids tonight. Able to communicate through writing.  Accompanied by daughter-in-law today.  Patient is now living with his son and daughter-in-law, they are seeking guardianship and have an upcoming court hearing. They have forms to fill out for competency. Patient needs to follow-up with psychiatry for completion of forms. Given new referral and list of local mental health providers.  Denies any difficulty breathing now, still has a mild cough.  His medication list at discharge is quite a bit different than the last time I saw him. He previously was on Seroquel, Remeron, Lexapro. He is not on any antidepressant, antianxiety, antipsychotic medication. He has not been following up with psychiatry. Referral to psychiatry. He needs to go back before he has any type of relapse.  Has not seen cardiology, needs to follow-up with cardiology.  CHF, euvolemic, not currently on any diuretics. Need to monitor closely.                  Review of Systems   Constitutional:  Positive for fatigue. Negative for activity change, chills and unexpected weight change.   HENT:  Positive for hearing loss. Negative for congestion, ear pain and sore throat.    Eyes:  Positive for visual disturbance.   Respiratory:  Positive for cough. Negative for shortness of breath and wheezing.    Cardiovascular:  Negative for chest pain, palpitations and leg swelling.   Gastrointestinal: Negative.  Negative for abdominal pain, constipation, diarrhea,  nausea and vomiting.   Endocrine: Negative.    Genitourinary: Negative.    Musculoskeletal: Negative.    Skin: Negative.    Allergic/Immunologic: Negative.    Neurological:  Positive for facial asymmetry. Negative for dizziness, speech difficulty, numbness and headaches.   Hematological: Negative.    Psychiatric/Behavioral:  The patient is nervous/anxious.         Depression   All other systems reviewed and are negative.      Objective   /88   Pulse 77   Temp 36.4 °C (97.6 °F)   Wt 87.1 kg (192 lb)   SpO2 97%   BMI 26.78 kg/m²     Physical Exam  Vitals and nursing note reviewed.   Constitutional:       General: He is not in acute distress.     Appearance: Normal appearance. He is normal weight. He is not ill-appearing.   HENT:      Head: Normocephalic and atraumatic.      Right Ear: Tympanic membrane, ear canal and external ear normal.      Left Ear: Tympanic membrane, ear canal and external ear normal.      Nose: Nose normal.      Mouth/Throat:      Mouth: Mucous membranes are moist.      Pharynx: Oropharynx is clear.   Eyes:      Extraocular Movements: Extraocular movements intact.      Conjunctiva/sclera: Conjunctivae normal.      Pupils: Pupils are equal, round, and reactive to light.   Cardiovascular:      Rate and Rhythm: Normal rate and regular rhythm.      Pulses: Normal pulses.      Heart sounds: Normal heart sounds. No murmur heard.  Pulmonary:      Effort: Pulmonary effort is normal. No respiratory distress.      Breath sounds: Normal breath sounds. No wheezing.   Abdominal:      General: Bowel sounds are normal. There is no distension.      Palpations: Abdomen is soft.      Tenderness: There is no abdominal tenderness.   Musculoskeletal:         General: No tenderness. Normal range of motion.      Cervical back: Normal range of motion and neck supple.      Right lower leg: No edema.      Left lower leg: No edema.   Skin:     General: Skin is warm and dry.      Capillary Refill: Capillary  refill takes less than 2 seconds.   Neurological:      Mental Status: He is alert and oriented to person, place, and time.      Cranial Nerves: Facial asymmetry present.   Psychiatric:         Mood and Affect: Mood normal.         Behavior: Behavior normal. Behavior is cooperative.         Thought Content: Thought content normal.         Judgment: Judgment normal.         Assessment/Plan   Problem List Items Addressed This Visit          Nervous    CVA (cerebral vascular accident) (CMS/[facility])    Relevant Orders    Referral to Neurology    Follow Up In Primary Care    Bell's palsy     Improving  Referral to neurology            Circulatory    Atherosclerosis of coronary artery     Follow up with cardiology         Relevant Orders    ECG 12 lead (Completed)    CHF (congestive heart failure) (CMS/[facility])     Euvolemic  -2 GM sodium diet  -Weigh yourself every morning before breakfast  -Call the office if you have a weight gain of 3 or more pounds in one day or 5 or more pounds in one week          Relevant Orders    ECG 12 lead (Completed)    Follow Up In Primary Care    Hypertension - Primary     Controlled. Continue current medications.  -Low fat/low cholesterol, low sodium, carbohydrate controlled diet  -Exercise as tolerated 150 minutes/week, increase activity gradually  -Maintain healthy weight           Relevant Orders    ECG 12 lead (Completed)    Follow Up In Primary Care       Other    Severe episode of recurrent major depressive disorder, without psychotic features (CMS/[facility])    Relevant Orders    Referral to Psychiatry     Other Visit Diagnoses       Vision loss        Relevant Orders    Referral to Ophthalmology

## 2023-05-02 ASSESSMENT — ENCOUNTER SYMPTOMS
MUSCULOSKELETAL NEGATIVE: 1
UNEXPECTED WEIGHT CHANGE: 0
HEADACHES: 0
NERVOUS/ANXIOUS: 1
VOMITING: 0
FACIAL ASYMMETRY: 1
SHORTNESS OF BREATH: 0
FATIGUE: 1
DIZZINESS: 0
NUMBNESS: 0
GASTROINTESTINAL NEGATIVE: 1
DIARRHEA: 0
SORE THROAT: 0
WHEEZING: 0
ALLERGIC/IMMUNOLOGIC NEGATIVE: 1
CONSTIPATION: 0
PALPITATIONS: 0
NAUSEA: 0
SPEECH DIFFICULTY: 0
HEMATOLOGIC/LYMPHATIC NEGATIVE: 1
COUGH: 1
CHILLS: 0
ABDOMINAL PAIN: 0
ENDOCRINE NEGATIVE: 1
ACTIVITY CHANGE: 0

## 2023-05-09 DIAGNOSIS — Z95.1 HISTORY OF CORONARY ARTERY BYPASS GRAFT: ICD-10-CM

## 2023-05-09 DIAGNOSIS — I10 PRIMARY HYPERTENSION: ICD-10-CM

## 2023-05-09 DIAGNOSIS — I25.10 ATHEROSCLEROSIS OF CORONARY ARTERY OF NATIVE HEART WITHOUT ANGINA PECTORIS, UNSPECIFIED VESSEL OR LESION TYPE: ICD-10-CM

## 2023-05-09 DIAGNOSIS — I63.9 CEREBROVASCULAR ACCIDENT (CVA), UNSPECIFIED MECHANISM (MULTI): ICD-10-CM

## 2023-05-09 DIAGNOSIS — I20.9 ANGINA PECTORIS (CMS-HCC): Primary | ICD-10-CM

## 2023-05-09 RX ORDER — CLOPIDOGREL BISULFATE 75 MG/1
75 TABLET ORAL
Qty: 90 TABLET | Refills: 0 | Status: SHIPPED | OUTPATIENT
Start: 2023-05-09

## 2023-05-09 RX ORDER — AMLODIPINE BESYLATE 5 MG/1
5 TABLET ORAL DAILY
Qty: 90 TABLET | Refills: 0 | Status: SHIPPED | OUTPATIENT
Start: 2023-05-09

## 2023-05-09 RX ORDER — ATORVASTATIN CALCIUM 40 MG/1
40 TABLET, FILM COATED ORAL NIGHTLY
Qty: 90 TABLET | Refills: 0 | Status: SHIPPED | OUTPATIENT
Start: 2023-05-09

## 2023-05-09 RX ORDER — ISOSORBIDE MONONITRATE 30 MG/1
30 TABLET, EXTENDED RELEASE ORAL
Qty: 90 TABLET | Refills: 0 | Status: SHIPPED | OUTPATIENT
Start: 2023-05-09

## 2023-05-09 RX ORDER — CARVEDILOL 3.12 MG/1
3.12 TABLET ORAL
Qty: 180 TABLET | Refills: 0 | Status: SHIPPED | OUTPATIENT
Start: 2023-05-09

## 2023-07-05 ENCOUNTER — APPOINTMENT (OUTPATIENT)
Dept: PRIMARY CARE | Facility: CLINIC | Age: 63
End: 2023-07-05

## 2023-08-30 ENCOUNTER — APPOINTMENT (OUTPATIENT)
Dept: GENERAL RADIOLOGY | Age: 63
End: 2023-08-30
Payer: COMMERCIAL

## 2023-08-30 ENCOUNTER — HOSPITAL ENCOUNTER (EMERGENCY)
Age: 63
Discharge: HOME | End: 2023-08-30
Payer: COMMERCIAL

## 2023-08-30 VITALS
HEART RATE: 66 BPM | BODY MASS INDEX: 26.04 KG/M2 | SYSTOLIC BLOOD PRESSURE: 125 MMHG | HEIGHT: 71 IN | RESPIRATION RATE: 20 BRPM | DIASTOLIC BLOOD PRESSURE: 77 MMHG | OXYGEN SATURATION: 98 % | TEMPERATURE: 98.4 F | WEIGHT: 186 LBS

## 2023-08-30 DIAGNOSIS — R07.9 CHEST PAIN, UNSPECIFIED TYPE: Primary | ICD-10-CM

## 2023-08-30 LAB
ALBUMIN SERPL-MCNC: 3.6 G/DL (ref 3.5–4.6)
ALP SERPL-CCNC: 98 U/L (ref 35–104)
ALT SERPL-CCNC: 10 U/L (ref 0–41)
ANION GAP SERPL CALCULATED.3IONS-SCNC: 6 MEQ/L (ref 9–15)
AST SERPL-CCNC: 18 U/L (ref 0–40)
BASOPHILS # BLD: 0 K/UL (ref 0–0.2)
BASOPHILS NFR BLD: 0 %
BILIRUB SERPL-MCNC: 0.5 MG/DL (ref 0.2–0.7)
BNP BLD-MCNC: 352 PG/ML
BUN SERPL-MCNC: 17 MG/DL (ref 8–23)
CALCIUM SERPL-MCNC: 8.6 MG/DL (ref 8.5–9.9)
CHLORIDE SERPL-SCNC: 107 MEQ/L (ref 95–107)
CO2 SERPL-SCNC: 29 MEQ/L (ref 20–31)
CREAT SERPL-MCNC: 1.16 MG/DL (ref 0.7–1.2)
EOSINOPHIL # BLD: 0.2 K/UL (ref 0–0.7)
EOSINOPHIL NFR BLD: 2 %
ERYTHROCYTE [DISTWIDTH] IN BLOOD BY AUTOMATED COUNT: 15.1 % (ref 11.5–14.5)
GLOBULIN SER CALC-MCNC: 3 G/DL (ref 2.3–3.5)
GLUCOSE SERPL-MCNC: 101 MG/DL (ref 70–99)
HCT VFR BLD AUTO: 39.4 % (ref 42–52)
HGB BLD-MCNC: 12.8 G/DL (ref 14–18)
LYMPHOCYTES # BLD: 3 K/UL (ref 1–4.8)
LYMPHOCYTES NFR BLD: 30 %
MAGNESIUM SERPL-MCNC: 2.2 MG/DL (ref 1.7–2.4)
MCH RBC QN AUTO: 31 PG (ref 27–31.3)
MCHC RBC AUTO-ENTMCNC: 32.5 % (ref 33–37)
MCV RBC AUTO: 95.5 FL (ref 79–92.2)
MONOCYTES # BLD: 1.4 K/UL (ref 0.2–0.8)
MONOCYTES NFR BLD: 14 %
NEUTROPHILS # BLD: 5.3 K/UL (ref 1.4–6.5)
NEUTS SEG NFR BLD: 54 %
PLATELET # BLD AUTO: 248 K/UL (ref 130–400)
POTASSIUM SERPL-SCNC: 4.2 MEQ/L (ref 3.4–4.9)
PROT SERPL-MCNC: 6.6 G/DL (ref 6.3–8)
RBC # BLD AUTO: 4.12 M/UL (ref 4.7–6.1)
SODIUM SERPL-SCNC: 142 MEQ/L (ref 135–144)
TROPONIN T SERPL-MCNC: <0.01 NG/ML (ref 0–0.01)
TROPONIN T SERPL-MCNC: <0.01 NG/ML (ref 0–0.01)
WBC # BLD AUTO: 9.9 K/UL (ref 4.8–10.8)

## 2023-08-30 PROCEDURE — 83735 ASSAY OF MAGNESIUM: CPT

## 2023-08-30 PROCEDURE — 6370000000 HC RX 637 (ALT 250 FOR IP): Performed by: PHYSICIAN ASSISTANT

## 2023-08-30 PROCEDURE — 83880 ASSAY OF NATRIURETIC PEPTIDE: CPT

## 2023-08-30 PROCEDURE — 80053 COMPREHEN METABOLIC PANEL: CPT

## 2023-08-30 PROCEDURE — 71045 X-RAY EXAM CHEST 1 VIEW: CPT

## 2023-08-30 PROCEDURE — 84484 ASSAY OF TROPONIN QUANT: CPT

## 2023-08-30 PROCEDURE — 36415 COLL VENOUS BLD VENIPUNCTURE: CPT

## 2023-08-30 PROCEDURE — 93005 ELECTROCARDIOGRAM TRACING: CPT | Performed by: EMERGENCY MEDICINE

## 2023-08-30 PROCEDURE — 99285 EMERGENCY DEPT VISIT HI MDM: CPT

## 2023-08-30 PROCEDURE — 85025 COMPLETE CBC W/AUTO DIFF WBC: CPT

## 2023-08-30 RX ORDER — ASPIRIN 81 MG/1
324 TABLET, CHEWABLE ORAL ONCE
Status: COMPLETED | OUTPATIENT
Start: 2023-08-30 | End: 2023-08-30

## 2023-08-30 RX ADMIN — ASPIRIN 81 MG 324 MG: 81 TABLET ORAL at 11:37

## 2023-08-30 ASSESSMENT — ENCOUNTER SYMPTOMS
SHORTNESS OF BREATH: 0
RHINORRHEA: 0
BACK PAIN: 0
ABDOMINAL PAIN: 0
SORE THROAT: 0
VOMITING: 0
NAUSEA: 0
COUGH: 0
EYE PAIN: 0
DIARRHEA: 0
PHOTOPHOBIA: 0

## 2023-08-30 ASSESSMENT — PAIN - FUNCTIONAL ASSESSMENT: PAIN_FUNCTIONAL_ASSESSMENT: 0-10

## 2023-08-30 ASSESSMENT — PAIN SCALES - GENERAL: PAINLEVEL_OUTOF10: 3

## 2023-08-30 ASSESSMENT — PAIN DESCRIPTION - FREQUENCY: FREQUENCY: CONTINUOUS

## 2023-08-30 ASSESSMENT — PAIN DESCRIPTION - LOCATION: LOCATION: CHEST

## 2023-08-30 ASSESSMENT — PAIN DESCRIPTION - PAIN TYPE: TYPE: CHRONIC PAIN

## 2023-08-30 ASSESSMENT — PAIN DESCRIPTION - ORIENTATION: ORIENTATION: MID

## 2023-08-30 ASSESSMENT — PAIN DESCRIPTION - DESCRIPTORS: DESCRIPTORS: DISCOMFORT

## 2023-08-30 NOTE — ED NOTES
Labs obtained by this RN, labeled and sent to lab via tube system.        Gary Cox RN  08/30/23 8708

## 2023-08-30 NOTE — ED PROVIDER NOTES
[facility] ED  eMERGENCY dEPARTMENTeNCOUnter      Pt Name: Nirav Tony  MRN: [id number]  [address] 1960  Date ofevaluation: 8/30/2023  Provider: Allyson Holt PA-C    CHIEF COMPLAINT       Chief Complaint   Patient presents with    Chest Pain     Started this am         HISTORY OF PRESENT ILLNESS   (Location/Symptom, Timing/Onset,Context/Setting, Quality, Duration, Modifying Factors, Severity)  Note limiting factors. Nirav Tony is a 70 y.o. male who presents to the emergency department chest pain around 9:00 this morning waking him up. Patient is currently residing in psychiatric facility [facility]. He does have a history of his CAD CHF and CABG. Also has a history of dementia with behavioral disturbance acute psychosis hyperlipidemia. He states he does not have any chest pain at present time and states it lasted about 10 minutes and he described it as a sharp like a knife twisting. He denies any shortness of breath or leg swelling. HPI    NursingNotes were reviewed. REVIEW OF SYSTEMS    (2-9 systems for level 4, 10 or more for level 5)     Review of Systems   Constitutional:  Negative for chills, diaphoresis, fatigue and fever. HENT:  Negative for congestion, rhinorrhea and sore throat. Eyes:  Negative for photophobia and pain. Respiratory:  Negative for cough and shortness of breath. Cardiovascular:  Positive for chest pain. Negative for palpitations. Gastrointestinal:  Negative for abdominal pain, diarrhea, nausea and vomiting. Genitourinary:  Negative for dysuria and flank pain. Musculoskeletal:  Negative for back pain. Skin:  Negative for rash. Neurological:  Negative for dizziness, light-headedness and headaches. Psychiatric/Behavioral: Negative. All other systems reviewed and are negative. Except as noted above the remainder of the review of systems was reviewed and negative.        PAST MEDICAL HISTORY     Past Medical History:   Diagnosis Date    Atrial

## 2023-08-30 NOTE — ED NOTES
SBAR report received from [address] [place] [address], RN. Pt. Alert, smiling, resting on the exam cot. Remains on monitoring devices. NSR noted. See VS.  No acute distress noted at this time. He remains in direct visualization of the nurses station.      Sonny Moulton RN  08/30/23 9738

## 2023-08-30 NOTE — ED NOTES
[address] [place] [address],[address] [facility]  08/30/23 7237

## 2023-08-30 NOTE — ED NOTES
Labs obtained by this RN, labeled and sent to lab via tube system.        Samia Callahan RN  08/30/23 8885

## 2023-08-30 NOTE — ED NOTES
Pt. Remains free of chest pain, eager to depart. Report given to [facility] with transfer of care. No acute distress noted upon dc.      Concepcion Vega RN  08/30/23 7644

## 2023-08-30 NOTE — ED NOTES
Che PARSONS at the bedside at this time. Assessment completed.        Sheyla Harley RN  08/30/23 9733

## 2023-08-31 LAB
EKG ATRIAL RATE: 65 BPM
EKG P AXIS: 55 DEGREES
EKG P-R INTERVAL: 166 MS
EKG Q-T INTERVAL: 436 MS
EKG QRS DURATION: 90 MS
EKG QTC CALCULATION (BAZETT): 453 MS
EKG R AXIS: -29 DEGREES
EKG T AXIS: 92 DEGREES
EKG VENTRICULAR RATE: 65 BPM

## 2023-08-31 PROCEDURE — 93010 ELECTROCARDIOGRAM REPORT: CPT | Performed by: INTERNAL MEDICINE

## 2023-10-23 ENCOUNTER — APPOINTMENT (OUTPATIENT)
Dept: GENERAL RADIOLOGY | Age: 63
End: 2023-10-23
Payer: COMMERCIAL

## 2023-10-23 ENCOUNTER — HOSPITAL ENCOUNTER (OUTPATIENT)
Age: 63
Setting detail: OBSERVATION
Discharge: NURSING, REHAB OR LONG-TERM CARE | End: 2023-10-25
Attending: EMERGENCY MEDICINE | Admitting: INTERNAL MEDICINE
Payer: COMMERCIAL

## 2023-10-23 ENCOUNTER — APPOINTMENT (OUTPATIENT)
Dept: CT IMAGING | Age: 63
End: 2023-10-23
Payer: COMMERCIAL

## 2023-10-23 DIAGNOSIS — R94.31 ABNORMAL EKG: ICD-10-CM

## 2023-10-23 DIAGNOSIS — I10 HYPERTENSION, UNSPECIFIED TYPE: ICD-10-CM

## 2023-10-23 DIAGNOSIS — R93.0 ABNORMAL CT OF THE HEAD: ICD-10-CM

## 2023-10-23 DIAGNOSIS — R93.89 ABNORMAL CHEST X-RAY: Primary | ICD-10-CM

## 2023-10-23 DIAGNOSIS — N18.9 CHRONIC KIDNEY DISEASE, UNSPECIFIED CKD STAGE: ICD-10-CM

## 2023-10-23 LAB
ALBUMIN SERPL-MCNC: 4 G/DL (ref 3.5–4.6)
ALP SERPL-CCNC: 122 U/L (ref 35–104)
ALT SERPL-CCNC: 11 U/L (ref 0–41)
ANION GAP SERPL CALCULATED.3IONS-SCNC: 10 MEQ/L (ref 9–15)
APTT PPP: 31.9 SEC (ref 24.4–36.8)
AST SERPL-CCNC: 20 U/L (ref 0–40)
BASOPHILS # BLD: 0.1 K/UL (ref 0–0.2)
BASOPHILS NFR BLD: 0.5 %
BILIRUB SERPL-MCNC: 0.4 MG/DL (ref 0.2–0.7)
BNP BLD-MCNC: 565 PG/ML
BUN SERPL-MCNC: 17 MG/DL (ref 8–23)
CALCIUM SERPL-MCNC: 9.5 MG/DL (ref 8.5–9.9)
CHLORIDE SERPL-SCNC: 106 MEQ/L (ref 95–107)
CO2 SERPL-SCNC: 28 MEQ/L (ref 20–31)
CREAT SERPL-MCNC: 1.26 MG/DL (ref 0.7–1.2)
D DIMER PPP FEU-MCNC: 0.4 MG/L FEU (ref 0–0.5)
EOSINOPHIL # BLD: 0.2 K/UL (ref 0–0.7)
EOSINOPHIL NFR BLD: 2 %
ERYTHROCYTE [DISTWIDTH] IN BLOOD BY AUTOMATED COUNT: 13.3 % (ref 11.5–14.5)
ETHANOL PERCENT: NORMAL G/DL
ETHANOLAMINE SERPL-MCNC: <10 MG/DL (ref 0–0.08)
GLOBULIN SER CALC-MCNC: 3.7 G/DL (ref 2.3–3.5)
GLUCOSE SERPL-MCNC: 122 MG/DL (ref 70–99)
HCT VFR BLD AUTO: 50.5 % (ref 42–52)
HGB BLD-MCNC: 16.2 G/DL (ref 14–18)
INR PPP: 1.1
LYMPHOCYTES # BLD: 3.1 K/UL (ref 1–4.8)
LYMPHOCYTES NFR BLD: 33.6 %
MAGNESIUM SERPL-MCNC: 2.1 MG/DL (ref 1.7–2.4)
MCH RBC QN AUTO: 30.8 PG (ref 27–31.3)
MCHC RBC AUTO-ENTMCNC: 32.1 % (ref 33–37)
MCV RBC AUTO: 96 FL (ref 79–92.2)
MONOCYTES # BLD: 1.2 K/UL (ref 0.2–0.8)
MONOCYTES NFR BLD: 12.6 %
NEUTROPHILS # BLD: 4.7 K/UL (ref 1.4–6.5)
NEUTS SEG NFR BLD: 51.1 %
PLATELET # BLD AUTO: 277 K/UL (ref 130–400)
POTASSIUM SERPL-SCNC: 3.9 MEQ/L (ref 3.4–4.9)
PROT SERPL-MCNC: 7.7 G/DL (ref 6.3–8)
PROTHROMBIN TIME: 14.2 SEC (ref 12.3–14.9)
RBC # BLD AUTO: 5.26 M/UL (ref 4.7–6.1)
SODIUM SERPL-SCNC: 144 MEQ/L (ref 135–144)
TROPONIN, HIGH SENSITIVITY: 19 NG/L (ref 0–19)
TROPONIN, HIGH SENSITIVITY: 20 NG/L (ref 0–19)
WBC # BLD AUTO: 9.1 K/UL (ref 4.8–10.8)

## 2023-10-23 PROCEDURE — 82077 ASSAY SPEC XCP UR&BREATH IA: CPT

## 2023-10-23 PROCEDURE — 71045 X-RAY EXAM CHEST 1 VIEW: CPT

## 2023-10-23 PROCEDURE — 80307 DRUG TEST PRSMV CHEM ANLYZR: CPT

## 2023-10-23 PROCEDURE — 99285 EMERGENCY DEPT VISIT HI MDM: CPT

## 2023-10-23 PROCEDURE — 84484 ASSAY OF TROPONIN QUANT: CPT

## 2023-10-23 PROCEDURE — 85610 PROTHROMBIN TIME: CPT

## 2023-10-23 PROCEDURE — 83735 ASSAY OF MAGNESIUM: CPT

## 2023-10-23 PROCEDURE — 80053 COMPREHEN METABOLIC PANEL: CPT

## 2023-10-23 PROCEDURE — 85379 FIBRIN DEGRADATION QUANT: CPT

## 2023-10-23 PROCEDURE — 70450 CT HEAD/BRAIN W/O DYE: CPT

## 2023-10-23 PROCEDURE — 36415 COLL VENOUS BLD VENIPUNCTURE: CPT

## 2023-10-23 PROCEDURE — 85025 COMPLETE CBC W/AUTO DIFF WBC: CPT

## 2023-10-23 PROCEDURE — 93005 ELECTROCARDIOGRAM TRACING: CPT | Performed by: EMERGENCY MEDICINE

## 2023-10-23 PROCEDURE — 83880 ASSAY OF NATRIURETIC PEPTIDE: CPT

## 2023-10-23 PROCEDURE — 85730 THROMBOPLASTIN TIME PARTIAL: CPT

## 2023-10-23 ASSESSMENT — PAIN - FUNCTIONAL ASSESSMENT: PAIN_FUNCTIONAL_ASSESSMENT: ADULT NONVERBAL PAIN SCALE (NPVS)

## 2023-10-24 PROBLEM — R93.89 ABNORMAL CHEST X-RAY: Status: ACTIVE | Noted: 2023-10-24

## 2023-10-24 PROBLEM — F33.2 SEVERE EPISODE OF RECURRENT MAJOR DEPRESSIVE DISORDER, WITHOUT PSYCHOTIC FEATURES (HCC): Status: ACTIVE | Noted: 2023-10-24

## 2023-10-24 LAB
AMPHET UR QL SCN: NORMAL
BARBITURATES UR QL SCN: NORMAL
BENZODIAZ UR QL SCN: NORMAL
CANNABINOIDS UR QL SCN: NORMAL
COCAINE UR QL SCN: NORMAL
DRUG SCREEN COMMENT UR-IMP: NORMAL
EKG ATRIAL RATE: 115 BPM
EKG ATRIAL RATE: 74 BPM
EKG P AXIS: 58 DEGREES
EKG P AXIS: 73 DEGREES
EKG P-R INTERVAL: 146 MS
EKG P-R INTERVAL: 156 MS
EKG Q-T INTERVAL: 356 MS
EKG Q-T INTERVAL: 438 MS
EKG QRS DURATION: 90 MS
EKG QRS DURATION: 96 MS
EKG QTC CALCULATION (BAZETT): 486 MS
EKG QTC CALCULATION (BAZETT): 492 MS
EKG R AXIS: -43 DEGREES
EKG R AXIS: -50 DEGREES
EKG T AXIS: 107 DEGREES
EKG T AXIS: 77 DEGREES
EKG VENTRICULAR RATE: 115 BPM
EKG VENTRICULAR RATE: 74 BPM
FENTANYL SCREEN, URINE: NORMAL
METHADONE UR QL SCN: NORMAL
OPIATES UR QL SCN: NORMAL
OXYCODONE UR QL SCN: NORMAL
PCP UR QL SCN: NORMAL
PROPOXYPH UR QL SCN: NORMAL
TROPONIN, HIGH SENSITIVITY: 26 NG/L (ref 0–19)

## 2023-10-24 PROCEDURE — 2580000003 HC RX 258: Performed by: NURSE PRACTITIONER

## 2023-10-24 PROCEDURE — 93005 ELECTROCARDIOGRAM TRACING: CPT | Performed by: EMERGENCY MEDICINE

## 2023-10-24 PROCEDURE — G0378 HOSPITAL OBSERVATION PER HR: HCPCS

## 2023-10-24 PROCEDURE — 36415 COLL VENOUS BLD VENIPUNCTURE: CPT

## 2023-10-24 PROCEDURE — 84484 ASSAY OF TROPONIN QUANT: CPT

## 2023-10-24 PROCEDURE — APPSS45 APP SPLIT SHARED TIME 31-45 MINUTES: Performed by: PHYSICIAN ASSISTANT

## 2023-10-24 PROCEDURE — 90792 PSYCH DIAG EVAL W/MED SRVCS: CPT | Performed by: PSYCHIATRY & NEUROLOGY

## 2023-10-24 PROCEDURE — 6370000000 HC RX 637 (ALT 250 FOR IP): Performed by: EMERGENCY MEDICINE

## 2023-10-24 PROCEDURE — 99223 1ST HOSP IP/OBS HIGH 75: CPT | Performed by: INTERNAL MEDICINE

## 2023-10-24 RX ORDER — ASPIRIN 300 MG/1
300 SUPPOSITORY RECTAL ONCE
Status: COMPLETED | OUTPATIENT
Start: 2023-10-24 | End: 2023-10-24

## 2023-10-24 RX ORDER — TRAZODONE HYDROCHLORIDE 50 MG/1
50 TABLET ORAL NIGHTLY
Status: DISCONTINUED | OUTPATIENT
Start: 2023-10-24 | End: 2023-10-25 | Stop reason: HOSPADM

## 2023-10-24 RX ORDER — ISOSORBIDE MONONITRATE 60 MG/1
60 TABLET, EXTENDED RELEASE ORAL DAILY
COMMUNITY
Start: 2023-07-08

## 2023-10-24 RX ORDER — LEVETIRACETAM 250 MG/1
250 TABLET ORAL NIGHTLY
Status: DISCONTINUED | OUTPATIENT
Start: 2023-10-24 | End: 2023-10-25 | Stop reason: HOSPADM

## 2023-10-24 RX ORDER — SODIUM CHLORIDE 9 MG/ML
INJECTION, SOLUTION INTRAVENOUS PRN
Status: DISCONTINUED | OUTPATIENT
Start: 2023-10-24 | End: 2023-10-25 | Stop reason: HOSPADM

## 2023-10-24 RX ORDER — POLYETHYLENE GLYCOL 3350 17 G/17G
17 POWDER, FOR SOLUTION ORAL DAILY PRN
Status: DISCONTINUED | OUTPATIENT
Start: 2023-10-24 | End: 2023-10-25 | Stop reason: HOSPADM

## 2023-10-24 RX ORDER — TRAZODONE HYDROCHLORIDE 50 MG/1
50 TABLET ORAL NIGHTLY
COMMUNITY

## 2023-10-24 RX ORDER — OLANZAPINE 10 MG/1
10 TABLET ORAL NIGHTLY
Status: ON HOLD | COMMUNITY
Start: 2023-07-07 | End: 2023-10-24 | Stop reason: HOSPADM

## 2023-10-24 RX ORDER — DULOXETIN HYDROCHLORIDE 60 MG/1
60 CAPSULE, DELAYED RELEASE ORAL DAILY
COMMUNITY
Start: 2023-07-08

## 2023-10-24 RX ORDER — ACETAMINOPHEN 650 MG/1
650 SUPPOSITORY RECTAL EVERY 6 HOURS PRN
Status: DISCONTINUED | OUTPATIENT
Start: 2023-10-24 | End: 2023-10-25 | Stop reason: HOSPADM

## 2023-10-24 RX ORDER — ENOXAPARIN SODIUM 100 MG/ML
40 INJECTION SUBCUTANEOUS DAILY
Status: DISCONTINUED | OUTPATIENT
Start: 2023-10-24 | End: 2023-10-25 | Stop reason: HOSPADM

## 2023-10-24 RX ORDER — ESOMEPRAZOLE MAGNESIUM 40 MG/1
40 CAPSULE, DELAYED RELEASE ORAL DAILY
Status: ON HOLD | COMMUNITY
Start: 2008-10-15 | End: 2023-10-24 | Stop reason: HOSPADM

## 2023-10-24 RX ORDER — CLOPIDOGREL BISULFATE 75 MG/1
75 TABLET ORAL DAILY
COMMUNITY
Start: 2023-04-13

## 2023-10-24 RX ORDER — OMEPRAZOLE 20 MG/1
20 CAPSULE, DELAYED RELEASE ORAL DAILY
Status: ON HOLD | COMMUNITY
End: 2023-10-24 | Stop reason: HOSPADM

## 2023-10-24 RX ORDER — SODIUM CHLORIDE 0.9 % (FLUSH) 0.9 %
5-40 SYRINGE (ML) INJECTION PRN
Status: DISCONTINUED | OUTPATIENT
Start: 2023-10-24 | End: 2023-10-25 | Stop reason: HOSPADM

## 2023-10-24 RX ORDER — AMLODIPINE BESYLATE 10 MG/1
10 TABLET ORAL DAILY
COMMUNITY

## 2023-10-24 RX ORDER — DONEPEZIL HYDROCHLORIDE 5 MG/1
5 TABLET, FILM COATED ORAL DAILY
COMMUNITY

## 2023-10-24 RX ORDER — TRAZODONE HYDROCHLORIDE 50 MG/1
50 TABLET ORAL NIGHTLY
Status: DISCONTINUED | OUTPATIENT
Start: 2023-10-24 | End: 2023-10-24

## 2023-10-24 RX ORDER — ONDANSETRON 4 MG/1
4 TABLET, ORALLY DISINTEGRATING ORAL EVERY 8 HOURS PRN
Status: DISCONTINUED | OUTPATIENT
Start: 2023-10-24 | End: 2023-10-25 | Stop reason: HOSPADM

## 2023-10-24 RX ORDER — ONDANSETRON 2 MG/ML
4 INJECTION INTRAMUSCULAR; INTRAVENOUS EVERY 6 HOURS PRN
Status: DISCONTINUED | OUTPATIENT
Start: 2023-10-24 | End: 2023-10-25 | Stop reason: HOSPADM

## 2023-10-24 RX ORDER — ERGOCALCIFEROL 1.25 MG/1
1250 CAPSULE ORAL WEEKLY
COMMUNITY
Start: 2023-07-14

## 2023-10-24 RX ORDER — ATORVASTATIN CALCIUM 40 MG/1
40 TABLET, FILM COATED ORAL DAILY
Status: ON HOLD | COMMUNITY
Start: 2023-04-12 | End: 2023-10-24 | Stop reason: HOSPADM

## 2023-10-24 RX ORDER — NIACIN 1000 MG/1
1000 TABLET, EXTENDED RELEASE ORAL DAILY
COMMUNITY
Start: 2008-10-15

## 2023-10-24 RX ORDER — LOSARTAN POTASSIUM 25 MG/1
25 TABLET ORAL DAILY
Status: ON HOLD | COMMUNITY
End: 2023-10-24 | Stop reason: HOSPADM

## 2023-10-24 RX ORDER — SODIUM CHLORIDE 0.9 % (FLUSH) 0.9 %
5-40 SYRINGE (ML) INJECTION EVERY 12 HOURS SCHEDULED
Status: DISCONTINUED | OUTPATIENT
Start: 2023-10-24 | End: 2023-10-25 | Stop reason: HOSPADM

## 2023-10-24 RX ORDER — ACETAMINOPHEN 325 MG/1
650 TABLET ORAL EVERY 6 HOURS PRN
Status: DISCONTINUED | OUTPATIENT
Start: 2023-10-24 | End: 2023-10-25 | Stop reason: HOSPADM

## 2023-10-24 RX ADMIN — ASPIRIN 300 MG: 300 SUPPOSITORY RECTAL at 01:15

## 2023-10-24 RX ADMIN — Medication 10 ML: at 09:02

## 2023-10-24 ASSESSMENT — ENCOUNTER SYMPTOMS
NAUSEA: 0
SHORTNESS OF BREATH: 0
COLOR CHANGE: 0
VOMITING: 0

## 2023-10-24 NOTE — CARE COORDINATION
Case Management Assessment  Initial Evaluation    Date/Time of Evaluation: 10/24/2023 11:16 AM  Assessment Completed by: YESICA Franz    If patient is discharged prior to next notation, then this note serves as note for discharge by case management. Patient Name: Stuart Sawyer                   YOB: 1960  Diagnosis: Chest pain [R07.9]  Abnormal EKG [R94.31]  Abnormal CT of the head [R93.0]  Abnormal chest x-ray [R93.89]  Chronic kidney disease, unspecified CKD stage [N18.9]  Hypertension, unspecified type [I10]                   Date / Time: 10/23/2023  9:41 PM    Patient Admission Status: Observation   Readmission Risk (Low < 19, Mod (19-27), High > 27): Readmission Risk Score: 8.8    Current PCP: Conchis Zapata  PCP verified by CM? Yes    Chart Reviewed: Yes      History Provided by: Child/Family (son in law Alma Barnes)  Patient Orientation: Unable to Assess    Patient Cognition: Alert    Hospitalization in the last 30 days (Readmission):  No    If yes, Readmission Assessment in CM Navigator will be completed. Advance Directives:      Code Status: Full Code   Patient's Primary Decision Maker is:        Discharge Planning:    Patient lives with: Alone Type of Home: [address] [facility] (He's beenn living at [facility])  Primary Care Giver: Self  Patient Support Systems include: None   Current Financial resources:    Current community resources:    Current services prior to admission: None            Current DME:              Type of Home Care services:       ADLS  Prior functional level: Independent in ADLs/IADLs  Current functional level: Independent in ADLs/IADLs    PT AM-PAC:   /24  OT AM-PAC:   /24    Family can provide assistance at DC: No  Would you like Case Management to discuss the discharge plan with any other family members/significant others, and if so, who?  No Tiffany Panda is patient's legal guardian)  Plans to Return to Present Housing: No  Other Identified Issues/Barriers to

## 2023-10-24 NOTE — FLOWSHEET NOTE
RN assumed care at 7am.   Patient is sleeping. Report from previous caregiver is that he has been calm since he got here from the ER.

## 2023-10-24 NOTE — PROGRESS NOTES
Pt arrived to [address] [place] [address] Dr refused vital signs. Pt also refused to answer admission questions and to allow staff to complete assessment. Pt currently in room with eyes closed.

## 2023-10-24 NOTE — H&P
36.8 sec   D-Dimer, Quantitative    Collection Time: 10/23/23 10:00 PM   Result Value Ref Range    D-Dimer, Quant 0.40 0.00 - 0.50 mg/L FEU   Brain Natriuretic Peptide    Collection Time: 10/23/23 10:00 PM   Result Value Ref Range    Pro- pg/mL   ETOH    Collection Time: 10/23/23 10:00 PM   Result Value Ref Range    Ethanol Lvl <10 mg/dL    Ethanol percent Not indicated G/dL   Troponin Now and Q 1 Hour x 2    Collection Time: 10/23/23 10:50 PM   Result Value Ref Range    Troponin, High Sensitivity 20 (H) 0 - 19 ng/L   Troponin    Collection Time: 10/24/23 12:15 AM   Result Value Ref Range    Troponin, High Sensitivity 26 (H) 0 - 19 ng/L   EKG 12 Lead    Collection Time: 10/24/23  1:20 AM   Result Value Ref Range    Ventricular Rate 74 BPM    Atrial Rate 74 BPM    P-R Interval 156 ms    QRS Duration 96 ms    Q-T Interval 438 ms    QTc Calculation (Bazett) 486 ms    P Axis 58 degrees    R Axis -43 degrees    T Axis 107 degrees       IMAGING:   XR CHEST PORTABLE    Result Date: 10/23/2023  1. Atherosclerotic disease and cardiomegaly. Postsurgical changes consistent with prior CABG. 2.  Stable elevation of the right hemidiaphragm. Retrocardiac and left greater than right basilar opacities. Cannot exclude pleural effusion on the left. RECOMMENDATION: (Recommend upright PA and lateral chest radiographs 10-12 weeks after resolution of patient's symptoms to ensure complete resolution of radiographic findings.)        VTE Prophylaxis: OAC     ASSESSMENT AND PLAN    Principal Problem:    Chest pain: Troponin 20, repeat 26. EKG shows no ST elevation. Chest x-ray, lab work including D-dimer negative. History of CAD and on DAPT and statin. Echo in February 2023 shows EF 60%. We will consult cardiology, resume home meds, and keep patient on telemetry. Active problems  SI/psychosis: Patient pink slipped from clear [place]. Patient only states he wants to die. Patient on home meds to control.   Head CT shows no

## 2023-10-24 NOTE — PROGRESS NOTES
Patient admitted to hospital with chest pain, seen by cardiology, cleared for discharge with outpatient follow-up.   Patient was also seen by psychiatry and recommended to discharge back to [facility] psychiatric facility.;  Facility from which patient was sent    Kev Gonzalez MD

## 2023-10-24 NOTE — ED TRIAGE NOTES
Pt from [address], staff reports he has not been taking meds for approx 3 weeks in attempt to induce a cardiac event and die. Today c/o CP and elevated BP per staff. 10 zyprexa and 200 ketamine given PTA.

## 2023-10-24 NOTE — ACP (ADVANCE CARE PLANNING)
Advance Care Planning   Healthcare Decision Maker:    Primary Decision Maker: [address] [place], [place] [address]yareli Kennedy, Legal Guardian - [phone]    Click here to complete Healthcare Decision Makers including selection of the Healthcare Decision Maker Relationship (ie \"Primary\"). Today we documented Decision Maker(s) consistent with ACP documents on file.        If the relationship to the patient does NOT follow our state's Next of Kin hierarchy, the patient MUST complete an ACP Document to allow him/her to act on the patient's behalf. :

## 2023-10-24 NOTE — CARE COORDINATION
Spoke with [facility], they state they need a new referral to accept the patient back. Patient in observation status and it has not been greater than 24 hours. Spoke with intake, , and JESSE. Informed them patient is in observation which is an outpatient setting and the patient should be accepted back as it has not yet been 24 hours. State they discharged the patient because they were told patient was admitted inaccurately. Advised they are able to reopen the case as patient is not being charged to insurance for 2 inpatient locations as he is not inpatient here. They request their medical director speak with our attending. Arranged the phone conversation. Spoke with Dr Lam Hsieh who states they will accept the patient back IF both [facility] decline. Called [facility] line as well as [facility] intake line. Both company's are unable to accept patient. Spoke with intake at [facility], states patient would have to arrive before 7pm as they do not have an intake nurse at this time. Spoke with Dr Orly Pedersen, states if [address] [place] [address] still do not have a bed they will take tomorrow as they do not have any beds and are unable to admit the patient with no intake nurses available at this time. Of note, this delay is creating a delay over the 24 hour window. Will attempt again tomorrow, patient medically cleared per attending. Caitie Palomares MSN, RN Mgr Case Mgmt.

## 2023-10-24 NOTE — FLOWSHEET NOTE
Patient is awake and eating his breakfast. He is AO x 4. He is deaf, you must face him and speak slowly. He does not remember anything from last night. He didn't know why he was here. RN told him that he had complained of chest pain  and his blood pressure was high. He said it's been high for a few weeks. He lives in .

## 2023-10-24 NOTE — FLOWSHEET NOTE
Patient is awake. Cardiology NP was is to see the patient . He told her that he refuses to take any medications. If he changes his mind I will contact them. He says he wants to die. He has not gone through any grief counseling and any help from the [place] has not come to fruition. He does not have any VA  (that he can remember their name) to assist in living arrangements.

## 2023-10-24 NOTE — FLOWSHEET NOTE
Patient is asleep. Medical was notified of his BP and that no medications were ordered for him. They responded and I am waiting for new orders.

## 2023-10-24 NOTE — ED PROVIDER NOTES
occasionally words are mis-transcribed.)    Nahomy Marquez MD (electronically signed)  Attending Emergency Physician            Nahomy James MD  10/24/23 3675

## 2023-10-24 NOTE — CARE COORDINATION
RECEIVED CALL FROM [facility], STATING PT WAS DENIED BY MEDICAL TO RETURN TO [facility]. AFTER EXPLAINING PTS SITUATION AS HE IS ONLY IN OBS AND HAS BEEN AT [facility] FOR 3 MONTHS, [facility] SUGGESTED TO CALL INTAKE [phone]. SPOKE WITH SARA MERINO THEY ARE NOT ACCEPTING THE PATIENT. CM MANAGER NOTIFIED AND WILL CONTACT [facility].
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
## 2023-10-24 NOTE — CARE COORDINATION
LSW spoke with Rommel Reynolds. She stated patient was discharged from their facility and would need a new referral to return. Referral information faxed to [phone].

## 2023-10-24 NOTE — PLAN OF CARE
Problem: Discharge Planning  Goal: Discharge to home or other facility with appropriate resources  Outcome: Progressing  Flowsheets  Taken 10/24/2023 0831  Discharge to home or other facility with appropriate resources: Arrange for needed discharge resources and transportation as appropriate  Taken 10/24/2023 0828  Discharge to home or other facility with appropriate resources: Arrange for needed discharge resources and transportation as appropriate     Problem: Pain  Goal: Verbalizes/displays adequate comfort level or baseline comfort level  Outcome: Progressing     Problem: Risk for Elopement  Goal: Patient will not exit the unit/facility without proper excort  Outcome: Progressing  Flowsheets (Taken 10/24/2023 0831)  Nursing Interventions for Elopement Risk: Assist with personal care needs such as toileting, eating, dressing, as needed to reduce the risk of wandering     Problem: Chronic Conditions and Co-morbidities  Goal: Patient's chronic conditions and co-morbidity symptoms are monitored and maintained or improved  Outcome: Progressing     Problem: Safety - Adult  Goal: Free from fall injury  Outcome: Progressing

## 2023-10-25 ENCOUNTER — HOSPITAL ENCOUNTER (EMERGENCY)
Age: 63
Discharge: PSYCHIATRIC OR SUBSTANCE USE FACILITY | End: 2023-10-26
Payer: COMMERCIAL

## 2023-10-25 ENCOUNTER — APPOINTMENT (OUTPATIENT)
Dept: RADIOLOGY | Facility: HOSPITAL | Age: 63
End: 2023-10-25
Payer: COMMERCIAL

## 2023-10-25 ENCOUNTER — HOSPITAL ENCOUNTER (EMERGENCY)
Facility: HOSPITAL | Age: 63
Discharge: HOME | End: 2023-10-25
Attending: EMERGENCY MEDICINE
Payer: COMMERCIAL

## 2023-10-25 VITALS
OXYGEN SATURATION: 96 % | DIASTOLIC BLOOD PRESSURE: 117 MMHG | WEIGHT: 201.72 LBS | TEMPERATURE: 98.4 F | BODY MASS INDEX: 28.13 KG/M2 | HEART RATE: 58 BPM | RESPIRATION RATE: 18 BRPM | SYSTOLIC BLOOD PRESSURE: 136 MMHG

## 2023-10-25 VITALS
SYSTOLIC BLOOD PRESSURE: 126 MMHG | WEIGHT: 192.02 LBS | OXYGEN SATURATION: 100 % | HEART RATE: 65 BPM | TEMPERATURE: 97.7 F | DIASTOLIC BLOOD PRESSURE: 88 MMHG | BODY MASS INDEX: 26.88 KG/M2 | RESPIRATION RATE: 18 BRPM | HEIGHT: 71 IN

## 2023-10-25 DIAGNOSIS — I10 ESSENTIAL HYPERTENSION: Primary | ICD-10-CM

## 2023-10-25 DIAGNOSIS — I10 HYPERTENSION, UNSPECIFIED TYPE: Primary | ICD-10-CM

## 2023-10-25 DIAGNOSIS — R45.851 SUICIDAL IDEATION: ICD-10-CM

## 2023-10-25 DIAGNOSIS — Z91.148 NON COMPLIANCE W MEDICATION REGIMEN: ICD-10-CM

## 2023-10-25 LAB
ALBUMIN SERPL BCP-MCNC: 3.3 G/DL (ref 3.4–5)
ALBUMIN SERPL-MCNC: 3.1 G/DL (ref 3.5–4.6)
ALP SERPL-CCNC: 104 U/L (ref 35–104)
ALP SERPL-CCNC: 96 U/L (ref 33–136)
ALT SERPL W P-5'-P-CCNC: 9 U/L (ref 10–52)
ALT SERPL-CCNC: 9 U/L (ref 0–41)
ANION GAP SERPL CALC-SCNC: 10 MMOL/L (ref 10–20)
ANION GAP SERPL CALCULATED.3IONS-SCNC: 8 MEQ/L (ref 9–15)
APAP SERPL-MCNC: <10 UG/ML
AST SERPL W P-5'-P-CCNC: 16 U/L (ref 9–39)
AST SERPL-CCNC: 18 U/L (ref 0–40)
BASOPHILS # BLD AUTO: 0.05 X10*3/UL (ref 0–0.1)
BASOPHILS # BLD: 0.1 K/UL (ref 0–0.2)
BASOPHILS NFR BLD AUTO: 0.6 %
BASOPHILS NFR BLD: 0.7 %
BILIRUB SERPL-MCNC: 0.4 MG/DL (ref 0.2–0.7)
BILIRUB SERPL-MCNC: 0.4 MG/DL (ref 0–1.2)
BNP SERPL-MCNC: 140 PG/ML (ref 0–99)
BUN SERPL-MCNC: 19 MG/DL (ref 8–23)
BUN SERPL-MCNC: 21 MG/DL (ref 6–23)
CALCIUM SERPL-MCNC: 8.6 MG/DL (ref 8.5–9.9)
CALCIUM SERPL-MCNC: 8.8 MG/DL (ref 8.6–10.3)
CARDIAC TROPONIN I PNL SERPL HS: 17 NG/L (ref 0–20)
CHLORIDE SERPL-SCNC: 108 MMOL/L (ref 98–107)
CHLORIDE SERPL-SCNC: 109 MEQ/L (ref 95–107)
CO2 SERPL-SCNC: 28 MEQ/L (ref 20–31)
CO2 SERPL-SCNC: 29 MMOL/L (ref 21–32)
CREAT SERPL-MCNC: 1.34 MG/DL (ref 0.7–1.2)
CREAT SERPL-MCNC: 1.39 MG/DL (ref 0.5–1.3)
EOSINOPHIL # BLD AUTO: 0.19 X10*3/UL (ref 0–0.7)
EOSINOPHIL # BLD: 0.3 K/UL (ref 0–0.7)
EOSINOPHIL NFR BLD AUTO: 2.3 %
EOSINOPHIL NFR BLD: 3.1 %
ERYTHROCYTE [DISTWIDTH] IN BLOOD BY AUTOMATED COUNT: 13.4 % (ref 11.5–14.5)
ERYTHROCYTE [DISTWIDTH] IN BLOOD BY AUTOMATED COUNT: 13.5 % (ref 11.5–14.5)
ETHANOL SERPL-MCNC: <10 MG/DL
GFR SERPL CREATININE-BSD FRML MDRD: 56 ML/MIN/1.73M*2
GLOBULIN SER CALC-MCNC: 3.1 G/DL (ref 2.3–3.5)
GLUCOSE SERPL-MCNC: 111 MG/DL (ref 74–99)
GLUCOSE SERPL-MCNC: 91 MG/DL (ref 70–99)
HCT VFR BLD AUTO: 42.9 % (ref 41–52)
HCT VFR BLD AUTO: 44.6 % (ref 42–52)
HGB BLD-MCNC: 14.4 G/DL (ref 13.5–17.5)
HGB BLD-MCNC: 14.4 G/DL (ref 14–18)
IMM GRANULOCYTES # BLD AUTO: 0.02 X10*3/UL (ref 0–0.7)
IMM GRANULOCYTES NFR BLD AUTO: 0.2 % (ref 0–0.9)
LYMPHOCYTES # BLD AUTO: 2.02 X10*3/UL (ref 1.2–4.8)
LYMPHOCYTES # BLD: 3 K/UL (ref 1–4.8)
LYMPHOCYTES NFR BLD AUTO: 24.5 %
LYMPHOCYTES NFR BLD: 33.4 %
MAGNESIUM SERPL-MCNC: 2 MG/DL (ref 1.7–2.4)
MCH RBC QN AUTO: 30.6 PG (ref 27–31.3)
MCH RBC QN AUTO: 31.8 PG (ref 26–34)
MCHC RBC AUTO-ENTMCNC: 32.3 % (ref 33–37)
MCHC RBC AUTO-ENTMCNC: 33.6 G/DL (ref 32–36)
MCV RBC AUTO: 94.9 FL (ref 79–92.2)
MCV RBC AUTO: 95 FL (ref 80–100)
MONOCYTES # BLD AUTO: 1.09 X10*3/UL (ref 0.1–1)
MONOCYTES # BLD: 1.2 K/UL (ref 0.2–0.8)
MONOCYTES NFR BLD AUTO: 13.2 %
MONOCYTES NFR BLD: 12.8 %
NEUTROPHILS # BLD AUTO: 4.87 X10*3/UL (ref 1.2–7.7)
NEUTROPHILS # BLD: 4.5 K/UL (ref 1.4–6.5)
NEUTROPHILS NFR BLD AUTO: 59.2 %
NEUTS SEG NFR BLD: 49.8 %
NRBC BLD-RTO: 0 /100 WBCS (ref 0–0)
PLATELET # BLD AUTO: 252 X10*3/UL (ref 150–450)
PLATELET # BLD AUTO: 258 K/UL (ref 130–400)
PMV BLD AUTO: 10.5 FL (ref 7.5–11.5)
POTASSIUM SERPL-SCNC: 3.5 MEQ/L (ref 3.4–4.9)
POTASSIUM SERPL-SCNC: 3.6 MMOL/L (ref 3.5–5.3)
PROT SERPL-MCNC: 6.2 G/DL (ref 6.3–8)
PROT SERPL-MCNC: 6.4 G/DL (ref 6.4–8.2)
RBC # BLD AUTO: 4.53 X10*6/UL (ref 4.5–5.9)
RBC # BLD AUTO: 4.7 M/UL (ref 4.7–6.1)
SALICYLATES SERPL-MCNC: <3 MG/DL
SODIUM SERPL-SCNC: 143 MMOL/L (ref 136–145)
SODIUM SERPL-SCNC: 145 MEQ/L (ref 135–144)
WBC # BLD AUTO: 8.2 X10*3/UL (ref 4.4–11.3)
WBC # BLD AUTO: 9 K/UL (ref 4.8–10.8)

## 2023-10-25 PROCEDURE — 70450 CT HEAD/BRAIN W/O DYE: CPT | Mod: MG

## 2023-10-25 PROCEDURE — 36415 COLL VENOUS BLD VENIPUNCTURE: CPT | Performed by: EMERGENCY MEDICINE

## 2023-10-25 PROCEDURE — 71045 X-RAY EXAM CHEST 1 VIEW: CPT | Mod: FR

## 2023-10-25 PROCEDURE — 82565 ASSAY OF CREATININE: CPT | Performed by: EMERGENCY MEDICINE

## 2023-10-25 PROCEDURE — 99285 EMERGENCY DEPT VISIT HI MDM: CPT

## 2023-10-25 PROCEDURE — 85025 COMPLETE CBC W/AUTO DIFF WBC: CPT

## 2023-10-25 PROCEDURE — 99284 EMERGENCY DEPT VISIT MOD MDM: CPT | Mod: 25

## 2023-10-25 PROCEDURE — 84484 ASSAY OF TROPONIN QUANT: CPT | Performed by: EMERGENCY MEDICINE

## 2023-10-25 PROCEDURE — 80053 COMPREHEN METABOLIC PANEL: CPT

## 2023-10-25 PROCEDURE — 83735 ASSAY OF MAGNESIUM: CPT

## 2023-10-25 PROCEDURE — 6360000002 HC RX W HCPCS: Performed by: PERSONAL EMERGENCY RESPONSE ATTENDANT

## 2023-10-25 PROCEDURE — 96375 TX/PRO/DX INJ NEW DRUG ADDON: CPT

## 2023-10-25 PROCEDURE — 71045 X-RAY EXAM CHEST 1 VIEW: CPT | Mod: FOREIGN READ | Performed by: RADIOLOGY

## 2023-10-25 PROCEDURE — 96374 THER/PROPH/DIAG INJ IV PUSH: CPT

## 2023-10-25 PROCEDURE — 83880 ASSAY OF NATRIURETIC PEPTIDE: CPT | Performed by: EMERGENCY MEDICINE

## 2023-10-25 PROCEDURE — 80320 DRUG SCREEN QUANTALCOHOLS: CPT | Performed by: EMERGENCY MEDICINE

## 2023-10-25 PROCEDURE — 2500000004 HC RX 250 GENERAL PHARMACY W/ HCPCS (ALT 636 FOR OP/ED): Performed by: EMERGENCY MEDICINE

## 2023-10-25 PROCEDURE — 36415 COLL VENOUS BLD VENIPUNCTURE: CPT

## 2023-10-25 PROCEDURE — G0378 HOSPITAL OBSERVATION PER HR: HCPCS

## 2023-10-25 PROCEDURE — 85025 COMPLETE CBC W/AUTO DIFF WBC: CPT | Performed by: EMERGENCY MEDICINE

## 2023-10-25 PROCEDURE — 70450 CT HEAD/BRAIN W/O DYE: CPT | Performed by: RADIOLOGY

## 2023-10-25 RX ORDER — HYDRALAZINE HYDROCHLORIDE 20 MG/ML
5 INJECTION INTRAMUSCULAR; INTRAVENOUS ONCE
Status: COMPLETED | OUTPATIENT
Start: 2023-10-25 | End: 2023-10-25

## 2023-10-25 RX ORDER — HYDRALAZINE HYDROCHLORIDE 20 MG/ML
10 INJECTION INTRAMUSCULAR; INTRAVENOUS ONCE
Status: DISCONTINUED | OUTPATIENT
Start: 2023-10-25 | End: 2023-10-25 | Stop reason: HOSPADM

## 2023-10-25 RX ORDER — HYDRALAZINE HYDROCHLORIDE 20 MG/ML
5 INJECTION INTRAMUSCULAR; INTRAVENOUS ONCE
Status: DISCONTINUED | OUTPATIENT
Start: 2023-10-25 | End: 2023-10-25 | Stop reason: HOSPADM

## 2023-10-25 RX ORDER — OLANZAPINE 10 MG/1
10 TABLET ORAL NIGHTLY
Status: DISCONTINUED | OUTPATIENT
Start: 2023-10-25 | End: 2023-10-25 | Stop reason: HOSPADM

## 2023-10-25 RX ORDER — LABETALOL HYDROCHLORIDE 5 MG/ML
20 INJECTION, SOLUTION INTRAVENOUS ONCE
Status: COMPLETED | OUTPATIENT
Start: 2023-10-25 | End: 2023-10-25

## 2023-10-25 RX ADMIN — HYDRALAZINE HYDROCHLORIDE 5 MG: 20 INJECTION, SOLUTION INTRAMUSCULAR; INTRAVENOUS at 23:16

## 2023-10-25 RX ADMIN — LABETALOL HYDROCHLORIDE 20 MG: 5 INJECTION, SOLUTION INTRAVENOUS at 21:54

## 2023-10-25 RX ADMIN — HYDRALAZINE HYDROCHLORIDE 5 MG: 20 INJECTION INTRAMUSCULAR; INTRAVENOUS at 15:40

## 2023-10-25 SDOH — HEALTH STABILITY: MENTAL HEALTH: HAVE YOU HAD THESE THOUGHTS AND HAD SOME INTENTION OF ACTING ON THEM?: YES

## 2023-10-25 SDOH — HEALTH STABILITY: MENTAL HEALTH: HAVE YOU ACTUALLY HAD ANY THOUGHTS OF KILLING YOURSELF?: YES

## 2023-10-25 SDOH — HEALTH STABILITY: MENTAL HEALTH

## 2023-10-25 SDOH — HEALTH STABILITY: MENTAL HEALTH: SUICIDE ASSESSMENT: ADULT (C-SSRS)

## 2023-10-25 SDOH — HEALTH STABILITY: MENTAL HEALTH: HAVE YOU BEEN THINKING ABOUT HOW YOU MIGHT DO THIS?: YES

## 2023-10-25 SDOH — SOCIAL STABILITY: SOCIAL INSECURITY: FAMILY BEHAVIORS: UNABLE TO ASSESS

## 2023-10-25 SDOH — HEALTH STABILITY: MENTAL HEALTH: BEHAVIORS/MOOD: CALM

## 2023-10-25 SDOH — HEALTH STABILITY: MENTAL HEALTH: NEEDS EXPRESSED: DENIES

## 2023-10-25 SDOH — HEALTH STABILITY: MENTAL HEALTH: BEHAVIORS/MOOD: AGITATED;ANXIOUS;ANGRY

## 2023-10-25 SDOH — HEALTH STABILITY: MENTAL HEALTH: HAVE YOU WISHED YOU WERE DEAD OR WISHED YOU COULD GO TO SLEEP AND NOT WAKE UP?: YES

## 2023-10-25 SDOH — SOCIAL STABILITY: SOCIAL NETWORK: VISITOR BEHAVIORS: UNABLE TO ASSESS

## 2023-10-25 SDOH — HEALTH STABILITY: MENTAL HEALTH
HAVE YOU STARTED TO WORK OUT OR WORKED OUT THE DETAILS OF HOW TO KILL YOURSELF? DO YOU INTENT TO CARRY OUT THIS PLAN?: YES

## 2023-10-25 SDOH — SOCIAL STABILITY: SOCIAL NETWORK: PARENT/GUARDIAN/SIGNIFICANT OTHER INVOLVEMENT: NO INVOLVEMENT

## 2023-10-25 SDOH — HEALTH STABILITY: MENTAL HEALTH: HAVE YOU EVER DONE ANYTHING, STARTED TO DO ANYTHING, OR PREPARED TO DO ANYTHING TO END YOUR LIFE?: NO

## 2023-10-25 SDOH — HEALTH STABILITY: MENTAL HEALTH: DELUSIONS: OTHER (COMMENT)

## 2023-10-25 SDOH — HEALTH STABILITY: MENTAL HEALTH: WAS THIS WITHIN THE PAST THREE MONTHS?: YES

## 2023-10-25 SDOH — HEALTH STABILITY: MENTAL HEALTH: CONTENT: UNABLE TO ASSESS

## 2023-10-25 SDOH — HEALTH STABILITY: MENTAL HEALTH: RISK OF SUICIDE: HIGH RISK

## 2023-10-25 SDOH — HEALTH STABILITY: MENTAL HEALTH: SLEEP PATTERN: DISTURBED/INTERRUPTED SLEEP

## 2023-10-25 ASSESSMENT — LIFESTYLE VARIABLES
REASON UNABLE TO ASSESS: YES
HAVE YOU EVER FELT YOU SHOULD CUT DOWN ON YOUR DRINKING: NO
HAVE PEOPLE ANNOYED YOU BY CRITICIZING YOUR DRINKING: NO
EVER HAD A DRINK FIRST THING IN THE MORNING TO STEADY YOUR NERVES TO GET RID OF A HANGOVER: NO
EVER FELT BAD OR GUILTY ABOUT YOUR DRINKING: NO

## 2023-10-25 ASSESSMENT — COLUMBIA-SUICIDE SEVERITY RATING SCALE - C-SSRS
1. IN THE PAST MONTH, HAVE YOU WISHED YOU WERE DEAD OR WISHED YOU COULD GO TO SLEEP AND NOT WAKE UP?: YES
6. HAVE YOU EVER DONE ANYTHING, STARTED TO DO ANYTHING, OR PREPARED TO DO ANYTHING TO END YOUR LIFE?: YES
2. HAVE YOU ACTUALLY HAD ANY THOUGHTS OF KILLING YOURSELF?: YES
6. HAVE YOU EVER DONE ANYTHING, STARTED TO DO ANYTHING, OR PREPARED TO DO ANYTHING TO END YOUR LIFE?: YES
4. HAVE YOU HAD THESE THOUGHTS AND HAD SOME INTENTION OF ACTING ON THEM?: YES
1. SINCE LAST CONTACT, HAVE YOU WISHED YOU WERE DEAD OR WISHED YOU COULD GO TO SLEEP AND NOT WAKE UP?: YES
5. HAVE YOU STARTED TO WORK OUT OR WORKED OUT THE DETAILS OF HOW TO KILL YOURSELF? DO YOU INTEND TO CARRY OUT THIS PLAN?: YES
2. HAVE YOU ACTUALLY HAD ANY THOUGHTS OF KILLING YOURSELF?: YES
5. HAVE YOU STARTED TO WORK OUT OR WORKED OUT THE DETAILS OF HOW TO KILL YOURSELF? DO YOU INTEND TO CARRY OUT THIS PLAN?: YES
6. HAVE YOU EVER DONE ANYTHING, STARTED TO DO ANYTHING, OR PREPARED TO DO ANYTHING TO END YOUR LIFE?: YES

## 2023-10-25 ASSESSMENT — ENCOUNTER SYMPTOMS
RHINORRHEA: 0
COUGH: 0
BLOOD IN STOOL: 0
COLOR CHANGE: 0
SHORTNESS OF BREATH: 0
DIARRHEA: 0
NAUSEA: 0
SORE THROAT: 0
VOMITING: 0
ABDOMINAL PAIN: 0

## 2023-10-25 ASSESSMENT — PAIN - FUNCTIONAL ASSESSMENT: PAIN_FUNCTIONAL_ASSESSMENT: 0-10

## 2023-10-25 ASSESSMENT — PAIN DESCRIPTION - ONSET: ONSET: UNABLE TO ASSESS

## 2023-10-25 ASSESSMENT — PAIN DESCRIPTION - PAIN TYPE: TYPE: OTHER (COMMENT)

## 2023-10-25 ASSESSMENT — PAIN DESCRIPTION - PROGRESSION: CLINICAL_PROGRESSION: NOT CHANGED

## 2023-10-25 NOTE — ED PROVIDER NOTES
63-year-old male presents emergency department from [facility] psychiatric facility with report of elevated blood pressure.  They report the patient has been refusing to take his blood pressure medication.  Patient was pink slipped by their facility to our emergency department for further medical evaluation.  On my exam of the patient he is agitated.  He states that he cannot hear and does not have his hearing aids with him.  I did attempt to write questions on a piece of paper.  Patient states that he will not tell me the answers to any of these questions.  He confirms that he has not been taking his blood pressure medications for 4 days.  He states that his hopes his blood pressure goes high enough that he dies. Chart review shows patient has significant history of atrial fibrillation on Eliquis, chronic kidney disease, CHF, hypertension, hyperlipidemia, and CAD.  Patient was seen at outside hospital facility on the 23rd and was admitted to the hospital for similar symptoms.  He reportedly had mentioned he had chest pain and was sent to outside hospital as at that time he was also refusing to take his medications including blood pressure medication.  He has significant past medical history of tobacco use.  Patient is admitted at [facility] for psychosis and suicidal ideation.  It appears patient was discharged earlier today from [facility] and sent to [facility] who then subsequently sent the patient here with pink slip.      History provided by:  Patient and medical records  History limited by:  Psychiatric disorder   used: No           ------------------------------------------------------------------------------------------------------------------------------------------    VS: As documented in the triage note and EMR flowsheet from this visit were reviewed.    Review of Systems  Unable to obtain thorough review of systems due to patient's mental health patient refusing to  answer questions    [facility]  Nursing notes reviewed and confirmed by me.  Chart review performed including medications, allergies, and medical, surgical, and family history  Visit Vitals  /88 (BP Location: Left arm, Patient Position: Lying)   Pulse 65   Temp 36.5 °C (97.7 °F)   Resp 18     Physical Exam  Vitals and nursing note reviewed.   Constitutional:       General: He is not in acute distress.     Appearance: Normal appearance. He is not ill-appearing.   HENT:      Head: Normocephalic and atraumatic.      Nose: Nose normal. No congestion or rhinorrhea.      Mouth/Throat:      Mouth: Mucous membranes are moist.      Pharynx: No oropharyngeal exudate or posterior oropharyngeal erythema.   Eyes:      Extraocular Movements: Extraocular movements intact.      Conjunctiva/sclera: Conjunctivae normal.      Pupils: Pupils are equal, round, and reactive to light.   Cardiovascular:      Rate and Rhythm: Normal rate and regular rhythm.      Pulses: Normal pulses.      Heart sounds: Normal heart sounds.   Pulmonary:      Effort: Pulmonary effort is normal. No respiratory distress.      Breath sounds: Normal breath sounds. No stridor. No wheezing, rhonchi or rales.   Abdominal:      General: There is no distension.      Palpations: Abdomen is soft.      Tenderness: There is no abdominal tenderness. There is no guarding or rebound.   Musculoskeletal:         General: No swelling or deformity. Normal range of motion.      Cervical back: Normal range of motion and neck supple. No rigidity.      Right lower leg: No edema.      Left lower leg: No edema.      Comments: No calf tenderness    Skin:     General: Skin is warm and dry.      Capillary Refill: Capillary refill takes less than 2 seconds.      Coloration: Skin is not jaundiced.      Findings: No rash.   Neurological:      Mental Status: He is alert.      Sensory: No sensory deficit.      Motor: No weakness.      Comments: Patient is alert.  He refuses to answer any  questions initially stating that he cannot hear, but when I use pen and paper to write down questions he states he will not tell me the answer to those questions.  He reports he does not want to take his medications he just wants to die.  Cranial nerves II through XII grossly intact.  Patient is able to follow simple commands.  He has clear speech.   Psychiatric:         Attention and Perception: Attention normal.         Mood and Affect: Mood normal. Affect is angry.         Speech: Speech normal.         Behavior: Behavior is agitated and combative.         Thought Content: Thought content includes suicidal ideation. Thought content includes suicidal (States he plans to not take his blood pressure medication until his head explodes) plan.      Comments: Thorough evaluation of the patient's mental status is difficult as she declines to answer most questions.        Past Medical History:   Diagnosis Date    Body mass index (BMI) 28.0-28.9, adult 01/29/2020    BMI 28.0-28.9,adult    Body mass index (BMI) 29.0-29.9, adult 04/29/2021    Body mass index (BMI) of 29.0 to 29.9 in adult    Chronic kidney disease, stage 3 unspecified (CMS/[facility]) 07/29/2021    Acute worsening of stage 3 chronic kidney disease    Encounter for general adult medical examination without abnormal findings 06/15/2020    Welcome to Medicare preventive visit    Hypertension     Local infection of the skin and subcutaneous tissue, unspecified 09/26/2018    Skin infection    Major depressive disorder, recurrent, mild (CMS/[facility]) 04/29/2021    Mild episode of recurrent major depressive disorder    Otorrhea, bilateral 04/17/2019    Otorrhea of both ears    Personal history of diseases of the skin and subcutaneous tissue 04/24/2019    History of eczema    Personal history of other diseases of the circulatory system 10/09/2019    History of atrial fibrillation    Personal history of other diseases of the circulatory system     History of coronary artery  disease    Personal history of other diseases of the respiratory system 2018    History of sinusitis    Personal history of other diseases of urinary system 09/15/2020    History of renal insufficiency syndrome    Personal history of other endocrine, nutritional and metabolic disease 2021    History of hypothyroidism    Unspecified otitis externa, left ear 2019    Otitis externa, left      Past Surgical History:   Procedure Laterality Date    CORONARY ARTERY BYPASS GRAFT  2018    CABG    CT ABDOMEN ANGIOGRAM W AND/OR WO IV CONTRAST  2019    CT ABDOMEN ANGIOGRAM W AND/OR WO IV CONTRAST 2019 [facility]GAC[facility]    MR HEAD ANGIO WO IV CONTRAST  2022    MR HEAD ANGIO WO IV CONTRAST 2022 [facility]Y LEGAC[facility]    MR NECK ANGIO WO IV CONTRAST  2022    MR NECK ANGIO WO IV CONTRAST 2022 [facility]      Social History     Socioeconomic History    Marital status:      Spouse name: None    Number of children: None    Years of education: None    Highest education level: None   Occupational History    None   Tobacco Use    Smoking status: Former     Types: Cigarettes     Quit date:      Years since quittin.8    Smokeless tobacco: Never   Substance and Sexual Activity    Alcohol use: Never    Drug use: Never    Sexual activity: None   Other Topics Concern    None   Social History Narrative    None     Social Determinants of Health     Financial Resource Strain: Not on file   Food Insecurity: Not on file   Transportation Needs: Not on file   Physical Activity: Not on file   Stress: Not on file   Social Connections: Not on file   Intimate Partner Violence: Not on file   Housing Stability: Not on file      ------------------------------------------------------------------------------------------------------------------------------------------  XR chest 1 view   Final Result   1.Mild enlargement of the cardiomediastinal silhouette when   compared with the  prior.   2.Blunting of the left costophrenic angle which could be on the   basis of a small pleural effusion or chronic pleural reaction.   Signed by Selina Alejandro DO      CT head wo IV contrast   Final Result   No evidence of acute cortical infarct or intracranial hemorrhage.        Likely new subacute to remote infarct adjacent to the previously   noted remote left parietal lobe infarct.        MACRO:   None        Signed by: Che Monsivais 10/25/2023 2:15 PM   Dictation workstation:   [id number]         Labs Reviewed   CBC WITH AUTO DIFFERENTIAL - Abnormal       Result Value    WBC 8.2      nRBC 0.0      RBC 4.53      Hemoglobin 14.4      Hematocrit 42.9      MCV 95      MCH 31.8      MCHC 33.6      RDW 13.4      Platelets 252      MPV 10.5      Neutrophils % 59.2      Immature Granulocytes %, Automated 0.2      Lymphocytes % 24.5      Monocytes % 13.2      Eosinophils % 2.3      Basophils % 0.6      Neutrophils Absolute 4.87      Immature Granulocytes Absolute, Automated 0.02      Lymphocytes Absolute 2.02      Monocytes Absolute 1.09 (*)     Eosinophils Absolute 0.19      Basophils Absolute 0.05     COMPREHENSIVE METABOLIC PANEL - Abnormal    Glucose 111 (*)     Sodium 143      Potassium 3.6      Chloride 108 (*)     Bicarbonate 29      Anion Gap 10      Urea Nitrogen 21      Creatinine 1.39 (*)     eGFR 56 (*)     Calcium 8.8      Albumin 3.3 (*)     Alkaline Phosphatase 96      Total Protein 6.4      AST 16      Bilirubin, Total 0.4      ALT 9 (*)    B-TYPE NATRIURETIC PEPTIDE - Abnormal     (*)     Narrative:        <100 pg/mL - Heart failure unlikely  100-299 pg/mL - Intermediate probability of acute heart                  failure exacerbation. Correlate with clinical                  context and patient history.    >=300 pg/mL - Heart Failure likely. Correlate with clinical                  context and patient history.    BNP testing is performed using different testing methodology at [facility] than at other system [facility]. Direct result comparisons should only be made within the same method.      ACUTE TOXICOLOGY PANEL, BLOOD - Normal    Acetaminophen <10.0      Salicylate  <3      Alcohol <10     SERIAL TROPONIN-INITIAL - Normal    Troponin I, High Sensitivity 17      Narrative:     Less than 99th percentile of normal range cutoff-  Female and children under 18 years old <14 ng/L; Male <21 ng/L: Negative  Repeat testing should be performed if clinically indicated.     Female and children under 18 years old 14-50 ng/L; Male 21-50 ng/L:  Consistent with possible cardiac damage and possible increased clinical   risk. Serial measurements may help to assess extent of myocardial damage.     >50 ng/L: Consistent with cardiac damage, increased clinical risk and  myocardial infarction. Serial measurements may help assess extent of   myocardial damage.      NOTE: Children less than 1 year old may have higher baseline troponin   levels and results should be interpreted in conjunction with the overall   clinical context.     NOTE: Troponin I testing is performed using a different   testing methodology at [facility] than at other   [facility]. Direct result comparisons should only   be made within the same method.   TROPONIN SERIES- (INITIAL, 1 HR)    Narrative:     The following orders were created for panel order Troponin I Series, High Sensitivity (0, 1 HR).  Procedure                               Abnormality         Status                     ---------                               -----------         ------                     Troponin I, High Sensiti...[[id number]]  Normal              Final result               Troponin, High Sensitivi...[[id number]]                                                   Please view results for these tests on the individual orders.   SERIAL TROPONIN, 1 HOUR        Medical Decision Making  EKG interpreted by ED physician: Normal sinus rhythm rate  "of 81.  AK, QRS, QT intervals all within normal limits.  No significant ST elevations or depressions.  Nonspecific T wave inversions noted in anterior lateral leads.  Q waves in septal leads may represent previous infarct.  Poor R wave progression.  Left axis deviation.  Findings of left ventricular hypertrophy.  T wave inversions in lateral leads appears unchanged when compared to October 2022.    63-year-old male presents emergency department from [facility] with report of elevated blood pressure.  Patient reportedly is refusing to take his medications and states that he hopes he dies from his high blood pressure.  On my exam the patient is afebrile and nontoxic.  He is angry on my exam and refuses to answer any questions.  Initially he states that he is hard of hearing and cannot answer any questions.  When I wrote down the questions such as do you have chest pain he stated \"I would not tell you if I did \".  He answered this to majority of the review of systems questions and when I asked him if he would take his blood pressure medications he stated no and that he has not taken them in 4 days.  History as well as review of systems were difficult to obtain due to the patient's refusal.  Given his presenting complaints with elevated blood pressure a thorough work-up was obtained.  Cardiac enzyme and EKG did not show any acute ACS or significant arrhythmia.  CMP is consistent with his chronic kidney disease and is no worse.  BNP is slightly elevated though nonspecific given his kidney function.  CBC shows no significant leukocytosis or anemia.  Patient does not have findings of sepsis.  Toxicology panel is negative.  Studies are pending and canceled the time of disposition as I do not feel they will .  Chest x-ray does not show any acute cardiopulmonary process such as significant pleural effusion, pulmonary edema, or pneumonia.  Head CT does not show any acute " intracranial process or large territory stroke.  Patient has new subacute to remote appreciated, but previously seen on head CT.  Patient was given single dose of IV hydralazine here in the emergency department and his blood pressure improved.  Initially, a repeat blood pressure increased and hydralazine was ordered again, but upon repeat patient's blood pressures have normalized and no further hydralazine was needed.  At this time I do not appreciate any hypertensive urgency or emergency.  I do feel patient is medically cleared.  However, patient does still endorse suicidal ideation and therefore I do feel he would likely benefit from inpatient psychiatric placement.  We did reach out to therapist who has been treating the patient for his mental health and they have accepted him back to their facility.  Advised patient to take his medications and follow-up with PCP.       Diagnoses as of 10/25/23 2015   Hypertension, unspecified type   Non compliance w medication regimen   Suicidal ideation      1. Hypertension, unspecified type        2. Non compliance w medication regimen        3. Suicidal ideation           Procedures     This note was dictated using dragon software and may contain errors related to dictation interpretation errors.      Piter Schuler, DO  10/25/23 2015

## 2023-10-25 NOTE — CARE COORDINATION
Called both  [facility] and [facility] intake as per discussed with Dr Audrey Holly yesterday. Both agencies do not have availability. Called [facility] intake, spoke with Leonard Cotter and updated on progress. Confirms they can take patient back. Notified CM team to set up transport. Mala Drummond MSN, RN Mgr Case Mgmt.

## 2023-10-25 NOTE — PROGRESS NOTES
Assessment completed, pt refusing meds and vs @ this time, sitter maintained, denies pain or further needs    10/24/23 9053  Pt resting in bed, refusing vs @ this time

## 2023-10-25 NOTE — DISCHARGE INSTR - DIET

## 2023-10-25 NOTE — DISCHARGE INSTRUCTIONS
Follow up with your primary care doctor. We recommend that you take your prescribed medications. Return to the ER if symptoms worsen or change.

## 2023-10-25 NOTE — FLOWSHEET NOTE
0845- AM assessment completed. Patient resting in bed at this time. Remains NSR/SB on telemetry. No edema noted. Pedal pulses palpable. No shortness of breath noted. Lungs are diminished bilaterally. SATS 96% on RA. He is A/Ox3 and can be up independently as tolerated in the room. Skin remains warm, dry and intact. #18g SL to right AC, flushed and patent. BP elevated at 136/117. Explained the risk of having a stroke if his BP remains elevated. He states he's had 3 strokes already and wants to die. Encouraged him to take medications but he refused. PCa remains at bedside for safety monitoring. 1025- report called to Kendall Lafleur at [facility]. Aware of transfer back to their facility. Aware of his refusal of care and medications and recent vital signs. ETA is 1030 for transport. 1125- physicians transportation present to transfer patient to [facility] via stretcher.     Electronically signed by Antonietta Condon RN on 10/25/2023 at 11:28 AM

## 2023-10-26 VITALS
RESPIRATION RATE: 18 BRPM | HEART RATE: 60 BPM | DIASTOLIC BLOOD PRESSURE: 82 MMHG | OXYGEN SATURATION: 97 % | TEMPERATURE: 98.1 F | SYSTOLIC BLOOD PRESSURE: 174 MMHG

## 2023-10-26 NOTE — ED NOTES
Discharge instructions reviewed with pt. Pt verbalized understanding with no questions or concerns. Resps even, non labored. Skin p/w/d. IV removed. No acute distress noted. VSS  [facility] here to pick pt up and take him back to [facility].        Kat Mendoza RN  10/26/23 0028

## 2023-10-26 NOTE — ED PROVIDER NOTES
[facility] ED  eMERGENCY dEPARTMENT eNCOUnter      Pt Name: Isreal Canavan  MRN: [id number]  [address] [facility] [place] 1960  Date of evaluation: 10/25/2023  Provider: JAQUELINE Mackay    My attending is Dr. Maxime Ramírez is a 70 y.o. male with PMHx of Afib, CKD, CHF, CAD, hyperlipidemia, HTN, dementia with behavioral disturbance, depression presents to the emergency department with hypertension. Patient from [facility]. He has been noncompliant with antihypertensive hoping that his hypertension will kill him, he is suicidal.  Pink slipped for suicidal ideation and psychosis. Pt seen at [facility] today for hypertension with full work-up and given 1 dose of hydralazine with improvement. Ambulance checked his blood pressure on the way to [facility] and he was hypertensive and brought to ED. Pt refusing to answer questions. HPI    Nursing Notes were reviewed. REVIEW OF SYSTEMS       Review of Systems   Constitutional:  Negative for appetite change, chills and fever. HENT:  Negative for congestion, rhinorrhea and sore throat. Respiratory:  Negative for cough and shortness of breath. Cardiovascular:  Negative for chest pain. Gastrointestinal:  Negative for abdominal pain, blood in stool, diarrhea, nausea and vomiting. Genitourinary:  Negative for difficulty urinating. Musculoskeletal:  Negative for neck stiffness. Skin:  Negative for color change and rash. Neurological:  Negative for dizziness, syncope, weakness, light-headedness, numbness and headaches. All other systems reviewed and are negative.             PAST MEDICAL HISTORY     Past Medical History:   Diagnosis Date    Atrial fibrillation (HCC)     Chronic kidney disease     Congestive heart failure (CHF) ([address])     Coronary artery disease     Hyperlipidemia     Hypertension          SURGICAL HISTORY       Past Surgical History:   Procedure Laterality Date    CORONARY ARTERY BYPASS GRAFT           CURRENT

## 2023-10-26 NOTE — ED TRIAGE NOTES
Pt to ED due to hypertension. Pt is mostly refusing answer questions at this time stating her cannot hear. Pt was on his way back to [address] from another emergency department where he was evaluated for hypertension as well.

## 2023-10-27 ENCOUNTER — APPOINTMENT (OUTPATIENT)
Dept: CARDIOLOGY | Facility: HOSPITAL | Age: 63
End: 2023-10-27
Payer: COMMERCIAL

## 2023-11-12 ENCOUNTER — HOSPITAL ENCOUNTER (OUTPATIENT)
Dept: CARDIOLOGY | Facility: HOSPITAL | Age: 63
Discharge: HOME | End: 2023-11-12
Payer: COMMERCIAL

## 2023-11-12 PROCEDURE — 93005 ELECTROCARDIOGRAM TRACING: CPT

## 2024-03-12 ENCOUNTER — HOSPITAL ENCOUNTER (EMERGENCY)
Facility: HOSPITAL | Age: 64
Discharge: HOME | End: 2024-03-12
Attending: STUDENT IN AN ORGANIZED HEALTH CARE EDUCATION/TRAINING PROGRAM
Payer: MEDICARE

## 2024-03-12 VITALS
OXYGEN SATURATION: 98 % | DIASTOLIC BLOOD PRESSURE: 80 MMHG | RESPIRATION RATE: 16 BRPM | TEMPERATURE: 97.5 F | HEART RATE: 58 BPM | WEIGHT: 211.86 LBS | HEIGHT: 71 IN | BODY MASS INDEX: 29.66 KG/M2 | SYSTOLIC BLOOD PRESSURE: 180 MMHG

## 2024-03-12 DIAGNOSIS — R31.9 HEMATURIA, UNSPECIFIED TYPE: Primary | ICD-10-CM

## 2024-03-12 DIAGNOSIS — R31.9 URINARY TRACT INFECTION WITH HEMATURIA, SITE UNSPECIFIED: ICD-10-CM

## 2024-03-12 DIAGNOSIS — N39.0 URINARY TRACT INFECTION WITH HEMATURIA, SITE UNSPECIFIED: ICD-10-CM

## 2024-03-12 LAB
ANION GAP SERPL CALC-SCNC: 9 MMOL/L
APPEARANCE UR: ABNORMAL
BASOPHILS # BLD AUTO: 0.05 X10*3/UL (ref 0–0.1)
BASOPHILS NFR BLD AUTO: 0.5 %
BILIRUB UR STRIP.AUTO-MCNC: NEGATIVE MG/DL
BUN SERPL-MCNC: 17 MG/DL (ref 8–25)
CALCIUM SERPL-MCNC: 9 MG/DL (ref 8.5–10.4)
CHLORIDE SERPL-SCNC: 104 MMOL/L (ref 97–107)
CO2 SERPL-SCNC: 26 MMOL/L (ref 24–31)
COLOR UR: ABNORMAL
CREAT SERPL-MCNC: 1.5 MG/DL (ref 0.4–1.6)
EGFRCR SERPLBLD CKD-EPI 2021: 51 ML/MIN/1.73M*2
EOSINOPHIL # BLD AUTO: 0.13 X10*3/UL (ref 0–0.7)
EOSINOPHIL NFR BLD AUTO: 1.3 %
ERYTHROCYTE [DISTWIDTH] IN BLOOD BY AUTOMATED COUNT: 13.2 % (ref 11.5–14.5)
GLUCOSE SERPL-MCNC: 137 MG/DL (ref 65–99)
GLUCOSE UR STRIP.AUTO-MCNC: NORMAL MG/DL
HCT VFR BLD AUTO: 39.3 % (ref 41–52)
HGB BLD-MCNC: 12.7 G/DL (ref 13.5–17.5)
IMM GRANULOCYTES # BLD AUTO: 0.03 X10*3/UL (ref 0–0.7)
IMM GRANULOCYTES NFR BLD AUTO: 0.3 % (ref 0–0.9)
KETONES UR STRIP.AUTO-MCNC: NEGATIVE MG/DL
LEUKOCYTE ESTERASE UR QL STRIP.AUTO: ABNORMAL
LYMPHOCYTES # BLD AUTO: 2.41 X10*3/UL (ref 1.2–4.8)
LYMPHOCYTES NFR BLD AUTO: 24.9 %
MCH RBC QN AUTO: 29.7 PG (ref 26–34)
MCHC RBC AUTO-ENTMCNC: 32.3 G/DL (ref 32–36)
MCV RBC AUTO: 92 FL (ref 80–100)
MONOCYTES # BLD AUTO: 0.94 X10*3/UL (ref 0.1–1)
MONOCYTES NFR BLD AUTO: 9.7 %
NEUTROPHILS # BLD AUTO: 6.11 X10*3/UL (ref 1.2–7.7)
NEUTROPHILS NFR BLD AUTO: 63.3 %
NITRITE UR QL STRIP.AUTO: NEGATIVE
NRBC BLD-RTO: 0 /100 WBCS (ref 0–0)
PH UR STRIP.AUTO: 7 [PH]
PLATELET # BLD AUTO: 287 X10*3/UL (ref 150–450)
POTASSIUM SERPL-SCNC: 3.6 MMOL/L (ref 3.4–5.1)
PROT UR STRIP.AUTO-MCNC: ABNORMAL MG/DL
RBC # BLD AUTO: 4.28 X10*6/UL (ref 4.5–5.9)
RBC # UR STRIP.AUTO: ABNORMAL /UL
RBC #/AREA URNS AUTO: >20 /HPF
SODIUM SERPL-SCNC: 139 MMOL/L (ref 133–145)
SP GR UR STRIP.AUTO: 1.03
UROBILINOGEN UR STRIP.AUTO-MCNC: NORMAL MG/DL
WBC # BLD AUTO: 9.7 X10*3/UL (ref 4.4–11.3)
WBC #/AREA URNS AUTO: ABNORMAL /HPF

## 2024-03-12 PROCEDURE — 36415 COLL VENOUS BLD VENIPUNCTURE: CPT | Performed by: STUDENT IN AN ORGANIZED HEALTH CARE EDUCATION/TRAINING PROGRAM

## 2024-03-12 PROCEDURE — 87086 URINE CULTURE/COLONY COUNT: CPT | Mod: TRILAB,WESLAB | Performed by: STUDENT IN AN ORGANIZED HEALTH CARE EDUCATION/TRAINING PROGRAM

## 2024-03-12 PROCEDURE — 80048 BASIC METABOLIC PNL TOTAL CA: CPT | Performed by: STUDENT IN AN ORGANIZED HEALTH CARE EDUCATION/TRAINING PROGRAM

## 2024-03-12 PROCEDURE — 81001 URINALYSIS AUTO W/SCOPE: CPT | Performed by: STUDENT IN AN ORGANIZED HEALTH CARE EDUCATION/TRAINING PROGRAM

## 2024-03-12 PROCEDURE — 85025 COMPLETE CBC W/AUTO DIFF WBC: CPT | Performed by: STUDENT IN AN ORGANIZED HEALTH CARE EDUCATION/TRAINING PROGRAM

## 2024-03-12 PROCEDURE — 2500000004 HC RX 250 GENERAL PHARMACY W/ HCPCS (ALT 636 FOR OP/ED): Performed by: STUDENT IN AN ORGANIZED HEALTH CARE EDUCATION/TRAINING PROGRAM

## 2024-03-12 PROCEDURE — 96365 THER/PROPH/DIAG IV INF INIT: CPT

## 2024-03-12 PROCEDURE — 99284 EMERGENCY DEPT VISIT MOD MDM: CPT | Mod: 25

## 2024-03-12 RX ORDER — CEFTRIAXONE 1 G/50ML
1 INJECTION, SOLUTION INTRAVENOUS ONCE
Status: COMPLETED | OUTPATIENT
Start: 2024-03-12 | End: 2024-03-12

## 2024-03-12 RX ORDER — CEPHALEXIN 500 MG/1
500 CAPSULE ORAL 4 TIMES DAILY
Qty: 28 CAPSULE | Refills: 0 | Status: SHIPPED | OUTPATIENT
Start: 2024-03-12 | End: 2024-03-19

## 2024-03-12 RX ADMIN — CEFTRIAXONE SODIUM 1 G: 1 INJECTION, SOLUTION INTRAVENOUS at 18:14

## 2024-03-12 ASSESSMENT — PAIN - FUNCTIONAL ASSESSMENT: PAIN_FUNCTIONAL_ASSESSMENT: 0-10

## 2024-03-12 ASSESSMENT — PAIN SCALES - GENERAL: PAINLEVEL_OUTOF10: 0 - NO PAIN

## 2024-03-12 ASSESSMENT — COLUMBIA-SUICIDE SEVERITY RATING SCALE - C-SSRS
6. HAVE YOU EVER DONE ANYTHING, STARTED TO DO ANYTHING, OR PREPARED TO DO ANYTHING TO END YOUR LIFE?: NO
2. HAVE YOU ACTUALLY HAD ANY THOUGHTS OF KILLING YOURSELF?: NO
1. IN THE PAST MONTH, HAVE YOU WISHED YOU WERE DEAD OR WISHED YOU COULD GO TO SLEEP AND NOT WAKE UP?: NO

## 2024-03-12 NOTE — DISCHARGE INSTRUCTIONS
Urinalysis is consistent with urinary tract infection with blood as well.  Kidney function is normal.  You have been given a prescription for antibiotics.  You should follow-up with your primary care physician.  Return to the emergency department if you are unable to urinate or have any other worsening symptoms.    It is important to remember that your care does not end here and you must continue to monitor your condition closely. Please return to the emergency department for any worsening or concerning signs or symptoms as directed by our conversations and the discharge instructions. If you do not have a doctor please contact the referral number on your discharge instructions. Please contact any physician specialists provided in your discharge notes as it is very important to follow up with them regarding your condition. If you are unable to reach the physicians provided, please come back to the Emergency Department at any time.    Return to emergency room without delay for ANY new or worsening pains or for any other symptoms or concerns.  Return with worsening pains, nausea, vomiting, trouble breathing, palpitations, shortness of breath, inability to pass stool or urine, loss of control of stool or urine, any numbness or tingling (that is not normal for you), uncontrolled fevers, the passing of blood or other material in stool or urine, rashes, pains or for any other symptoms or concerns you may have.  You are always welcome to return to the ER at any time for any reason or for any other concerns you may have.

## 2024-03-12 NOTE — ED PROVIDER NOTES
HPI   Chief Complaint   Patient presents with    Blood in Urine       Patient presents with blood in the urine.  He has been having blood in the urine for the last 4 days.  He does take Plavix.  He denies any abdominal pain.                          [place] Coma Scale Score: 15                     Patient History   Past Medical History:   Diagnosis Date    Body mass index (BMI) 28.0-28.9, adult 01/29/2020    BMI 28.0-28.9,adult    Body mass index (BMI) 29.0-29.9, adult 04/29/2021    Body mass index (BMI) of 29.0 to 29.9 in adult    Chronic kidney disease, stage 3 unspecified (CMS/[facility]) 07/29/2021    Acute worsening of stage 3 chronic kidney disease    Encounter for general adult medical examination without abnormal findings 06/15/2020    Welcome to Medicare preventive visit    Hypertension     Local infection of the skin and subcutaneous tissue, unspecified 09/26/2018    Skin infection    Major depressive disorder, recurrent, mild (CMS/[facility]) 04/29/2021    Mild episode of recurrent major depressive disorder    MDD (major depressive disorder)     Otorrhea, bilateral 04/17/2019    Otorrhea of both ears    Personal history of diseases of the skin and subcutaneous tissue 04/24/2019    History of eczema    Personal history of other diseases of the circulatory system 10/09/2019    History of atrial fibrillation    Personal history of other diseases of the circulatory system     History of coronary artery disease    Personal history of other diseases of the respiratory system 12/26/2018    History of sinusitis    Personal history of other diseases of urinary system 09/15/2020    History of renal insufficiency syndrome    Personal history of other endocrine, nutritional and metabolic disease 07/30/2021    History of hypothyroidism    Unspecified otitis externa, left ear 04/17/2019    Otitis externa, left    Vascular dementia (CMS/[facility])      Past Surgical History:   Procedure Laterality Date    CORONARY ARTERY BYPASS GRAFT   2018    CABG    CT ABDOMEN ANGIOGRAM W AND/OR WO IV CONTRAST  2019    CT ABDOMEN ANGIOGRAM W AND/OR WO IV CONTRAST 2019 [facility]    MR HEAD ANGIO WO IV CONTRAST  2022    MR HEAD ANGIO WO IV CONTRAST 2022 [facility]    MR NECK ANGIO WO IV CONTRAST  2022    MR NECK ANGIO WO IV CONTRAST 2022 [facility]     No family history on file.  Social History     Tobacco Use    Smoking status: Former     Types: Cigarettes     Quit date:      Years since quittin.1    Smokeless tobacco: Never   Substance Use Topics    Alcohol use: Never    Drug use: Never       Physical Exam   ED Triage Vitals [24 1629]   Temperature Heart Rate Respirations BP   36.6 °C (97.9 °F) 57 18 172/80      Pulse Ox Temp Source Heart Rate Source Patient Position   98 % Temporal -- --      BP Location FiO2 (%)     -- --       Physical Exam  HENT:      Head: Normocephalic.   Eyes:      General: No scleral icterus.  Cardiovascular:      Rate and Rhythm: Normal rate.   Abdominal:      Comments: Soft, nontender to palpation.   Neurological:      Mental Status: He is alert.         ED Course & MDM   Diagnoses as of 24 180   Hematuria, unspecified type   Urinary tract infection with hematuria, site unspecified       Medical Decision Making  Patient presents with painless hematuria.  He does take Plavix.  Kidney function is comparable to previous measurements.  Blood counts are unremarkable.  Bladder scan reveals only 165 mL of urine.  Urinalysis consistent with urinary tract infection.  Patient treated with ceftriaxone and was given prescription for cephalexin.  No signs of urinary obstruction at this time.  Return precautions given for any worsening symptoms.  Parts of this chart were completed with dictation software, please excuse any errors in transcription.        Procedure  Procedures     Terrance Morfin MD  24 [id number]

## 2024-03-14 LAB — BACTERIA UR CULT: NO GROWTH

## 2024-08-08 ENCOUNTER — APPOINTMENT (OUTPATIENT)
Dept: RADIOLOGY | Facility: HOSPITAL | Age: 64
DRG: 481 | End: 2024-08-08
Payer: MEDICARE

## 2024-08-08 ENCOUNTER — ANESTHESIA EVENT (OUTPATIENT)
Dept: OPERATING ROOM | Facility: HOSPITAL | Age: 64
End: 2024-08-08
Payer: MEDICARE

## 2024-08-08 ENCOUNTER — PREP FOR PROCEDURE (OUTPATIENT)
Dept: OPERATING ROOM | Facility: HOSPITAL | Age: 64
End: 2024-08-08

## 2024-08-08 ENCOUNTER — HOSPITAL ENCOUNTER (INPATIENT)
Facility: HOSPITAL | Age: 64
LOS: 3 days | Discharge: NURSING, REHAB OR LONG-TERM CARE | End: 2024-08-11
Attending: STUDENT IN AN ORGANIZED HEALTH CARE EDUCATION/TRAINING PROGRAM | Admitting: STUDENT IN AN ORGANIZED HEALTH CARE EDUCATION/TRAINING PROGRAM
Payer: MEDICARE

## 2024-08-08 ENCOUNTER — APPOINTMENT (OUTPATIENT)
Dept: CARDIOLOGY | Facility: HOSPITAL | Age: 64
DRG: 481 | End: 2024-08-08
Payer: MEDICARE

## 2024-08-08 ENCOUNTER — ANESTHESIA (OUTPATIENT)
Dept: OPERATING ROOM | Facility: HOSPITAL | Age: 64
End: 2024-08-08
Payer: MEDICARE

## 2024-08-08 DIAGNOSIS — S72.142K CLOSED DISPLACED INTERTROCHANTERIC FRACTURE OF LEFT FEMUR WITH NONUNION, SUBSEQUENT ENCOUNTER: ICD-10-CM

## 2024-08-08 DIAGNOSIS — S72.142A CLOSED DISPLACED INTERTROCHANTERIC FRACTURE OF LEFT FEMUR, INITIAL ENCOUNTER (MULTI): ICD-10-CM

## 2024-08-08 DIAGNOSIS — S72.142A CLOSED COMMINUTED INTERTROCHANTERIC FRACTURE OF LEFT FEMUR, INITIAL ENCOUNTER (MULTI): Primary | ICD-10-CM

## 2024-08-08 LAB
ANION GAP SERPL CALC-SCNC: 11 MMOL/L
BASOPHILS # BLD AUTO: 0.03 X10*3/UL (ref 0–0.1)
BASOPHILS NFR BLD AUTO: 0.2 %
BUN SERPL-MCNC: 28 MG/DL (ref 8–25)
CALCIUM SERPL-MCNC: 9 MG/DL (ref 8.5–10.4)
CHLORIDE SERPL-SCNC: 106 MMOL/L (ref 97–107)
CO2 SERPL-SCNC: 22 MMOL/L (ref 24–31)
CREAT SERPL-MCNC: 1.5 MG/DL (ref 0.4–1.6)
EGFRCR SERPLBLD CKD-EPI 2021: 51 ML/MIN/1.73M*2
EOSINOPHIL # BLD AUTO: 0.02 X10*3/UL (ref 0–0.7)
EOSINOPHIL NFR BLD AUTO: 0.1 %
ERYTHROCYTE [DISTWIDTH] IN BLOOD BY AUTOMATED COUNT: 16.5 % (ref 11.5–14.5)
GLUCOSE SERPL-MCNC: 112 MG/DL (ref 65–99)
HCT VFR BLD AUTO: 36.8 % (ref 41–52)
HGB BLD-MCNC: 11.8 G/DL (ref 13.5–17.5)
IMM GRANULOCYTES # BLD AUTO: 0.05 X10*3/UL (ref 0–0.7)
IMM GRANULOCYTES NFR BLD AUTO: 0.4 % (ref 0–0.9)
INR PPP: 1.1 (ref 0.9–1.2)
LYMPHOCYTES # BLD AUTO: 1.86 X10*3/UL (ref 1.2–4.8)
LYMPHOCYTES NFR BLD AUTO: 13.4 %
MCH RBC QN AUTO: 28 PG (ref 26–34)
MCHC RBC AUTO-ENTMCNC: 32.1 G/DL (ref 32–36)
MCV RBC AUTO: 87 FL (ref 80–100)
MONOCYTES # BLD AUTO: 1.85 X10*3/UL (ref 0.1–1)
MONOCYTES NFR BLD AUTO: 13.3 %
NEUTROPHILS # BLD AUTO: 10.06 X10*3/UL (ref 1.2–7.7)
NEUTROPHILS NFR BLD AUTO: 72.6 %
NRBC BLD-RTO: 0 /100 WBCS (ref 0–0)
PLATELET # BLD AUTO: 226 X10*3/UL (ref 150–450)
POTASSIUM SERPL-SCNC: 4.2 MMOL/L (ref 3.4–5.1)
PROTHROMBIN TIME: 11.8 SECONDS (ref 9.3–12.7)
RBC # BLD AUTO: 4.21 X10*6/UL (ref 4.5–5.9)
SODIUM SERPL-SCNC: 139 MMOL/L (ref 133–145)
WBC # BLD AUTO: 13.9 X10*3/UL (ref 4.4–11.3)

## 2024-08-08 PROCEDURE — 3600000009 HC OR TIME - EACH INCREMENTAL 1 MINUTE - PROCEDURE LEVEL FOUR: Performed by: ORTHOPAEDIC SURGERY

## 2024-08-08 PROCEDURE — 2720000007 HC OR 272 NO HCPCS: Performed by: ORTHOPAEDIC SURGERY

## 2024-08-08 PROCEDURE — 72100 X-RAY EXAM L-S SPINE 2/3 VWS: CPT

## 2024-08-08 PROCEDURE — 2500000004 HC RX 250 GENERAL PHARMACY W/ HCPCS (ALT 636 FOR OP/ED): Performed by: NURSE PRACTITIONER

## 2024-08-08 PROCEDURE — 70450 CT HEAD/BRAIN W/O DYE: CPT

## 2024-08-08 PROCEDURE — 71045 X-RAY EXAM CHEST 1 VIEW: CPT

## 2024-08-08 PROCEDURE — 2780000003 HC OR 278 NO HCPCS: Performed by: ORTHOPAEDIC SURGERY

## 2024-08-08 PROCEDURE — 87086 URINE CULTURE/COLONY COUNT: CPT | Mod: TRILAB,WESLAB | Performed by: REGISTERED NURSE

## 2024-08-08 PROCEDURE — 94645 CONT INHLJ TX EACH ADDL HOUR: CPT

## 2024-08-08 PROCEDURE — C1769 GUIDE WIRE: HCPCS | Performed by: ORTHOPAEDIC SURGERY

## 2024-08-08 PROCEDURE — A27245 PR OPEN FIX INTER/SUBTROCH FX,IMPLNT: Performed by: ANESTHESIOLOGIST ASSISTANT

## 2024-08-08 PROCEDURE — 1100000001 HC PRIVATE ROOM DAILY

## 2024-08-08 PROCEDURE — 36415 COLL VENOUS BLD VENIPUNCTURE: CPT | Performed by: STUDENT IN AN ORGANIZED HEALTH CARE EDUCATION/TRAINING PROGRAM

## 2024-08-08 PROCEDURE — 70450 CT HEAD/BRAIN W/O DYE: CPT | Performed by: RADIOLOGY

## 2024-08-08 PROCEDURE — 2500000005 HC RX 250 GENERAL PHARMACY W/O HCPCS: Performed by: ANESTHESIOLOGIST ASSISTANT

## 2024-08-08 PROCEDURE — 2500000002 HC RX 250 W HCPCS SELF ADMINISTERED DRUGS (ALT 637 FOR MEDICARE OP, ALT 636 FOR OP/ED): Performed by: INTERNAL MEDICINE

## 2024-08-08 PROCEDURE — 2500000004 HC RX 250 GENERAL PHARMACY W/ HCPCS (ALT 636 FOR OP/ED): Performed by: ORTHOPAEDIC SURGERY

## 2024-08-08 PROCEDURE — 0QS706Z REPOSITION LEFT UPPER FEMUR WITH INTRAMEDULLARY INTERNAL FIXATION DEVICE, OPEN APPROACH: ICD-10-PCS | Performed by: ORTHOPAEDIC SURGERY

## 2024-08-08 PROCEDURE — 96374 THER/PROPH/DIAG INJ IV PUSH: CPT

## 2024-08-08 PROCEDURE — 3600000004 HC OR TIME - INITIAL BASE CHARGE - PROCEDURE LEVEL FOUR: Performed by: ORTHOPAEDIC SURGERY

## 2024-08-08 PROCEDURE — 72100 X-RAY EXAM L-S SPINE 2/3 VWS: CPT | Performed by: RADIOLOGY

## 2024-08-08 PROCEDURE — 2500000002 HC RX 250 W HCPCS SELF ADMINISTERED DRUGS (ALT 637 FOR MEDICARE OP, ALT 636 FOR OP/ED): Performed by: ANESTHESIOLOGY

## 2024-08-08 PROCEDURE — 71045 X-RAY EXAM CHEST 1 VIEW: CPT | Performed by: RADIOLOGY

## 2024-08-08 PROCEDURE — 73502 X-RAY EXAM HIP UNI 2-3 VIEWS: CPT | Mod: LEFT SIDE | Performed by: RADIOLOGY

## 2024-08-08 PROCEDURE — 94644 CONT INHLJ TX 1ST HOUR: CPT

## 2024-08-08 PROCEDURE — 93005 ELECTROCARDIOGRAM TRACING: CPT

## 2024-08-08 PROCEDURE — 7100000002 HC RECOVERY ROOM TIME - EACH INCREMENTAL 1 MINUTE: Performed by: ORTHOPAEDIC SURGERY

## 2024-08-08 PROCEDURE — 3700000002 HC GENERAL ANESTHESIA TIME - EACH INCREMENTAL 1 MINUTE: Performed by: ORTHOPAEDIC SURGERY

## 2024-08-08 PROCEDURE — A27245 PR OPEN FIX INTER/SUBTROCH FX,IMPLNT: Performed by: ANESTHESIOLOGY

## 2024-08-08 PROCEDURE — 96361 HYDRATE IV INFUSION ADD-ON: CPT

## 2024-08-08 PROCEDURE — C1713 ANCHOR/SCREW BN/BN,TIS/BN: HCPCS | Performed by: ORTHOPAEDIC SURGERY

## 2024-08-08 PROCEDURE — 2500000004 HC RX 250 GENERAL PHARMACY W/ HCPCS (ALT 636 FOR OP/ED): Performed by: ANESTHESIOLOGY

## 2024-08-08 PROCEDURE — 80048 BASIC METABOLIC PNL TOTAL CA: CPT | Performed by: STUDENT IN AN ORGANIZED HEALTH CARE EDUCATION/TRAINING PROGRAM

## 2024-08-08 PROCEDURE — 85025 COMPLETE CBC W/AUTO DIFF WBC: CPT | Performed by: STUDENT IN AN ORGANIZED HEALTH CARE EDUCATION/TRAINING PROGRAM

## 2024-08-08 PROCEDURE — 85610 PROTHROMBIN TIME: CPT | Performed by: STUDENT IN AN ORGANIZED HEALTH CARE EDUCATION/TRAINING PROGRAM

## 2024-08-08 PROCEDURE — 73502 X-RAY EXAM HIP UNI 2-3 VIEWS: CPT | Mod: LT

## 2024-08-08 PROCEDURE — 2500000004 HC RX 250 GENERAL PHARMACY W/ HCPCS (ALT 636 FOR OP/ED): Performed by: STUDENT IN AN ORGANIZED HEALTH CARE EDUCATION/TRAINING PROGRAM

## 2024-08-08 PROCEDURE — 81001 URINALYSIS AUTO W/SCOPE: CPT | Performed by: REGISTERED NURSE

## 2024-08-08 PROCEDURE — 99285 EMERGENCY DEPT VISIT HI MDM: CPT | Mod: 25

## 2024-08-08 PROCEDURE — 2500000004 HC RX 250 GENERAL PHARMACY W/ HCPCS (ALT 636 FOR OP/ED): Performed by: ANESTHESIOLOGIST ASSISTANT

## 2024-08-08 PROCEDURE — 7100000001 HC RECOVERY ROOM TIME - INITIAL BASE CHARGE: Performed by: ORTHOPAEDIC SURGERY

## 2024-08-08 PROCEDURE — 3700000001 HC GENERAL ANESTHESIA TIME - INITIAL BASE CHARGE: Performed by: ORTHOPAEDIC SURGERY

## 2024-08-08 DEVICE — 12MM/130 DEG TI CANN TFNA 170MM - STERILE
Type: IMPLANTABLE DEVICE | Site: HIP | Status: FUNCTIONAL
Brand: TFN-ADVANCE

## 2024-08-08 DEVICE — TFNA FENESTRATED HELICAL BLADE 95MM - STERILE
Type: IMPLANTABLE DEVICE | Site: HIP | Status: FUNCTIONAL
Brand: TFN-ADVANCE

## 2024-08-08 DEVICE — 5.0MM TI LOCKING SCREW 42MM- FOR IM NAILS-STERILE: Type: IMPLANTABLE DEVICE | Site: HIP | Status: FUNCTIONAL

## 2024-08-08 RX ORDER — PROPOFOL 10 MG/ML
INJECTION, EMULSION INTRAVENOUS AS NEEDED
Status: DISCONTINUED | OUTPATIENT
Start: 2024-08-08 | End: 2024-08-08

## 2024-08-08 RX ORDER — CEFAZOLIN SODIUM 2 G/100ML
INJECTION, SOLUTION INTRAVENOUS AS NEEDED
Status: DISCONTINUED | OUTPATIENT
Start: 2024-08-08 | End: 2024-08-08

## 2024-08-08 RX ORDER — SODIUM CHLORIDE, SODIUM LACTATE, POTASSIUM CHLORIDE, CALCIUM CHLORIDE 600; 310; 30; 20 MG/100ML; MG/100ML; MG/100ML; MG/100ML
100 INJECTION, SOLUTION INTRAVENOUS CONTINUOUS
Status: DISCONTINUED | OUTPATIENT
Start: 2024-08-08 | End: 2024-08-08 | Stop reason: HOSPADM

## 2024-08-08 RX ORDER — IPRATROPIUM BROMIDE AND ALBUTEROL SULFATE 2.5; .5 MG/3ML; MG/3ML
3 SOLUTION RESPIRATORY (INHALATION) EVERY 4 HOURS PRN
Status: DISCONTINUED | OUTPATIENT
Start: 2024-08-08 | End: 2024-08-11 | Stop reason: HOSPADM

## 2024-08-08 RX ORDER — HYDROMORPHONE HYDROCHLORIDE 1 MG/ML
1 INJECTION, SOLUTION INTRAMUSCULAR; INTRAVENOUS; SUBCUTANEOUS
Status: DISCONTINUED | OUTPATIENT
Start: 2024-08-08 | End: 2024-08-09

## 2024-08-08 RX ORDER — NAPROXEN SODIUM 220 MG/1
81 TABLET, FILM COATED ORAL DAILY
Status: DISCONTINUED | OUTPATIENT
Start: 2024-08-09 | End: 2024-08-11 | Stop reason: HOSPADM

## 2024-08-08 RX ORDER — HYDRALAZINE HYDROCHLORIDE 20 MG/ML
10 INJECTION INTRAMUSCULAR; INTRAVENOUS ONCE
Status: COMPLETED | OUTPATIENT
Start: 2024-08-08 | End: 2024-08-08

## 2024-08-08 RX ORDER — ARIPIPRAZOLE 10 MG/1
10 TABLET ORAL DAILY
COMMUNITY
Start: 2024-06-18

## 2024-08-08 RX ORDER — ONDANSETRON HYDROCHLORIDE 2 MG/ML
4 INJECTION, SOLUTION INTRAVENOUS EVERY 8 HOURS PRN
Status: DISCONTINUED | OUTPATIENT
Start: 2024-08-08 | End: 2024-08-11 | Stop reason: HOSPADM

## 2024-08-08 RX ORDER — CEFAZOLIN SODIUM 1 G/50ML
1 SOLUTION INTRAVENOUS EVERY 8 HOURS
Status: DISPENSED | OUTPATIENT
Start: 2024-08-08 | End: 2024-08-09

## 2024-08-08 RX ORDER — ASPIRIN 81 MG/1
81 TABLET ORAL DAILY
Qty: 30 TABLET | Refills: 0 | Status: SHIPPED | OUTPATIENT
Start: 2024-08-08

## 2024-08-08 RX ORDER — ONDANSETRON HYDROCHLORIDE 2 MG/ML
4 INJECTION, SOLUTION INTRAVENOUS ONCE AS NEEDED
Status: DISCONTINUED | OUTPATIENT
Start: 2024-08-08 | End: 2024-08-08 | Stop reason: HOSPADM

## 2024-08-08 RX ORDER — ACETAMINOPHEN 500 MG
5 TABLET ORAL NIGHTLY PRN
Status: DISCONTINUED | OUTPATIENT
Start: 2024-08-08 | End: 2024-08-11 | Stop reason: HOSPADM

## 2024-08-08 RX ORDER — ACETAMINOPHEN 650 MG/1
650 SUPPOSITORY RECTAL EVERY 4 HOURS PRN
Status: DISCONTINUED | OUTPATIENT
Start: 2024-08-08 | End: 2024-08-11 | Stop reason: HOSPADM

## 2024-08-08 RX ORDER — LISINOPRIL 20 MG/1
20 TABLET ORAL DAILY
Status: DISCONTINUED | OUTPATIENT
Start: 2024-08-09 | End: 2024-08-11 | Stop reason: HOSPADM

## 2024-08-08 RX ORDER — HYDROMORPHONE HYDROCHLORIDE 1 MG/ML
1 INJECTION, SOLUTION INTRAMUSCULAR; INTRAVENOUS; SUBCUTANEOUS ONCE
Status: COMPLETED | OUTPATIENT
Start: 2024-08-08 | End: 2024-08-08

## 2024-08-08 RX ORDER — ATORVASTATIN CALCIUM 40 MG/1
40 TABLET, FILM COATED ORAL NIGHTLY
Status: DISCONTINUED | OUTPATIENT
Start: 2024-08-08 | End: 2024-08-11 | Stop reason: HOSPADM

## 2024-08-08 RX ORDER — CLOPIDOGREL BISULFATE 75 MG/1
75 TABLET ORAL DAILY
Status: DISCONTINUED | OUTPATIENT
Start: 2024-08-09 | End: 2024-08-11 | Stop reason: HOSPADM

## 2024-08-08 RX ORDER — DONEPEZIL HYDROCHLORIDE 10 MG/1
10 TABLET, FILM COATED ORAL NIGHTLY
COMMUNITY
Start: 2024-06-18

## 2024-08-08 RX ORDER — HYDRALAZINE HYDROCHLORIDE 20 MG/ML
10 INJECTION INTRAMUSCULAR; INTRAVENOUS EVERY 6 HOURS PRN
Status: DISCONTINUED | OUTPATIENT
Start: 2024-08-08 | End: 2024-08-11 | Stop reason: HOSPADM

## 2024-08-08 RX ORDER — MIDAZOLAM HYDROCHLORIDE 1 MG/ML
INJECTION, SOLUTION INTRAMUSCULAR; INTRAVENOUS AS NEEDED
Status: DISCONTINUED | OUTPATIENT
Start: 2024-08-08 | End: 2024-08-08

## 2024-08-08 RX ORDER — SODIUM CHLORIDE, SODIUM LACTATE, POTASSIUM CHLORIDE, CALCIUM CHLORIDE 600; 310; 30; 20 MG/100ML; MG/100ML; MG/100ML; MG/100ML
100 INJECTION, SOLUTION INTRAVENOUS CONTINUOUS
Status: DISCONTINUED | OUTPATIENT
Start: 2024-08-08 | End: 2024-08-09

## 2024-08-08 RX ORDER — PANTOPRAZOLE SODIUM 20 MG/1
20 TABLET, DELAYED RELEASE ORAL DAILY
Status: DISCONTINUED | OUTPATIENT
Start: 2024-08-09 | End: 2024-08-11 | Stop reason: HOSPADM

## 2024-08-08 RX ORDER — ONDANSETRON 4 MG/1
4 TABLET, ORALLY DISINTEGRATING ORAL EVERY 8 HOURS PRN
Status: DISCONTINUED | OUTPATIENT
Start: 2024-08-08 | End: 2024-08-11 | Stop reason: HOSPADM

## 2024-08-08 RX ORDER — HYDROCODONE BITARTRATE AND ACETAMINOPHEN 5; 325 MG/1; MG/1
1 TABLET ORAL EVERY 6 HOURS PRN
Status: DISCONTINUED | OUTPATIENT
Start: 2024-08-08 | End: 2024-08-09

## 2024-08-08 RX ORDER — NITROGLYCERIN 0.4 MG/1
0.4 TABLET SUBLINGUAL EVERY 5 MIN PRN
Status: DISCONTINUED | OUTPATIENT
Start: 2024-08-08 | End: 2024-08-11 | Stop reason: HOSPADM

## 2024-08-08 RX ORDER — CLOPIDOGREL BISULFATE 75 MG/1
75 TABLET ORAL
Status: DISCONTINUED | OUTPATIENT
Start: 2024-08-08 | End: 2024-08-08

## 2024-08-08 RX ORDER — HYDROCODONE BITARTRATE AND ACETAMINOPHEN 5; 325 MG/1; MG/1
1 TABLET ORAL EVERY 6 HOURS PRN
Qty: 20 TABLET | Refills: 0 | Status: SHIPPED | OUTPATIENT
Start: 2024-08-08 | End: 2024-08-11 | Stop reason: HOSPADM

## 2024-08-08 RX ORDER — FENTANYL CITRATE 50 UG/ML
50 INJECTION, SOLUTION INTRAMUSCULAR; INTRAVENOUS EVERY 5 MIN PRN
Status: DISCONTINUED | OUTPATIENT
Start: 2024-08-08 | End: 2024-08-08 | Stop reason: HOSPADM

## 2024-08-08 RX ORDER — BISACODYL 5 MG
10 TABLET, DELAYED RELEASE (ENTERIC COATED) ORAL DAILY PRN
Status: DISCONTINUED | OUTPATIENT
Start: 2024-08-08 | End: 2024-08-11 | Stop reason: HOSPADM

## 2024-08-08 RX ORDER — LIDOCAINE HYDROCHLORIDE 10 MG/ML
0.1 INJECTION, SOLUTION EPIDURAL; INFILTRATION; INTRACAUDAL; PERINEURAL ONCE
Status: DISCONTINUED | OUTPATIENT
Start: 2024-08-08 | End: 2024-08-08 | Stop reason: HOSPADM

## 2024-08-08 RX ORDER — AMLODIPINE BESYLATE 5 MG/1
5 TABLET ORAL DAILY
Status: DISCONTINUED | OUTPATIENT
Start: 2024-08-09 | End: 2024-08-11 | Stop reason: HOSPADM

## 2024-08-08 RX ORDER — MIDAZOLAM HYDROCHLORIDE 1 MG/ML
1 INJECTION, SOLUTION INTRAMUSCULAR; INTRAVENOUS ONCE AS NEEDED
Status: COMPLETED | OUTPATIENT
Start: 2024-08-08 | End: 2024-08-08

## 2024-08-08 RX ORDER — PHENYLEPHRINE HCL IN 0.9% NACL 1 MG/10 ML
SYRINGE (ML) INTRAVENOUS AS NEEDED
Status: DISCONTINUED | OUTPATIENT
Start: 2024-08-08 | End: 2024-08-08

## 2024-08-08 RX ORDER — ACETAMINOPHEN 325 MG/1
650 TABLET ORAL EVERY 4 HOURS PRN
Status: DISCONTINUED | OUTPATIENT
Start: 2024-08-08 | End: 2024-08-11 | Stop reason: HOSPADM

## 2024-08-08 RX ORDER — ACETAMINOPHEN 160 MG/5ML
650 SOLUTION ORAL EVERY 4 HOURS PRN
Status: DISCONTINUED | OUTPATIENT
Start: 2024-08-08 | End: 2024-08-11 | Stop reason: HOSPADM

## 2024-08-08 RX ORDER — LIDOCAINE HYDROCHLORIDE 10 MG/ML
INJECTION INFILTRATION; PERINEURAL AS NEEDED
Status: DISCONTINUED | OUTPATIENT
Start: 2024-08-08 | End: 2024-08-08

## 2024-08-08 RX ORDER — ISOSORBIDE MONONITRATE 30 MG/1
30 TABLET, EXTENDED RELEASE ORAL
Status: DISCONTINUED | OUTPATIENT
Start: 2024-08-09 | End: 2024-08-11 | Stop reason: HOSPADM

## 2024-08-08 RX ORDER — ALBUTEROL SULFATE 0.83 MG/ML
2.5 SOLUTION RESPIRATORY (INHALATION) ONCE
Status: COMPLETED | OUTPATIENT
Start: 2024-08-08 | End: 2024-08-08

## 2024-08-08 RX ORDER — SODIUM CHLORIDE, SODIUM LACTATE, POTASSIUM CHLORIDE, CALCIUM CHLORIDE 600; 310; 30; 20 MG/100ML; MG/100ML; MG/100ML; MG/100ML
125 INJECTION, SOLUTION INTRAVENOUS CONTINUOUS
Status: DISCONTINUED | OUTPATIENT
Start: 2024-08-08 | End: 2024-08-08

## 2024-08-08 RX ORDER — CARVEDILOL 3.12 MG/1
3.12 TABLET ORAL
Status: DISCONTINUED | OUTPATIENT
Start: 2024-08-09 | End: 2024-08-11 | Stop reason: HOSPADM

## 2024-08-08 RX ORDER — FENTANYL CITRATE 50 UG/ML
INJECTION, SOLUTION INTRAMUSCULAR; INTRAVENOUS AS NEEDED
Status: DISCONTINUED | OUTPATIENT
Start: 2024-08-08 | End: 2024-08-08

## 2024-08-08 RX ADMIN — HYDROMORPHONE HYDROCHLORIDE 1 MG: 1 INJECTION, SOLUTION INTRAMUSCULAR; INTRAVENOUS; SUBCUTANEOUS at 23:19

## 2024-08-08 RX ADMIN — SODIUM CHLORIDE 500 ML: 900 INJECTION, SOLUTION INTRAVENOUS at 10:52

## 2024-08-08 RX ADMIN — IPRATROPIUM BROMIDE AND ALBUTEROL SULFATE 3 ML: 2.5; .5 SOLUTION RESPIRATORY (INHALATION) at 19:09

## 2024-08-08 RX ADMIN — SODIUM CHLORIDE, SODIUM LACTATE, POTASSIUM CHLORIDE, AND CALCIUM CHLORIDE 100 ML/HR: 600; 310; 30; 20 INJECTION, SOLUTION INTRAVENOUS at 21:26

## 2024-08-08 RX ADMIN — ALBUTEROL SULFATE 2.5 MG: 2.5 SOLUTION RESPIRATORY (INHALATION) at 18:34

## 2024-08-08 RX ADMIN — HYDRALAZINE HYDROCHLORIDE 10 MG: 20 INJECTION INTRAMUSCULAR; INTRAVENOUS at 14:17

## 2024-08-08 RX ADMIN — MIDAZOLAM HYDROCHLORIDE 1 MG: 1 INJECTION, SOLUTION INTRAMUSCULAR; INTRAVENOUS at 18:25

## 2024-08-08 RX ADMIN — MEPERIDINE HYDROCHLORIDE 12.5 MG: 25 INJECTION, SOLUTION INTRAMUSCULAR; INTRAVENOUS; SUBCUTANEOUS at 19:12

## 2024-08-08 RX ADMIN — FENTANYL CITRATE 50 MCG: 50 INJECTION INTRAMUSCULAR; INTRAVENOUS at 19:49

## 2024-08-08 RX ADMIN — HYDROMORPHONE HYDROCHLORIDE 1 MG: 1 INJECTION, SOLUTION INTRAMUSCULAR; INTRAVENOUS; SUBCUTANEOUS at 14:49

## 2024-08-08 SDOH — SOCIAL STABILITY: SOCIAL INSECURITY: ABUSE: ADULT

## 2024-08-08 SDOH — SOCIAL STABILITY: SOCIAL INSECURITY: HAS ANYONE EVER THREATENED TO HURT YOUR FAMILY OR YOUR PETS?: UNABLE TO ASSESS

## 2024-08-08 SDOH — SOCIAL STABILITY: SOCIAL INSECURITY: ARE THERE ANY APPARENT SIGNS OF INJURIES/BEHAVIORS THAT COULD BE RELATED TO ABUSE/NEGLECT?: UNABLE TO ASSESS

## 2024-08-08 SDOH — SOCIAL STABILITY: SOCIAL INSECURITY: DO YOU FEEL ANYONE HAS EXPLOITED OR TAKEN ADVANTAGE OF YOU FINANCIALLY OR OF YOUR PERSONAL PROPERTY?: UNABLE TO ASSESS

## 2024-08-08 SDOH — SOCIAL STABILITY: SOCIAL INSECURITY: WERE YOU ABLE TO COMPLETE ALL THE BEHAVIORAL HEALTH SCREENINGS?: NO

## 2024-08-08 SDOH — SOCIAL STABILITY: SOCIAL INSECURITY: DO YOU FEEL UNSAFE GOING BACK TO THE PLACE WHERE YOU ARE LIVING?: UNABLE TO ASSESS

## 2024-08-08 SDOH — SOCIAL STABILITY: SOCIAL INSECURITY: HAVE YOU HAD THOUGHTS OF HARMING ANYONE ELSE?: UNABLE TO ASSESS

## 2024-08-08 SDOH — SOCIAL STABILITY: SOCIAL INSECURITY: DOES ANYONE TRY TO KEEP YOU FROM HAVING/CONTACTING OTHER FRIENDS OR DOING THINGS OUTSIDE YOUR HOME?: UNABLE TO ASSESS

## 2024-08-08 SDOH — SOCIAL STABILITY: SOCIAL INSECURITY: ARE YOU OR HAVE YOU BEEN THREATENED OR ABUSED PHYSICALLY, EMOTIONALLY, OR SEXUALLY BY ANYONE?: UNABLE TO ASSESS

## 2024-08-08 ASSESSMENT — PAIN SCALES - GENERAL
PAINLEVEL_OUTOF10: 5 - MODERATE PAIN
PAINLEVEL_OUTOF10: 10 - WORST POSSIBLE PAIN
PAIN_LEVEL: 3
PAINLEVEL_OUTOF10: 7
PAINLEVEL_OUTOF10: 0 - NO PAIN
PAINLEVEL_OUTOF10: 5 - MODERATE PAIN
PAINLEVEL_OUTOF10: 8

## 2024-08-08 ASSESSMENT — PAIN DESCRIPTION - ORIENTATION
ORIENTATION: LEFT

## 2024-08-08 ASSESSMENT — PATIENT HEALTH QUESTIONNAIRE - PHQ9
2. FEELING DOWN, DEPRESSED OR HOPELESS: NOT AT ALL
1. LITTLE INTEREST OR PLEASURE IN DOING THINGS: NOT AT ALL
SUM OF ALL RESPONSES TO PHQ9 QUESTIONS 1 & 2: 0

## 2024-08-08 ASSESSMENT — ACTIVITIES OF DAILY LIVING (ADL)
HEARING - LEFT EAR: DEAF
JUDGMENT_ADEQUATE_SAFELY_COMPLETE_DAILY_ACTIVITIES: NO
WALKS IN HOME: NEEDS ASSISTANCE
TOILETING: NEEDS ASSISTANCE
DRESSING YOURSELF: NEEDS ASSISTANCE
HEARING - RIGHT EAR: DEAF
BATHING: NEEDS ASSISTANCE
FEEDING YOURSELF: NEEDS ASSISTANCE
PATIENT'S MEMORY ADEQUATE TO SAFELY COMPLETE DAILY ACTIVITIES?: NO
ADEQUATE_TO_COMPLETE_ADL: YES
ASSISTIVE_DEVICE: OTHER (COMMENT)
LACK_OF_TRANSPORTATION: PATIENT UNABLE TO ANSWER
GROOMING: NEEDS ASSISTANCE

## 2024-08-08 ASSESSMENT — PAIN - FUNCTIONAL ASSESSMENT
PAIN_FUNCTIONAL_ASSESSMENT: 0-10
PAIN_FUNCTIONAL_ASSESSMENT: FLACC (FACE, LEGS, ACTIVITY, CRY, CONSOLABILITY)
PAIN_FUNCTIONAL_ASSESSMENT: WONG-BAKER FACES
PAIN_FUNCTIONAL_ASSESSMENT: WONG-BAKER FACES
PAIN_FUNCTIONAL_ASSESSMENT: FLACC (FACE, LEGS, ACTIVITY, CRY, CONSOLABILITY)
PAIN_FUNCTIONAL_ASSESSMENT: 0-10

## 2024-08-08 ASSESSMENT — PAIN SCALES - WONG BAKER
WONGBAKER_NUMERICALRESPONSE: HURTS LITTLE BIT
WONGBAKER_NUMERICALRESPONSE: HURTS WHOLE LOT

## 2024-08-08 ASSESSMENT — LIFESTYLE VARIABLES
AUDIT-C TOTAL SCORE: -1
HOW OFTEN DO YOU HAVE 6 OR MORE DRINKS ON ONE OCCASION: PATIENT UNABLE TO ANSWER
SKIP TO QUESTIONS 9-10: 0
HOW MANY STANDARD DRINKS CONTAINING ALCOHOL DO YOU HAVE ON A TYPICAL DAY: PATIENT UNABLE TO ANSWER
AUDIT-C TOTAL SCORE: -1
HOW OFTEN DO YOU HAVE A DRINK CONTAINING ALCOHOL: PATIENT UNABLE TO ANSWER

## 2024-08-08 ASSESSMENT — COGNITIVE AND FUNCTIONAL STATUS - GENERAL
TURNING FROM BACK TO SIDE WHILE IN FLAT BAD: A LOT
DAILY ACTIVITIY SCORE: 12
DRESSING REGULAR UPPER BODY CLOTHING: A LOT
CLIMB 3 TO 5 STEPS WITH RAILING: A LOT
DRESSING REGULAR LOWER BODY CLOTHING: A LOT
MOBILITY SCORE: 12
STANDING UP FROM CHAIR USING ARMS: A LOT
HELP NEEDED FOR BATHING: A LOT
PERSONAL GROOMING: A LOT
TOILETING: A LOT
MOVING TO AND FROM BED TO CHAIR: A LOT
EATING MEALS: A LOT
PATIENT BASELINE BEDBOUND: NO
WALKING IN HOSPITAL ROOM: A LOT
MOVING FROM LYING ON BACK TO SITTING ON SIDE OF FLAT BED WITH BEDRAILS: A LOT

## 2024-08-08 ASSESSMENT — PAIN DESCRIPTION - PAIN TYPE: TYPE: ACUTE PAIN

## 2024-08-08 ASSESSMENT — PAIN DESCRIPTION - PROGRESSION: CLINICAL_PROGRESSION: NOT CHANGED

## 2024-08-08 ASSESSMENT — PAIN DESCRIPTION - LOCATION
LOCATION: HIP
LOCATION: BACK

## 2024-08-08 NOTE — NURSING NOTE
Verbal consent for surgery was obtained via telephone from daughter in law Opal Carpenter ([phone]). Consent obtained by Dr. Troy and witnessed by Seth Potts RN. All questions were answered.

## 2024-08-08 NOTE — CONSULTS
Reason For Consult  Left hip fracture    History Of Present Illness  Fernando Sweet is a 64 y.o. male presenting with a fall and injured his left hip.  Fall per nursing facility have occurred yesterday and was brought in today to the hospital and found to have left hip intertrochanteric fracture and is admitted for further treatment.  Patient himself is deaf he complains only some pain about the hip was apparently unable to ambulate after the fall he has no other questions or concerns.  Situation discussed with his guardian Maria De Jesus Carpenter over the phone.     Past Medical History  He has a past medical history of Body mass index (BMI) 28.0-28.9, adult (01/29/2020), Body mass index (BMI) 29.0-29.9, adult (04/29/2021), Chronic kidney disease, stage 3 unspecified (Multi) (07/29/2021), Encounter for general adult medical examination without abnormal findings (06/15/2020), Hypertension, Local infection of the skin and subcutaneous tissue, unspecified (09/26/2018), Major depressive disorder, recurrent, mild (CMS-HCC) (04/29/2021), MDD (major depressive disorder), Otorrhea, bilateral (04/17/2019), Personal history of diseases of the skin and subcutaneous tissue (04/24/2019), Personal history of other diseases of the circulatory system (10/09/2019), Personal history of other diseases of the circulatory system, Personal history of other diseases of the respiratory system (12/26/2018), Personal history of other diseases of urinary system (09/15/2020), Personal history of other endocrine, nutritional and metabolic disease (07/30/2021), Unspecified otitis externa, left ear (04/17/2019), and Vascular dementia (Multi).    Surgical History  He has a past surgical history that includes Coronary artery bypass graft (08/16/2018); CT angio abdomen w and or wo IV IV contrast (9/26/2019); MR angio head wo IV contrast (9/21/2022); and MR angio neck wo IV contrast (9/21/2022).     Social History  He reports that he quit smoking about 7 years ago.  "His smoking use included cigarettes. He has never used smokeless tobacco. He reports that he does not drink alcohol and does not use drugs.    Family History  No family history on file.     Allergies  Patient has no known allergies.    Review of Systems  Denies     Physical Exam  Patient alert no signs of distress.  Complains about pain in the left hip no other complaints.  Positive EHL FHL left leg somewhat shortened and externally rotated tenderness palpation about the left hip and groin     Last Recorded Vitals  Blood pressure 149/81, pulse 83, temperature 37.2 °C (99 °F), temperature source Temporal, resp. rate 11, height 1.753 m (5' 9\"), weight 97 kg (213 lb 13.5 oz), SpO2 97%.    Relevant Results  Displaced left hip intertrochanteric fracture     Assessment/Plan     64-year-old male with a left hip intertrochanteric fracture.  Discussed the situation with his guardian that he would benefit from surgery for intramedullary nailing of the left hip.  This and benefits of that were discussed including but not limited to gout bleeding infection damage to blood vessels nerves veins tendons malunion nonunion residual hip pain stiffness loss of range of motion need for repeat surgical procedures DVT PE possible death.  Patient's guardian was agreeable and wished to proceed.  Will patient n.p.o. and plan on proceeding later today once or space available    I spent 30 minutes in the professional and overall care of this patient.      Gualberto Aviles MD    "

## 2024-08-08 NOTE — ANESTHESIA POSTPROCEDURE EVALUATION
Patient: Fernando Sweet    Procedure Summary       Date: 08/08/24 Room / Location: [facility] 03 / [facility]    Anesthesia Start: 1658 Anesthesia Stop: 1841    Procedure: Hip Fracture ORIF w/ Nail Trochanteric (Left: Hip) Diagnosis:       Closed displaced intertrochanteric fracture of left femur, initial encounter (Multi)      (Closed displaced intertrochanteric fracture of left femur, initial encounter (Multi) [S72.142A])    Surgeons: Gualberto Aviles MD Responsible Provider: Andrew Berry DO    Anesthesia Type: general ASA Status: 3            Anesthesia Type: general    Vitals Value Taken Time   /89 08/08/24 1935   Temp 37.3 °C (99.1 °F) 08/08/24 1825   Pulse 74 08/08/24 1935   Resp 10 08/08/24 1935   SpO2 99 % 08/08/24 1935       Anesthesia Post Evaluation    Patient location during evaluation: bedside  Patient participation: complete - patient cannot participate  Level of consciousness: confused  Pain score: 3  Pain management: adequate  Airway patency: patent  Cardiovascular status: hemodynamically stable  Respiratory status: nasal cannula  Hydration status: acceptable  Postoperative Nausea and Vomiting: none        There were no known notable events for this encounter.

## 2024-08-08 NOTE — ANESTHESIA PROCEDURE NOTES
Airway  Date/Time: 8/8/2024 5:04 PM  Urgency: elective    Airway not difficult    Staffing  Performed: GEMMA   Authorized by: Andrew Berry DO    Performed by: GEMMA Sandoval  Patient location during procedure: OR    Indications and Patient Condition  Indications for airway management: anesthesia  Sedation level: deep  Preoxygenated: yes  MILS maintained throughout  Mask difficulty assessment: 0 - not attempted    Final Airway Details  Final airway type: supraglottic airway      Successful airway: classic  Size 4     Number of attempts at approach: 1

## 2024-08-08 NOTE — ANESTHESIA PREPROCEDURE EVALUATION
Patient: Fernando Sweet    Procedure Information       Date/Time: 08/08/24 6736    Procedure: Hip Fracture ORIF w/ Nail Trochanteric (Left: Hip) - C-Arm, Fracture Table, Synthes TFN    Location: [facility] / [facility]    Surgeons: Gualberto Aviles MD            Relevant Problems   Anesthesia (within normal limits)      Cardiac   (+) Atherosclerosis of coronary artery   (+) CHF (congestive heart failure) (Multi)   (+) History of coronary artery bypass graft   (+) Hyperlipidemia   (+) Hypertension   (+) PVD (peripheral vascular disease) (CMS-HCC)   (+) Rib pain on left side      Neuro   (+) Bell's palsy   (+) CVA (cerebral vascular accident) (Multi)   (+) Depression   (+) Severe episode of recurrent major depressive disorder, without psychotic features (Multi)      Endocrine   (+) Hyperthyroidism      Musculoskeletal   (+) Cervical spondylosis without myelopathy      HEENT   (+) Mixed hearing loss, bilateral       Clinical information reviewed:   Tobacco  Allergies  Meds  Problems  Med Hx  Surg Hx   Fam Hx  Soc   Hx        NPO Detail:  No data recorded     Physical Exam    Airway  Mallampati: II  TM distance: >3 FB     Cardiovascular    Dental    Pulmonary    Abdominal            Anesthesia Plan    History of general anesthesia?: yes  History of complications of general anesthesia?: no    ASA 3     general     intravenous induction   Anesthetic plan and risks discussed with patient.

## 2024-08-08 NOTE — OP NOTE
Hip Fracture ORIF w/ Nail Trochanteric (L) Operative Note     Date: 2024  OR Location: [facility]    Name: Fernando Sweet, : 1960, Age: 64 y.o., MRN: [id number], Sex: male    Diagnosis  Pre-op Diagnosis      * Closed displaced intertrochanteric fracture of left femur, initial encounter (Multi) [S72.142A] Post-op Diagnosis     * Closed displaced intertrochanteric fracture of left femur, initial encounter (Multi) [S72.142A]     Procedures  Hip Fracture ORIF w/ Nail Trochanteric  75186 - MT TX INTER/MT/SUBTRCHNTRIC FEM FX IMED IMPLTSCREW      Surgeons      * Gualberto Aviles - Primary    Resident/Fellow/Other Assistant:  Surgeons and Role:  * No surgeons found with a matching role *  Ana    Procedure Summary  Anesthesia: General  ASA: III  Anesthesia Staff: Anesthesiologist: DO MARCO Arredondo-AA: GEMMA Sandoval  Estimated Blood Loss: 100mL  Intra-op Medications:   Administrations occurring from 1645 to 1825 on 24:   Medication Name Total Dose   lactated Ringer's infusion Cannot be calculated              Anesthesia Record               Intraprocedure I/O Totals          Intake    ceFAZolin 2 gram/100 mL 100.00 mL    Total Intake 100 mL       Output    Est. Blood Loss 70 mL    Total Output 70 mL       Net    Net Volume 30 mL          Specimen: No specimens collected     Staff:   Circulator: Seth Ferguson Person: Meliza         Drains and/or Catheters: * None in log *    Tourniquet Times:         Implants: Synthes TFN [id number], 95mm helical blade, 42mm distal interlock  Implants       Type Name Action Serial No.      Joint Hip NAIL, TFN ADV SHORT, 170MML, DIAMETER 12, 130 DEG - [id number] - [id number] Implanted [id number]     Implant HELICAL BLADES, TFNA FENESTRATED, 95MM - [id number] - [id number] Implanted [id number]     Screw SCREW, LOCKING 5 X 42MM TI, STERILE - [id number] - [id number] Implanted [id number]              Findings: Displaced left hip intertrochanteric fracture    Indications: Fernando Sweet  is an 64 y.o. male who is having surgery for Closed displaced intertrochanteric fracture of left femur, initial encounter (Multi) [H97.915I].  64-year-old male who had suffered a fall at his nursing facility was found to have a displaced left hip intertrochanteric fracture.  It was discussed with the patient's guardian that he would benefit from intramedullary nailing of the left hip.  Risk and benefit of this were further discussed including but not limited to bleeding infection damage to blood vessels nerves veins tendons malunion nonunion residual hip pain stiffness loss of range of motion DVT PE possible death need for repeat surgical procedures.  Guardian agreeable and wished to proceed    The patient was seen in the preoperative area. The risks, benefits, complications, treatment options, non-operative alternatives, expected recovery and outcomes were discussed with the patient. The possibilities of reaction to medication, pulmonary aspiration, injury to surrounding structures, bleeding, recurrent infection, the need for additional procedures, failure to diagnose a condition, and creating a complication requiring transfusion or operation were discussed with the patient. The patient concurred with the proposed plan, giving informed consent.  The site of surgery was properly noted/marked if necessary per policy. The patient has been actively warmed in preoperative area. Preoperative antibiotics have been ordered and given within 1 hours of incision. Venous thrombosis prophylaxis are not indicated.    Procedure Details: After identification of the patient and marking of the correct operative site patient was taken to the operating room.  SCD applied on the nonoperative leg perioperative antibiotics administered anesthesia was administered the patient was then positioned on the fracture table.  Reduction of the left hip was then performed and verified with C-arm fluoroscopy.  Once satisfied with the reduction the  left hip and leg were then prepped and draped in the usual sterile fashion.  After prepping and draping the tip of the greater trochanter was localized with the aid of C-arm fluoroscopy and marked on the skin.  Approximately 4 cm incision extending proximally was then made through the skin with a 10 blade scalpel.  Subcu tissues dissected with Bovie electrocautery down to the fascial layer which was split with a 10 blade scalpel guidewire was introduced onto the tip of the greater trochanter we verified our guidewire position with C arm and advanced the guidewire into the proximal femur.  We then verified the guidewire position on AP and lateral views and once satisfied with this we then opened the proximal femoral canal with a entry reamer.  The nail was then impacted into place.  Once satisfied with the nail position as verified with C arm we then use our triple sleeve trocars and marked on the skin for our blade insertion.  Incision made through the skin on the lateral thigh of about 5 cm in length for this subcu as there is dissected with Bovie electrocautery down to the fascial layer which was split with scalpel.  We then elevated the muscle up off the lateral cortex of the femur with a small elevator and then advanced our triple sleeve trocar up to the lateral cortex.  We then advanced a guidewire for our screw up into the neck and head of the femur and verified our wire position in AP and lateral views.  Once satisfied with this we then measured for our blade length.  Next we then drilled for a blade with a step drill.  I then placed our blade over the guidewire.  Once the blade was advanced into proper position we then advanced the lock for the blade backed off a half step and then compressed the proximal femur.  We then re-locked the screw fully.  We then removed the triple sleeve trocar and wire for the screw.  Trocar for the distal interlock screw was then advanced to the lateral cortex of the femur and  we then drilled and placed a distal interlock screw.  We then removed our insertion handle for the nail took final x-ray images elle irrigate the wound to sound solution fascial layers closed with 0 Vicryl subcutaneous layer with 2-0 Vicryl skin with a 3-0 Monocryl and Dermabond Telfa and Tegaderms were applied.  Patient was awakened from anesthesia and taken from the operating to recover without complication.  I was present for the entirety of this procedure.    Complications:  None; patient tolerated the procedure well.    Disposition: PACU - hemodynamically stable.  Condition: stable         Additional Details: none    Attending Attestation:     Gualberto Aviles  Phone Number: [phone]

## 2024-08-08 NOTE — H&P
History Of Present Illness  Fernando Sweet is a 64 y.o. male presenting status post fall at home.  Patient is deaf without his hearing aids and does not have them with him here.  We communicated through writing.  I spoke with his daughter-in-law Maria De Jesus who states that he fell yesterday at the assisted living he lives at ([facility]).  In the ER he had a CT of the head and lumbar spine that were negative for acute injury.  X-ray of the left hip showed an intertrochanteric fracture.  In the ER he received 10 mg hydralazine and half liter normal saline.  The patient was in a lot of pain and and I ordered him Dilaudid immediately after I saw him.  He denies any other pain except for the hip.  He was hypertensive in the emergency room and was given hydralazine but more than likely his hypertension was due to his severe hip pain.  He denies chest pain, shortness breath, or abdominal pain.  He further denies headache, dizziness, nausea/vomiting.     Past Medical History  Past Medical History:   Diagnosis Date    Body mass index (BMI) 28.0-28.9, adult 01/29/2020    BMI 28.0-28.9,adult    Body mass index (BMI) 29.0-29.9, adult 04/29/2021    Body mass index (BMI) of 29.0 to 29.9 in adult    Chronic kidney disease, stage 3 unspecified (Multi) 07/29/2021    Acute worsening of stage 3 chronic kidney disease    Encounter for general adult medical examination without abnormal findings 06/15/2020    Welcome to Medicare preventive visit    Hypertension     Local infection of the skin and subcutaneous tissue, unspecified 09/26/2018    Skin infection    Major depressive disorder, recurrent, mild ([facility]) 04/29/2021    Mild episode of recurrent major depressive disorder    MDD (major depressive disorder)     Otorrhea, bilateral 04/17/2019    Otorrhea of both ears    Personal history of diseases of the skin and subcutaneous tissue 04/24/2019    History of eczema    Personal history of other diseases of the circulatory system 10/09/2019     History of atrial fibrillation    Personal history of other diseases of the circulatory system     History of coronary artery disease    Personal history of other diseases of the respiratory system 12/26/2018    History of sinusitis    Personal history of other diseases of urinary system 09/15/2020    History of renal insufficiency syndrome    Personal history of other endocrine, nutritional and metabolic disease 07/30/2021    History of hypothyroidism    Unspecified otitis externa, left ear 04/17/2019    Otitis externa, left    Vascular dementia (Multi)        Surgical History  Past Surgical History:   Procedure Laterality Date    CORONARY ARTERY BYPASS GRAFT  08/16/2018    CABG    CT ABDOMEN ANGIOGRAM W AND/OR WO IV CONTRAST  9/26/2019    CT ABDOMEN ANGIOGRAM W AND/OR WO IV CONTRAST 9/26/2019 [facility]    MR HEAD ANGIO WO IV CONTRAST  9/21/2022    MR HEAD ANGIO WO IV CONTRAST 9/21/2022 [facility]    MR NECK ANGIO WO IV CONTRAST  9/21/2022    MR NECK ANGIO WO IV CONTRAST 9/21/2022 [facility]        Social History  He reports that he quit smoking about 7 years ago. His smoking use included cigarettes. He has never used smokeless tobacco. He reports that he does not drink alcohol and does not use drugs.    Family History  No family history on file.     Allergies  Patient has no known allergies.    Review of Systems  All systems reviewed and negative except as noted in HPI  Physical Exam  Constitutional: No acute distress, calm, cooperative  HEENT: PERRL, normocephalic, atraumatic, mucous membranes moist  Cardiovascular: Regular rhythm and rate,   Respiratory: Lungs clear to auscultation,   Gastrointestinal: Bowel sounds positive x 4, soft, nontender  Neurologic: Alert and oriented x 3 equal strength bilaterally  Musculoskeletal: Able to move all extremities left hip pain due to fracture  Skin: Warm, dry and intact  Last Recorded Vitals  Blood pressure (!) 164/100, pulse 82, temperature  "37.1 °C (98.8 °F), temperature source Temporal, resp. rate 11, height 1.753 m (5' 9\"), weight 97 kg (213 lb 13.5 oz), SpO2 96%.    Relevant Results             Assessment/Plan   Principal Problem:    Closed displaced intertrochanteric fracture of left femur (Multi)    Post displaced intertrochanteric fracture left femur  Pain control with IV Dilaudid   mL/h  N.p.o.  Plan is for surgery 430 this afternoon  Orthopedics is following    Hypertension  Patient was hypertensive in the ER and he was given hydralazine although his evaded blood pressure was most likely due to his left hip pain  Hydralazine 10 mg as needed for systolic blood pressure greater than 180  Continue home meds starting tomorrow a.m.    Hyperlipidemia  Continue home atorvastatin    Stage IIIb chronic kidney disease  Creatinine was 1.5 which appears to be baseline    DVT prophylaxis  Sequential compression         I spent 75 minutes in the professional and overall care of this patient.      Jd Parekh, APRN-CNP    "

## 2024-08-08 NOTE — PROGRESS NOTES
Asked to see the patient was wheezing.  Because of concern about subcutaneous emphysema.  On examination, responsive, hard of hearing.  Oxygen saturation was 98% on 4 L/min.  He had fullness/swelling without erythema, warmth or tenderness on both sides of the base of the neck with no crepitus.    Assessment and plan  He has fullness of the lower neck, unclear if this is the regular control of his neck.  Will obtain a stat portable chest x-ray to rule out pneumothorax.  Inhaled bronchodilators as needed  Continuous oxygen saturation monitoring.

## 2024-08-09 ENCOUNTER — APPOINTMENT (OUTPATIENT)
Dept: RADIOLOGY | Facility: HOSPITAL | Age: 64
DRG: 481 | End: 2024-08-09
Payer: MEDICARE

## 2024-08-09 LAB
ANION GAP SERPL CALC-SCNC: 8 MMOL/L
APPEARANCE UR: ABNORMAL
ATRIAL RATE: 65 BPM
BILIRUB UR STRIP.AUTO-MCNC: NEGATIVE MG/DL
BUN SERPL-MCNC: 28 MG/DL (ref 8–25)
CALCIUM SERPL-MCNC: 8.3 MG/DL (ref 8.5–10.4)
CHLORIDE SERPL-SCNC: 109 MMOL/L (ref 97–107)
CO2 SERPL-SCNC: 24 MMOL/L (ref 24–31)
COLOR UR: ABNORMAL
CREAT SERPL-MCNC: 1.6 MG/DL (ref 0.4–1.6)
EGFRCR SERPLBLD CKD-EPI 2021: 47 ML/MIN/1.73M*2
ERYTHROCYTE [DISTWIDTH] IN BLOOD BY AUTOMATED COUNT: 17 % (ref 11.5–14.5)
GLUCOSE SERPL-MCNC: 94 MG/DL (ref 65–99)
GLUCOSE UR STRIP.AUTO-MCNC: NORMAL MG/DL
HCT VFR BLD AUTO: 33 % (ref 41–52)
HGB BLD-MCNC: 10.2 G/DL (ref 13.5–17.5)
KETONES UR STRIP.AUTO-MCNC: ABNORMAL MG/DL
LEUKOCYTE ESTERASE UR QL STRIP.AUTO: ABNORMAL
MCH RBC QN AUTO: 28 PG (ref 26–34)
MCHC RBC AUTO-ENTMCNC: 30.9 G/DL (ref 32–36)
MCV RBC AUTO: 91 FL (ref 80–100)
MUCOUS THREADS #/AREA URNS AUTO: ABNORMAL /LPF
NITRITE UR QL STRIP.AUTO: NEGATIVE
NRBC BLD-RTO: 0 /100 WBCS (ref 0–0)
P AXIS: 52 DEGREES
P OFFSET: 185 MS
P ONSET: 132 MS
PH UR STRIP.AUTO: 6 [PH]
PLATELET # BLD AUTO: 181 X10*3/UL (ref 150–450)
POTASSIUM SERPL-SCNC: 4.2 MMOL/L (ref 3.4–5.1)
PR INTERVAL: 160 MS
PROT UR STRIP.AUTO-MCNC: ABNORMAL MG/DL
Q ONSET: 212 MS
QRS COUNT: 10 BEATS
QRS DURATION: 90 MS
QT INTERVAL: 426 MS
QTC CALCULATION(BAZETT): 443 MS
QTC FREDERICIA: 437 MS
R AXIS: -6 DEGREES
RBC # BLD AUTO: 3.64 X10*6/UL (ref 4.5–5.9)
RBC # UR STRIP.AUTO: ABNORMAL /UL
RBC #/AREA URNS AUTO: >20 /HPF
SODIUM SERPL-SCNC: 141 MMOL/L (ref 133–145)
SP GR UR STRIP.AUTO: 1.02
T AXIS: 60 DEGREES
T OFFSET: 425 MS
UROBILINOGEN UR STRIP.AUTO-MCNC: NORMAL MG/DL
VENTRICULAR RATE: 65 BPM
WBC # BLD AUTO: 12.2 X10*3/UL (ref 4.4–11.3)
WBC #/AREA URNS AUTO: >50 /HPF

## 2024-08-09 PROCEDURE — 97530 THERAPEUTIC ACTIVITIES: CPT | Mod: GP

## 2024-08-09 PROCEDURE — 2500000001 HC RX 250 WO HCPCS SELF ADMINISTERED DRUGS (ALT 637 FOR MEDICARE OP): Performed by: ORTHOPAEDIC SURGERY

## 2024-08-09 PROCEDURE — 2500000001 HC RX 250 WO HCPCS SELF ADMINISTERED DRUGS (ALT 637 FOR MEDICARE OP): Performed by: REGISTERED NURSE

## 2024-08-09 PROCEDURE — 2500000005 HC RX 250 GENERAL PHARMACY W/O HCPCS: Performed by: STUDENT IN AN ORGANIZED HEALTH CARE EDUCATION/TRAINING PROGRAM

## 2024-08-09 PROCEDURE — 73564 X-RAY EXAM KNEE 4 OR MORE: CPT | Mod: LEFT SIDE | Performed by: RADIOLOGY

## 2024-08-09 PROCEDURE — 2500000002 HC RX 250 W HCPCS SELF ADMINISTERED DRUGS (ALT 637 FOR MEDICARE OP, ALT 636 FOR OP/ED): Performed by: ORTHOPAEDIC SURGERY

## 2024-08-09 PROCEDURE — 85027 COMPLETE CBC AUTOMATED: CPT | Performed by: ORTHOPAEDIC SURGERY

## 2024-08-09 PROCEDURE — 94762 N-INVAS EAR/PLS OXIMTRY CONT: CPT

## 2024-08-09 PROCEDURE — 1100000001 HC PRIVATE ROOM DAILY

## 2024-08-09 PROCEDURE — 2500000001 HC RX 250 WO HCPCS SELF ADMINISTERED DRUGS (ALT 637 FOR MEDICARE OP): Performed by: NURSE PRACTITIONER

## 2024-08-09 PROCEDURE — 2500000004 HC RX 250 GENERAL PHARMACY W/ HCPCS (ALT 636 FOR OP/ED): Mod: JZ | Performed by: ORTHOPAEDIC SURGERY

## 2024-08-09 PROCEDURE — 99232 SBSQ HOSP IP/OBS MODERATE 35: CPT | Performed by: NURSE PRACTITIONER

## 2024-08-09 PROCEDURE — 36415 COLL VENOUS BLD VENIPUNCTURE: CPT | Performed by: ORTHOPAEDIC SURGERY

## 2024-08-09 PROCEDURE — 73564 X-RAY EXAM KNEE 4 OR MORE: CPT | Mod: LT

## 2024-08-09 PROCEDURE — 97162 PT EVAL MOD COMPLEX 30 MIN: CPT | Mod: GP

## 2024-08-09 PROCEDURE — 80051 ELECTROLYTE PANEL: CPT | Performed by: ORTHOPAEDIC SURGERY

## 2024-08-09 RX ORDER — OXYCODONE AND ACETAMINOPHEN 5; 325 MG/1; MG/1
1 TABLET ORAL EVERY 4 HOURS PRN
Status: DISCONTINUED | OUTPATIENT
Start: 2024-08-09 | End: 2024-08-11 | Stop reason: HOSPADM

## 2024-08-09 RX ORDER — GRANULES FOR ORAL 3 G/1
3 POWDER ORAL ONCE
Status: COMPLETED | OUTPATIENT
Start: 2024-08-09 | End: 2024-08-09

## 2024-08-09 RX ORDER — HYDROMORPHONE HYDROCHLORIDE 1 MG/ML
0.6 INJECTION, SOLUTION INTRAMUSCULAR; INTRAVENOUS; SUBCUTANEOUS EVERY 4 HOURS PRN
Status: DISCONTINUED | OUTPATIENT
Start: 2024-08-09 | End: 2024-08-10

## 2024-08-09 RX ADMIN — HYDROMORPHONE HYDROCHLORIDE 1 MG: 1 INJECTION, SOLUTION INTRAMUSCULAR; INTRAVENOUS; SUBCUTANEOUS at 08:45

## 2024-08-09 RX ADMIN — CEFAZOLIN SODIUM 1 G: 1 INJECTION, SOLUTION INTRAVENOUS at 10:41

## 2024-08-09 RX ADMIN — Medication 5 MG: at 20:58

## 2024-08-09 RX ADMIN — CLOPIDOGREL BISULFATE 75 MG: 75 TABLET ORAL at 08:44

## 2024-08-09 RX ADMIN — HYDROCODONE BITARTRATE AND ACETAMINOPHEN 1 TABLET: 5; 325 TABLET ORAL at 04:45

## 2024-08-09 RX ADMIN — CEFAZOLIN SODIUM 1 G: 1 INJECTION, SOLUTION INTRAVENOUS at 02:08

## 2024-08-09 RX ADMIN — AMLODIPINE BESYLATE 5 MG: 5 TABLET ORAL at 08:45

## 2024-08-09 RX ADMIN — GRANULES FOR ORAL SOLUTION 3 G: 3 POWDER ORAL at 04:43

## 2024-08-09 RX ADMIN — CARVEDILOL 3.12 MG: 3.12 TABLET, FILM COATED ORAL at 08:44

## 2024-08-09 RX ADMIN — Medication 2 L/MIN: at 23:00

## 2024-08-09 RX ADMIN — ISOSORBIDE MONONITRATE 30 MG: 30 TABLET, EXTENDED RELEASE ORAL at 08:44

## 2024-08-09 RX ADMIN — SODIUM CHLORIDE, SODIUM LACTATE, POTASSIUM CHLORIDE, AND CALCIUM CHLORIDE 100 ML/HR: 600; 310; 30; 20 INJECTION, SOLUTION INTRAVENOUS at 06:36

## 2024-08-09 RX ADMIN — ATORVASTATIN CALCIUM 40 MG: 40 TABLET, FILM COATED ORAL at 20:58

## 2024-08-09 RX ADMIN — ASPIRIN 81 MG CHEWABLE TABLET 81 MG: 81 TABLET CHEWABLE at 08:44

## 2024-08-09 RX ADMIN — LISINOPRIL 20 MG: 20 TABLET ORAL at 08:45

## 2024-08-09 RX ADMIN — CARVEDILOL 3.12 MG: 3.12 TABLET, FILM COATED ORAL at 17:43

## 2024-08-09 RX ADMIN — Medication 2 L/MIN: at 15:00

## 2024-08-09 RX ADMIN — PANTOPRAZOLE SODIUM 20 MG: 20 TABLET, DELAYED RELEASE ORAL at 08:44

## 2024-08-09 RX ADMIN — OXYCODONE HYDROCHLORIDE AND ACETAMINOPHEN 1 TABLET: 5; 325 TABLET ORAL at 20:57

## 2024-08-09 RX ADMIN — OXYCODONE HYDROCHLORIDE AND ACETAMINOPHEN 1 TABLET: 5; 325 TABLET ORAL at 15:25

## 2024-08-09 ASSESSMENT — COGNITIVE AND FUNCTIONAL STATUS - GENERAL
MOVING TO AND FROM BED TO CHAIR: A LOT
TURNING FROM BACK TO SIDE WHILE IN FLAT BAD: A LOT
TOILETING: TOTAL
CLIMB 3 TO 5 STEPS WITH RAILING: TOTAL
HELP NEEDED FOR BATHING: A LOT
CLIMB 3 TO 5 STEPS WITH RAILING: TOTAL
MOBILITY SCORE: 9
DRESSING REGULAR UPPER BODY CLOTHING: TOTAL
STANDING UP FROM CHAIR USING ARMS: A LOT
TURNING FROM BACK TO SIDE WHILE IN FLAT BAD: A LOT
MOBILITY SCORE: 11
DRESSING REGULAR UPPER BODY CLOTHING: A LOT
MOVING TO AND FROM BED TO CHAIR: TOTAL
MOVING TO AND FROM BED TO CHAIR: A LOT
WALKING IN HOSPITAL ROOM: A LOT
MOVING FROM LYING ON BACK TO SITTING ON SIDE OF FLAT BED WITH BEDRAILS: A LOT
HELP NEEDED FOR BATHING: TOTAL
DAILY ACTIVITIY SCORE: 16
PERSONAL GROOMING: A LITTLE
DAILY ACTIVITIY SCORE: 8
PERSONAL GROOMING: A LITTLE
STANDING UP FROM CHAIR USING ARMS: A LOT
MOVING FROM LYING ON BACK TO SITTING ON SIDE OF FLAT BED WITH BEDRAILS: A LOT
PERSONAL GROOMING: A LOT
DRESSING REGULAR LOWER BODY CLOTHING: A LOT
DAILY ACTIVITIY SCORE: 16
EATING MEALS: A LOT
TOILETING: A LITTLE
STANDING UP FROM CHAIR USING ARMS: A LOT
WALKING IN HOSPITAL ROOM: TOTAL
CLIMB 3 TO 5 STEPS WITH RAILING: TOTAL
TURNING FROM BACK TO SIDE WHILE IN FLAT BAD: A LOT
DRESSING REGULAR UPPER BODY CLOTHING: A LOT
MOBILITY SCORE: 11
HELP NEEDED FOR BATHING: A LOT
DRESSING REGULAR LOWER BODY CLOTHING: A LOT
DRESSING REGULAR LOWER BODY CLOTHING: TOTAL
TOILETING: A LITTLE
WALKING IN HOSPITAL ROOM: A LOT
MOVING FROM LYING ON BACK TO SITTING ON SIDE OF FLAT BED WITH BEDRAILS: A LOT

## 2024-08-09 ASSESSMENT — ACTIVITIES OF DAILY LIVING (ADL)
ADL_ASSISTANCE: INDEPENDENT
ADL_ASSISTANCE: INDEPENDENT
BATHING_ASSISTANCE: TOTAL

## 2024-08-09 ASSESSMENT — PAIN DESCRIPTION - LOCATION
LOCATION: HIP

## 2024-08-09 ASSESSMENT — PAIN SCALES - GENERAL
PAINLEVEL_OUTOF10: 3
PAINLEVEL_OUTOF10: 7
PAINLEVEL_OUTOF10: 9
PAINLEVEL_OUTOF10: 9
PAINLEVEL_OUTOF10: 5 - MODERATE PAIN
PAINLEVEL_OUTOF10: 2

## 2024-08-09 ASSESSMENT — PAIN - FUNCTIONAL ASSESSMENT
PAIN_FUNCTIONAL_ASSESSMENT: 0-10
PAIN_FUNCTIONAL_ASSESSMENT: WONG-BAKER FACES

## 2024-08-09 ASSESSMENT — PAIN SCALES - WONG BAKER
WONGBAKER_NUMERICALRESPONSE: NO HURT
WONGBAKER_NUMERICALRESPONSE: HURTS WHOLE LOT

## 2024-08-09 ASSESSMENT — PAIN DESCRIPTION - ORIENTATION
ORIENTATION: LEFT

## 2024-08-09 ASSESSMENT — PAIN DESCRIPTION - DESCRIPTORS
DESCRIPTORS: THROBBING
DESCRIPTORS: THROBBING

## 2024-08-09 NOTE — PROGRESS NOTES
Evaluation    Patient Name: Fernando Sweet  MRN: [id number]  Today's Date: 8/9/2024  Time Calculation  Start Time: 0937  Stop Time: 1000  Time Calculation (min): 23 min    Assessment  IP OT Assessment  OT Assessment: 63 yo male who sustained a left intertrochanteric fracture from a fall and underwent IM nailing on 8/8 presents significantly below baseline functional status d/t pain restricting mobility as well as impaired activity tolerance and generalized weakness posotp. Pt requires OT services to provide ADL retraining utilizing compensatory techniques and progressive strengtheninig in order to maximize functional capacity.  Prognosis: Good  Barriers to Discharge: Other (Comment) (2-3 person assist with transfers, significant ADL deficits, impaired cognition (baseline), high falls risk)  Evaluation/Treatment Tolerance: Patient limited by pain, Patient limited by fatigue  Medical Staff Made Aware: Yes  End of Session Communication: Bedside nurse  End of Session Patient Position: Up in chair, Alarm on    Plan:  Treatment Interventions: ADL retraining, Functional transfer training, UE strengthening/ROM, Endurance training, Patient/family training, Equipment evaluation/education, Compensatory technique education  OT Frequency: 4 times per week  OT Discharge Recommendations: Moderate intensity level of continued care  Equipment Recommended upon Discharge: Lift  OT Recommended Transfer Status: Assist of 2  OT - OK to Discharge: Yes        Subjective   Current Problem:  1. Closed comminuted intertrochanteric fracture of left femur, initial encounter (Multi)        2. Closed displaced intertrochanteric fracture of left femur, initial encounter (Multi)  Case Request Operating Room: Hip Fracture ORIF w/ Nail Trochanteric    Case Request Operating Room: Hip Fracture ORIF w/ Nail Trochanteric      3. Closed displaced intertrochanteric fracture of left femur with nonunion, subsequent encounter  aspirin 81 mg EC tablet     HYDROcodone-acetaminophen (Norco) 5-325 mg tablet        General:  General  Reason for Referral: Impaired ADLs s/p hip fracture repair  Referred By: Dr Aviles  Past Medical History Relevant to Rehab: deaf bilat ears, bilat cataracts, CKD 3, HTN, recurrent major depressive d/o, a-fib, CAD, CABG, vascular dementia  Family/Caregiver Present: No  Co-Treatment: PT  Co-Treatment Reason: pt safety and tolerance POD1  Prior to Session Communication: Bedside nurse  Patient Position Received: Bed, 3 rail up, Alarm on  Preferred Learning Style: verbal, visual  General Comment: 63 yo male s/p left hip fracture repair was cleared by nursing for therapy and agreeable to same.    Precautions:  Hearing/Visual Limitations: deaf bilateral ears  LE Weight Bearing Status: Weight Bearing as Tolerated  Medical Precautions: Fall precautions, Oxygen therapy device and L/min    Pain:  Pain Assessment  Pain Assessment: 0-10  0-10 (Numeric) Pain Score: 5 - Moderate pain  Pain Type: Surgical pain, Acute pain  Pain Location: Hip  Pain Orientation: Left  Pain Descriptors: Throbbing  Pain Interventions: Other (Comment) (Pt medicated for pain prior to session per nursing)      Objective   Cognition:  Overall Cognitive Status: Impaired at baseline  Orientation Level: Disoriented to place, Disoriented to time, Disoriented to situation  Following Commands:  (Follows one step commands inconsistently with increased time and written instructions d/t hearing deficit.)  Insight: Moderate  Impulsive: Moderately  Processing Speed: Delayed     Home Living:  Type of Home: Assisted living ([facility])  Lives With: Other (Comment) (Care Staff)  Home Adaptive Equipment: Cane  Bathroom Shower/Tub:  (assume stall with grab bars and a seat)  Home Living Comments: Pt is an unreliable historian.  Information taken primarily from EMR.     Prior Function:  Level of [place]: Independent with ADLs and functional transfers  Receives Help From: Other (Comment)  (Care Staff completes IADLs)  ADL Assistance: Independent  Ambulatory Assistance: Independent (without an A.D.)  Prior Function Comments: Pt reports IND mobility w/o AD PTA, denied any other falls.    ADL:  Eating Assistance: Moderate  Eating Deficit: Increased time to complete, Bringing food to mouth assist, Scoop assist  Grooming Assistance: Maximal  Grooming Deficit: Shaving, Teeth care, Brushing hair  Bathing Assistance: Total  UE Dressing Assistance: Total  LE Dressing Assistance: Total  Toileting Assistance with Device: Total  ADL Comments: all per clinical judgement    Activity Tolerance:  Endurance: Decreased tolerance for upright activites    Bed Mobility/Transfers: Bed Mobility  Bed Mobility: Yes  Bed Mobility 1  Bed Mobility 1: Supine to sitting  Level of Assistance 1: Maximum verbal cues, Maximum assistance, +2  Bed Mobility Comments 1: toward the right side of bed with head elevated ~50 degrees using drawsheet to assist with trunk and scoot toward edge of bed  Bed Mobility 2  Bed Mobility  2: Sitting to supine  Level of Assistance 2: Dependent, +2    Transfer 1  Transfer From 1: Bed to  Transfer to 1: Stand  Technique 1: Sit to stand, Stand to sit  Transfer Device 1: Walker, Gait belt  Transfer Level of Assistance 1: Maximum assistance, +2, Moderate verbal cues  Trials/Comments 1: from elevated bed height with hands on stabilized walker after first failed attempt from a lower surface and with one hand on the walker  Transfers 2  Transfer From 2: Bed to  Transfer to 2: Chair with arms  Technique 2:  (static standing)  Transfer Device 2: Walker, Gait belt  Transfer Level of Assistance 2: Moderate assistance, +2  Trials/Comments 2: bed swapped with chair by third person as patient was unable to take steps this date    Sensation:  Light Touch: No apparent deficits    Strength:  Strength Comments: BUEs=~3+/5 grossly    Hand Function:  Hand Function  Gross Grasp: Functional  Coordination:  Functional    Extremities: RUE   RUE : Within Functional Limits and LUE   LUE: Within Functional Limits    Outcome Measures: [facility] Daily Activity  Putting on and taking off regular lower body clothing: Total  Bathing (including washing, rinsing, drying): Total  Putting on and taking off regular upper body clothing: Total  Toileting, which includes using toilet, bedpan or urinal: Total  Taking care of personal grooming such as brushing teeth: A lot  Eating Meals: A lot  Daily Activity - Total Score: 8    Education Documentation  ADL Training, taught by Ruby Hayden OT at 8/9/2024 12:26 PM.  Learner: Patient  Readiness: Acceptance  Method: Explanation  Response: Needs Reinforcement    Goals:   Encounter Problems       Encounter Problems (Active)       OT Goals       ADLs (Progressing)       Start:  08/09/24       Pt will complete bathing, dressing, and toileting tasks with min assist using adaptive aides and with increased time.         Functional transfers (Progressing)       Start:  08/09/24       Pt will complete toilet, bed, and chair transfers with min assist using a gait belt, elevated seat heights, and bilateral arm supports.         Activity tolerance (Progressing)       Start:  08/09/24       Pt will tolerate 25 minutes of therapeutic activity in order to increase functional endurance needed for ADLs.

## 2024-08-09 NOTE — PROGRESS NOTES
"   24 [id number]   Discharge Planning   Living Arrangements   (Care staff - [facility])   Support Systems Family members;Other (Comment)  ([facility] care staff)   Assistance Needed yes - ADL's   Type of Residence Assisted living  ([facility])   Do you have animals or pets at home? No   Care Facility Name [facility]   Who is requesting discharge planning? Provider   Home or Post Acute Services Post acute facilities (Rehab/SNF/etc)   Expected Discharge Disposition SNF  (Pending acceptance [facility])   Does the patient need discharge transport arranged? Yes   RoundTrip coordination needed? Yes   Has discharge transport been arranged? No   What day is the transport expected? 24   Patient Choice   Provider Choice list and CMS website (https://medicare.gov/care-compare#search) for post-acute Quality and Resource Measure Data were provided and reviewed with: Patient   Patient / Family choosing to utilize agency / facility established prior to hospitalization No     Attempted to speak/ write / communicate with patient.  He wasn't able to answer my questions well.    Call to Maria De Jesus Carpenter [phone],   ANT to patient.  She and  Jude \"Isaak\" Jayden are guardians for patient through the court.     Patient was mad at son and DIL that guardianship was obtained and he wont speak with them.  [facility] is aware.  Spoke with [facility] nurse regarding baseline for patient which is cane JOSEN.  [facility], but,  patient would need to go to SNF first before returning home to [place].   Maria De Jesus wanted somewhere close to hospital and chose [facility].   Sent to [facility] to process referral.   Patient would NOT need a precert.  Medicare requirements would be met on .  The other contacts listed #2 Fernando Maciel is the patient's father who is  and #4 Lisbet which should be Romelia is a stepdaughter who doesn't associate with patient anymore and lives out of state.   I " sent email to registration to fix all of this.    [id number] -obtained guardianship papers faxed to registration, copy to chart.

## 2024-08-09 NOTE — PROGRESS NOTES
Physical Therapy    Physical Therapy Evaluation & Treatment    Patient Name: Fernando Sweet  MRN: [id number]  Today's Date: 8/9/2024   Time Calculation  Start Time: 0925  Stop Time: 1003  Time Calculation (min): 38 min    Assessment/Plan   PT Assessment  PT Assessment Results: Decreased strength, Decreased range of motion, Decreased endurance, Impaired balance, Decreased mobility, Decreased coordination, Decreased cognition, Impaired judgement, Decreased safety awareness, Impaired vision, Impaired hearing, Decreased skin integrity, Orthopedic restrictions, Pain  Rehab Prognosis: Good  Barriers to Discharge: level of assist required, limited tolerance to activity, limited mobility progression  Evaluation/Treatment Tolerance: Patient limited by pain, Patient limited by fatigue  Medical Staff Made Aware: Yes  Strengths: Premorbid level of function  Barriers to Participation: Comorbidities  End of Session Communication: Bedside nurse  Assessment Comment: Pt gives effort, mobility slow and painful, limited tolerance and progress on eval. Pt would benefit from continued PT services to optimize post op outcome.  End of Session Patient Position: Up in chair, Alarm on   IP OR SWING BED PT PLAN  Inpatient or Swing Bed: Inpatient  PT Plan  Treatment/Interventions: Bed mobility, Transfer training, Gait training, Balance training, Strengthening, Endurance training, Range of motion, Therapeutic exercise, Therapeutic activity  PT Plan: Ongoing PT  PT Frequency: Daily  PT Discharge Recommendations: Moderate intensity level of continued care  Equipment Recommended upon Discharge: Wheeled walker  PT Recommended Transfer Status: Assist x2, Assistive device  PT - OK to Discharge: Yes      Subjective     General Visit Information:  General  Reason for Referral: recent surgery; s/p hip fx ORIF w/ trochanteric nail on 8/8/24 w/ Dr Aviles.  Referred By: Dr. Stefan Vaca  Past Medical History Relevant to Rehab: deaf bilat ears, bilat  cataracts, CKD 3, HTN, recurrent major depressive d/o, a-fib, CAD, CABG, vascular dementia  Family/Caregiver Present: No  Co-Treatment: OT  Co-Treatment Reason: pt safety and tolerance POD1  Prior to Session Communication: Bedside nurse  Patient Position Received: Bed, 3 rail up, Alarm on  Preferred Learning Style: visual, written (pt deaf, can read lips some, used written word mostly, some gestures)  General Comment: Pt is a 64 y.o. male adm from [facility] following a fall during which he suffered a Lt hip fx. Pt is s/p ORIF w/ trochanteric nail on 8/8/242. Pt reports having much pain, agreeable to PT eval w/ encouragement.  Home Living:  Home Living  Type of Home: Assisted living ([facility], pt reports x 7 months, has difficulty w/ accurately naming facility)  Lives With:  (care staff)  Home Adaptive Equipment: Cane  Prior Level of Function:  Prior Function Per Pt/Caregiver Report  Level of [place]: Independent with ADLs and functional transfers, Other (Comment) (IND w/ amb w/o AD; IADLs, per facility)  Receives Help From: Other (Comment) (care staff)  ADL Assistance: Independent  Homemaking Assistance:  (per facility)  Ambulatory Assistance: Independent (no AD prior to fall)  Prior Function Comments: Pt reports IND mobility w/o AD PTA, denied any other falls.  Precautions:  Precautions  Hearing/Visual Limitations: deaf bilat ears, denies glasses, reports having cataracts but can see orientation board, TV, clock.  LE Weight Bearing Status: Weight Bearing as Tolerated  Medical Precautions: Fall precautions, Oxygen therapy device and L/min (3L O2)  Post-Surgical Precautions:  (Lt hip ORIF)  Vital Signs:  Vital Signs  Heart Rate: 74  Heart Rate Source: Monitor  SpO2: 94 %  Patient Position: Lying    Objective   Pain:  Pain Assessment  Pain Assessment: 0-10  0-10 (Numeric) Pain Score: 5 - Moderate pain  Pain Type: Surgical pain, Acute pain  Pain Location: Hip  Pain Orientation: Left  Pain Descriptors: Throbbing  Pain  "Interventions:  (per nurse, pt had IV pain med just prior to session)  Cognition:  Cognition  Overall Cognitive Status: Impaired at baseline  Orientation Level: Disoriented to place  Cognition Comments: decreased processsing, cooperative  Safety/Judgement:  (decreased)  Insight: Moderate  Processing Speed: Delayed    General Assessments:  Activity Tolerance  Endurance: Decreased tolerance for upright activites  Activity Tolerance Comments: Poor+ due to pain    Sensation  Sensation Comment: pt reports \"throbbing\"     Coordination  Coordination Comment: decreased speed and accuracy    Postural Control  Posture Comment: forward rounded shoulders    Static Standing Balance  Static Standing-Balance Support: Bilateral upper extremity supported  Static Standing-Level of Assistance: Moderate assistance, Minimum assistance (Fairly stable once in standing, attempted to move feet w/ slight unweighting but unable to take any effective steps)  Static Standing-Comment/Number of Minutes: tolerated ~ 3 minutes,  Functional Assessments:  Bed Mobility  Bed Mobility: Yes  Bed Mobility 1  Bed Mobility 1: Supine to sitting  Level of Assistance 1: Maximum verbal cues, Maximum assistance, +2  Bed Mobility Comments 1: to Rt EOB, cues to direct activity, assist for BLEs over EOB and trunk up w/ heavy posterior lean, use of draw sheet to get hips to EOB. Once stable, pt able to attempt very small scoots closer to EOB.    Transfers  Transfer: Yes  Transfer 1  Transfer From 1: Bed to  Transfer to 1: Stand  Technique 1: Sit to stand  Transfer Device 1: Walker, Gait belt  Transfer Level of Assistance 1: Maximum assistance, +2, Moderate verbal cues  Trials/Comments 1: Required a few attempts and bed height elevated to achieve full stand, pt holding onto stabilized RW, transfers very slowly w/ c/o increased pain.  Transfers 2  Transfer From 2: Stand to  Transfer to 2: Chair with arms  Technique 2: Stand to sit  Transfer Device 2: Walker, Gait " belt  Transfer Level of Assistance 2: Maximum assistance, +2, Maximum tactile cues, Maximum verbal cues  Trials/Comments 2: Pt required assist and cues to lower into sitting in chair.    Ambulation/Gait Training  Ambulation/Gait Training Performed:  (pt able to unweight feet briefly but unable to take any effective steps. Pt able to maintain standing while bed moved and chair placed behind pt.)  Extremity/Trunk Assessments:  RLE   RLE : Within Functional Limits (grossly, though reluctant to move due to overall high pain level)  LLE   LLE : Exceptions to WFL (ankle functional, however, unable to move hip or knee well w/o assist, limited range due to pain. Grossly 3-/5 or > to maintain standing)  Treatments:  Transfers  Transfer: Yes  Transfer 1  Transfer From 1: Bed to  Transfer to 1: Stand  Technique 1: Sit to stand  Transfer Device 1: Walker, Gait belt  Transfer Level of Assistance 1: Maximum assistance, +2, Moderate verbal cues  Trials/Comments 1: Required a few attempts and bed height elevated to achieve full stand, pt holding onto stabilized RW, transfers very slowly w/ c/o increased pain.  Transfers 2  Transfer From 2: Stand to  Transfer to 2: Chair with arms  Technique 2: Stand to sit  Transfer Device 2: Walker, Gait belt  Transfer Level of Assistance 2: Maximum assistance, +2, Maximum tactile cues, Maximum verbal cues  Trials/Comments 2: Pt required assist and cues to lower into sitting in chair.  Outcome Measures:  [facility] Basic Mobility  Turning from your back to your side while in a flat bed without using bedrails: A lot  Moving from lying on your back to sitting on the side of a flat bed without using bedrails: A lot  Moving to and from bed to chair (including a wheelchair): Total  Standing up from a chair using your arms (e.g. wheelchair or bedside chair): A lot  To walk in hospital room: Total  Climbing 3-5 steps with railing: Total  Basic Mobility - Total Score: 9    Encounter Problems       Encounter  Problems (Active)       Balance       LTG - Patient will maintain balance to allow for safe mobility (Progressing)       Start:  08/09/24    Expected End:  08/16/24               Mobility       STG - Patient will ambulate 20' w/ RW and min assist (Progressing)       Start:  08/09/24    Expected End:  08/16/24            Pt will tolerate BLE exercises to promote functional strength, balance and endurance (Progressing)       Start:  08/09/24    Expected End:  08/16/24               PT Transfers       STG - Patient to transfer to and from sit to supine w/ min assist (Progressing)       Start:  08/09/24    Expected End:  08/16/24            STG - Patient will transfer sit to and from stand w/ min assist (Progressing)       Start:  08/09/24    Expected End:  08/16/24                   Education Documentation  Precautions, taught by Angeles Seth, PT at 8/9/2024 10:54 AM.  Learner: Patient  Readiness: Acceptance  Method: Explanation, Demonstration, Handout  Response: Needs Reinforcement  Comment: safety and technique    Mobility Training, taught by Angeles Seth, PT at 8/9/2024 10:54 AM.  Learner: Patient  Readiness: Acceptance  Method: Explanation, Demonstration, Handout  Response: Needs Reinforcement  Comment: safety and technique    Education Comments  No comments found.

## 2024-08-09 NOTE — NURSING NOTE
Assumed care of patient from previous shift RN. Pt resting in bed, denies any needs. Call bell within reach.      0855: Patient eating breakfast. C/o 9/10 left hip pain, medicated per MAR. Spo2 97% on 4L NC. Decreased to 3L NC at this time, will reassess. No needs voiced, call bell within reach and bed alarm on.    1155: Pt decreased from 3L NC to 2L NC.

## 2024-08-09 NOTE — PROGRESS NOTES
Fernando Sweet is a 64 y.o. male on day 1 of admission presenting with Closed displaced intertrochanteric fracture of left femur (Multi).      Subjective   Patient seen and examined. Sitting up in the chair. Complains of significant pain, actually tells me his L knee is really painful, he thinks he hit it when he fell. Denies SOB, he is on 3L NC. Afebrile.        Objective     Last Recorded Vitals  /77 (BP Location: Right arm, Patient Position: Lying)   Pulse 68   Temp 37.2 °C (99 °F) (Temporal)   Resp 17   Wt 97 kg (213 lb 13.5 oz)   SpO2 98%   Intake/Output last 3 Shifts:    Intake/Output Summary (Last 24 hours) at 8/9/2024 1014  Last data filed at 8/9/2024 1005  Gross per 24 hour   Intake 2694.99 ml   Output 495 ml   Net 2199.99 ml       Admission Weight  Weight: 97 kg (213 lb 13.5 oz) (08/08/24 1012)    Daily Weight  08/08/24 : 97 kg (213 lb 13.5 oz)    Image Results  XR chest 1 view  Narrative: Interpreted By:  Jeffrey Conway,   STUDY:  XR CHEST 1 VIEW;  8/8/2024 7:21 pm      INDICATION:  Signs/Symptoms:subcutaneous emphysema.      COMPARISON:  Chest x-ray from 10/25/2023. CT scan chest from 03/28/2022.      ACCESSION NUMBER(S):  [id number]      ORDERING CLINICIAN:  SAMRIA OCONNOR      TECHNIQUE:  Single AP portable view of the chest was obtained.      FINDINGS:  MEDIASTINUM/ LUNGS/ CADY:  Previous CABG. Sternotomy wires are intact. Cardiomegaly. No blunting  of costophrenic sulci. No gross masslike opacity in either lung  worrisome for tumor or pneumonia. Mild stable eventration of the  right diaphragm. Stable surgical clips at the base of the neck on the  right. . No abnormal opacity in either lung worrisome for tumor or  pneumonia. No pneumothorax.  No tracheal deviation.  No abnormal hilar fullness or gross mass on either side.      BONES:  No lytic or blastic destructive bone lesion.  Previous distal  cervical spine anterior plate and screw fusion.  There is  mild-to-moderate disc space  narrowing and endplate osteophytosis  throughout the thoracic spine.      UPPER ABDOMEN:  Grossly intact.      Impression: Multiple stable findings as described.  No significant or acute  interval change.      MACRO:  None      Signed by: Jeffrey Conway 8/8/2024 8:56 PM  Dictation workstation:   [id number]  FL fluoro images no charge  These images are not reportable by radiology and will not be interpreted   by  Radiologists.  ECG 12 lead  Normal sinus rhythm  Cannot rule out Anterior infarct (cited on or before 25-OCT-2023)  Abnormal ECG  When compared with ECG of 25-OCT-2023 13:28,  QRS axis Shifted right  Questionable change in initial forces of Anteroseptal leads  Nonspecific T wave abnormality, worse in Inferior leads  T wave inversion no longer evident in Anterior leads  XR hip left with pelvis when performed 2 or 3 views  Narrative: Interpreted By:  Harry Booker,   STUDY:  XR HIP LEFT WITH PELVIS WHEN PERFORMED 2 OR 3 VIEWS;  8/8/2024 11:22  am      INDICATION:  Signs/Symptoms:pain after fall.      COMPARISON:  None.      ACCESSION NUMBER(S):  [id number]      ORDERING CLINICIAN:  TITUS SIMON      FINDINGS:  There is a comminuted nondisplaced intertrochanteric fracture of the  left femur. Mild degenerative changes are present.      Impression: Nondisplaced comminuted intertrochanteric fracture of the left femur.      MACRO:  None      Signed by: Harry Booker 8/8/2024 11:27 AM  Dictation workstation:   [id number]  XR lumbar spine 2-3 views  Narrative: Interpreted By:  Harry Booker,   STUDY:  XR LUMBAR SPINE 2-3 VIEWS;  8/8/2024 11:22 am      INDICATION:  Signs/Symptoms:pain after fall.      COMPARISON:  None.      ACCESSION NUMBER(S):  [id number]      ORDERING CLINICIAN:  TITUS SIMON      FINDINGS:  No acute fracture or dislocation of the lumbar spine. Moderate to  severe multilevel degenerative disc height loss with endplate  osteophyte formation. 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      Atherosclerotic calcifications are present.      Impression: No definite acute lumbar spine fracture. Moderate to severe  degenerative changes.      MACRO:  None      Signed by: Harry Booker 8/8/2024 11:26 AM  Dictation workstation:   [id number]  CT head wo IV contrast  Narrative: Interpreted By:  Harry Booker,   STUDY:  CT HEAD WO IV CONTRAST  8/8/2024 11:07 am      INDICATION:  Signs/Symptoms:fall, unknown if hit head      COMPARISON:  10/25/2023      ACCESSION NUMBER(S):  [id number]      ORDERING CLINICIAN:  TITUS SIMON      TECHNIQUE:  Contiguous axial CT images of the brain were obtained without IV  contrast.      FINDINGS:  The ventricles, cisterns and sulci are prominent, consistent with  moderate diffuse volume loss. Areas of white matter low attenuation  are nonspecific but likely related to chronic microvascular disease.  Unchanged left MCA distribution encephalomalacia. Interval  development of focal right parietal periventricular encephalomalacia.  There is intracranial atherosclerosis.      Gray-white differentiation is preserved.  No acute intracranial hemorrhage or mass effect.  No midline shift. Patent basal cisterns.  No extraaxial fluid collections.      The calvaria is intact.  The visualized paranasal sinuses and mastoid air cells are clear.      Impression: No acute intracranial pathology.      Unchanged left MCA distribution encephalomalacia.  Interval development of right parietal encephalomalacia which is  otherwise not acute.      Signed by: Harry Booker 8/8/2024 11:13 AM  Dictation workstation:   [id number]      Physical Exam    General: Alert and oriented x3, very [place].    Cardiac: Regular rate and rhythm, S1/S2 , no murmur.   Pulmonary: Clear/Diminished on 3L NC.   Abdomen: Soft, round, nontender. BS +x4.   Extremities: Trace edema LLE.   Skin: Dressing in place to the L hip without drainage.     Relevant  Results    Scheduled medications  amLODIPine, 5 mg, oral, Daily  aspirin, 81 mg, oral, Daily  atorvastatin, 40 mg, oral, Nightly  carvedilol, 3.125 mg, oral, BID  ceFAZolin, 1 g, intravenous, q8h  clopidogrel, 75 mg, oral, Daily  isosorbide mononitrate ER, 30 mg, oral, Daily  lisinopril, 20 mg, oral, Daily  pantoprazole, 20 mg, oral, Daily      Continuous medications  lactated Ringer's, 100 mL/hr, Last Rate: 100 mL/hr (08/09/24 1005)      PRN medications  PRN medications: acetaminophen **OR** acetaminophen **OR** acetaminophen, bisacodyl, hydrALAZINE, HYDROcodone-acetaminophen, HYDROmorphone, ipratropium-albuteroL, melatonin, nitroglycerin, ondansetron ODT **OR** ondansetron     Results for orders placed or performed during the hospital encounter of 08/08/24 (from the past 24 hour(s))   CBC and Auto Differential   Result Value Ref Range    WBC 13.9 (H) 4.4 - 11.3 x10*3/uL    nRBC 0.0 0.0 - 0.0 /100 WBCs    RBC 4.21 (L) 4.50 - 5.90 x10*6/uL    Hemoglobin 11.8 (L) 13.5 - 17.5 g/dL    Hematocrit 36.8 (L) 41.0 - 52.0 %    MCV 87 80 - 100 fL    MCH 28.0 26.0 - 34.0 pg    MCHC 32.1 32.0 - 36.0 g/dL    RDW 16.5 (H) 11.5 - 14.5 %    Platelets 226 150 - 450 x10*3/uL    Neutrophils % 72.6 40.0 - 80.0 %    Immature Granulocytes %, Automated 0.4 0.0 - 0.9 %    Lymphocytes % 13.4 13.0 - 44.0 %    Monocytes % 13.3 2.0 - 10.0 %    Eosinophils % 0.1 0.0 - 6.0 %    Basophils % 0.2 0.0 - 2.0 %    Neutrophils Absolute 10.06 (H) 1.20 - 7.70 x10*3/uL    Immature Granulocytes Absolute, Automated 0.05 0.00 - 0.70 x10*3/uL    Lymphocytes Absolute 1.86 1.20 - 4.80 x10*3/uL    Monocytes Absolute 1.85 (H) 0.10 - 1.00 x10*3/uL    Eosinophils Absolute 0.02 0.00 - 0.70 x10*3/uL    Basophils Absolute 0.03 0.00 - 0.10 x10*3/uL   Basic metabolic panel   Result Value Ref Range    Glucose 112 (H) 65 - 99 mg/dL    Sodium 139 133 - 145 mmol/L    Potassium 4.2 3.4 - 5.1 mmol/L    Chloride 106 97 - 107 mmol/L    Bicarbonate 22 (L) 24 - 31 mmol/L    Urea  Nitrogen 28 (H) 8 - 25 mg/dL    Creatinine 1.50 0.40 - 1.60 mg/dL    eGFR 51 (L) >60 mL/min/1.73m*2    Calcium 9.0 8.5 - 10.4 mg/dL    Anion Gap 11 <=19 mmol/L   ECG 12 lead   Result Value Ref Range    Ventricular Rate 65 BPM    Atrial Rate 65 BPM    IN Interval 160 ms    QRS Duration 90 ms    QT Interval 426 ms    QTC Calculation(Bazett) 443 ms    P Axis 52 degrees    R Axis -6 degrees    T Axis 60 degrees    QRS Count 10 beats    Q Onset 212 ms    P Onset 132 ms    P Offset 185 ms    T Offset 425 ms    QTC Fredericia 437 ms   Protime-INR   Result Value Ref Range    Protime 11.8 9.3 - 12.7 seconds    INR 1.1 0.9 - 1.2   Urinalysis with Reflex Culture and Microscopic   Result Value Ref Range    Color, Urine Light-Orange (N) Light-Yellow, Yellow, Dark-Yellow    Appearance, Urine Turbid (N) Clear    Specific Gravity, Urine 1.023 1.005 - 1.035    pH, Urine 6.0 5.0, 5.5, 6.0, 6.5, 7.0, 7.5, 8.0    Protein, Urine 70 (1+) (A) NEGATIVE, 10 (TRACE), 20 (TRACE) mg/dL    Glucose, Urine Normal Normal mg/dL    Blood, Urine OVER (3+) (A) NEGATIVE    Ketones, Urine TRACE (A) NEGATIVE mg/dL    Bilirubin, Urine NEGATIVE NEGATIVE    Urobilinogen, Urine Normal Normal mg/dL    Nitrite, Urine NEGATIVE NEGATIVE    Leukocyte Esterase, Urine 250 Liudmila/µL (A) NEGATIVE   Microscopic Only, Urine   Result Value Ref Range    WBC, Urine >50 (A) 1-5, NONE /HPF    RBC, Urine >20 (A) NONE, 1-2, 3-5 /HPF    Mucus, Urine FEW Reference range not established. /LPF   CBC   Result Value Ref Range    WBC 12.2 (H) 4.4 - 11.3 x10*3/uL    nRBC 0.0 0.0 - 0.0 /100 WBCs    RBC 3.64 (L) 4.50 - 5.90 x10*6/uL    Hemoglobin 10.2 (L) 13.5 - 17.5 g/dL    Hematocrit 33.0 (L) 41.0 - 52.0 %    MCV 91 80 - 100 fL    MCH 28.0 26.0 - 34.0 pg    MCHC 30.9 (L) 32.0 - 36.0 g/dL    RDW 17.0 (H) 11.5 - 14.5 %    Platelets 181 150 - 450 x10*3/uL   Basic metabolic panel   Result Value Ref Range    Glucose 94 65 - 99 mg/dL    Sodium 141 133 - 145 mmol/L    Potassium 4.2 3.4 - 5.1  mmol/L    Chloride 109 (H) 97 - 107 mmol/L    Bicarbonate 24 24 - 31 mmol/L    Urea Nitrogen 28 (H) 8 - 25 mg/dL    Creatinine 1.60 0.40 - 1.60 mg/dL    eGFR 47 (L) >60 mL/min/1.73m*2    Calcium 8.3 (L) 8.5 - 10.4 mg/dL    Anion Gap 8 <=19 mmol/L           Assessment/Plan      Closed Displaced Intertrochanteric Fracture Left Femur / Fall  -Ortho follows.   -POD #1 ORIF with Nailing L Femur.  -Continue post op care.   -Pain control. Will try and transition to PO pain meds, will lower dose of IV dilaudid and make for breakthrough and change from norco to percocet to see if this gives him better relief.    -PT/OT evals.   -Stop IVF this AM.    -Patient complains of significant pain in the L knee, feels he hit it when he fell, will check XR image.     Hypoxia / Wheeze  -Currently weaned down to 3L NC from 4L.   -CXR checked last night with no acute findings, no ptx, no subcutaneous emphysema.   -Continue to wean O2 as able.   -Continue PRN IBDs.   -No wheeze on exam today.     Possible UTI  -UA with positive leuks with turbid appearance.   -UC pending.   -He is currently on post op doses of cefazolin.   -Awaiting urine culture. If it starts to grow bacteria will start ceftriaxone.     HTN  -BP was elevated on admission, now improving.   -Continue home dose lisinopril, isosorbide, norvasc, coreg.  -Monitor.     CAD  -S/P CABG.   -Continue core meds.   -No complaints of chest pain.     HLD  -Continue statin.     CKD 3  -Appears baseline creat ~1.5, currently at baseline.   -Monitor closely post op.     DVT Risk  -Continue aspirin plus plavix per ortho.   -SCDs.     Plan  Ortho follows. Continue post op care.   Wean O2 as sats allow. CXR with no acute findings. Down to 3L NC.   BP improved with home medications, monitor.   Stop IVF.   Will change from norco to percocet and decrease the dilaudid to lower dose for breakthrough to see if we can do PO pain meds alone.   Checking XR of the L knee as he has significant pain  there.   PT/OT evals.        Lisbet Aaron, APRN-CNP

## 2024-08-09 NOTE — PROGRESS NOTES
"Fernando Sweet is a 64 y.o. male on day 1 of admission presenting with Closed displaced intertrochanteric fracture of left femur (Multi).    Subjective   Patient states he is doing okay.  Hip is sore but pain controlled.  no issues overnight       Objective     Physical Exam   dressings clean dry and intact no strikethrough.  Mild discomfort with logroll and hip flexion extension.  No calf pain tenderness or swelling.  Sensation intact light touch throughout  Last Recorded Vitals  Blood pressure 149/77, pulse 68, temperature 37.2 °C (99 °F), temperature source Temporal, resp. rate 17, height 1.753 m (5' 9\"), weight 97 kg (213 lb 13.5 oz), SpO2 98%.  Intake/Output last 3 Shifts:  I/O last 3 completed shifts:  In: 2331.7 (24 mL/kg) [P.O.:280; I.V.:1901.7 (19.6 mL/kg); IV Piggyback:150]  Out: 495 (5.1 mL/kg) [Urine:425 (0.1 mL/kg/hr); Blood:70]  Weight: 97 kg     Relevant Results  Results for orders placed or performed during the hospital encounter of 08/08/24 (from the past 24 hour(s))   CBC and Auto Differential   Result Value Ref Range    WBC 13.9 (H) 4.4 - 11.3 x10*3/uL    nRBC 0.0 0.0 - 0.0 /100 WBCs    RBC 4.21 (L) 4.50 - 5.90 x10*6/uL    Hemoglobin 11.8 (L) 13.5 - 17.5 g/dL    Hematocrit 36.8 (L) 41.0 - 52.0 %    MCV 87 80 - 100 fL    MCH 28.0 26.0 - 34.0 pg    MCHC 32.1 32.0 - 36.0 g/dL    RDW 16.5 (H) 11.5 - 14.5 %    Platelets 226 150 - 450 x10*3/uL    Neutrophils % 72.6 40.0 - 80.0 %    Immature Granulocytes %, Automated 0.4 0.0 - 0.9 %    Lymphocytes % 13.4 13.0 - 44.0 %    Monocytes % 13.3 2.0 - 10.0 %    Eosinophils % 0.1 0.0 - 6.0 %    Basophils % 0.2 0.0 - 2.0 %    Neutrophils Absolute 10.06 (H) 1.20 - 7.70 x10*3/uL    Immature Granulocytes Absolute, Automated 0.05 0.00 - 0.70 x10*3/uL    Lymphocytes Absolute 1.86 1.20 - 4.80 x10*3/uL    Monocytes Absolute 1.85 (H) 0.10 - 1.00 x10*3/uL    Eosinophils Absolute 0.02 0.00 - 0.70 x10*3/uL    Basophils Absolute 0.03 0.00 - 0.10 x10*3/uL   Basic metabolic panel "   Result Value Ref Range    Glucose 112 (H) 65 - 99 mg/dL    Sodium 139 133 - 145 mmol/L    Potassium 4.2 3.4 - 5.1 mmol/L    Chloride 106 97 - 107 mmol/L    Bicarbonate 22 (L) 24 - 31 mmol/L    Urea Nitrogen 28 (H) 8 - 25 mg/dL    Creatinine 1.50 0.40 - 1.60 mg/dL    eGFR 51 (L) >60 mL/min/1.73m*2    Calcium 9.0 8.5 - 10.4 mg/dL    Anion Gap 11 <=19 mmol/L   ECG 12 lead   Result Value Ref Range    Ventricular Rate 65 BPM    Atrial Rate 65 BPM    DE Interval 160 ms    QRS Duration 90 ms    QT Interval 426 ms    QTC Calculation(Bazett) 443 ms    P Axis 52 degrees    R Axis -6 degrees    T Axis 60 degrees    QRS Count 10 beats    Q Onset 212 ms    P Onset 132 ms    P Offset 185 ms    T Offset 425 ms    QTC Fredericia 437 ms   Protime-INR   Result Value Ref Range    Protime 11.8 9.3 - 12.7 seconds    INR 1.1 0.9 - 1.2   Urinalysis with Reflex Culture and Microscopic   Result Value Ref Range    Color, Urine Light-Orange (N) Light-Yellow, Yellow, Dark-Yellow    Appearance, Urine Turbid (N) Clear    Specific Gravity, Urine 1.023 1.005 - 1.035    pH, Urine 6.0 5.0, 5.5, 6.0, 6.5, 7.0, 7.5, 8.0    Protein, Urine 70 (1+) (A) NEGATIVE, 10 (TRACE), 20 (TRACE) mg/dL    Glucose, Urine Normal Normal mg/dL    Blood, Urine OVER (3+) (A) NEGATIVE    Ketones, Urine TRACE (A) NEGATIVE mg/dL    Bilirubin, Urine NEGATIVE NEGATIVE    Urobilinogen, Urine Normal Normal mg/dL    Nitrite, Urine NEGATIVE NEGATIVE    Leukocyte Esterase, Urine 250 Liudmila/µL (A) NEGATIVE   Microscopic Only, Urine   Result Value Ref Range    WBC, Urine >50 (A) 1-5, NONE /HPF    RBC, Urine >20 (A) NONE, 1-2, 3-5 /HPF    Mucus, Urine FEW Reference range not established. /LPF   CBC   Result Value Ref Range    WBC 12.2 (H) 4.4 - 11.3 x10*3/uL    nRBC 0.0 0.0 - 0.0 /100 WBCs    RBC 3.64 (L) 4.50 - 5.90 x10*6/uL    Hemoglobin 10.2 (L) 13.5 - 17.5 g/dL    Hematocrit 33.0 (L) 41.0 - 52.0 %    MCV 91 80 - 100 fL    MCH 28.0 26.0 - 34.0 pg    MCHC 30.9 (L) 32.0 - 36.0 g/dL     RDW 17.0 (H) 11.5 - 14.5 %    Platelets 181 150 - 450 x10*3/uL   Basic metabolic panel   Result Value Ref Range    Glucose 94 65 - 99 mg/dL    Sodium 141 133 - 145 mmol/L    Potassium 4.2 3.4 - 5.1 mmol/L    Chloride 109 (H) 97 - 107 mmol/L    Bicarbonate 24 24 - 31 mmol/L    Urea Nitrogen 28 (H) 8 - 25 mg/dL    Creatinine 1.60 0.40 - 1.60 mg/dL    eGFR 47 (L) >60 mL/min/1.73m*2    Calcium 8.3 (L) 8.5 - 10.4 mg/dL    Anion Gap 8 <=19 mmol/L          Assessment/Plan   Principal Problem:    Closed displaced intertrochanteric fracture of left femur (Multi)     64-year-old male postop day 1 IM nailing left hip.  Patient doing well up with therapy weightbearing as tolerated social work for discharge planning continue Plavix with baby aspirin for DVT prophylaxis follow-up in office in 2 weeks discharge once medically stable    Gualberto Aviles MD

## 2024-08-09 NOTE — CARE PLAN
The patient's goals for the shift include      The clinical goals for the shift include        Problem: Fall/Injury  Goal: Not fall by end of shift  Outcome: Progressing  Goal: Be free from injury by end of the shift  Outcome: Progressing  Goal: Verbalize understanding of personal risk factors for fall in the hospital  Outcome: Progressing  Goal: Verbalize understanding of risk factor reduction measures to prevent injury from fall in the home  Outcome: Progressing  Goal: Use assistive devices by end of the shift  Outcome: Progressing  Goal: Pace activities to prevent fatigue by end of the shift  Outcome: Progressing     Problem: Pain  Goal: Takes deep breaths with improved pain control throughout the shift  Outcome: Progressing  Goal: Turns in bed with improved pain control throughout the shift  Outcome: Progressing  Goal: Walks with improved pain control throughout the shift  Outcome: Progressing  Goal: Performs ADL's with improved pain control throughout shift  Outcome: Progressing  Goal: Participates in PT with improved pain control throughout the shift  Outcome: Progressing  Goal: Free from opioid side effects throughout the shift  Outcome: Progressing  Goal: Free from acute confusion related to pain meds throughout the shift  Outcome: Progressing

## 2024-08-09 NOTE — CARE PLAN
Problem: Fall/Injury  Goal: Not fall by end of shift  Outcome: Progressing  Goal: Be free from injury by end of the shift  Outcome: Progressing  Goal: Verbalize understanding of personal risk factors for fall in the hospital  Outcome: Progressing  Goal: Verbalize understanding of risk factor reduction measures to prevent injury from fall in the home  Outcome: Progressing  Goal: Use assistive devices by end of the shift  Outcome: Progressing  Goal: Pace activities to prevent fatigue by end of the shift  Outcome: Progressing     Problem: Pain  Goal: Takes deep breaths with improved pain control throughout the shift  Outcome: Progressing  Goal: Turns in bed with improved pain control throughout the shift  Outcome: Progressing  Goal: Walks with improved pain control throughout the shift  Outcome: Progressing  Goal: Performs ADL's with improved pain control throughout shift  Outcome: Progressing  Goal: Participates in PT with improved pain control throughout the shift  Outcome: Progressing  Goal: Free from opioid side effects throughout the shift  Outcome: Progressing  Goal: Free from acute confusion related to pain meds throughout the shift  Outcome: Progressing     Problem: Deep Vein Thrombosis  Goal: I will remain free from complications of deep vein thrombosis and maintain current level of mobility  Outcome: Progressing     Problem: Skin  Goal: Decreased wound size/increased tissue granulation at next dressing change  Outcome: Progressing  Goal: Participates in plan/prevention/treatment measures  Outcome: Progressing  Goal: Prevent/manage excess moisture  Outcome: Progressing  Goal: Prevent/minimize sheer/friction injuries  Outcome: Progressing  Goal: Promote/optimize nutrition  Outcome: Progressing  Goal: Promote skin healing  Outcome: Progressing   The patient's goals for the shift include pain control    The clinical goals for the shift include maintain safety, pain control    Over the shift, the patient did not  make progress toward the following goals. Barriers to progression include pain, impaired hearing. Recommendations to address these barriers include pain medication, written instructions.

## 2024-08-10 LAB
ANION GAP SERPL CALC-SCNC: 7 MMOL/L
BACTERIA UR CULT: NO GROWTH
BUN SERPL-MCNC: 29 MG/DL (ref 8–25)
CALCIUM SERPL-MCNC: 8 MG/DL (ref 8.5–10.4)
CHLORIDE SERPL-SCNC: 110 MMOL/L (ref 97–107)
CO2 SERPL-SCNC: 24 MMOL/L (ref 24–31)
CREAT SERPL-MCNC: 1.6 MG/DL (ref 0.4–1.6)
EGFRCR SERPLBLD CKD-EPI 2021: 47 ML/MIN/1.73M*2
ERYTHROCYTE [DISTWIDTH] IN BLOOD BY AUTOMATED COUNT: 16.7 % (ref 11.5–14.5)
FERRITIN SERPL-MCNC: 161 NG/ML (ref 30–400)
GLUCOSE SERPL-MCNC: 98 MG/DL (ref 65–99)
HCT VFR BLD AUTO: 26.1 % (ref 41–52)
HGB BLD-MCNC: 8.2 G/DL (ref 13.5–17.5)
IRON SATN MFR SERPL: ABNORMAL %
IRON SERPL-MCNC: <20 UG/DL (ref 45–160)
MCH RBC QN AUTO: 28.2 PG (ref 26–34)
MCHC RBC AUTO-ENTMCNC: 31.4 G/DL (ref 32–36)
MCV RBC AUTO: 90 FL (ref 80–100)
NRBC BLD-RTO: 0 /100 WBCS (ref 0–0)
PLATELET # BLD AUTO: 156 X10*3/UL (ref 150–450)
POTASSIUM SERPL-SCNC: 3.9 MMOL/L (ref 3.4–5.1)
RBC # BLD AUTO: 2.91 X10*6/UL (ref 4.5–5.9)
SODIUM SERPL-SCNC: 141 MMOL/L (ref 133–145)
TIBC SERPL-MCNC: ABNORMAL UG/DL
UIBC SERPL-MCNC: 150 UG/DL (ref 110–370)
WBC # BLD AUTO: 10.8 X10*3/UL (ref 4.4–11.3)

## 2024-08-10 PROCEDURE — 2500000001 HC RX 250 WO HCPCS SELF ADMINISTERED DRUGS (ALT 637 FOR MEDICARE OP): Performed by: NURSE PRACTITIONER

## 2024-08-10 PROCEDURE — 83540 ASSAY OF IRON: CPT | Performed by: NURSE PRACTITIONER

## 2024-08-10 PROCEDURE — 2500000004 HC RX 250 GENERAL PHARMACY W/ HCPCS (ALT 636 FOR OP/ED): Performed by: NURSE PRACTITIONER

## 2024-08-10 PROCEDURE — 2500000002 HC RX 250 W HCPCS SELF ADMINISTERED DRUGS (ALT 637 FOR MEDICARE OP, ALT 636 FOR OP/ED): Performed by: ORTHOPAEDIC SURGERY

## 2024-08-10 PROCEDURE — 36415 COLL VENOUS BLD VENIPUNCTURE: CPT | Performed by: NURSE PRACTITIONER

## 2024-08-10 PROCEDURE — 97530 THERAPEUTIC ACTIVITIES: CPT | Mod: GO

## 2024-08-10 PROCEDURE — 2500000001 HC RX 250 WO HCPCS SELF ADMINISTERED DRUGS (ALT 637 FOR MEDICARE OP): Performed by: ORTHOPAEDIC SURGERY

## 2024-08-10 PROCEDURE — 82728 ASSAY OF FERRITIN: CPT | Performed by: NURSE PRACTITIONER

## 2024-08-10 PROCEDURE — 99232 SBSQ HOSP IP/OBS MODERATE 35: CPT | Performed by: NURSE PRACTITIONER

## 2024-08-10 PROCEDURE — 85027 COMPLETE CBC AUTOMATED: CPT | Performed by: NURSE PRACTITIONER

## 2024-08-10 PROCEDURE — 1100000001 HC PRIVATE ROOM DAILY

## 2024-08-10 PROCEDURE — 2500000005 HC RX 250 GENERAL PHARMACY W/O HCPCS: Performed by: STUDENT IN AN ORGANIZED HEALTH CARE EDUCATION/TRAINING PROGRAM

## 2024-08-10 PROCEDURE — 94762 N-INVAS EAR/PLS OXIMTRY CONT: CPT

## 2024-08-10 PROCEDURE — 80048 BASIC METABOLIC PNL TOTAL CA: CPT | Performed by: NURSE PRACTITIONER

## 2024-08-10 PROCEDURE — 97530 THERAPEUTIC ACTIVITIES: CPT | Mod: GP

## 2024-08-10 RX ADMIN — LISINOPRIL 20 MG: 20 TABLET ORAL at 08:58

## 2024-08-10 RX ADMIN — Medication 5 MG: at 22:19

## 2024-08-10 RX ADMIN — CLOPIDOGREL BISULFATE 75 MG: 75 TABLET ORAL at 08:58

## 2024-08-10 RX ADMIN — Medication 2 L/MIN: at 07:00

## 2024-08-10 RX ADMIN — PANTOPRAZOLE SODIUM 20 MG: 20 TABLET, DELAYED RELEASE ORAL at 08:58

## 2024-08-10 RX ADMIN — OXYCODONE HYDROCHLORIDE AND ACETAMINOPHEN 1 TABLET: 5; 325 TABLET ORAL at 03:29

## 2024-08-10 RX ADMIN — CARVEDILOL 3.12 MG: 3.12 TABLET, FILM COATED ORAL at 16:39

## 2024-08-10 RX ADMIN — Medication 21 PERCENT: at 23:00

## 2024-08-10 RX ADMIN — ISOSORBIDE MONONITRATE 30 MG: 30 TABLET, EXTENDED RELEASE ORAL at 08:58

## 2024-08-10 RX ADMIN — OXYCODONE HYDROCHLORIDE AND ACETAMINOPHEN 1 TABLET: 5; 325 TABLET ORAL at 08:58

## 2024-08-10 RX ADMIN — CARVEDILOL 3.12 MG: 3.12 TABLET, FILM COATED ORAL at 08:58

## 2024-08-10 RX ADMIN — ATORVASTATIN CALCIUM 40 MG: 40 TABLET, FILM COATED ORAL at 22:19

## 2024-08-10 RX ADMIN — ASPIRIN 81 MG CHEWABLE TABLET 81 MG: 81 TABLET CHEWABLE at 08:58

## 2024-08-10 RX ADMIN — OXYCODONE HYDROCHLORIDE AND ACETAMINOPHEN 1 TABLET: 5; 325 TABLET ORAL at 13:20

## 2024-08-10 RX ADMIN — OXYCODONE HYDROCHLORIDE AND ACETAMINOPHEN 1 TABLET: 5; 325 TABLET ORAL at 17:55

## 2024-08-10 RX ADMIN — Medication 2 L/MIN: at 15:00

## 2024-08-10 RX ADMIN — IRON SUCROSE 200 MG: 20 INJECTION, SOLUTION INTRAVENOUS at 16:38

## 2024-08-10 RX ADMIN — OXYCODONE HYDROCHLORIDE AND ACETAMINOPHEN 1 TABLET: 5; 325 TABLET ORAL at 22:19

## 2024-08-10 RX ADMIN — AMLODIPINE BESYLATE 5 MG: 5 TABLET ORAL at 08:58

## 2024-08-10 ASSESSMENT — COGNITIVE AND FUNCTIONAL STATUS - GENERAL
MOVING FROM LYING ON BACK TO SITTING ON SIDE OF FLAT BED WITH BEDRAILS: A LOT
MOBILITY SCORE: 10
STANDING UP FROM CHAIR USING ARMS: A LOT
DRESSING REGULAR UPPER BODY CLOTHING: A LITTLE
DAILY ACTIVITIY SCORE: 13
CLIMB 3 TO 5 STEPS WITH RAILING: TOTAL
PERSONAL GROOMING: A LITTLE
EATING MEALS: A LITTLE
MOVING TO AND FROM BED TO CHAIR: A LOT
HELP NEEDED FOR BATHING: A LOT
DRESSING REGULAR LOWER BODY CLOTHING: TOTAL
TURNING FROM BACK TO SIDE WHILE IN FLAT BAD: A LOT
WALKING IN HOSPITAL ROOM: TOTAL
TOILETING: TOTAL

## 2024-08-10 ASSESSMENT — PAIN DESCRIPTION - ORIENTATION
ORIENTATION: LEFT

## 2024-08-10 ASSESSMENT — PAIN - FUNCTIONAL ASSESSMENT
PAIN_FUNCTIONAL_ASSESSMENT: 0-10
PAIN_FUNCTIONAL_ASSESSMENT: UNABLE TO SELF-REPORT
PAIN_FUNCTIONAL_ASSESSMENT: 0-10
PAIN_FUNCTIONAL_ASSESSMENT: FLACC (FACE, LEGS, ACTIVITY, CRY, CONSOLABILITY)
PAIN_FUNCTIONAL_ASSESSMENT: WONG-BAKER FACES
PAIN_FUNCTIONAL_ASSESSMENT: 0-10

## 2024-08-10 ASSESSMENT — PAIN SCALES - GENERAL
PAINLEVEL_OUTOF10: 5 - MODERATE PAIN
PAINLEVEL_OUTOF10: 7
PAINLEVEL_OUTOF10: 7
PAINLEVEL_OUTOF10: 6
PAINLEVEL_OUTOF10: 8
PAINLEVEL_OUTOF10: 8
PAINLEVEL_OUTOF10: 7
PAINLEVEL_OUTOF10: 7

## 2024-08-10 ASSESSMENT — PAIN DESCRIPTION - LOCATION
LOCATION: HIP

## 2024-08-10 ASSESSMENT — PAIN SCALES - WONG BAKER: WONGBAKER_NUMERICALRESPONSE: NO HURT

## 2024-08-10 ASSESSMENT — PAIN SCALES - PAIN ASSESSMENT IN ADVANCED DEMENTIA (PAINAD)
TOTALSCORE: MEDICATION (SEE MAR)
TOTALSCORE: COLD APPLIED

## 2024-08-10 NOTE — PROGRESS NOTES
Occupational Therapy    Occupational Therapy Treatment    Name: Fernando Sweet  MRN: [id number]  : 1960  Date: 08/10/24  Time Calculation  Start Time: 758  Stop Time: 822  Time Calculation (min): 24 min    Assessment:  OT Assessment: Slowly progressing towards OT POC  Prognosis: Good  Barriers to Discharge: Other (Comment) (mod-max A x2 for all xfers; nonambulatory)  Evaluation/Treatment Tolerance: Patient limited by pain  Medical Staff Made Aware: Yes  End of Session Communication: Bedside nurse  End of Session Patient Position: Up in chair, Alarm on  Plan:  Treatment Interventions: ADL retraining, Functional transfer training, UE strengthening/ROM, Endurance training, Patient/family training, Equipment evaluation/education, Neuromuscular reeducation, Compensatory technique education  OT Frequency: 4 times per week  OT Discharge Recommendations: Moderate intensity level of continued care  Equipment Recommended upon Discharge: Lift  OT Recommended Transfer Status: Assist of 2  OT - OK to Discharge: Yes    Subjective   Previous Visit Info:  OT Last Visit  OT Received On: 08/10/24  General:  General  Reason for Referral: Impaired ADLs s/p hip fracture repair  Referred By: Dr Aviles  Past Medical History Relevant to Rehab: deaf bilat ears, bilat cataracts, CKD 3, HTN, recurrent major depressive d/o, a-fib, CAD, CABG, vascular dementia  Family/Caregiver Present: No  Co-Treatment: PT  Co-Treatment Reason: d/t pt's high pain levels and increased need for skilled intervention  Prior to Session Communication: Bedside nurse  Patient Position Received: Bed, 3 rail up, Alarm on  Preferred Learning Style: verbal, visual  General Comment: Cleared by nsg, pt met in supine, agreeable to OT session  Precautions:  Hearing/Visual Limitations: deaf bilateral ears  LE Weight Bearing Status: Weight Bearing as Tolerated (LLE)  Medical Precautions: Fall precautions, Oxygen therapy device and L/min  Vitals:  Vital Signs  SpO2: 96  %  Pain Assessment:  Pain Assessment  Pain Assessment: FLACC (Face, Legs, Activity, Cry, Consolability)  0-10 (Numeric) Pain Score: 7  Pain Type: Surgical pain  Pain Location: Hip  Pain Orientation: Left  Pain Interventions: Repositioned (nsg informed and aware)     Objective   Cognition:  Overall Cognitive Status: Within Functional Limits  Following Commands: Follows one step commands with increased time  Cognition Comments: communicates via lip reading and writing.  flat affect.  increased time to complete all tasks    Activities of Daily Living:   Feeding  Feeding Level of Assistance: Setup  Feeding Where Assessed: Chair  Feeding Comments: items placed within reach    Bed Mobility/Transfers:   Bed Mobility  Bed Mobility: Yes  Bed Mobility 1  Bed Mobility 1: Supine to sitting  Level of Assistance 1: Moderate assistance, +2  Bed Mobility Comments 1: significantly increased time required to achieve supine to sit; HOB elevated.  visual/tactile cues provided to faciliate proper sequencing and body mechanics    Transfers  Transfer: Yes  Transfer 1  Transfer From 1: Bed to  Transfer to 1: Stand  Technique 1: Sit to stand  Transfer Device 1: Walker  Transfer Level of Assistance 1: Maximum assistance, +2  Trials/Comments 1: attempted STS from EOB with FWW; max A x2 to achieve only partial stand as pt is highly limited by pain levels.  attempted x2 trials; unable to achieve full standing  Transfers 2  Transfer From 2: Bed to  Transfer to 2: Chair with arms  Technique 2: Sit pivot  Transfer Level of Assistance 2: Maximum assistance, +2  Trials/Comments 2: low bottom pivot from EOB to bedside chair with max A x2 via arm in arm assist with b/l knees blocked; highly effortful completion    Functional Mobility:  Functional Mobility  Functional Mobility Performed: No    Sitting Balance:  Static Sitting Balance  Static Sitting-Balance Support: Feet supported, Bilateral upper extremity supported  Static Sitting-Level of  Assistance: Close supervision  Static Sitting-Comment/Number of Minutes: sat EOB for ~10 minutes today while pt attempts to scoot self towards EOB to assume proper seated position.  all movement/activity is highly effortful t kaila    Outcome Measures:  [facility] Daily Activity  Putting on and taking off regular lower body clothing: Total  Bathing (including washing, rinsing, drying): A lot  Putting on and taking off regular upper body clothing: A little  Toileting, which includes using toilet, bedpan or urinal: Total  Taking care of personal grooming such as brushing teeth: A little  Eating Meals: A little  Daily Activity - Total Score: 13        Education Documentation  Body Mechanics, taught by Piter Vidal OT at 8/10/2024  8:59 AM.  Learner: Patient  Readiness: Acceptance  Method: Explanation  Response: Needs Reinforcement    Precautions, taught by Piter Vidal OT at 8/10/2024  8:59 AM.  Learner: Patient  Readiness: Acceptance  Method: Explanation  Response: Needs Reinforcement    Education Comments  No comments found.      Goals:  Encounter Problems       Encounter Problems (Active)       OT Goals       ADLs (Progressing)       Start:  08/09/24    Expected End:  08/24/24       Pt will complete bathing, dressing, and toileting tasks with min assist using adaptive aides and with increased time.         Functional transfers (Progressing)       Start:  08/09/24    Expected End:  08/24/24       Pt will complete toilet, bed, and chair transfers with min assist using a gait belt, elevated seat heights, and bilateral arm supports.         Activity tolerance (Progressing)       Start:  08/09/24    Expected End:  08/24/24       Pt will tolerate 25 minutes of therapeutic activity in order to increase functional endurance needed for ADLs.

## 2024-08-10 NOTE — PROGRESS NOTES
Fernando Sweet is a 64 y.o. male on day 2 of admission presenting with Closed displaced intertrochanteric fracture of left femur (Multi).      Subjective   Patient seen and examined. Resting in bed. States pain is moderate. He states less pain in the knee today. Poor appetite with some occasional nausea, denies emesis or abdominal pain. He was weaned to room air, sats 94-95%. Afebrile.        Objective     Last Recorded Vitals  /71 (BP Location: Left arm, Patient Position: Sitting)   Pulse 70   Temp 36.9 °C (98.4 °F) (Temporal)   Resp 18   Wt 97 kg (213 lb 13.5 oz)   SpO2 97%   Intake/Output last 3 Shifts:    Intake/Output Summary (Last 24 hours) at 8/10/2024 0942  Last data filed at 8/10/2024 0900  Gross per 24 hour   Intake 1463.33 ml   Output 650 ml   Net 813.33 ml       Admission Weight  Weight: 97 kg (213 lb 13.5 oz) (08/08/24 1012)    Daily Weight  08/08/24 : 97 kg (213 lb 13.5 oz)    Image Results  XR knee left 4+ views  Narrative: Interpreted By:  Ninfa Cortez,   STUDY:  XR KNEE LEFT 4+ VIEWS 8/9/2024 1:17 pm      INDICATION:  Signs/Symptoms:Pain, fall      COMPARISON:  None available.      ACCESSION NUMBER(S):  [id number]      ORDERING CLINICIAN:  CARLOTTA CUNNINGHAM      TECHNIQUE:  Four views of the left knee      FINDINGS:  No fracture, dislocation or acute bony abnormality with the regional  soft tissues unremarkable. Medial compartment is mildly narrowed.      Impression: No fracture or dislocation of left knee.      Mild medial compartmental narrowing.      Signed by: Ninfa Cortez 8/9/2024 2:10 PM  Dictation workstation:   [id number]  ECG 12 lead  Normal sinus rhythm  Cannot rule out Anterior infarct (cited on or before 25-OCT-2023)  Abnormal ECG  When compared with ECG of 25-OCT-2023 13:28,  QRS axis Shifted right  Questionable change in initial forces of Anteroseptal leads  Nonspecific T wave abnormality, worse in Inferior leads  T wave inversion no longer evident in Anterior leads  Confirmed  by Sidney Green ([id number]) on 8/9/2024 1:02:31 PM      Physical Exam    General: Alert and oriented x3, very [place].    Cardiac: Regular rate and rhythm, S1/S2 , no murmur.   Pulmonary: Clear/Diminished on room air.   Abdomen: Soft, round, nontender. BS +x4.   Extremities: Trace edema LLE.   Skin: Dressings in place to the L hip without drainage, some mild edema, no ecchymosis seen.     Relevant Results    Scheduled medications  amLODIPine, 5 mg, oral, Daily  aspirin, 81 mg, oral, Daily  atorvastatin, 40 mg, oral, Nightly  carvedilol, 3.125 mg, oral, BID  clopidogrel, 75 mg, oral, Daily  isosorbide mononitrate ER, 30 mg, oral, Daily  lisinopril, 20 mg, oral, Daily  oxygen, , inhalation, q8h  pantoprazole, 20 mg, oral, Daily      Continuous medications     PRN medications  PRN medications: acetaminophen **OR** acetaminophen **OR** acetaminophen, bisacodyl, hydrALAZINE, ipratropium-albuteroL, melatonin, nitroglycerin, ondansetron ODT **OR** ondansetron, oxyCODONE-acetaminophen     Results for orders placed or performed during the hospital encounter of 08/08/24 (from the past 24 hour(s))   CBC   Result Value Ref Range    WBC 10.8 4.4 - 11.3 x10*3/uL    nRBC 0.0 0.0 - 0.0 /100 WBCs    RBC 2.91 (L) 4.50 - 5.90 x10*6/uL    Hemoglobin 8.2 (L) 13.5 - 17.5 g/dL    Hematocrit 26.1 (L) 41.0 - 52.0 %    MCV 90 80 - 100 fL    MCH 28.2 26.0 - 34.0 pg    MCHC 31.4 (L) 32.0 - 36.0 g/dL    RDW 16.7 (H) 11.5 - 14.5 %    Platelets 156 150 - 450 x10*3/uL   Basic Metabolic Panel   Result Value Ref Range    Glucose 98 65 - 99 mg/dL    Sodium 141 133 - 145 mmol/L    Potassium 3.9 3.4 - 5.1 mmol/L    Chloride 110 (H) 97 - 107 mmol/L    Bicarbonate 24 24 - 31 mmol/L    Urea Nitrogen 29 (H) 8 - 25 mg/dL    Creatinine 1.60 0.40 - 1.60 mg/dL    eGFR 47 (L) >60 mL/min/1.73m*2    Calcium 8.0 (L) 8.5 - 10.4 mg/dL    Anion Gap 7 <=19 mmol/L           Assessment/Plan      Closed Displaced Intertrochanteric Fracture Left Femur / Fall  -Ortho follows.    -POD #2 ORIF with Nailing L Femur.  -Continue post op care.   -Pain control. Has been on percocet and tolerating. IV dilaudid was stopped.   -PT/OT recommends moderate intensity therapy.   -Patient complained of significant pain in the L knee, feels he hit it when he fell, XR imaging obtained, no acute process. States it is feeling a little better today.  -Post-op anemia, H&H 8.2/26.1- checking iron studies, ferritin, will give dose of IV iron if low.      Hypoxia / Wheeze  -Currently on room air, sats in the mid 90's. Monitor.   -CXR checked with no acute findings, no ptx, no subcutaneous emphysema.   -Continue PRN IBDs.   -No wheeze on exam.     Possible UTI  -UA with positive leuks with turbid appearance.   -UC finalized with no growth, no need for abx.      HTN  -BP was elevated on admission, now improved. Likely from pain.    -Continue home dose lisinopril, isosorbide, norvasc, coreg.  -Monitor.      CAD  -S/P CABG.   -Continue core meds.   -No complaints of chest pain.      HLD  -Continue statin.      CKD 3  -Appears baseline creat ~1.5, currently at baseline.   -Monitor closely post op.      DVT Risk  -Continue aspirin plus plavix per ortho.   -SCDs.      Plan  Ortho follows. Continue post op care.   Now stable on room air.   Continue PO pain medications, better tolerance with percocet.  XR L knee with no acute findings. Pain with some improvement today.  PT/OT recommend moderate intensity therapy, plan for SNF, able to DC Sunday after 3 inpatient midnights., if he remains stable will plan for DC tomorrow.         Lisbet Aaron, APRN-CNP

## 2024-08-10 NOTE — CARE PLAN
Problem: Fall/Injury  Goal: Not fall by end of shift  8/10/2024 1040 by Pamela Song RN  Outcome: Progressing  8/10/2024 1036 by Pamela Song RN  Outcome: Progressing  Goal: Be free from injury by end of the shift  8/10/2024 1040 by Pamela Song RN  Outcome: Progressing  8/10/2024 1036 by Pamela Song RN  Outcome: Progressing  Goal: Verbalize understanding of personal risk factors for fall in the hospital  8/10/2024 1040 by Pamela Song RN  Outcome: Progressing  8/10/2024 1036 by Pamela Song RN  Outcome: Progressing  Goal: Verbalize understanding of risk factor reduction measures to prevent injury from fall in the home  8/10/2024 1040 by Pamela Song RN  Outcome: Progressing  8/10/2024 1036 by Pamela Song RN  Outcome: Progressing  Goal: Use assistive devices by end of the shift  8/10/2024 1040 by Pamela Song RN  Outcome: Progressing  8/10/2024 1036 by Pamela Sogn RN  Outcome: Progressing  Goal: Pace activities to prevent fatigue by end of the shift  8/10/2024 1040 by Pamela Song RN  Outcome: Progressing  8/10/2024 1036 by Pamela Song RN  Outcome: Progressing   The patient's goals for the shift include pain control    The clinical goals for the shift include safety awareness, pain control

## 2024-08-10 NOTE — CARE PLAN
Problem: Fall/Injury  Goal: Not fall by end of shift  Outcome: Progressing  Goal: Be free from injury by end of the shift  Outcome: Progressing  Goal: Verbalize understanding of personal risk factors for fall in the hospital  Outcome: Progressing  Goal: Verbalize understanding of risk factor reduction measures to prevent injury from fall in the home  Outcome: Progressing  Goal: Use assistive devices by end of the shift  Outcome: Progressing  Goal: Pace activities to prevent fatigue by end of the shift  Outcome: Progressing   The patient's goals for the shift include pain control    The clinical goals for the shift include Pt will remain safe and free from injury this shift

## 2024-08-10 NOTE — PROGRESS NOTES
Physical Therapy    Physical Therapy Treatment    Patient Name: Fernando Sweet  MRN: [id number]  Today's Date: 8/10/2024  Time Calculation  Start Time: 0805  Stop Time: 0830  Time Calculation (min): 25 min    Assessment/Plan   PT Assessment  PT Assessment Results: Decreased strength, Decreased range of motion, Decreased endurance, Impaired balance, Decreased mobility, Decreased coordination, Decreased cognition, Impaired judgement, Decreased safety awareness, Impaired vision, Impaired hearing, Decreased skin integrity, Orthopedic restrictions, Pain  Rehab Prognosis: Good  Barriers to Discharge: level of assist required, limited tolerance to activity, limited mobility progression  Evaluation/Treatment Tolerance: Patient limited by pain  Medical Staff Made Aware: Yes  Strengths: Support of Caregivers  Barriers to Participation: Comorbidities  End of Session Communication: Bedside nurse  Assessment Comment: The patient requires extensive time for initiation of all mobility; MaxAx2 to depAx2 for all OOB activity with use of HHA and gait belt. Pt would continue to benefit from skilled therapy services to address functional deficits.  End of Session Patient Position: Up in chair, Alarm on  PT Plan  Inpatient/Swing Bed or Outpatient: Inpatient  PT Plan  Treatment/Interventions: Bed mobility, Transfer training, Gait training, Balance training, Strengthening, Endurance training, Range of motion, Therapeutic exercise, Therapeutic activity  PT Plan: Ongoing PT  PT Frequency: Daily  PT Discharge Recommendations: Moderate intensity level of continued care  Equipment Recommended upon Discharge: Wheeled walker  PT Recommended Transfer Status: Assist x2, Assistive device  PT - OK to Discharge: Yes      General Visit Information:   PT  Visit  PT Received On: 08/10/24  Response to Previous Treatment: Patient with no complaints from previous session.  General  Co-Treatment: OT  Co-Treatment Reason: d/t pt's high pain levels and increased  need for skilled intervention  Prior to Session Communication: Bedside nurse  Patient Position Received: Bed, 3 rail up, Alarm on  Preferred Learning Style: verbal, visual  General Comment: Cleared by nsg, pt met in supine, agreeable to therapy session    Subjective   Precautions:  Precautions  Hearing/Visual Limitations: deaf bilateral ears  LE Weight Bearing Status: Weight Bearing as Tolerated (LLE)  Medical Precautions: Fall precautions, Oxygen therapy device and L/min (2L O2)  Precautions Comment: deaf; prefers written communication  Vital Signs:  Vital Signs  Heart Rate: 69  Heart Rate Source: Monitor  SpO2: 98 %    Objective   Pain:  Pain Assessment  Pain Assessment: 0-10  0-10 (Numeric) Pain Score: 7  Pain Type: Surgical pain  Pain Location: Hip  Pain Orientation: Left  Pain Interventions: Repositioned, Ambulation/increased activity (nsg made aware)  Cognition:  Cognition  Overall Cognitive Status: Within Functional Limits  Following Commands: Follows one step commands with increased time  Cognition Comments: flat affect; communicates with writing  Insight: Moderate  Impulsive: Moderately  Processing Speed: Delayed  Coordination:  Coordination Comment: decreased speed and accuracy  Postural Control:  Postural Control  Posture Comment: forward rounded shoulders  Static Sitting Balance  Static Sitting-Balance Support: Bilateral upper extremity supported, Feet supported  Static Sitting-Level of Assistance: Contact guard  Static Sitting-Comment/Number of Minutes: EOB >5 min  Static Standing Balance  Static Standing-Balance Support: Bilateral upper extremity supported (HHAx2)  Static Standing-Level of Assistance: Maximum assistance, Dependent (x2)  Static Standing-Comment/Number of Minutes: <30 sec  Dynamic Standing Balance  Dynamic Standing-Balance Support: Bilateral upper extremity supported (HHAx2)  Dynamic Standing-Comments: MaxAx2 to depAx2 for 2 steps taken at bedside; very forward flexed  posture  Extremity/Trunk Assessments:  RLE   RLE :  (grossly 3+/5; resistive to mobility)  LLE   LLE :  (limited due to pain; pt demonstrating resistance to activity; difficult to fully assess)  Activity Tolerance:  Activity Tolerance  Endurance: Decreased tolerance for upright activites  Activity Tolerance Comments: limited due to pain  Rate of Perceived Exertion (RPE): 5/10  Treatments:  Therapeutic Exercise  Therapeutic Exercise Performed: Yes  Therapeutic Exercise Activity 1: APx 10 reps jose  Therapeutic Exercise Activity 2: GS x 10 reps  Therapeutic Exercise Activity 3: QS x 10 reps jose    Therapeutic Activity  Therapeutic Activity Performed: Yes  Therapeutic Activity 1: PT returned to assist patient back to bed from [id number]; DepAx2-3 for stand pivot transfer to bedside and then to supine    Bed Mobility  Bed Mobility: Yes  Bed Mobility 1  Bed Mobility 1: Supine to sitting  Level of Assistance 1: Moderate assistance, +2  Bed Mobility Comments 1: Assist for trunk elevation and moving BLE to EOB; intermittent resistive behavior due to pain; frequent tactile cues for sequencing    Transfers  Transfer: Yes  Transfer 1  Transfer From 1: Sit to, Stand to  Transfer to 1: Stand, Sit  Technique 1: Sit to stand, Stand to sit  Transfer Device 1: Walker  Transfer Level of Assistance 1: Maximum assistance, +2  Trials/Comments 1: unable to achieve full standing posture with use of RW and maxAx2  Transfers 2  Transfer From 2: Bed to  Transfer to 2: Chair with arms  Technique 2: Sit pivot, To right  Transfer Level of Assistance 2: Maximum assistance, Dependent, +2  Trials/Comments 2: heavy pivot to chair with pt demonstrating resistive behavior throughout transfer    Outcome Measures:  [facility] Basic Mobility  Turning from your back to your side while in a flat bed without using bedrails: A lot  Moving from lying on your back to sitting on the side of a flat bed without using bedrails: A lot  Moving to and from bed to chair  (including a wheelchair): A lot  Standing up from a chair using your arms (e.g. wheelchair or bedside chair): A lot  To walk in hospital room: Total  Climbing 3-5 steps with railing: Total  Basic Mobility - Total Score: 10    Education Documentation  Precautions, taught by Michelle Mao PT at 8/10/2024 10:20 AM.  Learner: Patient  Readiness: Acceptance  Method: Demonstration, Handout  Response: Needs Reinforcement  Comment: Education provided on safe sequencing of mobility and transfers    Mobility Training, taught by Michelle Mao PT at 8/10/2024 10:20 AM.  Learner: Patient  Readiness: Acceptance  Method: Demonstration, Handout  Response: Needs Reinforcement  Comment: Education provided on safe sequencing of mobility and transfers    Education Comments  No comments found.      Encounter Problems       Encounter Problems (Active)       Balance       LTG - Patient will maintain balance to allow for safe mobility (Progressing)       Start:  08/09/24    Expected End:  08/16/24               Mobility       STG - Patient will ambulate 20' w/ RW and min assist (Progressing)       Start:  08/09/24    Expected End:  08/16/24            Pt will tolerate BLE exercises to promote functional strength, balance and endurance (Progressing)       Start:  08/09/24    Expected End:  08/16/24               PT Transfers       STG - Patient to transfer to and from sit to supine w/ min assist (Progressing)       Start:  08/09/24    Expected End:  08/16/24            STG - Patient will transfer sit to and from stand w/ min assist (Progressing)       Start:  08/09/24    Expected End:  08/16/24

## 2024-08-10 NOTE — DOCUMENTATION CLARIFICATION NOTE
"    PATIENT:               VANDANA REGALADO  ACCT #:                  [id number]  MRN:                       [id number]  :                       1960  ADMIT DATE:       2024 10:06 AM  DISCH DATE:  RESPONDING PROVIDER #:        [id number]          PROVIDER RESPONSE TEXT:    Acute Pulmonary Insufficiency following surgery is not a valid post-op diagnosis for this patient    CDI QUERY TEXT:    Clarification    Instruction:  Based on your assessment of the patient and the clinical information, please provide the requested documentation by clicking on the appropriate radio button and enter any additional information if prompted.    Question: Is there a diagnosis indicative of the clinical information    When answering this query, please exercise your independent professional judgment. The fact that a question is being asked, does not imply that any particular answer is desired or expected.    The patient's clinical indicators include:  Clinical Information: 64y.o. M presents s/p fall at assisted living facility. Per H/P, admitted with closed displaced intertrochanteric fracture of L femur and HTN.   VS: 37.1, 66, 18, 148/83 Map 105, 96 percent on RA     Op Note: \" Post-op Diagnosis - Closed displaced intertrochanteric fracture of left femur. Procedures - Hip Fracture ORIF w/ Nail Trochanteric.     Progress Note: \" Asked to see the patient was wheezing.  Because of concern about subcutaneous emphysema. On examination, responsive, hard of hearing. Oxygen saturation was 98 percent on 4L/min. Will obtain a stat portable chest x-ray to rule out pneumothorax. Inhaled bronchodilators as needed. Continuous oxygen saturation monitoring. \"     Progress Note: \" Hypoxia/Wheeze. Currently weaned down to 3L NC from 4L. CXR checked last night with no acute findings \"    Clinical Indicators:   CXR: \" Multiple stable findings as described. No significant or acute interval change. \"     at 1825: SpO2 92 percent on RA      - " Calculated P/F ratio 303.7  8/8 at 1840: SpO2 99 percent on 4L NC- Calculated P/F ratio 366.5  8/9 at 0930: SpO2 96 percent on 3L NC- Calculated P/F ratio 255.9  8/9 at 1155: SpO2 95 percent on 2L NC- Calculated P/F ratio 270.2    Treatment:  O2 therapy, Incentive Spirometer use, PT/OT  8/8 Albuterol 2.5mg nebulizer treatment x1 dose  8/8 Duo-Neb nebulizer treatment x1 dose    Risk Factors: PMHx: Vascular Dementia, Depression, HTN, CKD stage 3, Hearing loss, A fib, CAD, HLD, Hypothyroidism, former smoker.  BMI: 31.58  Options provided:  -- Acute Pulmonary Insufficiency following surgery as evident by patient requiring 2-4L O2 post-op, which was clinically significant altering or extending post-operative care  -- Acute Pulmonary Insufficiency following surgery is not a valid post-op diagnosis for this patient  -- Other - I will add my own diagnosis  -- Refer to Clinical Documentation Reviewer    Query created by: Isamar Barnes on 8/9/2024 4:24 PM      Electronically signed by:  CARLOTTA SRINIVASAN 8/10/2024 7:30 AM

## 2024-08-10 NOTE — CARE PLAN
The patient's goals for the shift include pain control    The clinical goals for the shift include Pt will remain safe and free from injury this shift

## 2024-08-10 NOTE — PROGRESS NOTES
"Fernando Sweet is a 64 y.o. male on day 2 of admission presenting with Closed displaced intertrochanteric fracture of left femur (Multi).    Subjective   Patient seen today.  States he worked with physical therapy.  He is slowly improving.  Pain reasonably controlled.       Objective     Physical Exam  Postop dressings clean dry and intact.  No calf pain tenderness or swelling.  Sensation intact light touch throughout.  Able to plantarflex and dorsiflex his ankle.  Last Recorded Vitals  Blood pressure 109/65, pulse 59, temperature 37.1 °C (98.8 °F), temperature source Temporal, resp. rate 18, height 1.753 m (5' 9\"), weight 97 kg (213 lb 13.5 oz), SpO2 99%.  Intake/Output last 3 Shifts:  I/O last 3 completed shifts:  In: 2545 (26.2 mL/kg) [P.O.:1230; I.V.:1265 (13 mL/kg); IV Piggyback:50]  Out: 1075 (11.1 mL/kg) [Urine:1075 (0.3 mL/kg/hr)]  Weight: 97 kg     Relevant Results  Results for orders placed or performed during the hospital encounter of 08/08/24 (from the past 24 hour(s))   CBC   Result Value Ref Range    WBC 10.8 4.4 - 11.3 x10*3/uL    nRBC 0.0 0.0 - 0.0 /100 WBCs    RBC 2.91 (L) 4.50 - 5.90 x10*6/uL    Hemoglobin 8.2 (L) 13.5 - 17.5 g/dL    Hematocrit 26.1 (L) 41.0 - 52.0 %    MCV 90 80 - 100 fL    MCH 28.2 26.0 - 34.0 pg    MCHC 31.4 (L) 32.0 - 36.0 g/dL    RDW 16.7 (H) 11.5 - 14.5 %    Platelets 156 150 - 450 x10*3/uL   Basic Metabolic Panel   Result Value Ref Range    Glucose 98 65 - 99 mg/dL    Sodium 141 133 - 145 mmol/L    Potassium 3.9 3.4 - 5.1 mmol/L    Chloride 110 (H) 97 - 107 mmol/L    Bicarbonate 24 24 - 31 mmol/L    Urea Nitrogen 29 (H) 8 - 25 mg/dL    Creatinine 1.60 0.40 - 1.60 mg/dL    eGFR 47 (L) >60 mL/min/1.73m*2    Calcium 8.0 (L) 8.5 - 10.4 mg/dL    Anion Gap 7 <=19 mmol/L   Iron and TIBC   Result Value Ref Range    Iron <20 (L) 45 - 160 ug/dL    UIBC 150 110 - 370 ug/dL    TIBC      % Saturation     Ferritin   Result Value Ref Range    Ferritin 161 30 - 400 ng/mL    "       Assessment/Plan   Principal Problem:    Closed displaced intertrochanteric fracture of left femur (Multi)     64-year-old male postop day 2 IM nailing left hip.  Patient doing well up with therapy weightbearing as tolerated social work for discharge planning continue Plavix with baby aspirin for DVT prophylaxis follow-up in office in 2 weeks upon discharge with Dr. Aviles once medically stable    Victor M Champagne MD

## 2024-08-11 VITALS
WEIGHT: 213.85 LBS | TEMPERATURE: 98.6 F | RESPIRATION RATE: 17 BRPM | BODY MASS INDEX: 31.67 KG/M2 | DIASTOLIC BLOOD PRESSURE: 88 MMHG | SYSTOLIC BLOOD PRESSURE: 133 MMHG | HEIGHT: 69 IN | HEART RATE: 64 BPM | OXYGEN SATURATION: 93 %

## 2024-08-11 LAB
ANION GAP SERPL CALC-SCNC: 8 MMOL/L
BUN SERPL-MCNC: 26 MG/DL (ref 8–25)
CALCIUM SERPL-MCNC: 8.3 MG/DL (ref 8.5–10.4)
CHLORIDE SERPL-SCNC: 110 MMOL/L (ref 97–107)
CO2 SERPL-SCNC: 25 MMOL/L (ref 24–31)
CREAT SERPL-MCNC: 1.3 MG/DL (ref 0.4–1.6)
EGFRCR SERPLBLD CKD-EPI 2021: 61 ML/MIN/1.73M*2
ERYTHROCYTE [DISTWIDTH] IN BLOOD BY AUTOMATED COUNT: 16.5 % (ref 11.5–14.5)
GLUCOSE SERPL-MCNC: 90 MG/DL (ref 65–99)
HCT VFR BLD AUTO: 26.5 % (ref 41–52)
HGB BLD-MCNC: 8.4 G/DL (ref 13.5–17.5)
MCH RBC QN AUTO: 28.1 PG (ref 26–34)
MCHC RBC AUTO-ENTMCNC: 31.7 G/DL (ref 32–36)
MCV RBC AUTO: 89 FL (ref 80–100)
NRBC BLD-RTO: 0 /100 WBCS (ref 0–0)
PLATELET # BLD AUTO: 180 X10*3/UL (ref 150–450)
POTASSIUM SERPL-SCNC: 4 MMOL/L (ref 3.4–5.1)
RBC # BLD AUTO: 2.99 X10*6/UL (ref 4.5–5.9)
SODIUM SERPL-SCNC: 143 MMOL/L (ref 133–145)
WBC # BLD AUTO: 8.2 X10*3/UL (ref 4.4–11.3)

## 2024-08-11 PROCEDURE — 80048 BASIC METABOLIC PNL TOTAL CA: CPT | Performed by: NURSE PRACTITIONER

## 2024-08-11 PROCEDURE — 2500000002 HC RX 250 W HCPCS SELF ADMINISTERED DRUGS (ALT 637 FOR MEDICARE OP, ALT 636 FOR OP/ED): Performed by: ORTHOPAEDIC SURGERY

## 2024-08-11 PROCEDURE — 2500000001 HC RX 250 WO HCPCS SELF ADMINISTERED DRUGS (ALT 637 FOR MEDICARE OP): Performed by: ORTHOPAEDIC SURGERY

## 2024-08-11 PROCEDURE — 94762 N-INVAS EAR/PLS OXIMTRY CONT: CPT

## 2024-08-11 PROCEDURE — 2500000001 HC RX 250 WO HCPCS SELF ADMINISTERED DRUGS (ALT 637 FOR MEDICARE OP): Performed by: NURSE PRACTITIONER

## 2024-08-11 PROCEDURE — 97530 THERAPEUTIC ACTIVITIES: CPT | Mod: GP

## 2024-08-11 PROCEDURE — 36415 COLL VENOUS BLD VENIPUNCTURE: CPT | Performed by: NURSE PRACTITIONER

## 2024-08-11 PROCEDURE — 97110 THERAPEUTIC EXERCISES: CPT | Mod: GP

## 2024-08-11 PROCEDURE — 85027 COMPLETE CBC AUTOMATED: CPT | Performed by: NURSE PRACTITIONER

## 2024-08-11 PROCEDURE — 2500000005 HC RX 250 GENERAL PHARMACY W/O HCPCS: Performed by: STUDENT IN AN ORGANIZED HEALTH CARE EDUCATION/TRAINING PROGRAM

## 2024-08-11 PROCEDURE — 99239 HOSP IP/OBS DSCHRG MGMT >30: CPT | Performed by: NURSE PRACTITIONER

## 2024-08-11 RX ORDER — OXYCODONE AND ACETAMINOPHEN 5; 325 MG/1; MG/1
1 TABLET ORAL EVERY 6 HOURS PRN
Qty: 8 TABLET | Refills: 0 | Status: SHIPPED | OUTPATIENT
Start: 2024-08-11

## 2024-08-11 RX ADMIN — Medication 21 PERCENT: at 08:18

## 2024-08-11 RX ADMIN — ISOSORBIDE MONONITRATE 30 MG: 30 TABLET, EXTENDED RELEASE ORAL at 09:54

## 2024-08-11 RX ADMIN — CLOPIDOGREL BISULFATE 75 MG: 75 TABLET ORAL at 09:54

## 2024-08-11 RX ADMIN — OXYCODONE HYDROCHLORIDE AND ACETAMINOPHEN 1 TABLET: 5; 325 TABLET ORAL at 05:59

## 2024-08-11 RX ADMIN — LISINOPRIL 20 MG: 20 TABLET ORAL at 09:55

## 2024-08-11 RX ADMIN — PANTOPRAZOLE SODIUM 20 MG: 20 TABLET, DELAYED RELEASE ORAL at 09:54

## 2024-08-11 RX ADMIN — CARVEDILOL 3.12 MG: 3.12 TABLET, FILM COATED ORAL at 09:54

## 2024-08-11 RX ADMIN — OXYCODONE HYDROCHLORIDE AND ACETAMINOPHEN 1 TABLET: 5; 325 TABLET ORAL at 02:11

## 2024-08-11 RX ADMIN — AMLODIPINE BESYLATE 5 MG: 5 TABLET ORAL at 09:54

## 2024-08-11 RX ADMIN — ASPIRIN 81 MG CHEWABLE TABLET 81 MG: 81 TABLET CHEWABLE at 09:54

## 2024-08-11 RX ADMIN — OXYCODONE HYDROCHLORIDE AND ACETAMINOPHEN 1 TABLET: 5; 325 TABLET ORAL at 11:38

## 2024-08-11 ASSESSMENT — PAIN - FUNCTIONAL ASSESSMENT
PAIN_FUNCTIONAL_ASSESSMENT: 0-10
PAIN_FUNCTIONAL_ASSESSMENT: WONG-BAKER FACES
PAIN_FUNCTIONAL_ASSESSMENT: 0-10
PAIN_FUNCTIONAL_ASSESSMENT: WONG-BAKER FACES
PAIN_FUNCTIONAL_ASSESSMENT: 0-10

## 2024-08-11 ASSESSMENT — PAIN SCALES - GENERAL
PAINLEVEL_OUTOF10: 5 - MODERATE PAIN
PAINLEVEL_OUTOF10: 6
PAINLEVEL_OUTOF10: 0 - NO PAIN
PAINLEVEL_OUTOF10: 7
PAINLEVEL_OUTOF10: 6
PAINLEVEL_OUTOF10: 3
PAINLEVEL_OUTOF10: 7

## 2024-08-11 ASSESSMENT — PAIN DESCRIPTION - ORIENTATION
ORIENTATION: LEFT

## 2024-08-11 ASSESSMENT — PAIN DESCRIPTION - LOCATION
LOCATION: HIP

## 2024-08-11 ASSESSMENT — COGNITIVE AND FUNCTIONAL STATUS - GENERAL
STANDING UP FROM CHAIR USING ARMS: A LOT
TURNING FROM BACK TO SIDE WHILE IN FLAT BAD: A LOT
WALKING IN HOSPITAL ROOM: A LOT
MOBILITY SCORE: 11
CLIMB 3 TO 5 STEPS WITH RAILING: TOTAL
MOVING TO AND FROM BED TO CHAIR: A LOT
MOVING FROM LYING ON BACK TO SITTING ON SIDE OF FLAT BED WITH BEDRAILS: A LOT

## 2024-08-11 ASSESSMENT — PAIN SCALES - WONG BAKER
WONGBAKER_NUMERICALRESPONSE: NO HURT
WONGBAKER_NUMERICALRESPONSE: NO HURT

## 2024-08-11 NOTE — DISCHARGE SUMMARY
Discharge Diagnosis  Closed displaced intertrochanteric fracture of left femur (Multi)    Issues Requiring Follow-Up  As above    Discharge Meds     Your medication list        START taking these medications        Instructions Last Dose Given Next Dose Due   aspirin 81 mg EC tablet      Take 1 tablet (81 mg) by mouth once daily.       oxyCODONE-acetaminophen 5-325 mg tablet  Commonly known as: Percocet      Take 1 tablet by mouth every 6 hours if needed for severe pain (7 - 10).              CONTINUE taking these medications        Instructions Last Dose Given Next Dose Due   acetaminophen 325 mg tablet  Commonly known as: Tylenol           amLODIPine 5 mg tablet  Commonly known as: Norvasc      Take 1 tablet (5 mg) by mouth once daily.       ARIPiprazole 10 mg tablet  Commonly known as: Abilify           atorvastatin 40 mg tablet  Commonly known as: Lipitor      Take 1 tablet (40 mg) by mouth once daily at bedtime.       carvedilol 3.125 mg tablet  Commonly known as: Coreg      Take 1 tablet (3.125 mg) by mouth 2 times a day with meals.       clopidogrel 75 mg tablet  Commonly known as: Plavix      Take 1 tablet (75 mg) by mouth once daily.       donepezil 10 mg tablet  Commonly known as: Aricept           isosorbide mononitrate ER 30 mg 24 hr tablet  Commonly known as: Imdur      Take 1 tablet (30 mg) by mouth once daily.       lisinopril 20 mg tablet           melatonin 5 mg tablet,disintegrating           nitroglycerin 0.4 mg SL tablet  Commonly known as: Nitrostat      Place 1 tablet (0.4 mg) under the tongue every 5 minutes if needed for chest pain. CALL 911 IF PAIN NOT RELIEVED AFTER 3 DOSES       pantoprazole 20 mg EC tablet  Commonly known as: ProtoNix                  STOP taking these medications      predniSONE 20 mg tablet  Commonly known as: Deltasone                  Where to Get Your Medications        These medications were sent to [facility]  [address], [address], [place]  [place] [address]      Hours: 9 AM to 6 PM Mon-Fri, 9 AM to 1 PM Sat Phone: [phone]   aspirin 81 mg EC tablet       You can get these medications from any pharmacy    Bring a paper prescription for each of these medications  oxyCODONE-acetaminophen 5-325 mg tablet         Test Results Pending At Discharge  Pending Labs       Order Current Status    Extra Urine Gray Tube Collected (08/08/24 4421)    Urinalysis with Reflex Culture and Microscopic In process            Hospital Course   Admitted for further management of L hip fracture. Underwent ORIF with nailing on 8/8/24 with Dr. Troy. Did well post op. Weaned off oxygen. H&H has been stable, he was founf to be iron deficient ad received dose of IV iron with improvement in his H&H, no need for PRBC transfusion. Labs and VS are stable, pain is well controlled. Cleared for DC by ortho. PT/OT recommended moderate intensity therapy, plan for SNF then return to [facility].     Case and plan was discussed with the patient, he is agreeable.   Case and plan was also discussed with my collaborating physician.     Time spent 35 minutes.     Pertinent Physical Exam At Time of Discharge  Physical Exam    Patient seen and examined. Resting in bed, working with therapy. States pain with better control today. He remains stable on room air. Discussed with him the plan of SNF then going back to [place], he is agreeable.     General: Alert and oriented x3, very [place].    Cardiac: Regular rate and rhythm, S1/S2 , no murmur.   Pulmonary: Clear/Diminished on room air.   Abdomen: Soft, round, nontender. BS +x4.   Extremities: Trace edema LLE.   Skin: Dressings in place to the L hip without drainage, some mild edema, no ecchymosis seen.     Outpatient Follow-Up  No future appointments.    Follow up with Dr. Aviles.     Lisbet Aaron, APRN-CNP

## 2024-08-11 NOTE — PROGRESS NOTES
Physical Therapy    Physical Therapy Treatment    Patient Name: Fernando Sweet  MRN: [id number]  Today's Date: 8/11/2024  Time Calculation  Start Time: 0842  Stop Time: 0906  Time Calculation (min): 24 min    Assessment/Plan   PT Assessment  Assessment Comment: pt continues to require assistance for all mobility tasks, he appears to be improving with upright activities but still struggles with getting in/out of bed  PT Plan  Inpatient/Swing Bed or Outpatient: Inpatient  PT Plan  Treatment/Interventions: Bed mobility, Transfer training, Gait training, Balance training, Strengthening, Endurance training, Range of motion, Therapeutic exercise, Therapeutic activity  PT Plan: Ongoing PT  PT Frequency: Daily  PT Discharge Recommendations: Moderate intensity level of continued care  Equipment Recommended upon Discharge: Wheeled walker  PT Recommended Transfer Status: Assist x2, Assistive device  PT - OK to Discharge: Yes      General Visit Information:      General  Prior to Session Communication: Bedside nurse  Patient Position Received: Bed, 3 rail up, Alarm on  General Comment: pt agreeable to therapy    Subjective   Precautions:     Vital Signs:  Vital Signs  Heart Rate: 70  SpO2: 93 % (98 after activity)    Objective   Pain:  Pain Assessment  Pain Assessment: 0-10  0-10 (Numeric) Pain Score: 6  Pain Type: Surgical pain  Pain Location: Hip  Pain Orientation: Left    Treatments:  Therapeutic Exercise  Therapeutic Exercise Activity 1: ankle pumps, qaud and glute sets x20  Therapeutic Exercise Activity 2: AAROM heel slides, SAQs, hip abduction 2x10    Bed Mobility 1  Bed Mobility 1: Supine to sitting, Sitting to supine  Level of Assistance 1: Maximum assistance       Transfer 1  Transfer From 1: Sit to  Transfer to 1: Stand  Transfer Device 1: Walker  Transfer Level of Assistance 1: Moderate assistance  Transfers 2  Transfer From 2: Bed to  Transfer to 2: Chair with arms  Transfer Device 2: Walker  Transfer Level of Assistance  2: Moderate assistance    Outcome Measures:  [facility] Basic Mobility  Turning from your back to your side while in a flat bed without using bedrails: A lot  Moving from lying on your back to sitting on the side of a flat bed without using bedrails: A lot  Moving to and from bed to chair (including a wheelchair): A lot  Standing up from a chair using your arms (e.g. wheelchair or bedside chair): A lot  To walk in hospital room: A lot  Climbing 3-5 steps with railing: Total  Basic Mobility - Total Score: 11    Education Documentation  No documentation found.  Education Comments  No comments found.        OP EDUCATION:       Encounter Problems       Encounter Problems (Active)       Balance       LTG - Patient will maintain balance to allow for safe mobility (Progressing)       Start:  08/09/24    Expected End:  08/16/24               Mobility       STG - Patient will ambulate 20' w/ RW and min assist (Progressing)       Start:  08/09/24    Expected End:  08/16/24            Pt will tolerate BLE exercises to promote functional strength, balance and endurance (Progressing)       Start:  08/09/24    Expected End:  08/16/24               PT Transfers       STG - Patient to transfer to and from sit to supine w/ min assist (Progressing)       Start:  08/09/24    Expected End:  08/16/24            STG - Patient will transfer sit to and from stand w/ min assist (Progressing)       Start:  08/09/24    Expected End:  08/16/24

## 2024-08-11 NOTE — NURSING NOTE
Report called to nurse at [facility], pt guardian notified of transfer to [facility] at 12:30.

## 2024-08-11 NOTE — PROGRESS NOTES
08/11/24 0929   Discharge Planning   Home or Post Acute Services Post acute facilities (Rehab/SNF/etc)   Type of Post Acute Facility Services Skilled nursing  (Patient accepted by [facility].  N2N report number [id number] or [phone].  Patient can d/c when medically ready and arrangements are made.)   Expected Discharge Disposition SNF   Does the patient need discharge transport arranged? Yes   RoundTrip coordination needed? Yes   Has discharge transport been arranged? No     1020  Transportation arranged via [employer] for 12:30 pm.  Bedside nurse advised of same.

## 2024-08-15 ENCOUNTER — NURSING HOME VISIT (OUTPATIENT)
Dept: POST ACUTE CARE | Facility: EXTERNAL LOCATION | Age: 64
End: 2024-08-15
Payer: MEDICARE

## 2024-08-15 VITALS
OXYGEN SATURATION: 94 % | RESPIRATION RATE: 16 BRPM | HEART RATE: 60 BPM | WEIGHT: 200.4 LBS | SYSTOLIC BLOOD PRESSURE: 142 MMHG | DIASTOLIC BLOOD PRESSURE: 98 MMHG | BODY MASS INDEX: 29.59 KG/M2 | TEMPERATURE: 98 F

## 2024-08-15 DIAGNOSIS — I63.9 CEREBROVASCULAR ACCIDENT (CVA), UNSPECIFIED MECHANISM (MULTI): ICD-10-CM

## 2024-08-15 DIAGNOSIS — N18.31 STAGE 3A CHRONIC KIDNEY DISEASE (MULTI): ICD-10-CM

## 2024-08-15 DIAGNOSIS — D50.9 IRON DEFICIENCY ANEMIA, UNSPECIFIED IRON DEFICIENCY ANEMIA TYPE: ICD-10-CM

## 2024-08-15 DIAGNOSIS — I25.10 ATHEROSCLEROSIS OF CORONARY ARTERY OF NATIVE HEART WITHOUT ANGINA PECTORIS, UNSPECIFIED VESSEL OR LESION TYPE: ICD-10-CM

## 2024-08-15 DIAGNOSIS — I10 HYPERTENSION, UNSPECIFIED TYPE: ICD-10-CM

## 2024-08-15 DIAGNOSIS — S72.142D CLOSED DISPLACED INTERTROCHANTERIC FRACTURE OF LEFT FEMUR WITH ROUTINE HEALING, SUBSEQUENT ENCOUNTER: Primary | ICD-10-CM

## 2024-08-15 PROCEDURE — 99309 SBSQ NF CARE MODERATE MDM 30: CPT | Performed by: PHYSICIAN ASSISTANT

## 2024-08-15 ASSESSMENT — ENCOUNTER SYMPTOMS
DYSURIA: 0
FREQUENCY: 0
DIARRHEA: 0
DIZZINESS: 0
CHILLS: 0
VOMITING: 0
WEAKNESS: 0
NERVOUS/ANXIOUS: 0
TREMORS: 0
WHEEZING: 0
CONSTIPATION: 0
HEADACHES: 0
HEMATURIA: 0
FEVER: 0
CONFUSION: 0
SHORTNESS OF BREATH: 0
ABDOMINAL PAIN: 0
APPETITE CHANGE: 0
NAUSEA: 0
COUGH: 0

## 2024-08-15 NOTE — LETTER
Patient: Fernando Sweet  : 1960    Encounter Date: 08/15/2024      Subjective  [id number] : Fernando Sweet is a 64 y.o. male admitted to [facility] for rehab. No chief complaint on file..  HPI  Pt  with h/o htn, cad s/p cabg, ckd3, and dementia treated for a fall with closed displaced intertrochanteric fracuter of the left femur s.p ORIF on  with Dr Troy.   Pt seen while resting in bed.  He is very hard of hearing and communication is difficult.   Pain has been controlled with percocet as needed.   He was treated for post op anemia with IV iron.   Nursing has sent a UA today which is pending.  He has no lower leg edema.  No shortness of breath or cough.  He is eating and drinking about 50% of his meals.   No bowel movement since admission.  No abdominal pain.  No nausea or emesis.  Pt is a resident at [place].    Review of Systems   Constitutional:  Negative for appetite change, chills and fever.   Respiratory:  Negative for cough, shortness of breath and wheezing.    Cardiovascular:  Negative for chest pain and leg swelling.   Gastrointestinal:  Negative for abdominal pain, constipation, diarrhea, nausea and vomiting.   Genitourinary:  Negative for dysuria, frequency and hematuria.   Neurological:  Negative for dizziness, tremors, weakness and headaches.   Psychiatric/Behavioral:  Negative for confusion. The patient is not nervous/anxious.    All other systems reviewed and are negative.      Objective  BP (!) 142/98   Pulse 60   Temp 36.7 °C (98 °F)   Resp 16   Wt 90.9 kg (200 lb 6.4 oz)   SpO2 94%   BMI 29.59 kg/m²    Physical Exam  Constitutional:       General: He is not in acute distress.  Eyes:      Conjunctiva/sclera: Conjunctivae normal.      Pupils: Pupils are equal, round, and reactive to light.   Cardiovascular:      Rate and Rhythm: Normal rate and regular rhythm.      Heart sounds: No murmur heard.  Pulmonary:      Effort: Pulmonary effort is normal.      Breath sounds: No wheezing,  "rhonchi or rales.   Abdominal:      General: Abdomen is flat. Bowel sounds are normal. There is no distension.      Palpations: Abdomen is soft. There is no mass.      Tenderness: There is no abdominal tenderness.   Musculoskeletal:         General: No swelling. Normal range of motion.      Comments: Hip thigh incisions are approximated and healing.  No redness, swelling or drainage is noted.    Skin:     General: Skin is warm and dry.      Findings: No rash.   Neurological:      General: No focal deficit present.      Mental Status: He is alert and oriented to person, place, and time. Mental status is at baseline.       No lab exists for component: \"CBC BMP\"  Assessment/Plan  Problem List Items Addressed This Visit             ICD-10-CM    Atherosclerosis of coronary artery I25.10     H/o CABG.   On imdur aspirin and statin.           Hypertension I10     Continue norvasc, lisinopril,  and carvedilol.  Monitor blood pressures and adjust meds as needed.         Stage 3 chronic kidney disease (Multi) N18.30     Monitor weekly labs.         CVA (cerebral vascular accident) (Multi) I63.9     Continues on aspirin, plavix and statin         Closed displaced intertrochanteric fracture of left femur (Multi) - Primary S72.142A     Continue PT/OT.   Pain management with percocet.   Follow up with Dr brenner as requested.          Iron deficiency anemia D50.9     S/p IV iron.   Monitor weekly labs.         Constipation:  ask nursing to give milk of mag today.  Needs routine bowel meds while taking pain meds.  Give miralax routine daily.        Time spent: 30 min in review of chart, labs and orders, consultation with pt and documentation.       Electronically Signed By: Nannette Rojas PA-C   8/15/24  3:00 PM  "

## 2024-08-15 NOTE — PROGRESS NOTES
Subjective   [id number] : Fernando Sweet is a 64 y.o. male admitted to [facility] for rehab. No chief complaint on file..  HPI  Pt  with h/o htn, cad s/p cabg, ckd3, and dementia treated for a fall with closed displaced intertrochanteric fracuter of the left femur s.p ORIF on 8/8 with Dr Troy.   Pt seen while resting in bed.  He is very hard of hearing and communication is difficult.   Pain has been controlled with percocet as needed.   He was treated for post op anemia with IV iron.   Nursing has sent a UA today which is pending.  He has no lower leg edema.  No shortness of breath or cough.  He is eating and drinking about 50% of his meals.   No bowel movement since admission.  No abdominal pain.  No nausea or emesis.  Pt is a resident at [place].    Review of Systems   Constitutional:  Negative for appetite change, chills and fever.   Respiratory:  Negative for cough, shortness of breath and wheezing.    Cardiovascular:  Negative for chest pain and leg swelling.   Gastrointestinal:  Negative for abdominal pain, constipation, diarrhea, nausea and vomiting.   Genitourinary:  Negative for dysuria, frequency and hematuria.   Neurological:  Negative for dizziness, tremors, weakness and headaches.   Psychiatric/Behavioral:  Negative for confusion. The patient is not nervous/anxious.    All other systems reviewed and are negative.      Objective   BP (!) 142/98   Pulse 60   Temp 36.7 °C (98 °F)   Resp 16   Wt 90.9 kg (200 lb 6.4 oz)   SpO2 94%   BMI 29.59 kg/m²    Physical Exam  Constitutional:       General: He is not in acute distress.  Eyes:      Conjunctiva/sclera: Conjunctivae normal.      Pupils: Pupils are equal, round, and reactive to light.   Cardiovascular:      Rate and Rhythm: Normal rate and regular rhythm.      Heart sounds: No murmur heard.  Pulmonary:      Effort: Pulmonary effort is normal.      Breath sounds: No wheezing, rhonchi or rales.   Abdominal:      General: Abdomen is flat.  "Bowel sounds are normal. There is no distension.      Palpations: Abdomen is soft. There is no mass.      Tenderness: There is no abdominal tenderness.   Musculoskeletal:         General: No swelling. Normal range of motion.      Comments: Hip thigh incisions are approximated and healing.  No redness, swelling or drainage is noted.    Skin:     General: Skin is warm and dry.      Findings: No rash.   Neurological:      General: No focal deficit present.      Mental Status: He is alert and oriented to person, place, and time. Mental status is at baseline.       No lab exists for component: \"CBC BMP\"  Assessment/Plan   Problem List Items Addressed This Visit             ICD-10-CM    Atherosclerosis of coronary artery I25.10     H/o CABG.   On imdur aspirin and statin.           Hypertension I10     Continue norvasc, lisinopril,  and carvedilol.  Monitor blood pressures and adjust meds as needed.         Stage 3 chronic kidney disease (Multi) N18.30     Monitor weekly labs.         CVA (cerebral vascular accident) (Multi) I63.9     Continues on aspirin, plavix and statin         Closed displaced intertrochanteric fracture of left femur (Multi) - Primary S72.142A     Continue PT/OT.   Pain management with percocet.   Follow up with Dr brenner as requested.          Iron deficiency anemia D50.9     S/p IV iron.   Monitor weekly labs.         Constipation:  ask nursing to give milk of mag today.  Needs routine bowel meds while taking pain meds.  Give miralax routine daily.        Time spent: 30 min in review of chart, labs and orders, consultation with pt and documentation.   "

## 2024-08-15 NOTE — ASSESSMENT & PLAN NOTE
Continue norvasc, lisinopril,  and carvedilol.  Monitor blood pressures and adjust meds as needed.

## 2024-08-18 NOTE — ED PROVIDER NOTES
History of Present Illness     History provided by: Patient  Limitations to History:  Difficulty hearing  External Records Reviewed with Brief Summary: Nursing home paperwork which showed home medications    HPI:  Fernando Sweet is a 64 y.o. male presents with reported confusion.  Patient is very hard of hearing, but able to answer questions.  Reportedly patient was found sitting at the edge of his bed unable to ambulate and appeared confused per his caregivers.  Patient here in the emergency department states that he woke up with severe hip pain.  He does admit to a recent fall.  Denies hitting his head, no loss of conscious.    Physical Exam   Triage vitals:  T 37.1 °C (98.8 °F)  HR 66  /83  RR 18  O2 96 % None (Room air)    Physical Exam  Vitals and nursing note reviewed.   Constitutional:       General: He is not in acute distress.     Appearance: He is not ill-appearing.   HENT:      Head: Normocephalic and atraumatic.      Mouth/Throat:      Mouth: Mucous membranes are moist.      Pharynx: Oropharynx is clear.   Eyes:      Extraocular Movements: Extraocular movements intact.      Conjunctiva/sclera: Conjunctivae normal.      Pupils: Pupils are equal, round, and reactive to light.   Cardiovascular:      Rate and Rhythm: Normal rate and regular rhythm.   Pulmonary:      Effort: Pulmonary effort is normal. No respiratory distress.      Breath sounds: Normal breath sounds.   Abdominal:      General: There is no distension.      Palpations: Abdomen is soft.      Tenderness: There is no abdominal tenderness.   Musculoskeletal:         General: No swelling or deformity.      Cervical back: Normal range of motion and neck supple.      Right hip: Tenderness present. Decreased range of motion.   Skin:     General: Skin is warm and dry.      Capillary Refill: Capillary refill takes less than 2 seconds.   Neurological:      General: No focal deficit present.      Mental Status: He is alert and oriented to person,  place, and time. Mental status is at baseline.   Psychiatric:         Mood and Affect: Mood normal.         Behavior: Behavior normal.          Medical Decision Making & ED Course   Medical Decision Makin y.o. male presents with right hip pain, XR shows intertrochanteric fx, no displacement.  Initial concern for AMS, however patient is alert and oriented, no evidence of delirium or confusion.  Patient is hard of hearing and in pain, likely causing care providers to believe patient was confused.  No other signs MSK or other trauma by H&P.  I have considered the following with regards to this patient's condition: Contusion, hematoma, laceration, joint dislocation, fracture, compartment syndrome, internal derangement of joint.  No obvious nerve or vascular injury.  No FB on exam or imaging.  No contamination, lac, or indication for antibx at this time.    Patient to proceed to OR with ortho.  Admit to medicine.    ----    Social Determinants of Health which Significantly Impact Care: None identified     EKG Independent Interpretation: EKG interpreted by myself. Please see ED Course for full interpretation.    Independent Result Review and Interpretation: Relevant laboratory and radiographic results were reviewed and independently interpreted by myself.  As necessary, they are commented on in the ED Course.    Chronic conditions affecting the patient's care: As documented above in [facility]    The patient was discussed with the following consultants/services: Hospitalist/Admitting Provider who accepted the patient for admission and Orthopedics on call who recommended admission and planned surgical intervention    Care Considerations: As documented above in [facility]    ED Course:  ED Course as of 24 0806   Thu Aug 08, 2024   1017 ECG 12 lead  Performed at  1014, HR of 65, NSR, NAD, QTc 443, no sign of STEMI, no Q wave or T wave abnormality noted.    Reviewed and interpreted by me at time performed   [JM]      ED Course  User Index  [JM] Mattie Saxena MD         Diagnoses as of 08/18/24 0806   Closed comminuted intertrochanteric fracture of left femur, initial encounter (Multi)       Procedures   Procedures    Mattie Saxena MD  Emergency Medicine     Mattie Saxena MD  08/18/24 0914

## 2024-08-22 ENCOUNTER — NURSING HOME VISIT (OUTPATIENT)
Dept: POST ACUTE CARE | Facility: EXTERNAL LOCATION | Age: 64
End: 2024-08-22
Payer: MEDICARE

## 2024-08-22 VITALS
BODY MASS INDEX: 29.12 KG/M2 | TEMPERATURE: 98 F | RESPIRATION RATE: 18 BRPM | DIASTOLIC BLOOD PRESSURE: 80 MMHG | OXYGEN SATURATION: 95 % | HEART RATE: 74 BPM | WEIGHT: 197.2 LBS | SYSTOLIC BLOOD PRESSURE: 140 MMHG

## 2024-08-22 DIAGNOSIS — I25.10 ATHEROSCLEROSIS OF CORONARY ARTERY OF NATIVE HEART WITHOUT ANGINA PECTORIS, UNSPECIFIED VESSEL OR LESION TYPE: ICD-10-CM

## 2024-08-22 DIAGNOSIS — S72.142D CLOSED DISPLACED INTERTROCHANTERIC FRACTURE OF LEFT FEMUR WITH ROUTINE HEALING, SUBSEQUENT ENCOUNTER: Primary | ICD-10-CM

## 2024-08-22 DIAGNOSIS — N18.31 STAGE 3A CHRONIC KIDNEY DISEASE (MULTI): ICD-10-CM

## 2024-08-22 DIAGNOSIS — D50.9 IRON DEFICIENCY ANEMIA, UNSPECIFIED IRON DEFICIENCY ANEMIA TYPE: ICD-10-CM

## 2024-08-22 DIAGNOSIS — I63.9 CEREBROVASCULAR ACCIDENT (CVA), UNSPECIFIED MECHANISM (MULTI): ICD-10-CM

## 2024-08-22 DIAGNOSIS — I10 HYPERTENSION, UNSPECIFIED TYPE: ICD-10-CM

## 2024-08-22 PROCEDURE — 99309 SBSQ NF CARE MODERATE MDM 30: CPT | Performed by: PHYSICIAN ASSISTANT

## 2024-08-22 ASSESSMENT — ENCOUNTER SYMPTOMS
DIZZINESS: 0
VOMITING: 0
CONFUSION: 0
FEVER: 0
ABDOMINAL PAIN: 0
APPETITE CHANGE: 0
CONSTIPATION: 0
TREMORS: 0
WHEEZING: 0
NAUSEA: 0
NERVOUS/ANXIOUS: 0
DYSURIA: 0
DIARRHEA: 0
WEAKNESS: 0
HEMATURIA: 0
COUGH: 0
SHORTNESS OF BREATH: 0
FREQUENCY: 0
HEADACHES: 0
CHILLS: 0

## 2024-08-22 NOTE — LETTER
Patient: Fernando Sweet  : 1960    Encounter Date: 2024      Subjective  [id number] : Fernando Sweet is a 64 y.o. male admitted to [facility] for rehab. No chief complaint on file..  HPI  Pt  with h/o htn, cad s/p cabg, ckd3, and dementia treated for a fall with closed displaced intertrochanteric fracuter of the left femur s.p ORIF on  with Dr Troy.   Pt seen while resting in bed.   Pt is hard of hearing but has his hearing aids in today and is communicating better.   He reports left hip pain.   He states that it it difficult to ambulate.   He does have percocet prn for pain management.   Nursing reports that pt had episode of chest pain and was given dose of nitroglycerine Tuesday evening.  He has had no further chest pain since.   He denies any shortness of breath or cough.   He offers no new complaints or concerns.   He had routine labs today - his hemoglobin is improving and is 10.7 today.  He had UA which showed no growth.   He has no lower leg edema.  No shortness of breath or cough.    No abdominal pain.  No nausea or emesis.  Pt is a resident at [place].    Review of Systems   Constitutional:  Negative for appetite change, chills and fever.   Respiratory:  Negative for cough, shortness of breath and wheezing.    Cardiovascular:  Negative for chest pain and leg swelling.   Gastrointestinal:  Negative for abdominal pain, constipation, diarrhea, nausea and vomiting.   Genitourinary:  Negative for dysuria, frequency and hematuria.   Neurological:  Negative for dizziness, tremors, weakness and headaches.   Psychiatric/Behavioral:  Negative for confusion. The patient is not nervous/anxious.    All other systems reviewed and are negative.      Objective  /80   Pulse 74   Temp 36.7 °C (98 °F)   Resp 18   Wt 89.4 kg (197 lb 3.2 oz)   SpO2 95%   BMI 29.12 kg/m²    Physical Exam  Constitutional:       General: He is not in acute distress.  Eyes:      Conjunctiva/sclera: Conjunctivae  "normal.      Pupils: Pupils are equal, round, and reactive to light.   Cardiovascular:      Rate and Rhythm: Normal rate and regular rhythm.      Heart sounds: No murmur heard.  Pulmonary:      Effort: Pulmonary effort is normal.      Breath sounds: No wheezing, rhonchi or rales.   Abdominal:      General: Abdomen is flat. Bowel sounds are normal. There is no distension.      Palpations: Abdomen is soft. There is no mass.      Tenderness: There is no abdominal tenderness.   Musculoskeletal:         General: No swelling. Normal range of motion.      Comments: Hip thigh incisions are approximated and healing.  No redness, swelling or drainage is noted.    Skin:     General: Skin is warm and dry.      Findings: No rash.   Neurological:      General: No focal deficit present.      Mental Status: He is alert and oriented to person, place, and time. Mental status is at baseline.       No lab exists for component: \"CBC BMP\"  Assessment/Plan  Atherosclerosis of coronary artery I25.10        H/o CABG.   On imdur aspirin and statin.             Hypertension I10       Continue norvasc, lisinopril,  and carvedilol.  Monitor blood pressures and adjust meds as needed.           Stage 3 chronic kidney disease (Multi) N18.30       Monitor weekly labs.           CVA (cerebral vascular accident) (Multi) I63.9       Continues on aspirin, plavix and statin           Closed displaced intertrochanteric fracture of left femur (Multi) - Primary S72.142A       Continue PT/OT.   Pain management with percocet.   Follow up with Dr brenner on 8/26           Iron deficiency anemia D50.9       S/p IV iron.   Monitor weekly labs. CBC trending up.          Constipation:   continue miralax routine daily.        Time spent: 30 min in review of chart, labs and orders, consultation with pt and documentation.       Electronically Signed By: Nannette Rojas PA-C   8/22/24  2:44 PM  "

## 2024-08-22 NOTE — PROGRESS NOTES
Subjective   [id number] : Fernando Sweet is a 64 y.o. male admitted to [facility] for rehab. No chief complaint on file..  HPI  Pt  with h/o htn, cad s/p cabg, ckd3, and dementia treated for a fall with closed displaced intertrochanteric fracuter of the left femur s.p ORIF on 8/8 with Dr Troy.   Pt seen while resting in bed.   Pt is hard of hearing but has his hearing aids in today and is communicating better.   He reports left hip pain.   He states that it it difficult to ambulate.   He does have percocet prn for pain management.   Nursing reports that pt had episode of chest pain and was given dose of nitroglycerine Tuesday evening.  He has had no further chest pain since.   He denies any shortness of breath or cough.   He offers no new complaints or concerns.   He had routine labs today - his hemoglobin is improving and is 10.7 today.  He had UA which showed no growth.   He has no lower leg edema.  No shortness of breath or cough.    No abdominal pain.  No nausea or emesis.  Pt is a resident at [place].    Review of Systems   Constitutional:  Negative for appetite change, chills and fever.   Respiratory:  Negative for cough, shortness of breath and wheezing.    Cardiovascular:  Negative for chest pain and leg swelling.   Gastrointestinal:  Negative for abdominal pain, constipation, diarrhea, nausea and vomiting.   Genitourinary:  Negative for dysuria, frequency and hematuria.   Neurological:  Negative for dizziness, tremors, weakness and headaches.   Psychiatric/Behavioral:  Negative for confusion. The patient is not nervous/anxious.    All other systems reviewed and are negative.      Objective   /80   Pulse 74   Temp 36.7 °C (98 °F)   Resp 18   Wt 89.4 kg (197 lb 3.2 oz)   SpO2 95%   BMI 29.12 kg/m²    Physical Exam  Constitutional:       General: He is not in acute distress.  Eyes:      Conjunctiva/sclera: Conjunctivae normal.      Pupils: Pupils are equal, round, and reactive to light.  "  Cardiovascular:      Rate and Rhythm: Normal rate and regular rhythm.      Heart sounds: No murmur heard.  Pulmonary:      Effort: Pulmonary effort is normal.      Breath sounds: No wheezing, rhonchi or rales.   Abdominal:      General: Abdomen is flat. Bowel sounds are normal. There is no distension.      Palpations: Abdomen is soft. There is no mass.      Tenderness: There is no abdominal tenderness.   Musculoskeletal:         General: No swelling. Normal range of motion.      Comments: Hip thigh incisions are approximated and healing.  No redness, swelling or drainage is noted.    Skin:     General: Skin is warm and dry.      Findings: No rash.   Neurological:      General: No focal deficit present.      Mental Status: He is alert and oriented to person, place, and time. Mental status is at baseline.       No lab exists for component: \"CBC BMP\"  Assessment/Plan   Atherosclerosis of coronary artery I25.10        H/o CABG.   On imdur aspirin and statin.             Hypertension I10       Continue norvasc, lisinopril,  and carvedilol.  Monitor blood pressures and adjust meds as needed.           Stage 3 chronic kidney disease (Multi) N18.30       Monitor weekly labs.           CVA (cerebral vascular accident) (Multi) I63.9       Continues on aspirin, plavix and statin           Closed displaced intertrochanteric fracture of left femur (Multi) - Primary S72.142A       Continue PT/OT.   Pain management with percocet.   Follow up with Dr brenner on 8/26           Iron deficiency anemia D50.9       S/p IV iron.   Monitor weekly labs. CBC trending up.          Constipation:   continue miralax routine daily.        Time spent: 30 min in review of chart, labs and orders, consultation with pt and documentation.   "

## 2024-08-30 ENCOUNTER — HOSPITAL ENCOUNTER (INPATIENT)
Facility: HOSPITAL | Age: 64
End: 2024-08-30
Attending: EMERGENCY MEDICINE | Admitting: INTERNAL MEDICINE
Payer: MEDICARE

## 2024-08-30 ENCOUNTER — APPOINTMENT (OUTPATIENT)
Dept: CARDIOLOGY | Facility: HOSPITAL | Age: 64
End: 2024-08-30
Payer: MEDICARE

## 2024-08-30 ENCOUNTER — APPOINTMENT (OUTPATIENT)
Dept: RADIOLOGY | Facility: HOSPITAL | Age: 64
End: 2024-08-30
Payer: MEDICARE

## 2024-08-30 DIAGNOSIS — Z87.81 S/P LEFT HIP FRACTURE: ICD-10-CM

## 2024-08-30 DIAGNOSIS — N17.9 AKI (ACUTE KIDNEY INJURY) (CMS-HCC): ICD-10-CM

## 2024-08-30 DIAGNOSIS — N40.1 BENIGN PROSTATIC HYPERPLASIA WITH LOWER URINARY TRACT SYMPTOMS, SYMPTOM DETAILS UNSPECIFIED: ICD-10-CM

## 2024-08-30 DIAGNOSIS — R79.89 ELEVATED TROPONIN: ICD-10-CM

## 2024-08-30 DIAGNOSIS — R07.9 CHEST PAIN, UNSPECIFIED TYPE: Primary | ICD-10-CM

## 2024-08-30 LAB
ALBUMIN SERPL-MCNC: 3.4 G/DL (ref 3.5–5)
ALP BLD-CCNC: 168 U/L (ref 35–125)
ALT SERPL-CCNC: 11 U/L (ref 5–40)
ANION GAP SERPL CALC-SCNC: 13 MMOL/L
AST SERPL-CCNC: 18 U/L (ref 5–40)
BASOPHILS # BLD AUTO: 0.02 X10*3/UL (ref 0–0.1)
BASOPHILS NFR BLD AUTO: 0.3 %
BILIRUB SERPL-MCNC: 0.4 MG/DL (ref 0.1–1.2)
BUN SERPL-MCNC: 60 MG/DL (ref 8–25)
CALCIUM SERPL-MCNC: 8.6 MG/DL (ref 8.5–10.4)
CHLORIDE SERPL-SCNC: 101 MMOL/L (ref 97–107)
CO2 SERPL-SCNC: 22 MMOL/L (ref 24–31)
CREAT SERPL-MCNC: 4.4 MG/DL (ref 0.4–1.6)
D DIMER PPP FEU-MCNC: 1.16 MG/L FEU (ref 0.19–0.5)
EGFRCR SERPLBLD CKD-EPI 2021: 14 ML/MIN/1.73M*2
EOSINOPHIL # BLD AUTO: 0.14 X10*3/UL (ref 0–0.7)
EOSINOPHIL NFR BLD AUTO: 1.8 %
ERYTHROCYTE [DISTWIDTH] IN BLOOD BY AUTOMATED COUNT: 16.7 % (ref 11.5–14.5)
GLUCOSE SERPL-MCNC: 107 MG/DL (ref 65–99)
HCT VFR BLD AUTO: 22.8 % (ref 41–52)
HGB BLD-MCNC: 7.2 G/DL (ref 13.5–17.5)
IMM GRANULOCYTES # BLD AUTO: 0.03 X10*3/UL (ref 0–0.7)
IMM GRANULOCYTES NFR BLD AUTO: 0.4 % (ref 0–0.9)
LYMPHOCYTES # BLD AUTO: 1.68 X10*3/UL (ref 1.2–4.8)
LYMPHOCYTES NFR BLD AUTO: 21.8 %
MCH RBC QN AUTO: 29.5 PG (ref 26–34)
MCHC RBC AUTO-ENTMCNC: 31.6 G/DL (ref 32–36)
MCV RBC AUTO: 93 FL (ref 80–100)
MONOCYTES # BLD AUTO: 1.05 X10*3/UL (ref 0.1–1)
MONOCYTES NFR BLD AUTO: 13.6 %
NEUTROPHILS # BLD AUTO: 4.8 X10*3/UL (ref 1.2–7.7)
NEUTROPHILS NFR BLD AUTO: 62.1 %
NRBC BLD-RTO: 0 /100 WBCS (ref 0–0)
NT-PROBNP SERPL-MCNC: 658 PG/ML (ref 0–229)
PLATELET # BLD AUTO: 334 X10*3/UL (ref 150–450)
POTASSIUM SERPL-SCNC: 3.9 MMOL/L (ref 3.4–5.1)
PROT SERPL-MCNC: 6.2 G/DL (ref 5.9–7.9)
RBC # BLD AUTO: 2.44 X10*6/UL (ref 4.5–5.9)
SODIUM SERPL-SCNC: 136 MMOL/L (ref 133–145)
TROPONIN T SERPL-MCNC: 32 NG/L
TROPONIN T SERPL-MCNC: 35 NG/L
WBC # BLD AUTO: 7.7 X10*3/UL (ref 4.4–11.3)

## 2024-08-30 PROCEDURE — 36415 COLL VENOUS BLD VENIPUNCTURE: CPT | Performed by: EMERGENCY MEDICINE

## 2024-08-30 PROCEDURE — 1200000002 HC GENERAL ROOM WITH TELEMETRY DAILY

## 2024-08-30 PROCEDURE — 85025 COMPLETE CBC W/AUTO DIFF WBC: CPT | Performed by: EMERGENCY MEDICINE

## 2024-08-30 PROCEDURE — A9540 TC99M MAA: HCPCS

## 2024-08-30 PROCEDURE — 78803 RP LOCLZJ TUM SPECT 1 AREA: CPT | Performed by: NUCLEAR MEDICINE

## 2024-08-30 PROCEDURE — 83880 ASSAY OF NATRIURETIC PEPTIDE: CPT | Performed by: EMERGENCY MEDICINE

## 2024-08-30 PROCEDURE — 2500000004 HC RX 250 GENERAL PHARMACY W/ HCPCS (ALT 636 FOR OP/ED): Performed by: EMERGENCY MEDICINE

## 2024-08-30 PROCEDURE — 99285 EMERGENCY DEPT VISIT HI MDM: CPT | Mod: 25

## 2024-08-30 PROCEDURE — 2500000001 HC RX 250 WO HCPCS SELF ADMINISTERED DRUGS (ALT 637 FOR MEDICARE OP): Performed by: INTERNAL MEDICINE

## 2024-08-30 PROCEDURE — 93005 ELECTROCARDIOGRAM TRACING: CPT

## 2024-08-30 PROCEDURE — 71045 X-RAY EXAM CHEST 1 VIEW: CPT

## 2024-08-30 PROCEDURE — 80053 COMPREHEN METABOLIC PANEL: CPT | Performed by: EMERGENCY MEDICINE

## 2024-08-30 PROCEDURE — 96374 THER/PROPH/DIAG INJ IV PUSH: CPT

## 2024-08-30 PROCEDURE — 85300 ANTITHROMBIN III ACTIVITY: CPT | Performed by: EMERGENCY MEDICINE

## 2024-08-30 PROCEDURE — 78803 RP LOCLZJ TUM SPECT 1 AREA: CPT

## 2024-08-30 PROCEDURE — 3430000001 HC RX 343 DIAGNOSTIC RADIOPHARMACEUTICALS

## 2024-08-30 PROCEDURE — 84484 ASSAY OF TROPONIN QUANT: CPT | Performed by: EMERGENCY MEDICINE

## 2024-08-30 PROCEDURE — 96361 HYDRATE IV INFUSION ADD-ON: CPT

## 2024-08-30 PROCEDURE — 71045 X-RAY EXAM CHEST 1 VIEW: CPT | Performed by: RADIOLOGY

## 2024-08-30 PROCEDURE — 96375 TX/PRO/DX INJ NEW DRUG ADDON: CPT

## 2024-08-30 PROCEDURE — 2500000004 HC RX 250 GENERAL PHARMACY W/ HCPCS (ALT 636 FOR OP/ED): Performed by: INTERNAL MEDICINE

## 2024-08-30 PROCEDURE — 93010 ELECTROCARDIOGRAM REPORT: CPT | Performed by: INTERNAL MEDICINE

## 2024-08-30 RX ORDER — HEPARIN SODIUM 5000 [USP'U]/ML
5000 INJECTION, SOLUTION INTRAVENOUS; SUBCUTANEOUS EVERY 8 HOURS SCHEDULED
Status: DISCONTINUED | OUTPATIENT
Start: 2024-08-30 | End: 2024-08-31

## 2024-08-30 RX ORDER — OXYCODONE AND ACETAMINOPHEN 5; 325 MG/1; MG/1
1 TABLET ORAL EVERY 6 HOURS PRN
Status: DISCONTINUED | OUTPATIENT
Start: 2024-08-30 | End: 2024-09-05 | Stop reason: HOSPADM

## 2024-08-30 RX ORDER — CARVEDILOL 25 MG/1
25 TABLET ORAL
Status: DISCONTINUED | OUTPATIENT
Start: 2024-08-31 | End: 2024-09-05 | Stop reason: HOSPADM

## 2024-08-30 RX ORDER — ONDANSETRON HYDROCHLORIDE 2 MG/ML
4 INJECTION, SOLUTION INTRAVENOUS ONCE
Status: COMPLETED | OUTPATIENT
Start: 2024-08-30 | End: 2024-08-30

## 2024-08-30 RX ORDER — ACETAMINOPHEN 500 MG
5 TABLET ORAL NIGHTLY PRN
Status: DISCONTINUED | OUTPATIENT
Start: 2024-08-30 | End: 2024-09-05 | Stop reason: HOSPADM

## 2024-08-30 RX ORDER — MORPHINE SULFATE 4 MG/ML
4 INJECTION, SOLUTION INTRAMUSCULAR; INTRAVENOUS ONCE
Status: COMPLETED | OUTPATIENT
Start: 2024-08-30 | End: 2024-08-30

## 2024-08-30 RX ORDER — ASPIRIN 81 MG/1
81 TABLET ORAL DAILY
Status: DISCONTINUED | OUTPATIENT
Start: 2024-08-31 | End: 2024-08-31

## 2024-08-30 RX ORDER — AMLODIPINE BESYLATE 5 MG/1
5 TABLET ORAL DAILY
Status: DISCONTINUED | OUTPATIENT
Start: 2024-08-31 | End: 2024-09-05 | Stop reason: HOSPADM

## 2024-08-30 RX ORDER — POLYETHYLENE GLYCOL 3350 17 G/17G
17 POWDER, FOR SOLUTION ORAL DAILY
Status: DISCONTINUED | OUTPATIENT
Start: 2024-08-31 | End: 2024-09-05 | Stop reason: HOSPADM

## 2024-08-30 RX ORDER — PANTOPRAZOLE SODIUM 20 MG/1
20 TABLET, DELAYED RELEASE ORAL
Status: DISCONTINUED | OUTPATIENT
Start: 2024-08-31 | End: 2024-09-05 | Stop reason: HOSPADM

## 2024-08-30 RX ORDER — CARVEDILOL 3.12 MG/1
3.12 TABLET ORAL
Status: DISCONTINUED | OUTPATIENT
Start: 2024-08-31 | End: 2024-08-30

## 2024-08-30 RX ORDER — LEVOFLOXACIN 500 MG/1
500 TABLET, FILM COATED ORAL DAILY
COMMUNITY
Start: 2024-08-30 | End: 2024-09-05 | Stop reason: HOSPADM

## 2024-08-30 RX ORDER — ACETAMINOPHEN 325 MG/1
650 TABLET ORAL EVERY 6 HOURS PRN
Status: DISCONTINUED | OUTPATIENT
Start: 2024-08-30 | End: 2024-09-05 | Stop reason: HOSPADM

## 2024-08-30 RX ORDER — FAMOTIDINE 10 MG/ML
20 INJECTION INTRAVENOUS ONCE
Status: COMPLETED | OUTPATIENT
Start: 2024-08-30 | End: 2024-08-30

## 2024-08-30 RX ORDER — ASPIRIN 325 MG
325 TABLET ORAL ONCE
Status: DISCONTINUED | OUTPATIENT
Start: 2024-08-30 | End: 2024-08-30 | Stop reason: ALTCHOICE

## 2024-08-30 RX ORDER — ISOSORBIDE MONONITRATE 30 MG/1
30 TABLET, EXTENDED RELEASE ORAL DAILY
Status: DISCONTINUED | OUTPATIENT
Start: 2024-08-31 | End: 2024-09-05 | Stop reason: HOSPADM

## 2024-08-30 RX ORDER — NITROGLYCERIN 0.4 MG/1
0.4 TABLET SUBLINGUAL EVERY 5 MIN PRN
Status: DISCONTINUED | OUTPATIENT
Start: 2024-08-30 | End: 2024-09-05 | Stop reason: HOSPADM

## 2024-08-30 RX ORDER — SODIUM CHLORIDE 9 MG/ML
100 INJECTION, SOLUTION INTRAVENOUS CONTINUOUS
Status: DISCONTINUED | OUTPATIENT
Start: 2024-08-30 | End: 2024-09-01

## 2024-08-30 RX ORDER — CLOPIDOGREL BISULFATE 75 MG/1
75 TABLET ORAL DAILY
Status: DISCONTINUED | OUTPATIENT
Start: 2024-08-31 | End: 2024-08-31

## 2024-08-30 RX ORDER — DONEPEZIL HYDROCHLORIDE 10 MG/1
10 TABLET, FILM COATED ORAL NIGHTLY
Status: DISCONTINUED | OUTPATIENT
Start: 2024-08-30 | End: 2024-09-05 | Stop reason: HOSPADM

## 2024-08-30 RX ORDER — ARIPIPRAZOLE 5 MG/1
10 TABLET ORAL DAILY
Status: DISCONTINUED | OUTPATIENT
Start: 2024-08-31 | End: 2024-09-05 | Stop reason: HOSPADM

## 2024-08-30 RX ORDER — LISINOPRIL 20 MG/1
20 TABLET ORAL DAILY
Status: DISCONTINUED | OUTPATIENT
Start: 2024-08-30 | End: 2024-08-30

## 2024-08-30 RX ORDER — AMLODIPINE BESYLATE 5 MG/1
5 TABLET ORAL DAILY
Status: DISCONTINUED | OUTPATIENT
Start: 2024-08-31 | End: 2024-08-30

## 2024-08-30 RX ADMIN — FAMOTIDINE 20 MG: 10 INJECTION, SOLUTION INTRAVENOUS at 14:46

## 2024-08-30 RX ADMIN — KIT FOR THE PREPARATION OF TECHNETIUM TC 99M ALBUMIN AGGREGATED 4 MILLICURIE: 2.5 INJECTION, POWDER, FOR SOLUTION INTRAVENOUS at 17:20

## 2024-08-30 RX ADMIN — DONEPEZIL HYDROCHLORIDE 10 MG: 10 TABLET, FILM COATED ORAL at 23:37

## 2024-08-30 RX ADMIN — SODIUM CHLORIDE 100 ML/HR: 900 INJECTION, SOLUTION INTRAVENOUS at 19:35

## 2024-08-30 RX ADMIN — SODIUM CHLORIDE 500 ML: 900 INJECTION, SOLUTION INTRAVENOUS at 14:46

## 2024-08-30 RX ADMIN — MORPHINE SULFATE 4 MG: 4 INJECTION, SOLUTION INTRAMUSCULAR; INTRAVENOUS at 14:46

## 2024-08-30 RX ADMIN — ONDANSETRON 4 MG: 2 INJECTION INTRAMUSCULAR; INTRAVENOUS at 14:46

## 2024-08-30 SDOH — SOCIAL STABILITY: SOCIAL INSECURITY: ABUSE: ADULT

## 2024-08-30 SDOH — SOCIAL STABILITY: SOCIAL INSECURITY: HAS ANYONE EVER THREATENED TO HURT YOUR FAMILY OR YOUR PETS?: NO

## 2024-08-30 SDOH — SOCIAL STABILITY: SOCIAL INSECURITY: WERE YOU ABLE TO COMPLETE ALL THE BEHAVIORAL HEALTH SCREENINGS?: YES

## 2024-08-30 SDOH — SOCIAL STABILITY: SOCIAL INSECURITY: DO YOU FEEL UNSAFE GOING BACK TO THE PLACE WHERE YOU ARE LIVING?: NO

## 2024-08-30 SDOH — SOCIAL STABILITY: SOCIAL INSECURITY: DO YOU FEEL ANYONE HAS EXPLOITED OR TAKEN ADVANTAGE OF YOU FINANCIALLY OR OF YOUR PERSONAL PROPERTY?: NO

## 2024-08-30 SDOH — SOCIAL STABILITY: SOCIAL INSECURITY: DOES ANYONE TRY TO KEEP YOU FROM HAVING/CONTACTING OTHER FRIENDS OR DOING THINGS OUTSIDE YOUR HOME?: NO

## 2024-08-30 SDOH — SOCIAL STABILITY: SOCIAL INSECURITY: HAVE YOU HAD THOUGHTS OF HARMING ANYONE ELSE?: NO

## 2024-08-30 SDOH — SOCIAL STABILITY: SOCIAL INSECURITY: ARE THERE ANY APPARENT SIGNS OF INJURIES/BEHAVIORS THAT COULD BE RELATED TO ABUSE/NEGLECT?: NO

## 2024-08-30 SDOH — SOCIAL STABILITY: SOCIAL INSECURITY: ARE YOU OR HAVE YOU BEEN THREATENED OR ABUSED PHYSICALLY, EMOTIONALLY, OR SEXUALLY BY ANYONE?: NO

## 2024-08-30 SDOH — SOCIAL STABILITY: SOCIAL INSECURITY: HAVE YOU HAD ANY THOUGHTS OF HARMING ANYONE ELSE?: NO

## 2024-08-30 ASSESSMENT — COGNITIVE AND FUNCTIONAL STATUS - GENERAL
DRESSING REGULAR LOWER BODY CLOTHING: A LITTLE
TURNING FROM BACK TO SIDE WHILE IN FLAT BAD: A LITTLE
WALKING IN HOSPITAL ROOM: A LOT
DRESSING REGULAR LOWER BODY CLOTHING: A LITTLE
DRESSING REGULAR UPPER BODY CLOTHING: A LITTLE
PERSONAL GROOMING: A LITTLE
TURNING FROM BACK TO SIDE WHILE IN FLAT BAD: A LITTLE
MOVING TO AND FROM BED TO CHAIR: A LITTLE
DAILY ACTIVITIY SCORE: 18
PERSONAL GROOMING: A LITTLE
DRESSING REGULAR UPPER BODY CLOTHING: A LITTLE
PATIENT BASELINE BEDBOUND: NO
MOVING FROM LYING ON BACK TO SITTING ON SIDE OF FLAT BED WITH BEDRAILS: A LITTLE
DAILY ACTIVITIY SCORE: 18
CLIMB 3 TO 5 STEPS WITH RAILING: A LOT
TOILETING: A LITTLE
HELP NEEDED FOR BATHING: A LITTLE
MOBILITY SCORE: 16
TOILETING: A LITTLE
MOVING TO AND FROM BED TO CHAIR: A LITTLE
STANDING UP FROM CHAIR USING ARMS: A LITTLE
WALKING IN HOSPITAL ROOM: A LOT
MOBILITY SCORE: 16
EATING MEALS: A LITTLE
CLIMB 3 TO 5 STEPS WITH RAILING: A LOT
MOVING FROM LYING ON BACK TO SITTING ON SIDE OF FLAT BED WITH BEDRAILS: A LITTLE
EATING MEALS: A LITTLE
STANDING UP FROM CHAIR USING ARMS: A LITTLE
HELP NEEDED FOR BATHING: A LITTLE

## 2024-08-30 ASSESSMENT — PAIN SCALES - GENERAL
PAINLEVEL_OUTOF10: 5 - MODERATE PAIN
PAINLEVEL_OUTOF10: 3
PAINLEVEL_OUTOF10: 0 - NO PAIN

## 2024-08-30 ASSESSMENT — ACTIVITIES OF DAILY LIVING (ADL)
PATIENT'S MEMORY ADEQUATE TO SAFELY COMPLETE DAILY ACTIVITIES?: YES
WALKS IN HOME: NEEDS ASSISTANCE
BATHING: NEEDS ASSISTANCE
DRESSING YOURSELF: NEEDS ASSISTANCE
HEARING - RIGHT EAR: DEAF
LACK_OF_TRANSPORTATION: NO
FEEDING YOURSELF: NEEDS ASSISTANCE
ASSISTIVE_DEVICE: WALKER
GROOMING: NEEDS ASSISTANCE
TOILETING: NEEDS ASSISTANCE
HEARING - LEFT EAR: HEARING AID
ADEQUATE_TO_COMPLETE_ADL: YES
JUDGMENT_ADEQUATE_SAFELY_COMPLETE_DAILY_ACTIVITIES: YES

## 2024-08-30 ASSESSMENT — LIFESTYLE VARIABLES
HOW OFTEN DO YOU HAVE 6 OR MORE DRINKS ON ONE OCCASION: NEVER
HOW MANY STANDARD DRINKS CONTAINING ALCOHOL DO YOU HAVE ON A TYPICAL DAY: PATIENT DOES NOT DRINK
AUDIT-C TOTAL SCORE: 0
HOW OFTEN DO YOU HAVE A DRINK CONTAINING ALCOHOL: NEVER
AUDIT-C TOTAL SCORE: 0
SKIP TO QUESTIONS 9-10: 1

## 2024-08-30 ASSESSMENT — PATIENT HEALTH QUESTIONNAIRE - PHQ9
SUM OF ALL RESPONSES TO PHQ9 QUESTIONS 1 & 2: 0
2. FEELING DOWN, DEPRESSED OR HOPELESS: NOT AT ALL
1. LITTLE INTEREST OR PLEASURE IN DOING THINGS: NOT AT ALL

## 2024-08-30 ASSESSMENT — PAIN - FUNCTIONAL ASSESSMENT
PAIN_FUNCTIONAL_ASSESSMENT: 0-10
PAIN_FUNCTIONAL_ASSESSMENT: 0-10

## 2024-08-30 NOTE — CARE PLAN
The patient's goals for the shift include      The clinical goals for the shift include monitor chest pain, maintain patient safety, monitor Spo2      Problem: Skin  Goal: Decreased wound size/increased tissue granulation at next dressing change  Outcome: Progressing  Goal: Participates in plan/prevention/treatment measures  Outcome: Progressing  Goal: Prevent/manage excess moisture  Outcome: Progressing  Goal: Prevent/minimize sheer/friction injuries  Outcome: Progressing  Flowsheets (Taken 8/30/2024 1821)  Prevent/minimize sheer/friction injuries: Use pull sheet  Goal: Promote/optimize nutrition  Outcome: Progressing  Goal: Promote skin healing  Outcome: Progressing     Problem: Pain - Adult  Goal: Verbalizes/displays adequate comfort level or baseline comfort level  Outcome: Progressing     Problem: Safety - Adult  Goal: Free from fall injury  Outcome: Progressing     Problem: Discharge Planning  Goal: Discharge to home or other facility with appropriate resources  Outcome: Progressing     Problem: Chronic Conditions and Co-morbidities  Goal: Patient's chronic conditions and co-morbidity symptoms are monitored and maintained or improved  Outcome: Progressing

## 2024-08-30 NOTE — ED PROVIDER NOTES
HPI   No chief complaint on file.      Patient is a 64-year-old male with a history of chronic kidney disease, hypertension, coronary artery disease, CVA, and iron deficiency anemia presented to the emergency department for evaluation of chest pain.  Patient states chest pain started approximately 2 hours ago.  He describes it as a constant dull ache in the middle of his chest.  Nothing makes it better or worse.  He denies any chronic medical problems however is a poor historian.  He denies shortness of breath, fevers, chills, nausea, vomiting, abdominal pain, cough, congestion, headaches, numbness, tingling, hematuria, dysuria, constipation, diarrhea.              Patient History   Past Medical History:   Diagnosis Date    Body mass index (BMI) 28.0-28.9, adult 01/29/2020    BMI 28.0-28.9,adult    Body mass index (BMI) 29.0-29.9, adult 04/29/2021    Body mass index (BMI) of 29.0 to 29.9 in adult    Chronic kidney disease, stage 3 unspecified (Multi) 07/29/2021    Acute worsening of stage 3 chronic kidney disease    Encounter for general adult medical examination without abnormal findings 06/15/2020    Welcome to Medicare preventive visit    Hypertension     Local infection of the skin and subcutaneous tissue, unspecified 09/26/2018    Skin infection    Major depressive disorder, recurrent, mild (CMS-[facility]) 04/29/2021    Mild episode of recurrent major depressive disorder    MDD (major depressive disorder)     Otorrhea, bilateral 04/17/2019    Otorrhea of both ears    Personal history of diseases of the skin and subcutaneous tissue 04/24/2019    History of eczema    Personal history of other diseases of the circulatory system 10/09/2019    History of atrial fibrillation    Personal history of other diseases of the circulatory system     History of coronary artery disease    Personal history of other diseases of the respiratory system 12/26/2018    History of sinusitis    Personal history of other diseases of urinary  system 09/15/2020    History of renal insufficiency syndrome    Personal history of other endocrine, nutritional and metabolic disease 2021    History of hypothyroidism    Unspecified otitis externa, left ear 2019    Otitis externa, left    Vascular dementia (Multi)      Past Surgical History:   Procedure Laterality Date    CORONARY ARTERY BYPASS GRAFT  2018    CABG    CT ABDOMEN ANGIOGRAM W AND/OR WO IV CONTRAST  2019    CT ABDOMEN ANGIOGRAM W AND/OR WO IV CONTRAST 2019 [facility]    MR HEAD ANGIO WO IV CONTRAST  2022    MR HEAD ANGIO WO IV CONTRAST 2022 [facility]    MR NECK ANGIO WO IV CONTRAST  2022    MR NECK ANGIO WO IV CONTRAST 2022 [facility]     No family history on file.  Social History     Tobacco Use    Smoking status: Former     Current packs/day: 0.00     Types: Cigarettes     Quit date:      Years since quittin.6    Smokeless tobacco: Never   Substance Use Topics    Alcohol use: Never    Drug use: Never       Physical Exam   ED Triage Vitals [24 1436]   Temperature Heart Rate Respirations BP   36.6 °C (97.9 °F) 64 16 100/59      Pulse Ox Temp Source Heart Rate Source Patient Position   98 % Temporal Monitor --      BP Location FiO2 (%)     -- --       Physical Exam  Vitals and nursing note reviewed.   Constitutional:       General: He is not in acute distress.     Appearance: Normal appearance. He is not ill-appearing or toxic-appearing.   HENT:      Head: Normocephalic and atraumatic.      Nose: Nose normal.      Mouth/Throat:      Mouth: Mucous membranes are dry.   Eyes:      Extraocular Movements: Extraocular movements intact.      Pupils: Pupils are equal, round, and reactive to light.   Cardiovascular:      Rate and Rhythm: Normal rate and regular rhythm.      Pulses: Normal pulses.      Heart sounds: Normal heart sounds.   Pulmonary:      Effort: Pulmonary effort is normal.      Breath sounds: Normal  breath sounds.   Abdominal:      Palpations: Abdomen is soft.      Tenderness: There is no abdominal tenderness. There is no guarding or rebound.   Musculoskeletal:         General: Normal range of motion.      Cervical back: Normal range of motion.   Skin:     General: Skin is warm and dry.   Neurological:      General: No focal deficit present.      Mental Status: He is alert and oriented to person, place, and time.      Sensory: No sensory deficit.      Motor: No weakness.   Psychiatric:         Mood and Affect: Mood normal.         Behavior: Behavior normal.           ED Course & MDM   Diagnoses as of 08/30/24 1621   Chest pain, unspecified type   DANNY (acute kidney injury) (CMS-[facility])   Elevated troponin                 No data recorded                                 Medical Decision Making  **Disclaimer parts of this chart have been completed using voice recognition software. Please excuse any errors of transcription.     Patient seen in conjunction with attending physician .     HPI: Detailed above.    Exam: A medically appropriate exam performed, outlined above, given the known history and presentation.    History obtained from: Patient    EKG: Reviewed and interpreted by my attending physician    Labs/Diagnostics:  Labs Reviewed   N-TERMINAL PROBNP - Abnormal       Result Value    PROBNP 658 (*)     Narrative:     Reference ranges are based on clinical submission data. These ranges represent the 95th percentile of normal cut-off points. As NT Pro- BNP values approach 1000 pg/ml, clinical symptoms are more likely associated with CHF.   CBC WITH AUTO DIFFERENTIAL - Abnormal    WBC 7.7      nRBC 0.0      RBC 2.44 (*)     Hemoglobin 7.2 (*)     Hematocrit 22.8 (*)     MCV 93      MCH 29.5      MCHC 31.6 (*)     RDW 16.7 (*)     Platelets 334      Neutrophils % 62.1      Immature Granulocytes %, Automated 0.4      Lymphocytes % 21.8      Monocytes % 13.6      Eosinophils % 1.8      Basophils % 0.3       Neutrophils Absolute 4.80      Immature Granulocytes Absolute, Automated 0.03      Lymphocytes Absolute 1.68      Monocytes Absolute 1.05 (*)     Eosinophils Absolute 0.14      Basophils Absolute 0.02     COMPREHENSIVE METABOLIC PANEL - Abnormal    Glucose 107 (*)     Sodium 136      Potassium 3.9      Chloride 101      Bicarbonate 22 (*)     Urea Nitrogen 60 (*)     Creatinine 4.40 (*)     eGFR 14 (*)     Calcium 8.6      Albumin 3.4 (*)     Alkaline Phosphatase 168 (*)     Total Protein 6.2      AST 18      Bilirubin, Total 0.4      ALT 11      Anion Gap 13     SERIAL TROPONIN, INITIAL ([facility]) - Abnormal    Troponin T, High Sensitivity 35 (*)    D-DIMER, NON VTE - Abnormal    D-Dimer Non VTE, Quant (mg/L FEU) 1.16 (*)     Narrative:     THROMBOEMBOLIC EVENTS CANNOT BE EXCLUDED SOLELY ON THE BASIS OF THE D-DIMER LEVEL BEING WITHIN THE NORMAL REFERENCE RANGE. D-DIMER LEVELS LESS THAN 0.5 MG/L FEU IN CONJUNCTION WITH A LOW CLINICAL PROBABILITY HAVE AN EXCELLENT NEGATIVE PREDICTIVE VALUE IN EXCLUDING A DIAGNOSIS OF PULMONARY EMBOLUS (PE) OR DEEP VEIN THROMBOSIS (DVT). ELEVATED D-DIMER LEVELS ARE NOT SPECIFIC TO PE OR DVT, AND MAY BE SEEN IN PATIENTS WITH DIC, ADVANCED AGE, PREGNANCY, MALIGNANCY, LIVER DISEASE, INFECTION, AND INFLAMMATORY CONDITIONS AMONG OTHERS. D-DIMER LEVELS MAY BE DECREASED IN PATIENTS RECEIVING ANTI-COAGULATION THERAPY.   TROPONIN T SERIES, HIGH SENSITIVITY (0, 2 HR, 6 HR)    Narrative:     The following orders were created for panel order Troponin T Series, High Sensitivity (0, 2HR, 6HR).  Procedure                               Abnormality         Status                     ---------                               -----------         ------                     Serial Troponin, Initial...[[id number]]  Abnormal            Final result               Serial Troponin, 2 Hour ...[[id number]]                                                   Please view results for these tests on the individual orders.  "  SERIAL TROPONIN,  2 HOUR ([facility])     XR chest 1 view   Final Result   Allowing for the aforementioned limitation, mild pulmonary vascular   congestion.        Signed by: Ángel Tinajero 8/30/2024 3:24 PM   Dictation workstation:   [id number]      NM Lung perfusion with spect    (Results Pending)     EMERGENCY DEPARTMENT COURSE and DIFFERENTIAL DIAGNOSIS/MDM:  Patient is a 64-year-old male presenting to the emergency department for evaluation of chest pain.  On physical exam vital signs stable and patient is in no acute distress.  Physical exam benign as lung sounds clear to auscultation bilaterally and patient is nontachycardic, not hypoxic, and sitting comfortably in bed.  Due to patient being in poor historian and coming from a facility with no paperwork extensive workup ordered.  D-dimer ordered given patient's most recent surgery which was elevated.  CMP remarkable for an DANNY with a creatinine of 4.4 and EGFR of 14 however due to patient having an elevated D-dimer CT angio was needed.  CT angio cannot be performed given patient's renal function therefore VQ scan ordered.  proBNP mildly elevated at 658.  CBC remarkable for anemia consistent with patient's history of iron deficiency anemia.  Chest x-ray showed mild pulmonary vascular congestion but no evidence of pneumonia.  Due to patient's DANNY as well as chest pain feel patient warrants admission for further management.  VQ scan is pending at time of admission to rule out PE.  I spoke with attending physician Dr. Rivera who agreed to admission for further management of DANNY and chest pain.    Vitals:    Vitals:    08/30/24 1436   BP: 100/59   Pulse: 64   Resp: 16   Temp: 36.6 °C (97.9 °F)   TempSrc: Temporal   SpO2: 98%   Weight: 90.7 kg (200 lb)   Height: 1.753 m (5' 9\")     History Limited by:    None    Independent history obtained from:    None    External records reviewed:    Inpatient Notes/Discharge Summary from discharge summary from 8/11/2024 patient " was admitted for left hip fracture and had open reduction internal fixation on 8/8/2024    Diagnostics interpreted by me:    Xrays - see my independent interpretation in MDM    Discussions with other clinicians:    Hospitalist/Admitting Team Dr. Rivera    Chronic conditions impacting care:    None    Social determinants of health affecting care:    None    Diagnostic tests considered but not performed: None    ED Medications managed:    Medications   famotidine PF (Pepcid) injection 20 mg (20 mg intravenous Given 8/30/24 1446)   ondansetron (Zofran) injection 4 mg (4 mg intravenous Given 8/30/24 1446)   morphine injection 4 mg (4 mg intravenous Given 8/30/24 1446)   sodium chloride 0.9 % bolus 500 mL (500 mL intravenous New Bag 8/30/24 1446)       Prescription drugs considered:    None    Screenings:              Procedure  Procedures     Elida Kirby PA-C  08/30/24 [id number]

## 2024-08-30 NOTE — H&P
History Of Present Illness  Fernando Sweet is a 64 y.o. male with a past medical history significant of chronic kidney disease, hypertension, coronary arteries, CVA, iron deficiency anemia came to the hospital with a complaint of chest pain which [place] the precordial area advised very severe.  Patient could not give the number.  Pain lasted about 2 hours.  Patient is complaining abdominal 0.  Emergency room initial workup was done and the patient has been admitted to the floor for further management.  Patient denied any fever chills or rigor.  Cough productive.  No fever chills or rigor.  No diaphoresis, nausea vomiting diarrhea dysuria hematuria frequency.  No hematemesis, hematochezia melena.  Patient is complaining of pain in the left leg due to his recent fracture..     Past Medical History  Past Medical History:   Diagnosis Date    Body mass index (BMI) 28.0-28.9, adult 01/29/2020    BMI 28.0-28.9,adult    Body mass index (BMI) 29.0-29.9, adult 04/29/2021    Body mass index (BMI) of 29.0 to 29.9 in adult    Chronic kidney disease, stage 3 unspecified (Multi) 07/29/2021    Acute worsening of stage 3 chronic kidney disease    Encounter for general adult medical examination without abnormal findings 06/15/2020    Welcome to Medicare preventive visit    Hypertension     Local infection of the skin and subcutaneous tissue, unspecified 09/26/2018    Skin infection    Major depressive disorder, recurrent, mild (CMS-[facility]) 04/29/2021    Mild episode of recurrent major depressive disorder    MDD (major depressive disorder)     Otorrhea, bilateral 04/17/2019    Otorrhea of both ears    Personal history of diseases of the skin and subcutaneous tissue 04/24/2019    History of eczema    Personal history of other diseases of the circulatory system 10/09/2019    History of atrial fibrillation    Personal history of other diseases of the circulatory system     History of coronary artery disease    Personal history of other  diseases of the respiratory system 12/26/2018    History of sinusitis    Personal history of other diseases of urinary system 09/15/2020    History of renal insufficiency syndrome    Personal history of other endocrine, nutritional and metabolic disease 07/30/2021    History of hypothyroidism    Unspecified otitis externa, left ear 04/17/2019    Otitis externa, left    Vascular dementia (Multi)        Surgical History  Past Surgical History:   Procedure Laterality Date    CORONARY ARTERY BYPASS GRAFT  08/16/2018    CABG    CT ABDOMEN ANGIOGRAM W AND/OR WO IV CONTRAST  9/26/2019    CT ABDOMEN ANGIOGRAM W AND/OR WO IV CONTRAST 9/26/2019 [facility]    MR HEAD ANGIO WO IV CONTRAST  9/21/2022    MR HEAD ANGIO WO IV CONTRAST 9/21/2022 [facility]    MR NECK ANGIO WO IV CONTRAST  9/21/2022    MR NECK ANGIO WO IV CONTRAST 9/21/2022 [facility]        Social History  He reports that he quit smoking about 7 years ago. His smoking use included cigarettes. He has never used smokeless tobacco. He reports that he does not drink alcohol and does not use drugs.    Family History  No family history on file.     Allergies  Patient has no known allergies.    Review of Systems  I reviewed all systems reviewed as above otherwise is negative.  Physical Exam  HEENT:  Head externally atraumatic, no pallor, no icterus, extraocular movements intact, pupils reactive to light, oral mucosa moist and throat clear.  Neck:  Supple, no JVP, no palpable adenopathy or thyromegaly.  No carotid bruit.  Chest:  Clear to auscultation and resonant.  Heart:  Regular rate and rhythm, no murmur or gallop could be appreciated.  Abdomen:  Soft, nontender, bowel sounds present, normoactive, no palpable hepatosplenomegaly.  Extremities:  No edema, pulses present, no cyanosis or clubbing.  CNS:  Patient alert, oriented to time, place and person.  Power 5/5 all over and deep tendon reflexes symmetrical, cranial nerves 2-12 grossly  "intact.  Skin:  No active rash.  Musculoskeletal:  No joint swelling or erythema, range of movement normal.  Last Recorded Vitals  Heart Rate:  [64-93]   Temp:  [36.1 °C (97 °F)-36.6 °C (97.9 °F)]   Resp:  [15-16]   BP: (100-124)/(59-69)   Height:  [175.3 cm (5' 9\")]   Weight:  [90.7 kg (200 lb)]   SpO2:  [98 %-99 %]       Relevant Results        Results for orders placed or performed during the hospital encounter of 08/30/24 (from the past 24 hour(s))   NT Pro-BNP   Result Value Ref Range    PROBNP 658 (H) 0 - 229 pg/mL   CBC and Auto Differential   Result Value Ref Range    WBC 7.7 4.4 - 11.3 x10*3/uL    nRBC 0.0 0.0 - 0.0 /100 WBCs    RBC 2.44 (L) 4.50 - 5.90 x10*6/uL    Hemoglobin 7.2 (L) 13.5 - 17.5 g/dL    Hematocrit 22.8 (L) 41.0 - 52.0 %    MCV 93 80 - 100 fL    MCH 29.5 26.0 - 34.0 pg    MCHC 31.6 (L) 32.0 - 36.0 g/dL    RDW 16.7 (H) 11.5 - 14.5 %    Platelets 334 150 - 450 x10*3/uL    Neutrophils % 62.1 40.0 - 80.0 %    Immature Granulocytes %, Automated 0.4 0.0 - 0.9 %    Lymphocytes % 21.8 13.0 - 44.0 %    Monocytes % 13.6 2.0 - 10.0 %    Eosinophils % 1.8 0.0 - 6.0 %    Basophils % 0.3 0.0 - 2.0 %    Neutrophils Absolute 4.80 1.20 - 7.70 x10*3/uL    Immature Granulocytes Absolute, Automated 0.03 0.00 - 0.70 x10*3/uL    Lymphocytes Absolute 1.68 1.20 - 4.80 x10*3/uL    Monocytes Absolute 1.05 (H) 0.10 - 1.00 x10*3/uL    Eosinophils Absolute 0.14 0.00 - 0.70 x10*3/uL    Basophils Absolute 0.02 0.00 - 0.10 x10*3/uL   Comprehensive metabolic panel   Result Value Ref Range    Glucose 107 (H) 65 - 99 mg/dL    Sodium 136 133 - 145 mmol/L    Potassium 3.9 3.4 - 5.1 mmol/L    Chloride 101 97 - 107 mmol/L    Bicarbonate 22 (L) 24 - 31 mmol/L    Urea Nitrogen 60 (H) 8 - 25 mg/dL    Creatinine 4.40 (H) 0.40 - 1.60 mg/dL    eGFR 14 (L) >60 mL/min/1.73m*2    Calcium 8.6 8.5 - 10.4 mg/dL    Albumin 3.4 (L) 3.5 - 5.0 g/dL    Alkaline Phosphatase 168 (H) 35 - 125 U/L    Total Protein 6.2 5.9 - 7.9 g/dL    AST 18 5 - 40 " U/L    Bilirubin, Total 0.4 0.1 - 1.2 mg/dL    ALT 11 5 - 40 U/L    Anion Gap 13 <=19 mmol/L   Serial Troponin, Initial ([facility])   Result Value Ref Range    Troponin T, High Sensitivity 35 (HH) <=14 ng/L   D-dimer, quantitative   Result Value Ref Range    D-Dimer Non VTE, Quant (mg/L FEU) 1.16 (H) 0.19 - 0.50 mg/L FEU   Serial Troponin, 2 Hour ([facility])   Result Value Ref Range    Troponin T, High Sensitivity 32 (HH) <=14 ng/L     Prior to Admission medications    Medication Sig Start Date End Date Taking? Authorizing Provider   acetaminophen (Tylenol) 325 mg tablet Take 2 tablets (650 mg) by mouth every 6 hours if needed for mild pain (1 - 3), headaches or fever (temp greater than 38.0 C). 5/23/18  Yes Historical Provider, MD   amLODIPine (Norvasc) 5 mg tablet Take 1 tablet (5 mg) by mouth once daily. 5/9/23  Yes ZARINA Lucas DNP   ARIPiprazole (Abilify) 10 mg tablet Take 1 tablet (10 mg) by mouth once daily. 6/18/24  Yes Historical Provider, MD   aspirin 81 mg EC tablet Take 1 tablet (81 mg) by mouth once daily. 8/8/24  Yes Gualberto Aviles MD   carvedilol (Coreg) 3.125 mg tablet Take 1 tablet (3.125 mg) by mouth 2 times a day with meals.  Patient taking differently: Take 8 tablets (25 mg) by mouth 2 times daily (morning and late afternoon). 5/9/23  Yes ZARINA Lucas DNP   clopidogrel (Plavix) 75 mg tablet Take 1 tablet (75 mg) by mouth once daily. 5/9/23  Yes ZARINA Lucas DNP   donepezil (Aricept) 10 mg tablet Take 1 tablet (10 mg) by mouth once daily at bedtime. 6/18/24  Yes Historical Provider, MD   isosorbide mononitrate ER (Imdur) 30 mg 24 hr tablet Take 1 tablet (30 mg) by mouth once daily. 5/9/23  Yes Margarette E Gianantonio, APRN-CNP, DNP   levoFLOXacin (Levaquin) 500 mg tablet Take 1 tablet (500 mg) by mouth once daily. 8/30/24 9/3/24 Yes Historical Provider, MD   lisinopril 20 mg tablet Take 1 tablet (20 mg) by mouth once daily.   Yes Historical Provider, MD    melatonin 5 mg tablet Take 1 tablet (5 mg) by mouth once daily. 3/14/23  Yes Historical Provider, MD   pantoprazole (ProtoNix) 20 mg EC tablet Take 1 tablet (20 mg) by mouth once daily in the morning. Take before meals.   Yes Historical Provider, MD   nitroglycerin (Nitrostat) 0.4 mg SL tablet Place 1 tablet (0.4 mg) under the tongue every 5 minutes if needed for chest pain. CALL 911 IF PAIN NOT RELIEVED AFTER 3 DOSES 4/19/23   ZARINA Lucas DNP   oxyCODONE-acetaminophen (Percocet) 5-325 mg tablet Take 1 tablet by mouth every 6 hours if needed for severe pain (7 - 10). 8/11/24   ZARINA Swartz   atorvastatin (Lipitor) 40 mg tablet Take 1 tablet (40 mg) by mouth once daily at bedtime. 5/9/23 8/30/24  ZARINA Lucas DNP       Current Facility-Administered Medications:     acetaminophen (Tylenol) tablet 650 mg, 650 mg, oral, q6h PRN, Jose Rivera MD    [START ON 8/31/2024] amLODIPine (Norvasc) tablet 5 mg, 5 mg, oral, Daily, Jose Rivera MD    [START ON 8/31/2024] ARIPiprazole (Abilify) tablet 10 mg, 10 mg, oral, Daily, Jose Rivera MD    [START ON 8/31/2024] aspirin EC tablet 81 mg, 81 mg, oral, Daily, Jose Rivera MD    [START ON 8/31/2024] carvedilol (Coreg) tablet 3.125 mg, 3.125 mg, oral, BID, Jose Rivera MD    [START ON 8/31/2024] clopidogrel (Plavix) tablet 75 mg, 75 mg, oral, Daily, Jose Rivera MD    donepezil (Aricept) tablet 10 mg, 10 mg, oral, Nightly, Jose Rivera MD    heparin (porcine) injection 5,000 Units, 5,000 Units, subcutaneous, q8h ROZ, Jose Rivera MD    [START ON 8/31/2024] isosorbide mononitrate ER (Imdur) 24 hr tablet 30 mg, 30 mg, oral, Daily, Jose Rivera MD    lisinopril tablet 20 mg, 20 mg, oral, Daily, Jose Rivera MD    melatonin tablet 5 mg, 5 mg, oral, Nightly PRN, Jose Rivera MD    nitroglycerin (Nitrostat) SL tablet 0.4 mg, 0.4 mg, sublingual, q5 min PRN,  Jose Rivera MD    oxyCODONE-acetaminophen (Percocet) 5-325 mg per tablet 1 tablet, 1 tablet, oral, q6h PRN, Jose Rivera MD    oxygen (O2) therapy, , inhalation, Continuous PRN - O2/gases, Jose Rivera MD    [START ON 8/31/2024] pantoprazole (ProtoNix) EC tablet 20 mg, 20 mg, oral, Daily before breakfast, Jose Rivera MD    polyethylene glycol (Glycolax, Miralax) packet 17 g, 17 g, oral, Daily, Jose Rivera MD  NM Lung perfusion with spect    Result Date: 8/30/2024  Interpreted By:  Hung Salvador, STUDY: NM LUNG PERFUSION WITH SPECT;  8/30/2024 6:00 pm   INDICATION: Signs/Symptoms:concern for PE.   COMPARISON: Chest x-ray from 08/30/2024   ACCESSION NUMBER(S): [id number]   ORDERING CLINICIAN: JESSE LY   TECHNIQUE: DIVISION OF NUCLEAR MEDICINE PERFUSION LUNG SCANS   Multiple perfusion images of the lungs were acquired after the intravenous administration of 4.0 mCi of Tc-99m macroaggregated albumin (MAA). In addition, SPECT  of the chest was performed.   FINDINGS: Perfusion images of both lungs demonstrate mild heterogeneity throughout the lung fields bilaterally. There are no distinct segmental perfusion defects seen.       There is nonspecific  heterogeneity in perfusion of both lungs indicating low probability for acute pulmonary embolism.     The interpretation above is based on modified PIOPED II and PISAPED criteria.   This study was analyzed and interpreted at [place], [place].   Signed by: Hung Salvador 8/30/2024 6:08 PM Dictation workstation:   [id number]    XR chest 1 view    Result Date: 8/30/2024  Interpreted By:  Ángel Tinajero, STUDY: XR CHEST 1 VIEW; 8/30/2024 2:57 pm   INDICATION: Signs/Symptoms:chest pain   COMPARISON: CT scan from March 2022.   ACCESSION NUMBER(S): [id number]   ORDERING CLINICIAN: MAX SILVA   FINDINGS: The study is limited due to rotation and poor inspiratory effort, with resultant crowding of the pulmonary  vasculature. Sternal wires present. The cardiomediastinal silhouette is within normal limits for the technique. Mild bilateral perihilar interstitial infiltrates are noted. There is no pneumothorax, confluent infiltrates or significant effusion. Plate and screws fuse the lower lumbar spine anteriorly; degenerative changes also involve the thoracic spine.       Allowing for the aforementioned limitation, mild pulmonary vascular congestion.   Signed by: Ángel Tinajero 8/30/2024 3:24 PM Dictation workstation:   [id number]    XR knee left 4+ views    Result Date: 8/9/2024  Interpreted By:  Ninfa Cortez, STUDY: XR KNEE LEFT 4+ VIEWS 8/9/2024 1:17 pm   INDICATION: Signs/Symptoms:Pain, fall   COMPARISON: None available.   ACCESSION NUMBER(S): [id number]   ORDERING CLINICIAN: CARLOTTA CUNNINGHAM   TECHNIQUE: Four views of the left knee   FINDINGS: No fracture, dislocation or acute bony abnormality with the regional soft tissues unremarkable. Medial compartment is mildly narrowed.       No fracture or dislocation of left knee.   Mild medial compartmental narrowing.   Signed by: Ninfa Cortez 8/9/2024 2:10 PM Dictation workstation:   [id number]    ECG 12 lead    Result Date: 8/9/2024  Normal sinus rhythm Cannot rule out Anterior infarct (cited on or before 25-OCT-2023) Abnormal ECG When compared with ECG of 25-OCT-2023 13:28, QRS axis Shifted right Questionable change in initial forces of Anteroseptal leads Nonspecific T wave abnormality, worse in Inferior leads T wave inversion no longer evident in Anterior leads Confirmed by Sidney Green ([id number]) on 8/9/2024 1:02:31 PM    XR chest 1 view    Result Date: 8/8/2024  Interpreted By:  Jeffrey Conway, STUDY: XR CHEST 1 VIEW;  8/8/2024 7:21 pm   INDICATION: Signs/Symptoms:subcutaneous emphysema.   COMPARISON: Chest x-ray from 10/25/2023. CT scan chest from 03/28/2022.   ACCESSION NUMBER(S): [id number]   ORDERING CLINICIAN: SAMIRA OCONNOR   TECHNIQUE: Single AP portable  view of the chest was obtained.   FINDINGS: MEDIASTINUM/ LUNGS/ CADY: Previous CABG. Sternotomy wires are intact. Cardiomegaly. No blunting of costophrenic sulci. No gross masslike opacity in either lung worrisome for tumor or pneumonia. Mild stable eventration of the right diaphragm. Stable surgical clips at the base of the neck on the right. . No abnormal opacity in either lung worrisome for tumor or pneumonia. No pneumothorax. No tracheal deviation. No abnormal hilar fullness or gross mass on either side.   BONES: No lytic or blastic destructive bone lesion.  Previous distal cervical spine anterior plate and screw fusion.  There is mild-to-moderate disc space narrowing and endplate osteophytosis throughout the thoracic spine.   UPPER ABDOMEN: Grossly intact.       Multiple stable findings as described.  No significant or acute interval change.   MACRO: None   Signed by: Jeffrey Conway 8/8/2024 8:56 PM Dictation workstation:   [id number]    FL fluoro images no charge    Result Date: 8/8/2024  These images are not reportable by radiology and will not be interpreted by  Radiologists.    XR hip left with pelvis when performed 2 or 3 views    Result Date: 8/8/2024  Interpreted By:  Harry Booker, STUDY: XR HIP LEFT WITH PELVIS WHEN PERFORMED 2 OR 3 VIEWS;  8/8/2024 11:22 am   INDICATION: Signs/Symptoms:pain after fall.   COMPARISON: None.   ACCESSION NUMBER(S): [id number]   ORDERING CLINICIAN: TITUS SIMON   FINDINGS: There is a comminuted nondisplaced intertrochanteric fracture of the left femur. Mild degenerative changes are present.       Nondisplaced comminuted intertrochanteric fracture of the left femur.   MACRO: None   Signed by: Harry Booker 8/8/2024 11:27 AM Dictation workstation:   [id number]    XR lumbar spine 2-3 views    Result Date: 8/8/2024  Interpreted By:  Harry Booker, STUDY: XR LUMBAR SPINE 2-3 VIEWS;  8/8/2024 11:22 am   INDICATION: Signs/Symptoms:pain after fall.   COMPARISON: None.    ACCESSION NUMBER(S): [id number]   ORDERING CLINICIAN: TITUS SIMON   FINDINGS: No acute fracture or dislocation of the lumbar spine. Moderate to severe multilevel degenerative disc height loss with endplate osteophyte formation. Severe multilevel facet arthropathy with spinous process changes of Baastrup's disease.   Atherosclerotic calcifications are present.       No definite acute lumbar spine fracture. Moderate to severe degenerative changes.   MACRO: None   Signed by: Harry Booker 8/8/2024 11:26 AM Dictation workstation:   [id number]    CT head wo IV contrast    Result Date: 8/8/2024  Interpreted By:  Harry Booker, STUDY: CT HEAD WO IV CONTRAST  8/8/2024 11:07 am   INDICATION: Signs/Symptoms:fall, unknown if hit head   COMPARISON: 10/25/2023   ACCESSION NUMBER(S): [id number]   ORDERING CLINICIAN: TITUS SIMON   TECHNIQUE: Contiguous axial CT images of the brain were obtained without IV contrast.   FINDINGS: The ventricles, cisterns and sulci are prominent, consistent with moderate diffuse volume loss. Areas of white matter low attenuation are nonspecific but likely related to chronic microvascular disease. Unchanged left MCA distribution encephalomalacia. Interval development of focal right parietal periventricular encephalomalacia. There is intracranial atherosclerosis.   Gray-white differentiation is preserved. No acute intracranial hemorrhage or mass effect. No midline shift. Patent basal cisterns. No extraaxial fluid collections.   The calvaria is intact. The visualized paranasal sinuses and mastoid air cells are clear.       No acute intracranial pathology.   Unchanged left MCA distribution encephalomalacia. Interval development of right parietal encephalomalacia which is otherwise not acute.   Signed by: Harry Booker 8/8/2024 11:13 AM Dictation workstation:   [id number]    No results found for the last 90 days.       Assessment/Plan   Assessment & Plan  Chest pain, unspecified  type    Severe anemia  Acute on chronic renal failure  Chronic kidney disease stage V  Hypertension  Hyperlipidemia  Bipolar  Coronary artery disease  GERD  Congestive heart failure hard of hearing  Continue current medication.  No chest pain.  Cardiac and other negative EKG done show any sign of ischemia at this time.  Have consulted cardiology.  Patient multiple risk factor including congestive heart failure hypertension hyperlipidemia.  Patient had acute on chronic renal failure.  We do not have a clear etiology of that at this time.  Check ultrasound of kidneys.  Consult nephrology.  Consult cardiology for chest pain.  Give supportive care.  Monitor blood pressure.  Monitor renal function panel.  Patient has severe anemia.  That could be due to the recent fracture and blood loss.  Anyhow monitor for now.  Will treat DVT, fall, aspiration, decubitus, DVT precaution.  This has been discussed the patient is agreeable to it.    Jose Rivera MD

## 2024-08-30 NOTE — PROGRESS NOTES
Attestation/Supervisory note for ANGELICA Kirby      The patient is a 64-year-old male presenting to the emergency department by EMS from a [facility] facility where he resides for evaluation of substernal chest pain that started approximately 2 hours prior to arrival.  Is a dull aching pain.  No better or worse.  No radiation.  It is fairly constant.  The patient denies any history of CAD, ACS, PE, dissection, hypertension, hyperlipidemia or diabetes but EMS suspects that he does have a history of chronic medical conditions.  The staff at the [facility] were not able to provide any details regarding his medical history to EMS and reportedly did not provide any paperwork on the patient to EMS.  The patient denies any shortness of breath.  He denies any palpitations.  No diaphoresis.  No abdominal pain.  No nausea vomiting.  No diarrhea or constipation but no urinary complaints.  All pertinent positives and negatives are recorded above.  All other systems reviewed and otherwise negative.  Vital signs within normal limits.  Physical exam with a well-nourished well-developed male with disheveled appearance but in no acute distress.  HEENT exam with dry mucous membranes but otherwise unremarkable.  He has no evidence of airway compromise or respiratory distress.  Abdominal exam is benign.  He does not have any gross motor, neurologic or vascular deficits on exam.  Pulses are equal bilaterally.       EKG with sinus rhythm at 64 bpm, occasional PACs, LVH criteria, normal axis, normal ST segment, and a slight diffuse flattening of the T waves      IV Pepcid, IV morphine, IV Zofran and IV fluids ordered.      Diagnostic labs with elevated D-dimer, mildly elevated troponin T, acute renal failure, electrolyte imbalance, anemia, but otherwise unremarkable      Initial troponin T 35.  Repeat Troponin T of 32      Heart score 6      XR chest 1 view   Final Result   Allowing for the aforementioned limitation, mild  pulmonary vascular   congestion.        Signed by: Ángel Tinajero 8/30/2024 3:24 PM   Dictation workstation:   [id number]      NM Lung perfusion with spect    (Results Pending)        The patient does not have any evidence of STEMI on EKG but he does have an elevated troponin T.  He also has acute renal failure and this may be partially contributing to the elevation of his Troponin T with failure to clear it.  He has an elevated D-dimer and did have recent surgery on his hip.  The patient cannot have a CT angio with contrast due to his acute kidney failure.  A VQ scan was ordered but radiology reports that they will have to call and his technician to do the study and may be delayed for a few hours.  We did speak to the accepting provider, Dr. Rivera, and he accepted the patient for admission for further management of his current symptoms trending of his cardiac enzymes, and further evaluation of the acute renal failure.      Impression/diagnosis:  Chest pain, substernal  Acute renal failure  Electrolyte imbalance  Anemia, unspecified      Critical care time billing is not warranted at this time      I personally saw the patient and made/approve the management plan and take responsibility for the patient management.      I independently interpreted the following study (S) EKG and diagnostic labs      I personally discussed the patient's management with the patient      I reviewed the results of the diagnostic labs and diagnostic imaging.  Formal radiology read was completed by the radiologist.      Adele Dickerson MD

## 2024-08-30 NOTE — PROGRESS NOTES
Pharmacy Medication History Review    Fernando Sweet is a 64 y.o. male admitted for Chest pain, unspecified type. Pharmacy reviewed the patient's [id number] medications and allergies for accuracy.    The list below reflects the updated PTA list. Please review each medication in order reconciliation for additional clarification and justification.  Prior to Admission Medications   Prescriptions Last Dose   ARIPiprazole (Abilify) 10 mg tablet 8/30/2024   Sig: Take 1 tablet (10 mg) by mouth once daily.   acetaminophen (Tylenol) 325 mg tablet Past Week   Sig: Take 2 tablets (650 mg) by mouth every 6 hours if needed for mild pain (1 - 3), headaches or fever (temp greater than 38.0 C).   amLODIPine (Norvasc) 5 mg tablet 8/30/2024   Sig: Take 1 tablet (5 mg) by mouth once daily.   aspirin 81 mg EC tablet 8/30/2024   Sig: Take 1 tablet (81 mg) by mouth once daily.   carvedilol (Coreg) 3.125 mg tablet 8/30/2024   Sig: Take 1 tablet (3.125 mg) by mouth 2 times a day with meals.   Patient taking differently: Take 8 tablets (25 mg) by mouth 2 times daily (morning and late afternoon).   clopidogrel (Plavix) 75 mg tablet 8/30/2024   Sig: Take 1 tablet (75 mg) by mouth once daily.   donepezil (Aricept) 10 mg tablet 8/29/2024   Sig: Take 1 tablet (10 mg) by mouth once daily at bedtime.   isosorbide mononitrate ER (Imdur) 30 mg 24 hr tablet 8/30/2024   Sig: Take 1 tablet (30 mg) by mouth once daily.   levoFLOXacin (Levaquin) 500 mg tablet 8/30/2024   Sig: Take 1 tablet (500 mg) by mouth once daily.   lisinopril 20 mg tablet 8/30/2024   Sig: Take 1 tablet (20 mg) by mouth once daily.   melatonin 5 mg tablet 8/29/2024   Sig: Take 1 tablet (5 mg) by mouth once daily.   nitroglycerin (Nitrostat) 0.4 mg SL tablet    Sig: Place 1 tablet (0.4 mg) under the tongue every 5 minutes if needed for chest pain. CALL 911 IF PAIN NOT RELIEVED AFTER 3 DOSES   oxyCODONE-acetaminophen (Percocet) 5-325 mg tablet    Sig: Take 1 tablet by mouth every  6 hours if needed for severe pain (7 - 10).   pantoprazole (ProtoNix) 20 mg EC tablet 8/30/2024   Sig: Take 1 tablet (20 mg) by mouth once daily in the morning. Take before meals.      Facility-Administered Medications: None          The list below reflects the updated allergy list. Please review each documented allergy for additional clarification and justification.  Allergies  Reviewed by Stephane Hernandez RPh on 8/30/2024   No Known Allergies         Below are additional concerns with the patient's PTA list.  - Pt is A&O x 1, med list obtained from facility.    - Pt is receiving carvedilol 25 mg BID vs. 3.125 mg BID as prescription reflects in chart. Last dispensed for 25 mg BID per dispense Hx report.    - Currently being treated for UTI with levofloxacin 500 mg once daily x 4 days, first dose received today, expected end date of 9/3/24 per facility med list.    Stephane Hernandez, PharmD

## 2024-08-31 ENCOUNTER — APPOINTMENT (OUTPATIENT)
Dept: RADIOLOGY | Facility: HOSPITAL | Age: 64
End: 2024-08-31
Payer: MEDICARE

## 2024-08-31 LAB
ABO GROUP (TYPE) IN BLOOD: NORMAL
ABO GROUP (TYPE) IN BLOOD: NORMAL
ANION GAP SERPL CALC-SCNC: 9 MMOL/L
ANTIBODY SCREEN: NORMAL
BASOPHILS # BLD AUTO: 0.04 X10*3/UL (ref 0–0.1)
BASOPHILS NFR BLD AUTO: 0.6 %
BUN SERPL-MCNC: 46 MG/DL (ref 8–25)
CALCIUM SERPL-MCNC: 8.2 MG/DL (ref 8.5–10.4)
CHLORIDE SERPL-SCNC: 109 MMOL/L (ref 97–107)
CHOLEST SERPL-MCNC: 93 MG/DL (ref 133–200)
CHOLEST/HDLC SERPL: 3.4 {RATIO}
CO2 SERPL-SCNC: 23 MMOL/L (ref 24–31)
CREAT SERPL-MCNC: 3 MG/DL (ref 0.4–1.6)
EGFRCR SERPLBLD CKD-EPI 2021: 22 ML/MIN/1.73M*2
EOSINOPHIL # BLD AUTO: 0.16 X10*3/UL (ref 0–0.7)
EOSINOPHIL NFR BLD AUTO: 2.3 %
ERYTHROCYTE [DISTWIDTH] IN BLOOD BY AUTOMATED COUNT: 16.6 % (ref 11.5–14.5)
GLUCOSE SERPL-MCNC: 98 MG/DL (ref 65–99)
HCT VFR BLD AUTO: 21.5 % (ref 41–52)
HDLC SERPL-MCNC: 27 MG/DL
HGB BLD-MCNC: 6.6 G/DL (ref 13.5–17.5)
IMM GRANULOCYTES # BLD AUTO: 0.02 X10*3/UL (ref 0–0.7)
IMM GRANULOCYTES NFR BLD AUTO: 0.3 % (ref 0–0.9)
LDLC SERPL CALC-MCNC: 47 MG/DL (ref 65–130)
LYMPHOCYTES # BLD AUTO: 1.61 X10*3/UL (ref 1.2–4.8)
LYMPHOCYTES NFR BLD AUTO: 23 %
MCH RBC QN AUTO: 29.1 PG (ref 26–34)
MCHC RBC AUTO-ENTMCNC: 30.7 G/DL (ref 32–36)
MCV RBC AUTO: 95 FL (ref 80–100)
MONOCYTES # BLD AUTO: 0.9 X10*3/UL (ref 0.1–1)
MONOCYTES NFR BLD AUTO: 12.9 %
NEUTROPHILS # BLD AUTO: 4.26 X10*3/UL (ref 1.2–7.7)
NEUTROPHILS NFR BLD AUTO: 60.9 %
NRBC BLD-RTO: 0 /100 WBCS (ref 0–0)
PLATELET # BLD AUTO: 300 X10*3/UL (ref 150–450)
POTASSIUM SERPL-SCNC: 3.9 MMOL/L (ref 3.4–5.1)
RBC # BLD AUTO: 2.27 X10*6/UL (ref 4.5–5.9)
RH FACTOR (ANTIGEN D): NORMAL
RH FACTOR (ANTIGEN D): NORMAL
SODIUM SERPL-SCNC: 141 MMOL/L (ref 133–145)
TRIGL SERPL-MCNC: 95 MG/DL (ref 40–150)
TROPONIN T SERPL-MCNC: 27 NG/L
WBC # BLD AUTO: 7 X10*3/UL (ref 4.4–11.3)

## 2024-08-31 PROCEDURE — 2500000001 HC RX 250 WO HCPCS SELF ADMINISTERED DRUGS (ALT 637 FOR MEDICARE OP)

## 2024-08-31 PROCEDURE — 2500000004 HC RX 250 GENERAL PHARMACY W/ HCPCS (ALT 636 FOR OP/ED): Performed by: INTERNAL MEDICINE

## 2024-08-31 PROCEDURE — 36415 COLL VENOUS BLD VENIPUNCTURE: CPT | Performed by: INTERNAL MEDICINE

## 2024-08-31 PROCEDURE — 2500000001 HC RX 250 WO HCPCS SELF ADMINISTERED DRUGS (ALT 637 FOR MEDICARE OP): Performed by: INTERNAL MEDICINE

## 2024-08-31 PROCEDURE — 1200000002 HC GENERAL ROOM WITH TELEMETRY DAILY

## 2024-08-31 PROCEDURE — 84484 ASSAY OF TROPONIN QUANT: CPT | Performed by: EMERGENCY MEDICINE

## 2024-08-31 PROCEDURE — 36430 TRANSFUSION BLD/BLD COMPNT: CPT

## 2024-08-31 PROCEDURE — 80048 BASIC METABOLIC PNL TOTAL CA: CPT | Performed by: INTERNAL MEDICINE

## 2024-08-31 PROCEDURE — 86920 COMPATIBILITY TEST SPIN: CPT

## 2024-08-31 PROCEDURE — 99223 1ST HOSP IP/OBS HIGH 75: CPT | Performed by: INTERNAL MEDICINE

## 2024-08-31 PROCEDURE — 85025 COMPLETE CBC W/AUTO DIFF WBC: CPT | Performed by: INTERNAL MEDICINE

## 2024-08-31 PROCEDURE — 76770 US EXAM ABDO BACK WALL COMP: CPT | Performed by: RADIOLOGY

## 2024-08-31 PROCEDURE — P9016 RBC LEUKOCYTES REDUCED: HCPCS

## 2024-08-31 PROCEDURE — 99223 1ST HOSP IP/OBS HIGH 75: CPT | Performed by: UROLOGY

## 2024-08-31 PROCEDURE — 80061 LIPID PANEL: CPT | Performed by: INTERNAL MEDICINE

## 2024-08-31 PROCEDURE — 2500000002 HC RX 250 W HCPCS SELF ADMINISTERED DRUGS (ALT 637 FOR MEDICARE OP, ALT 636 FOR OP/ED): Performed by: INTERNAL MEDICINE

## 2024-08-31 PROCEDURE — 86901 BLOOD TYPING SEROLOGIC RH(D): CPT | Performed by: INTERNAL MEDICINE

## 2024-08-31 PROCEDURE — 76770 US EXAM ABDO BACK WALL COMP: CPT

## 2024-08-31 RX ADMIN — PANTOPRAZOLE SODIUM 20 MG: 20 TABLET, DELAYED RELEASE ORAL at 09:12

## 2024-08-31 RX ADMIN — CARVEDILOL 25 MG: 25 TABLET, FILM COATED ORAL at 17:40

## 2024-08-31 RX ADMIN — DONEPEZIL HYDROCHLORIDE 10 MG: 10 TABLET, FILM COATED ORAL at 22:09

## 2024-08-31 RX ADMIN — OXYCODONE HYDROCHLORIDE AND ACETAMINOPHEN 1 TABLET: 5; 325 TABLET ORAL at 05:27

## 2024-08-31 RX ADMIN — SODIUM CHLORIDE 100 ML/HR: 900 INJECTION, SOLUTION INTRAVENOUS at 05:31

## 2024-08-31 ASSESSMENT — PAIN SCALES - GENERAL
PAINLEVEL_OUTOF10: 0 - NO PAIN
PAINLEVEL_OUTOF10: 5 - MODERATE PAIN
PAINLEVEL_OUTOF10: 0 - NO PAIN
PAINLEVEL_OUTOF10: 0 - NO PAIN

## 2024-08-31 ASSESSMENT — PAIN - FUNCTIONAL ASSESSMENT
PAIN_FUNCTIONAL_ASSESSMENT: 0-10

## 2024-08-31 ASSESSMENT — PAIN DESCRIPTION - ORIENTATION: ORIENTATION: LEFT

## 2024-08-31 ASSESSMENT — PAIN DESCRIPTION - LOCATION: LOCATION: HIP

## 2024-08-31 NOTE — PROGRESS NOTES
Fernando Sweet is a 64 y.o. male on day 1 of admission presenting with Chest pain, unspecified type.    Subjective   The patient was seen and examined.  Lying in the bed.  Patient started having hematuria last night.  Patient had a Rodríguez catheter placed now.  Patient is having dotty hematuria.       Objective     Physical Exam  HEENT:  Head externally atraumatic,  extraocular movements intact, oral mucosa moist  Neck:  Supple, no JVP, no palpable adenopathy or thyromegaly.  No carotid bruit.  Chest:  Clear to auscultation and resonant.  Heart:  Regular rate and rhythm, no murmur or gallop could be appreciated.  Abdomen:  Soft, nontender, bowel sounds present, normoactive, no palpable hepatosplenomegaly.  Extremities:  No edema, pulses present, no cyanosis or clubbing.  CNS:  Patient alert, oriented to time, place and person.    No new deficit.  Cranial nerves 2-12 grossly intact  Skin:  No active rash.  Musculoskeletal:  No  apparent joint swelling or erythema, range of movement normal.  Last Recorded Vitals  Heart Rate:  [67-93]   Temp:  [35.2 °C (95.4 °F)-36.9 °C (98.4 °F)]   Resp:  [15-18]   BP: (103-151)/(64-75)   SpO2:  [97 %-99 %]     Intake/Output last 3 Shifts:  I/O last 3 completed shifts:  In: 1666.7 (18.4 mL/kg) [P.O.:400; I.V.:433.3 (4.8 mL/kg); IV Piggyback:833.3]  Out: 600 (6.6 mL/kg) [Urine:600 (0.2 mL/kg/hr)]  Weight: 90.7 kg     Relevant Results  No results found for the last 90 days.    Results for orders placed or performed during the hospital encounter of 08/30/24 (from the past 24 hour(s))   Serial Troponin, 2 Hour ([facility])   Result Value Ref Range    Troponin T, High Sensitivity 32 (HH) <=14 ng/L   Serial Troponin, 6 Hour ([facility])   Result Value Ref Range    Troponin T, High Sensitivity 27 (HH) <=14 ng/L   Basic Metabolic Panel   Result Value Ref Range    Glucose 98 65 - 99 mg/dL    Sodium 141 133 - 145 mmol/L    Potassium 3.9 3.4 - 5.1 mmol/L    Chloride 109 (H) 97 - 107 mmol/L    Bicarbonate 23 (L)  24 - 31 mmol/L    Urea Nitrogen 46 (H) 8 - 25 mg/dL    Creatinine 3.00 (H) 0.40 - 1.60 mg/dL    eGFR 22 (L) >60 mL/min/1.73m*2    Calcium 8.2 (L) 8.5 - 10.4 mg/dL    Anion Gap 9 <=19 mmol/L   Lipid Panel   Result Value Ref Range    Cholesterol 93 (L) 133 - 200 mg/dL    HDL-Cholesterol 27.0 (L) >40.0 mg/dL    Cholesterol/HDL Ratio 3.4 SEE COMMENT    LDL Calculated 47 (L) 65 - 130 mg/dL    Triglycerides 95 40 - 150 mg/dL   CBC and Auto Differential   Result Value Ref Range    WBC 7.0 4.4 - 11.3 x10*3/uL    nRBC 0.0 0.0 - 0.0 /100 WBCs    RBC 2.27 (L) 4.50 - 5.90 x10*6/uL    Hemoglobin 6.6 (L) 13.5 - 17.5 g/dL    Hematocrit 21.5 (L) 41.0 - 52.0 %    MCV 95 80 - 100 fL    MCH 29.1 26.0 - 34.0 pg    MCHC 30.7 (L) 32.0 - 36.0 g/dL    RDW 16.6 (H) 11.5 - 14.5 %    Platelets 300 150 - 450 x10*3/uL    Neutrophils % 60.9 40.0 - 80.0 %    Immature Granulocytes %, Automated 0.3 0.0 - 0.9 %    Lymphocytes % 23.0 13.0 - 44.0 %    Monocytes % 12.9 2.0 - 10.0 %    Eosinophils % 2.3 0.0 - 6.0 %    Basophils % 0.6 0.0 - 2.0 %    Neutrophils Absolute 4.26 1.20 - 7.70 x10*3/uL    Immature Granulocytes Absolute, Automated 0.02 0.00 - 0.70 x10*3/uL    Lymphocytes Absolute 1.61 1.20 - 4.80 x10*3/uL    Monocytes Absolute 0.90 0.10 - 1.00 x10*3/uL    Eosinophils Absolute 0.16 0.00 - 0.70 x10*3/uL    Basophils Absolute 0.04 0.00 - 0.10 x10*3/uL        Current Facility-Administered Medications:     acetaminophen (Tylenol) tablet 650 mg, 650 mg, oral, q6h PRN, Jose Rivera MD    amLODIPine (Norvasc) tablet 5 mg, 5 mg, oral, Daily, Jose Rivera MD    ARIPiprazole (Abilify) tablet 10 mg, 10 mg, oral, Daily, Jose Rivera MD    aspirin EC tablet 81 mg, 81 mg, oral, Daily, Jose Rivera MD    carvedilol (Coreg) tablet 25 mg, 25 mg, oral, BID, Stephane Hernandez, [facility]    clopidogrel (Plavix) tablet 75 mg, 75 mg, oral, Daily, Jose Rivera MD    donepezil (Aricept) tablet 10 mg, 10 mg, oral, Nightly, Jose Rivera,  MD, 10 mg at 08/30/24 2337    heparin (porcine) injection 5,000 Units, 5,000 Units, subcutaneous, q8h ROZ, Jose Rivera MD    isosorbide mononitrate ER (Imdur) 24 hr tablet 30 mg, 30 mg, oral, Daily, Jose Rivera MD    melatonin tablet 5 mg, 5 mg, oral, Nightly PRN, Jose Rivera MD    nitroglycerin (Nitrostat) SL tablet 0.4 mg, 0.4 mg, sublingual, q5 min PRN, Jose Rivera MD    oxyCODONE-acetaminophen (Percocet) 5-325 mg per tablet 1 tablet, 1 tablet, oral, q6h PRN, Jose Rivera MD, 1 tablet at 08/31/24 0527    oxygen (O2) therapy, , inhalation, Continuous PRN - O2/gases, Jose Rivera MD    pantoprazole (ProtoNix) EC tablet 20 mg, 20 mg, oral, Daily before breakfast, Jose Rivera MD, 20 mg at 08/31/24 0912    polyethylene glycol (Glycolax, Miralax) packet 17 g, 17 g, oral, Daily, Jose Rivera MD    sodium chloride 0.9% infusion, 100 mL/hr, intravenous, Continuous, Jose Rivera MD, Last Rate: 100 mL/hr at 08/31/24 1220, 100 mL/hr at 08/31/24 1220   Assessment/Plan   Principal Problem:    Chest pain, unspecified type  Acute renal failure  Obstructive uropathy  Hematuria  Acute on chronic anemia due to acute blood loss  COPD  Coronary artery disease  Dementia  Hard of hearing    Plan: Continue current medication.  Patient has acute on chronic renal failure most likely due to obstructive uropathy.  Rodríguez has been placed.  Renal function improving.  Patient has developed hematuria.  Hemoglobin has dropped down to 6.6.  Monitor for now.  Give the patient blood transfusion.  Hold Plavix, aspirin and heparin.  Patient had a chest pain but cardiac enzymes are stable.  Most likely due to obstructive uropathy.  We will take DVT, fall, aspiration, decubitus surgery progression.  Cardiology, nephrology and urology input appreciated.     The patient was unable to give any consent.  Consent was obtained from the patient's daughter Maria De Jesus Carpenter on phone with his nurse  Catrina Rivera MD

## 2024-08-31 NOTE — CARE PLAN
Problem: Skin  Goal: Decreased wound size/increased tissue granulation at next dressing change  Outcome: Progressing  Goal: Participates in plan/prevention/treatment measures  Outcome: Progressing  Goal: Prevent/manage excess moisture  Outcome: Progressing  Goal: Prevent/minimize sheer/friction injuries  Outcome: Progressing  Goal: Promote/optimize nutrition  8/31/2024 1416 by Ciera Benitez RN  Outcome: Progressing  8/31/2024 1416 by Ciera Benitez RN  Flowsheets (Taken 8/31/2024 1416)  Promote/optimize nutrition:   Assist with feeding   Monitor/record intake including meals   Discuss with provider if NPO > 2 days   Offer water/supplements/favorite foods   Consume > 50% meals/supplements  Goal: Promote skin healing  Outcome: Progressing     Problem: Pain - Adult  Goal: Verbalizes/displays adequate comfort level or baseline comfort level  Outcome: Progressing     Problem: Safety - Adult  Goal: Free from fall injury  Outcome: Progressing     Problem: Discharge Planning  Goal: Discharge to home or other facility with appropriate resources  Outcome: Progressing     Problem: Chronic Conditions and Co-morbidities  Goal: Patient's chronic conditions and co-morbidity symptoms are monitored and maintained or improved  Outcome: Progressing   The patient's goals for the shift include      The clinical goals for the shift include monitor labs, vitals, bleeding    Over the shift, the patient did not make progress toward the following goals. Barriers to progression include na. Recommendations to address these barriers include na.

## 2024-08-31 NOTE — CARE PLAN
The patient's goals for the shift include      The clinical goals for the shift include monitor chest pain, maintain patient safety, monitor Spo2

## 2024-08-31 NOTE — CONSULTS
Consults  History Of Present Illness:    Fernando Sweet is a 64 y.o. male patient with a hard of hearing.  Underlying history of CKD, history of hypertension hyper history of CVA.  Also anemia.  Now with low H&H.  Complain of epigastric burning pain.  Patient is symptoms lasted about 2 hours prior to coming to ER.  Worsening symptoms.  Also dull ache in the last.  On and off for last several days.  No radiation localized symptoms.  No nausea no vomiting no diaphoretic symptoms.  Patient now found having a low H&H 7.2/22.8 troponins are mildly flat.  Creatinine is 4.4.  The EGFR is 14.  Chest x-ray showing mild pulmonary vascular congestion.  Last Recorded Vitals:  Vitals:    08/30/24 2336 08/31/24 0328 08/31/24 0751 08/31/24 1100   BP: 126/71 128/64 131/74 151/75   BP Location: Left arm Left arm Left arm Left arm   Patient Position: Lying Lying Lying Lying   Pulse: 74 86 68 73   Resp: 16 15 16 18   Temp: 36.1 °C (97 °F) 36.6 °C (97.9 °F) 36.9 °C (98.4 °F) 36.9 °C (98.4 °F)   TempSrc: Temporal Temporal Temporal Temporal   SpO2: 97% 98% 97% 99%   Weight:       Height:           Past Medical History:  He has a past medical history of Body mass index (BMI) 28.0-28.9, adult (01/29/2020), Body mass index (BMI) 29.0-29.9, adult (04/29/2021), Chronic kidney disease, stage 3 unspecified (Multi) (07/29/2021), Encounter for general adult medical examination without abnormal findings (06/15/2020), Hypertension, Local infection of the skin and subcutaneous tissue, unspecified (09/26/2018), Major depressive disorder, recurrent, mild (CMS-HCC) (04/29/2021), MDD (major depressive disorder), Otorrhea, bilateral (04/17/2019), Personal history of diseases of the skin and subcutaneous tissue (04/24/2019), Personal history of other diseases of the circulatory system (10/09/2019), Personal history of other diseases of the circulatory system, Personal history of other diseases of the respiratory system (12/26/2018), Personal history of  "other diseases of urinary system (09/15/2020), Personal history of other endocrine, nutritional and metabolic disease (07/30/2021), Unspecified otitis externa, left ear (04/17/2019), and Vascular dementia (Multi).    Past Surgical History:  He has a past surgical history that includes Coronary artery bypass graft (08/16/2018); CT angio abdomen w and or wo IV IV contrast (9/26/2019); MR angio head wo IV contrast (9/21/2022); and MR angio neck wo IV contrast (9/21/2022).      Social History:  He reports that he quit smoking about 7 years ago. His smoking use included cigarettes. He has never used smokeless tobacco. He reports that he does not drink alcohol and does not use drugs.    Family History:  No family history on file.     Allergies:  Patient has no known allergies.    Review of Systems  I reviewed all systems reviewed as above otherwise is negative.  Physical Exam  HEENT:  Head externally atraumatic, no pallor, no icterus, extraocular movements intact, pupils reactive to light, oral mucosa moist and throat clear.  Neck:  Supple, no JVP, no palpable adenopathy or thyromegaly.  No carotid bruit.  Chest:  Clear to auscultation and resonant.  Heart:  Regular rate and rhythm, no murmur or gallop could be appreciated.  Abdomen:  Soft, nontender, bowel sounds present, normoactive, no palpable hepatosplenomegaly.  Extremities:  No edema, pulses present, no cyanosis or clubbing.  CNS:  Patient alert, oriented to time, place and person.  Power 5/5 all over and deep tendon reflexes symmetrical, cranial nerves 2-12 grossly intact.  Skin:  No active rash.  Musculoskeletal:  No joint swelling or erythema, range of movement normal.  Last Recorded Vitals  Heart Rate:  [64-93]   Temp:  [36.1 °C (97 °F)-36.6 °C (97.9 °F)]   Resp:  [15-16]   BP: (100-124)/(59-69)   Height:  [175.3 cm (5' 9\")]   Weight:  [90.7 kg (200 lb)]   SpO2:  [98 %-99 %]     Inpatient Medications:  Scheduled medications   Medication Dose Route Frequency    " amLODIPine  5 mg oral Daily    ARIPiprazole  10 mg oral Daily    aspirin  81 mg oral Daily    carvedilol  25 mg oral BID    clopidogrel  75 mg oral Daily    donepezil  10 mg oral Nightly    heparin (porcine)  5,000 Units subcutaneous q8h ROZ    isosorbide mononitrate ER  30 mg oral Daily    pantoprazole  20 mg oral Daily before breakfast    polyethylene glycol  17 g oral Daily     Outpatient Medications:  Current Outpatient Medications   Medication Instructions    acetaminophen (TYLENOL) 650 mg, oral, Every 6 hours PRN    amLODIPine (NORVASC) 5 mg, oral, Daily    ARIPiprazole (ABILIFY) 10 mg, oral, Daily    aspirin 81 mg, oral, Daily    carvedilol (COREG) 3.125 mg, oral, 2 times daily (morning and late afternoon)    clopidogrel (PLAVIX) 75 mg, oral, Daily RT    donepezil (ARICEPT) 10 mg, oral, Nightly    isosorbide mononitrate ER (IMDUR) 30 mg, oral, Daily RT    levoFLOXacin (LEVAQUIN) 500 mg, oral, Daily    lisinopril 20 mg, oral, Daily    melatonin 5 mg tablet 1 tablet, oral, Daily    nitroglycerin (NITROSTAT) 0.4 mg, sublingual, Every 5 min PRN, CALL 911 IF PAIN NOT RELIEVED AFTER 3 DOSES    oxyCODONE-acetaminophen (Percocet) 5-325 mg tablet 1 tablet, oral, Every 6 hours PRN    pantoprazole (PROTONIX) 20 mg, oral, Daily before breakfast       Physical Exam:  HEENT: Normocephalic/atraumatic pupils equal react light  Neck exam mild JVD, no bruit  Lung exam clear to auscultation, few crackles at the bases  Cardiac exam is regular rhythm S1-S2, soft systolic murmur heard.   Abdomen soft nontender, nondistended  Extremities no clubbing, cyanosis but trace edema  Neuro exam grossly intact.  Last Labs:  CBC - 8/31/2024:  4:32 AM  7.0 6.6 300    21.5      CMP - 8/31/2024:  4:32 AM  8.2 6.2 18 --- 0.4   _ 3.4 11 168      PTT - No results in last year.  1.1   11.8 _     Troponin I, High Sensitivity   Date/Time Value Ref Range Status   10/25/2023 01:44 PM 17 0 - 20 ng/L Final     Troponin I   Date/Time Value Ref Range  Status   09/23/2022 12:26 AM 12 0 - 20 ng/L Final     Comment:     .  Less than 99th percentile of normal range cutoff-  Female and children under 18 years old <14 ng/L; Male <21 ng/L: Negative  Repeat testing should be performed if clinically indicated.   .  Female and children under 18 years old 14-50 ng/L; Male 21-50 ng/L:  Consistent with possible cardiac damage and possible increased clinical   risk. Serial measurements may help to assess extent of myocardial damage.   .  >50 ng/L: Consistent with cardiac damage, increased clinical risk and  myocardial infarction. Serial measurements may help assess extent of   myocardial damage.   .   NOTE: Children less than 1 year old may have higher baseline troponin   levels and results should be interpreted in conjunction with the overall   clinical context.   .  NOTE: Troponin I testing is performed using a different   testing methodology at [facility] than at other   [facility]. Direct result comparisons should only   be made within the same method.     09/22/2022 10:31 PM 11 0 - 20 ng/L Final     Comment:     .  Less than 99th percentile of normal range cutoff-  Female and children under 18 years old <14 ng/L; Male <21 ng/L: Negative  Repeat testing should be performed if clinically indicated.   .  Female and children under 18 years old 14-50 ng/L; Male 21-50 ng/L:  Consistent with possible cardiac damage and possible increased clinical   risk. Serial measurements may help to assess extent of myocardial damage.   .  >50 ng/L: Consistent with cardiac damage, increased clinical risk and  myocardial infarction. Serial measurements may help assess extent of   myocardial damage.   .   NOTE: Children less than 1 year old may have higher baseline troponin   levels and results should be interpreted in conjunction with the overall   clinical context.   .  NOTE: Troponin I testing is performed using a different   testing methodology at [facility] than at other   system hospitals. Direct result comparisons should only   be made within the same method.     09/20/2022 02:03 PM 12 0 - 20 ng/L Final     Comment:     .  Less than 99th percentile of normal range cutoff-  Female and children under 18 years old <14 ng/L; Male <21 ng/L: Negative  Repeat testing should be performed if clinically indicated.   .  Female and children under 18 years old 14-50 ng/L; Male 21-50 ng/L:  Consistent with possible cardiac damage and possible increased clinical   risk. Serial measurements may help to assess extent of myocardial damage.   .  >50 ng/L: Consistent with cardiac damage, increased clinical risk and  myocardial infarction. Serial measurements may help assess extent of   myocardial damage.   .   NOTE: Children less than 1 year old may have higher baseline troponin   levels and results should be interpreted in conjunction with the overall   clinical context.   .  NOTE: Troponin I testing is performed using a different   testing methodology at [facility] than at other   [facility]. Direct result comparisons should only   be made within the same method.       BNP   Date/Time Value Ref Range Status   10/25/2023 01:44  (H) 0 - 99 pg/mL Final   09/21/2022 05:15 AM 75 0 - 99 pg/mL Final     Comment:     .  <100 pg/mL - Heart failure unlikely  100-299 pg/mL - Intermediate probability of acute heart  .               failure exacerbation. Correlate with clinical  .               context and patient history.    >=300 pg/mL - Heart Failure likely. Correlate with clinical  .               context and patient history.  BNP testing is performed using different testing   methodology at [facility] than at other   [facility]. Direct result comparisons should   only be made within the same method.     03/28/2022 12:55 PM 95 0 - 99 pg/mL Final     Comment:     .  <100 pg/mL - Heart failure unlikely  100-299 pg/mL - Intermediate probability  of acute heart  .               failure exacerbation. Correlate with clinical  .               context and patient history.    >=300 pg/mL - Heart Failure likely. Correlate with clinical  .               context and patient history.  BNP testing is performed using different testing   methodology at [facility] than at other   [facility] hospitals. Direct result comparisons should   only be made within the same method.       LDL Calculated   Date/Time Value Ref Range Status   08/31/2024 04:32 AM 47 (L) 65 - 130 mg/dL Final     VLDL   Date/Time Value Ref Range Status   09/21/2022 05:15 AM 20 0 - 40 mg/dL Final   07/29/2021 09:19 AM 35 0 - 40 mg/dL Final   06/16/2020 11:23 AM 18 0 - 40 mg/dL Final          US renal complete  Narrative: Interpreted By:  Stephane Taylor,   STUDY:  US RENAL COMPLETE; 8/31/2024 7:51 am      INDICATION:  Signs/Symptoms:Acute renal failure.      COMPARISON:  12/05/2020      ACCESSION NUMBER(S):  [id number]      ORDERING CLINICIAN:  DUY JO      TECHNIQUE:  Grayscale and color Doppler imaging of the kidneys      FINDINGS:  The right kidney measures 11.8 cm. There is a 2.9 cm simple cyst  upper pole right kidney and a 9 mm simple cyst lower pole right  kidney.      The left kidney measures 10.6 cm  .          No renal stones are identified.  The renal cortical thickness and  echogenicity is normal.  No hydronephrosis is identified.      There is a Rodríguez catheter within the urinary bladder. There is a  large amount of solid-appearing material within the urinary bladder  adjacent to the Rodríguez catheter. No vascularity is identified within  this material. Incidental note is made of air within the urinary  bladder.      Impression: Right renal cyst.  Nonspecific appearance left kidney.  Large amount of material within the urinary bladder with a  differential to include clot or tumor.      MACRO:  none      Signed by: Stephane Taylor 8/31/2024 11:01 AM  Dictation workstation:    [id number]      Principal Problem:    Chest pain, unspecified type    Assessment/Plan   64 show patient now with episode of epigastric pain, anemia, CKD stage IV with hematuria.  Also history of CAD.  EKG shows sinus rhythm with LVH with a nonspecific ST lesion seen.  Patient had nuclear stress test done about a year ago.  At the [facility] essentially was normal perfusion with no evidence of ischemia.  Some moderate fixed defect seen in the LAD area.  Ejection fraction mildly decreased.  Otherwise unremarkable.  Patient had echocardiogram 2 years ago essentially ejection fraction about 60% with apical and septal segment abnormal otherwise diastolic dysfunction with a nonspecific ST lesion seen.  Patient now with chest pain with flat troponin.    1.  CAD: Patient had nuclear stress test about a year ago.  Troponins unremarkable as well as EKG unremarkable.  At the moment continue current treatment.  Patient having active hematuria at the moment.  Add Nitropatch order started Imdur 30 mg tablet p.o. daily.  Patient was also started on a Plavix 75 mg once a day which need to be stopped since now patient having a hematuria.  2.  Hypertension: Patient currently started on a carvedilol 25 mg tablet p.o. twice daily.  Patient also on a started on amlodipine as well as aspirin daily.  3.  Hyperlipidemia: Will check lipid profile on the patient.  4.  EKG: Patient with underlying left atrial hypertrophy by EKG criteria.  Echo with diastolic dysfunction.  Optimize blood pressure treatment plan.  5.  Prophylaxis: SCDs at the moment.  Hold off the heparin due to underlying hematuria.  6.  Chronic stable angina: Patient with underlying angina epigastric symptoms more likely noncardiac issue although patient currently on Imdur as well as aspirin.  Also start patient on Protonix.  Once patient stable I will order patient for outpatient stress test again.    Critical care time is spent at bedside includes review of  diagnostic tests, labs, and radiographs, serial assessments and management of hemodynamics, EKGs, old echoes, cardiac work-up and coordination of care.  Assessment, impression and plans are reflected in the note above as well as the orders.    Code Status:  Full Code  I spent 80 minutes in the professional and overall care of this patient.  Davy Richey MD

## 2024-08-31 NOTE — CONSULTS
Reason For Consult   Gross hematuria      History Of Present Illness  Fernando Sweet is a 64 y.o. male presenting with chest pain.  He has history of CKD.  His creatinine was 1.2 on 8/22/2024 however natalie to 4.4 yesterday.  Urine culture on 8/26/2024, 8/15/2024, and 8/8/2024 were no growth.  Urinalysis at that time had over 20 RBCs and over 50 WBCs per high-power field with 1+ bacteria.  He states he has had gross hematuria for couple weeks, however he is a poor historian.  He denies any dysuria.  Ultrasound was performed today with results pending.    Past Medical History  He has a past medical history of Body mass index (BMI) 28.0-28.9, adult (01/29/2020), Body mass index (BMI) 29.0-29.9, adult (04/29/2021), Chronic kidney disease, stage 3 unspecified (Multi) (07/29/2021), Encounter for general adult medical examination without abnormal findings (06/15/2020), Hypertension, Local infection of the skin and subcutaneous tissue, unspecified (09/26/2018), Major depressive disorder, recurrent, mild (CMS-HCC) (04/29/2021), MDD (major depressive disorder), Otorrhea, bilateral (04/17/2019), Personal history of diseases of the skin and subcutaneous tissue (04/24/2019), Personal history of other diseases of the circulatory system (10/09/2019), Personal history of other diseases of the circulatory system, Personal history of other diseases of the respiratory system (12/26/2018), Personal history of other diseases of urinary system (09/15/2020), Personal history of other endocrine, nutritional and metabolic disease (07/30/2021), Unspecified otitis externa, left ear (04/17/2019), and Vascular dementia (Multi).    Surgical History  He has a past surgical history that includes Coronary artery bypass graft (08/16/2018); CT angio abdomen w and or wo IV IV contrast (9/26/2019); MR angio head wo IV contrast (9/21/2022); and MR angio neck wo IV contrast (9/21/2022).     Social History  He reports that he quit smoking about 7 years ago.  "His smoking use included cigarettes. He has never used smokeless tobacco. He reports that he does not drink alcohol and does not use drugs.    Family History  No family history on file.     Allergies  Patient has no known allergies.    Review of Systems  Gross hematuria     Physical Exam  Alert, no distress  Normal respiratory effort  Abdomen soft nontender nondistended  Rodríguez catheter in place draining medium red urine  Digital rectal exam his prostate was 3+ in size and nontender     Last Recorded Vitals  Blood pressure 131/74, pulse 68, temperature 36.9 °C (98.4 °F), temperature source Temporal, resp. rate 16, height 1.753 m (5' 9\"), weight 90.7 kg (200 lb), SpO2 97%.    Relevant Results      Ultrasound films viewed, no obvious hydronephrosis, interpretation pending  Creatinine elevated today 3.0 today down from 4.4 yesterday.  His level was 1.2 on 8/22/2024.       Assessment/Plan     64-year-old has had a couple week history of gross hematuria,, Rodríguez catheter currently in place.  His renal ultrasound is pending from this morning.  He has DANNY which has improved since yesterday, and history of CKD.    He has BPH and does report a history of difficulty voiding.    He has had 3 negative urine cultures.  He is not a good historian, however he denies any dysuria.    He is anemic with a hemoglobin 6.6 today decreased from 7.2 yesterday.  No other source of bleeding documented.  Recommend holding blood thinners if possible.  He is on aspirin, Plavix and heparin.    He will ultimately require cystoscopy once his urine clears, ideally as an outpatient once his urine clears, however he may require cystoscopy as an inpatient if he has persistent gross hematuria.    Continue with his current Rodríguez catheter at this point, however it may need to be switched to a larger catheter or three-way if he develops any clots.              Gabe Odell MD    "

## 2024-09-01 VITALS
OXYGEN SATURATION: 99 % | BODY MASS INDEX: 29.62 KG/M2 | DIASTOLIC BLOOD PRESSURE: 79 MMHG | HEART RATE: 69 BPM | SYSTOLIC BLOOD PRESSURE: 133 MMHG | TEMPERATURE: 97.7 F | RESPIRATION RATE: 17 BRPM | WEIGHT: 200 LBS | HEIGHT: 69 IN

## 2024-09-01 LAB
ANION GAP SERPL CALC-SCNC: 12 MMOL/L
BLOOD EXPIRATION DATE: NORMAL
BUN SERPL-MCNC: 17 MG/DL (ref 8–25)
CALCIUM SERPL-MCNC: 8.1 MG/DL (ref 8.5–10.4)
CHLORIDE SERPL-SCNC: 109 MMOL/L (ref 97–107)
CO2 SERPL-SCNC: 19 MMOL/L (ref 24–31)
CREAT SERPL-MCNC: 1.3 MG/DL (ref 0.4–1.6)
DISPENSE STATUS: NORMAL
EGFRCR SERPLBLD CKD-EPI 2021: 61 ML/MIN/1.73M*2
ERYTHROCYTE [DISTWIDTH] IN BLOOD BY AUTOMATED COUNT: 16.4 % (ref 11.5–14.5)
GLUCOSE SERPL-MCNC: 106 MG/DL (ref 65–99)
HCT VFR BLD AUTO: 27.3 % (ref 41–52)
HGB BLD-MCNC: 8.3 G/DL (ref 13.5–17.5)
MCH RBC QN AUTO: 29.3 PG (ref 26–34)
MCHC RBC AUTO-ENTMCNC: 30.4 G/DL (ref 32–36)
MCV RBC AUTO: 97 FL (ref 80–100)
NRBC BLD-RTO: 0 /100 WBCS (ref 0–0)
PLATELET # BLD AUTO: 192 X10*3/UL (ref 150–450)
POTASSIUM SERPL-SCNC: 3.9 MMOL/L (ref 3.4–5.1)
PRODUCT BLOOD TYPE: 6200
PRODUCT CODE: NORMAL
RBC # BLD AUTO: 2.83 X10*6/UL (ref 4.5–5.9)
SODIUM SERPL-SCNC: 140 MMOL/L (ref 133–145)
UNIT ABO: NORMAL
UNIT NUMBER: NORMAL
UNIT RH: NORMAL
UNIT VOLUME: 350
WBC # BLD AUTO: 5.3 X10*3/UL (ref 4.4–11.3)
XM INTEP: NORMAL

## 2024-09-01 PROCEDURE — 36415 COLL VENOUS BLD VENIPUNCTURE: CPT | Performed by: UROLOGY

## 2024-09-01 PROCEDURE — 2500000001 HC RX 250 WO HCPCS SELF ADMINISTERED DRUGS (ALT 637 FOR MEDICARE OP): Performed by: INTERNAL MEDICINE

## 2024-09-01 PROCEDURE — 1200000002 HC GENERAL ROOM WITH TELEMETRY DAILY

## 2024-09-01 PROCEDURE — 80048 BASIC METABOLIC PNL TOTAL CA: CPT | Performed by: UROLOGY

## 2024-09-01 PROCEDURE — 99233 SBSQ HOSP IP/OBS HIGH 50: CPT | Performed by: UROLOGY

## 2024-09-01 PROCEDURE — 2500000001 HC RX 250 WO HCPCS SELF ADMINISTERED DRUGS (ALT 637 FOR MEDICARE OP)

## 2024-09-01 PROCEDURE — 85027 COMPLETE CBC AUTOMATED: CPT | Performed by: UROLOGY

## 2024-09-01 PROCEDURE — 2500000004 HC RX 250 GENERAL PHARMACY W/ HCPCS (ALT 636 FOR OP/ED): Performed by: INTERNAL MEDICINE

## 2024-09-01 PROCEDURE — 2500000002 HC RX 250 W HCPCS SELF ADMINISTERED DRUGS (ALT 637 FOR MEDICARE OP, ALT 636 FOR OP/ED): Performed by: INTERNAL MEDICINE

## 2024-09-01 RX ADMIN — PANTOPRAZOLE SODIUM 20 MG: 20 TABLET, DELAYED RELEASE ORAL at 06:29

## 2024-09-01 RX ADMIN — ISOSORBIDE MONONITRATE 30 MG: 30 TABLET, EXTENDED RELEASE ORAL at 08:33

## 2024-09-01 RX ADMIN — SODIUM CHLORIDE 100 ML/HR: 900 INJECTION, SOLUTION INTRAVENOUS at 08:30

## 2024-09-01 RX ADMIN — CARVEDILOL 25 MG: 25 TABLET, FILM COATED ORAL at 08:33

## 2024-09-01 RX ADMIN — SODIUM CHLORIDE 100 ML/HR: 900 INJECTION, SOLUTION INTRAVENOUS at 16:19

## 2024-09-01 RX ADMIN — POLYETHYLENE GLYCOL 3350 17 G: 17 POWDER, FOR SOLUTION ORAL at 08:33

## 2024-09-01 RX ADMIN — SODIUM CHLORIDE 100 ML/HR: 900 INJECTION, SOLUTION INTRAVENOUS at 06:30

## 2024-09-01 RX ADMIN — DONEPEZIL HYDROCHLORIDE 10 MG: 10 TABLET, FILM COATED ORAL at 21:19

## 2024-09-01 RX ADMIN — ARIPIPRAZOLE 10 MG: 5 TABLET ORAL at 08:33

## 2024-09-01 RX ADMIN — AMLODIPINE BESYLATE 5 MG: 5 TABLET ORAL at 08:33

## 2024-09-01 ASSESSMENT — PAIN SCALES - GENERAL
PAINLEVEL_OUTOF10: 0 - NO PAIN
PAINLEVEL_OUTOF10: 0 - NO PAIN

## 2024-09-01 ASSESSMENT — PAIN - FUNCTIONAL ASSESSMENT
PAIN_FUNCTIONAL_ASSESSMENT: 0-10
PAIN_FUNCTIONAL_ASSESSMENT: 0-10

## 2024-09-01 NOTE — CARE PLAN
The patient's goals for the shift include      The clinical goals for the shift include maintain safety and comfort

## 2024-09-01 NOTE — PROGRESS NOTES
Fernando Sweet is a 64 y.o. male on day 2 of admission presenting with Chest pain, unspecified type.    Subjective   The patient was seen and examined.  Lying in the bed.  Feeling better.  Denies any headache or dizziness.  Hemoglobin is better.       Objective     Physical Exam  HEENT:  Head externally atraumatic,  extraocular movements intact, oral mucosa moist  Neck:  Supple, no JVP, no palpable adenopathy or thyromegaly.  No carotid bruit.  Chest:  Clear to auscultation and resonant.  Heart:  Regular rate and rhythm, no murmur or gallop could be appreciated.  Abdomen:  Soft, nontender, bowel sounds present, normoactive, no palpable hepatosplenomegaly.  Extremities:  No edema, pulses present, no cyanosis or clubbing.  CNS:  Patient alert, oriented to time, place and person.    No new deficit.  Cranial nerves 2-12 grossly intact  Skin:  No active rash.  Musculoskeletal: Left leg movements are painful due to recent fracture  Last Recorded Vitals  Heart Rate:  [60-99]   Temp:  [35.7 °C (96.2 °F)-36.8 °C (98.2 °F)]   Resp:  [15-18]   BP: (101-152)/(53-94)   SpO2:  [96 %-100 %]     Intake/Output last 3 Shifts:  I/O last 3 completed shifts:  In: 5478.3 (60.4 mL/kg) [P.O.:1950; I.V.:3128.3 (34.5 mL/kg); Blood:400]  Out: 1250 (13.8 mL/kg) [Urine:1250 (0.4 mL/kg/hr)]  Weight: 90.7 kg     Relevant Results  No results found for the last 90 days.    Results for orders placed or performed during the hospital encounter of 08/30/24 (from the past 24 hour(s))   CBC   Result Value Ref Range    WBC 5.3 4.4 - 11.3 x10*3/uL    nRBC 0.0 0.0 - 0.0 /100 WBCs    RBC 2.83 (L) 4.50 - 5.90 x10*6/uL    Hemoglobin 8.3 (L) 13.5 - 17.5 g/dL    Hematocrit 27.3 (L) 41.0 - 52.0 %    MCV 97 80 - 100 fL    MCH 29.3 26.0 - 34.0 pg    MCHC 30.4 (L) 32.0 - 36.0 g/dL    RDW 16.4 (H) 11.5 - 14.5 %    Platelets 192 150 - 450 x10*3/uL   Basic Metabolic Panel   Result Value Ref Range    Glucose 106 (H) 65 - 99 mg/dL    Sodium 140 133 - 145 mmol/L    Potassium  3.9 3.4 - 5.1 mmol/L    Chloride 109 (H) 97 - 107 mmol/L    Bicarbonate 19 (L) 24 - 31 mmol/L    Urea Nitrogen 17 8 - 25 mg/dL    Creatinine 1.30 0.40 - 1.60 mg/dL    eGFR 61 >60 mL/min/1.73m*2    Calcium 8.1 (L) 8.5 - 10.4 mg/dL    Anion Gap 12 <=19 mmol/L        Current Facility-Administered Medications:     acetaminophen (Tylenol) tablet 650 mg, 650 mg, oral, q6h PRN, Jose Rivera MD    amLODIPine (Norvasc) tablet 5 mg, 5 mg, oral, Daily, Jose Rivera MD, 5 mg at 09/01/24 0833    ARIPiprazole (Abilify) tablet 10 mg, 10 mg, oral, Daily, Jose Rivera MD, 10 mg at 09/01/24 0833    carvedilol (Coreg) tablet 25 mg, 25 mg, oral, BID, Stephane Hernandez [facility], 25 mg at 09/01/24 0833    donepezil (Aricept) tablet 10 mg, 10 mg, oral, Nightly, Jose Rivera MD, 10 mg at 08/31/24 2209    isosorbide mononitrate ER (Imdur) 24 hr tablet 30 mg, 30 mg, oral, Daily, Jose Rivera MD, 30 mg at 09/01/24 0833    melatonin tablet 5 mg, 5 mg, oral, Nightly PRN, Jose Rivera MD    nitroglycerin (Nitrostat) SL tablet 0.4 mg, 0.4 mg, sublingual, q5 min PRN, Jose Rivera MD    oxyCODONE-acetaminophen (Percocet) 5-325 mg per tablet 1 tablet, 1 tablet, oral, q6h PRN, Jose Rivera MD, 1 tablet at 08/31/24 0527    oxygen (O2) therapy, , inhalation, Continuous PRN - O2/gases, Jose Rivera MD    pantoprazole (ProtoNix) EC tablet 20 mg, 20 mg, oral, Daily before breakfast, Jose Rivera MD, 20 mg at 09/01/24 0629    polyethylene glycol (Glycolax, Miralax) packet 17 g, 17 g, oral, Daily, Jose Rivera MD, 17 g at 09/01/24 0833    sodium chloride 0.9% infusion, 100 mL/hr, intravenous, Continuous, Jose Rivera MD, Last Rate: 100 mL/hr at 09/01/24 1619, 100 mL/hr at 09/01/24 1619   Assessment/Plan   Principal Problem:    Chest pain, unspecified type  Urinary retention  Acute on chronic renal failure due to obstructive uropathy  Hematuria  History of  CVA  Hypertension  Hyperlipidemia  History of bipolar disorder  Coronary artery disease  GERD        Plan: Continue current medication.  Supportive care begin physical therapy occupational.  Patient had a Rodríguez catheter placed.  Patient had hematuria that has resolved now.  Patient hemoglobin stable.  Patient was given transfusion yesterday.  Monitor hemoglobin hematocrit.  Renal function is improving.  Most likely it was due to obstructive uropathy.  We will treat DVT, fall, aspiration, decubitus, and DVT precautions       Jose Rivera MD

## 2024-09-01 NOTE — CARE PLAN
The patient's goals for the shift include      The clinical goals for the shift include monitor labs, vitals, bleeding

## 2024-09-01 NOTE — CARE PLAN
Problem: Skin  Goal: Decreased wound size/increased tissue granulation at next dressing change  Outcome: Progressing  Goal: Participates in plan/prevention/treatment measures  Outcome: Progressing  Goal: Prevent/manage excess moisture  Outcome: Progressing  Goal: Prevent/minimize sheer/friction injuries  Outcome: Progressing  Goal: Promote/optimize nutrition  Outcome: Progressing  Flowsheets (Taken 9/1/2024 8351)  Promote/optimize nutrition:   Assist with feeding   Monitor/record intake including meals   Consume > 50% meals/supplements   Offer water/supplements/favorite foods  Goal: Promote skin healing  Outcome: Progressing     Problem: Pain - Adult  Goal: Verbalizes/displays adequate comfort level or baseline comfort level  Outcome: Progressing     Problem: Safety - Adult  Goal: Free from fall injury  Outcome: Progressing     Problem: Discharge Planning  Goal: Discharge to home or other facility with appropriate resources  Outcome: Progressing     Problem: Chronic Conditions and Co-morbidities  Goal: Patient's chronic conditions and co-morbidity symptoms are monitored and maintained or improved  Outcome: Progressing   The patient's goals for the shift include      The clinical goals for the shift include monitor for bleeding    Over the shift, the patient did not make progress toward the following goals. Barriers to progression include na. Recommendations to address these barriers include na.

## 2024-09-01 NOTE — PROGRESS NOTES
"Fernando Sweet is a 64 y.o. male on day 2 of admission presenting with Chest pain, unspecified type.    Subjective   Patient has no complaints.  His urine has improved to a light red.  His catheter obstructed last night and 900 ml drained after it was manipulated.       Objective     Physical Exam  Alert, no distress  Normal respiratory effort  Abdomen soft nontender  Rodríguez draining light red urine    Last Recorded Vitals  Blood pressure 151/76, pulse 71, temperature 36.6 °C (97.9 °F), temperature source Temporal, resp. rate 16, height 1.753 m (5' 9\"), weight 90.7 kg (200 lb), SpO2 96%.  Intake/Output last 3 Shifts:  I/O last 3 completed shifts:  In: 5478.3 (60.4 mL/kg) [P.O.:1950; I.V.:3128.3 (34.5 mL/kg); Blood:400]  Out: 1250 (13.8 mL/kg) [Urine:1250 (0.4 mL/kg/hr)]  Weight: 90.7 kg     Relevant Results  Scheduled medications  amLODIPine, 5 mg, oral, Daily  ARIPiprazole, 10 mg, oral, Daily  carvedilol, 25 mg, oral, BID  donepezil, 10 mg, oral, Nightly  isosorbide mononitrate ER, 30 mg, oral, Daily  pantoprazole, 20 mg, oral, Daily before breakfast  polyethylene glycol, 17 g, oral, Daily      Continuous medications  sodium chloride 0.9%, 100 mL/hr, Last Rate: 100 mL/hr (09/01/24 0834)      PRN medications  PRN medications: acetaminophen, melatonin, nitroglycerin, oxyCODONE-acetaminophen, oxygen    Results for orders placed or performed during the hospital encounter of 08/30/24 (from the past 24 hour(s))   Prepare RBC: 1 Units   Result Value Ref Range    PRODUCT CODE Z9339P08     Unit Number [id number]     Unit ABO A     Unit RH POS     XM INTEP COMP     Dispense Status IS     Blood Expiration Date 9/2/2024 11:59:00 PM EDT     PRODUCT BLOOD TYPE 6200     UNIT VOLUME 350    Type and screen   Result Value Ref Range    ABO TYPE A     Rh TYPE POS     ANTIBODY SCREEN NEG    VERIFY ABO/Rh Group Test   Result Value Ref Range    ABO TYPE A     Rh TYPE POS        US renal complete    Result Date: 8/31/2024  Interpreted " By:  Stephane Taylor, STUDY: US RENAL COMPLETE; 8/31/2024 7:51 am   INDICATION: Signs/Symptoms:Acute renal failure.   COMPARISON: 12/05/2020   ACCESSION NUMBER(S): [id number]   ORDERING CLINICIAN: DUY JO   TECHNIQUE: Grayscale and color Doppler imaging of the kidneys   FINDINGS: The right kidney measures 11.8 cm. There is a 2.9 cm simple cyst upper pole right kidney and a 9 mm simple cyst lower pole right kidney.   The left kidney measures 10.6 cm  .     No renal stones are identified.  The renal cortical thickness and echogenicity is normal.  No hydronephrosis is identified.   There is a Rodríguez catheter within the urinary bladder. There is a large amount of solid-appearing material within the urinary bladder adjacent to the Rodríguez catheter. No vascularity is identified within this material. Incidental note is made of air within the urinary bladder.       Right renal cyst. Nonspecific appearance left kidney. Large amount of material within the urinary bladder with a differential to include clot or tumor.   MACRO: none   Signed by: Stephane Taylor 8/31/2024 11:01 AM Dictation workstation:   [id number]                      Assessment/Plan   Assessment & Plan  Chest pain, unspecified type    64-year-old had recent gross hematuria.  Per the chart it had just been present for 1 day and not for a few weeks.  His blood thinners have been held and his urine is cleared to a light red.  Continue to hold blood thinners for now if possible.    His catheter was flushed with a 60 ml syringe in and out, and a moderate amount of clots drained.  No further clots were identified.  Urine now clear    Ultrasound identified debris versus a tumor in his bladder.  He will require cystoscopy, however hopefully this may be done as an outpatient after his urine has cleared.  Hold on cystoscopy this admission if possible as this may result in recurrent bleeding.    He has anemia and he received a transfusion.  His hemoglobin was 6.6  yesterday.  Repeat CBC ordered    He has DANNY and history of CKD.  His creatinine was 3.0 yesterday.  Repeat BMP ordered.     He has BPH and does report a history of difficulty voiding.     He has had 3 negative urine cultures.  He is not a good historian, however he denies any dysuria.          nt.      Gabe Odell MD

## 2024-09-02 LAB
ANION GAP SERPL CALC-SCNC: 8 MMOL/L
BASOPHILS # BLD AUTO: 0.04 X10*3/UL (ref 0–0.1)
BASOPHILS NFR BLD AUTO: 0.6 %
BUN SERPL-MCNC: 10 MG/DL (ref 8–25)
CALCIUM SERPL-MCNC: 8.5 MG/DL (ref 8.5–10.4)
CHLORIDE SERPL-SCNC: 109 MMOL/L (ref 97–107)
CO2 SERPL-SCNC: 26 MMOL/L (ref 24–31)
CREAT SERPL-MCNC: 1.2 MG/DL (ref 0.4–1.6)
EGFRCR SERPLBLD CKD-EPI 2021: 67 ML/MIN/1.73M*2
EOSINOPHIL # BLD AUTO: 0.22 X10*3/UL (ref 0–0.7)
EOSINOPHIL NFR BLD AUTO: 3.3 %
ERYTHROCYTE [DISTWIDTH] IN BLOOD BY AUTOMATED COUNT: 16 % (ref 11.5–14.5)
GLUCOSE SERPL-MCNC: 100 MG/DL (ref 65–99)
HCT VFR BLD AUTO: 27.4 % (ref 41–52)
HGB BLD-MCNC: 8.6 G/DL (ref 13.5–17.5)
IMM GRANULOCYTES # BLD AUTO: 0.02 X10*3/UL (ref 0–0.7)
IMM GRANULOCYTES NFR BLD AUTO: 0.3 % (ref 0–0.9)
LYMPHOCYTES # BLD AUTO: 2.05 X10*3/UL (ref 1.2–4.8)
LYMPHOCYTES NFR BLD AUTO: 30.8 %
MCH RBC QN AUTO: 29.6 PG (ref 26–34)
MCHC RBC AUTO-ENTMCNC: 31.4 G/DL (ref 32–36)
MCV RBC AUTO: 94 FL (ref 80–100)
MONOCYTES # BLD AUTO: 1.12 X10*3/UL (ref 0.1–1)
MONOCYTES NFR BLD AUTO: 16.8 %
NEUTROPHILS # BLD AUTO: 3.2 X10*3/UL (ref 1.2–7.7)
NEUTROPHILS NFR BLD AUTO: 48.2 %
NRBC BLD-RTO: 0 /100 WBCS (ref 0–0)
PLATELET # BLD AUTO: 264 X10*3/UL (ref 150–450)
POTASSIUM SERPL-SCNC: 3.6 MMOL/L (ref 3.4–5.1)
RBC # BLD AUTO: 2.91 X10*6/UL (ref 4.5–5.9)
SODIUM SERPL-SCNC: 143 MMOL/L (ref 133–145)
WBC # BLD AUTO: 6.7 X10*3/UL (ref 4.4–11.3)

## 2024-09-02 PROCEDURE — 80048 BASIC METABOLIC PNL TOTAL CA: CPT | Performed by: INTERNAL MEDICINE

## 2024-09-02 PROCEDURE — 2500000001 HC RX 250 WO HCPCS SELF ADMINISTERED DRUGS (ALT 637 FOR MEDICARE OP): Performed by: INTERNAL MEDICINE

## 2024-09-02 PROCEDURE — 1200000002 HC GENERAL ROOM WITH TELEMETRY DAILY

## 2024-09-02 PROCEDURE — 99222 1ST HOSP IP/OBS MODERATE 55: CPT | Performed by: INTERNAL MEDICINE

## 2024-09-02 PROCEDURE — 36415 COLL VENOUS BLD VENIPUNCTURE: CPT | Performed by: INTERNAL MEDICINE

## 2024-09-02 PROCEDURE — 99232 SBSQ HOSP IP/OBS MODERATE 35: CPT | Performed by: UROLOGY

## 2024-09-02 PROCEDURE — 85025 COMPLETE CBC W/AUTO DIFF WBC: CPT | Performed by: INTERNAL MEDICINE

## 2024-09-02 PROCEDURE — 2500000002 HC RX 250 W HCPCS SELF ADMINISTERED DRUGS (ALT 637 FOR MEDICARE OP, ALT 636 FOR OP/ED): Performed by: INTERNAL MEDICINE

## 2024-09-02 PROCEDURE — 2500000001 HC RX 250 WO HCPCS SELF ADMINISTERED DRUGS (ALT 637 FOR MEDICARE OP)

## 2024-09-02 PROCEDURE — 2500000004 HC RX 250 GENERAL PHARMACY W/ HCPCS (ALT 636 FOR OP/ED): Performed by: INTERNAL MEDICINE

## 2024-09-02 RX ORDER — CLOPIDOGREL BISULFATE 75 MG/1
75 TABLET ORAL DAILY
Status: DISCONTINUED | OUTPATIENT
Start: 2024-09-02 | End: 2024-09-05 | Stop reason: HOSPADM

## 2024-09-02 RX ADMIN — POLYETHYLENE GLYCOL 3350 17 G: 17 POWDER, FOR SOLUTION ORAL at 09:42

## 2024-09-02 RX ADMIN — DONEPEZIL HYDROCHLORIDE 10 MG: 10 TABLET, FILM COATED ORAL at 21:00

## 2024-09-02 RX ADMIN — CLOPIDOGREL BISULFATE 75 MG: 75 TABLET ORAL at 18:21

## 2024-09-02 RX ADMIN — ARIPIPRAZOLE 10 MG: 5 TABLET ORAL at 09:42

## 2024-09-02 RX ADMIN — CARVEDILOL 25 MG: 25 TABLET, FILM COATED ORAL at 16:56

## 2024-09-02 RX ADMIN — PANTOPRAZOLE SODIUM 20 MG: 20 TABLET, DELAYED RELEASE ORAL at 05:36

## 2024-09-02 RX ADMIN — AMLODIPINE BESYLATE 5 MG: 5 TABLET ORAL at 09:42

## 2024-09-02 RX ADMIN — ISOSORBIDE MONONITRATE 30 MG: 30 TABLET, EXTENDED RELEASE ORAL at 09:42

## 2024-09-02 RX ADMIN — CARVEDILOL 25 MG: 25 TABLET, FILM COATED ORAL at 09:48

## 2024-09-02 ASSESSMENT — COGNITIVE AND FUNCTIONAL STATUS - GENERAL
DAILY ACTIVITIY SCORE: 20
CLIMB 3 TO 5 STEPS WITH RAILING: A LITTLE
TURNING FROM BACK TO SIDE WHILE IN FLAT BAD: A LITTLE
MOVING FROM LYING ON BACK TO SITTING ON SIDE OF FLAT BED WITH BEDRAILS: A LITTLE
WALKING IN HOSPITAL ROOM: A LITTLE
MOVING TO AND FROM BED TO CHAIR: A LITTLE
HELP NEEDED FOR BATHING: A LITTLE
STANDING UP FROM CHAIR USING ARMS: A LITTLE
DRESSING REGULAR UPPER BODY CLOTHING: A LITTLE
MOBILITY SCORE: 18
DRESSING REGULAR LOWER BODY CLOTHING: A LITTLE
TOILETING: A LITTLE

## 2024-09-02 ASSESSMENT — PAIN SCALES - GENERAL
PAINLEVEL_OUTOF10: 0 - NO PAIN

## 2024-09-02 NOTE — CARE PLAN
The patient's goals for the shift include  rest     The clinical goals for the shift include monitor labs and vitals, maintain Rodríguez catheter, rest, comfort, safety      Problem: Skin  Goal: Decreased wound size/increased tissue granulation at next dressing change  Outcome: Progressing  Goal: Participates in plan/prevention/treatment measures  Outcome: Progressing  Goal: Prevent/manage excess moisture  Outcome: Progressing  Goal: Prevent/minimize sheer/friction injuries  Outcome: Progressing  Goal: Promote/optimize nutrition  Outcome: Progressing  Goal: Promote skin healing  Outcome: Progressing     Problem: Pain - Adult  Goal: Verbalizes/displays adequate comfort level or baseline comfort level  Outcome: Progressing     Problem: Safety - Adult  Goal: Free from fall injury  Outcome: Progressing     Problem: Discharge Planning  Goal: Discharge to home or other facility with appropriate resources  Outcome: Progressing     Problem: Chronic Conditions and Co-morbidities  Goal: Patient's chronic conditions and co-morbidity symptoms are monitored and maintained or improved  Outcome: Progressing

## 2024-09-02 NOTE — PROGRESS NOTES
Fernando Sweet is a 64 y.o. male on day 3 of admission presenting with Chest pain, unspecified type.    Subjective   Patient was seen and examined.  Patient has hematuria again today.  Patient denied any headache or dizziness.  No abdominal pain or chest pain.       Objective     Physical Exam  HEENT:  Head externally atraumatic,  extraocular movements intact, oral mucosa moist  Neck:  Supple, no JVP, no palpable adenopathy or thyromegaly.  No carotid bruit.  Chest:  Clear to auscultation and resonant.  Heart:  Regular rate and rhythm, no murmur or gallop could be appreciated.  Abdomen:  Soft, nontender, bowel sounds present, normoactive, no palpable hepatosplenomegaly.  Extremities:  No edema, pulses present, no cyanosis or clubbing.  CNS:  Patient alert, oriented to time, place and person.    No new deficit.  Cranial nerves 2-12 grossly intact.  Hard of hearing.  Skin:  No active rash.  Musculoskeletal: Pain in the left hip due to previous surgery and difficulty moving.  Last Recorded Vitals  Heart Rate:  [63-71]   Temp:  [36.5 °C (97.7 °F)-37.1 °C (98.8 °F)]   Resp:  [16-19]   BP: (124-151)/(78-88)   SpO2:  [98 %-99 %]     Intake/Output last 3 Shifts:  I/O last 3 completed shifts:  In: 3953.3 (43.6 mL/kg) [P.O.:1230; I.V.:2323.3 (25.6 mL/kg); Blood:400]  Out: 3650 (40.2 mL/kg) [Urine:3650 (1.1 mL/kg/hr)]  Weight: 90.7 kg     Relevant Results  No results found for the last 90 days.    Results for orders placed or performed during the hospital encounter of 08/30/24 (from the past 24 hour(s))   CBC and Auto Differential   Result Value Ref Range    WBC 6.7 4.4 - 11.3 x10*3/uL    nRBC 0.0 0.0 - 0.0 /100 WBCs    RBC 2.91 (L) 4.50 - 5.90 x10*6/uL    Hemoglobin 8.6 (L) 13.5 - 17.5 g/dL    Hematocrit 27.4 (L) 41.0 - 52.0 %    MCV 94 80 - 100 fL    MCH 29.6 26.0 - 34.0 pg    MCHC 31.4 (L) 32.0 - 36.0 g/dL    RDW 16.0 (H) 11.5 - 14.5 %    Platelets 264 150 - 450 x10*3/uL    Neutrophils % 48.2 40.0 - 80.0 %    Immature  Granulocytes %, Automated 0.3 0.0 - 0.9 %    Lymphocytes % 30.8 13.0 - 44.0 %    Monocytes % 16.8 2.0 - 10.0 %    Eosinophils % 3.3 0.0 - 6.0 %    Basophils % 0.6 0.0 - 2.0 %    Neutrophils Absolute 3.20 1.20 - 7.70 x10*3/uL    Immature Granulocytes Absolute, Automated 0.02 0.00 - 0.70 x10*3/uL    Lymphocytes Absolute 2.05 1.20 - 4.80 x10*3/uL    Monocytes Absolute 1.12 (H) 0.10 - 1.00 x10*3/uL    Eosinophils Absolute 0.22 0.00 - 0.70 x10*3/uL    Basophils Absolute 0.04 0.00 - 0.10 x10*3/uL   Basic Metabolic Panel   Result Value Ref Range    Glucose 100 (H) 65 - 99 mg/dL    Sodium 143 133 - 145 mmol/L    Potassium 3.6 3.4 - 5.1 mmol/L    Chloride 109 (H) 97 - 107 mmol/L    Bicarbonate 26 24 - 31 mmol/L    Urea Nitrogen 10 8 - 25 mg/dL    Creatinine 1.20 0.40 - 1.60 mg/dL    eGFR 67 >60 mL/min/1.73m*2    Calcium 8.5 8.5 - 10.4 mg/dL    Anion Gap 8 <=19 mmol/L        Current Facility-Administered Medications:     acetaminophen (Tylenol) tablet 650 mg, 650 mg, oral, q6h PRN, Jose Rivera MD    amLODIPine (Norvasc) tablet 5 mg, 5 mg, oral, Daily, Jose Rivera MD, 5 mg at 09/02/24 0942    ARIPiprazole (Abilify) tablet 10 mg, 10 mg, oral, Daily, Jose Rivera MD, 10 mg at 09/02/24 0942    carvedilol (Coreg) tablet 25 mg, 25 mg, oral, BID, Stephane Hernandez [facility], 25 mg at 09/02/24 1656    donepezil (Aricept) tablet 10 mg, 10 mg, oral, Nightly, Jose Rivera MD, 10 mg at 09/01/24 2119    isosorbide mononitrate ER (Imdur) 24 hr tablet 30 mg, 30 mg, oral, Daily, Jose Rivera MD, 30 mg at 09/02/24 0942    melatonin tablet 5 mg, 5 mg, oral, Nightly PRN, Jose Rivera MD    nitroglycerin (Nitrostat) SL tablet 0.4 mg, 0.4 mg, sublingual, q5 min PRN, Jose Rivera MD    oxyCODONE-acetaminophen (Percocet) 5-325 mg per tablet 1 tablet, 1 tablet, oral, q6h PRN, Jose Rivera MD, 1 tablet at 08/31/24 0527    oxygen (O2) therapy, , inhalation, Continuous PRN - O2/gases, Jose JACKSON  MD Miguel    pantoprazole (ProtoNix) EC tablet 20 mg, 20 mg, oral, Daily before breakfast, Jose Rivera MD, 20 mg at 09/02/24 0536    polyethylene glycol (Glycolax, Miralax) packet 17 g, 17 g, oral, Daily, Jose Rivera MD, 17 g at 09/02/24 0942   Assessment/Plan   Principal Problem:    Chest pain, unspecified type  Acute renal failure  Anemia  Hematuria  Coronary artery disease  Osteoarthritis  Osteoarthritis  Hard of hearing  History of CVA    Plan: Continue current medication patient hematuria.  Although antiplatelet and oral coagulant is on hold.  Restart Plavix.  Start compression stockings for DVT prophylaxis.  Renal function improving.  Hemoglobin has improved.  Continue to monitor.  This has been discussed with patient is agreeable to it.         Jose Rivera MD

## 2024-09-02 NOTE — CARE PLAN
The patient's goals for the shift include  sit in chair for meals    The clinical goals for the shift include monitor labs and sims catheter    Over the shift, the patient did not make progress toward the following goals. Barriers to progression include none . Recommendations to address these barriers include none.

## 2024-09-02 NOTE — PROGRESS NOTES
"Fernando Sweet is a 64 y.o. male on day 3 of admission presenting with Chest pain, unspecified type.    Subjective    Urine light red.  No complaints       Objective     Physical Exam  Alert, no distress  Normal respiratory effort  Abdomen soft nontender  Rodríguez draining light red urine    Last Recorded Vitals  Blood pressure 146/88, pulse 64, temperature 37.1 °C (98.8 °F), temperature source Temporal, resp. rate 16, height 1.753 m (5' 9\"), weight 90.7 kg (200 lb), SpO2 98%.  Intake/Output last 3 Shifts:  I/O last 3 completed shifts:  In: 2475 (27.3 mL/kg) [P.O.:1310; I.V.:1165 (12.8 mL/kg)]  Out: 4250 (46.8 mL/kg) [Urine:4250 (1.3 mL/kg/hr)]  Weight: 90.7 kg     Relevant Results  Scheduled medications  amLODIPine, 5 mg, oral, Daily  ARIPiprazole, 10 mg, oral, Daily  carvedilol, 25 mg, oral, BID  clopidogrel, 75 mg, oral, Daily  donepezil, 10 mg, oral, Nightly  isosorbide mononitrate ER, 30 mg, oral, Daily  pantoprazole, 20 mg, oral, Daily before breakfast  polyethylene glycol, 17 g, oral, Daily      Continuous medications       PRN medications  PRN medications: acetaminophen, melatonin, nitroglycerin, oxyCODONE-acetaminophen, oxygen    Results for orders placed or performed during the hospital encounter of 08/30/24 (from the past 24 hour(s))   CBC and Auto Differential   Result Value Ref Range    WBC 6.7 4.4 - 11.3 x10*3/uL    nRBC 0.0 0.0 - 0.0 /100 WBCs    RBC 2.91 (L) 4.50 - 5.90 x10*6/uL    Hemoglobin 8.6 (L) 13.5 - 17.5 g/dL    Hematocrit 27.4 (L) 41.0 - 52.0 %    MCV 94 80 - 100 fL    MCH 29.6 26.0 - 34.0 pg    MCHC 31.4 (L) 32.0 - 36.0 g/dL    RDW 16.0 (H) 11.5 - 14.5 %    Platelets 264 150 - 450 x10*3/uL    Neutrophils % 48.2 40.0 - 80.0 %    Immature Granulocytes %, Automated 0.3 0.0 - 0.9 %    Lymphocytes % 30.8 13.0 - 44.0 %    Monocytes % 16.8 2.0 - 10.0 %    Eosinophils % 3.3 0.0 - 6.0 %    Basophils % 0.6 0.0 - 2.0 %    Neutrophils Absolute 3.20 1.20 - 7.70 x10*3/uL    Immature Granulocytes Absolute, " Automated 0.02 0.00 - 0.70 x10*3/uL    Lymphocytes Absolute 2.05 1.20 - 4.80 x10*3/uL    Monocytes Absolute 1.12 (H) 0.10 - 1.00 x10*3/uL    Eosinophils Absolute 0.22 0.00 - 0.70 x10*3/uL    Basophils Absolute 0.04 0.00 - 0.10 x10*3/uL   Basic Metabolic Panel   Result Value Ref Range    Glucose 100 (H) 65 - 99 mg/dL    Sodium 143 133 - 145 mmol/L    Potassium 3.6 3.4 - 5.1 mmol/L    Chloride 109 (H) 97 - 107 mmol/L    Bicarbonate 26 24 - 31 mmol/L    Urea Nitrogen 10 8 - 25 mg/dL    Creatinine 1.20 0.40 - 1.60 mg/dL    eGFR 67 >60 mL/min/1.73m*2    Calcium 8.5 8.5 - 10.4 mg/dL    Anion Gap 8 <=19 mmol/L       US renal complete    Result Date: 8/31/2024  Interpreted By:  Stephane Taylor, STUDY: US RENAL COMPLETE; 8/31/2024 7:51 am   INDICATION: Signs/Symptoms:Acute renal failure.   COMPARISON: 12/05/2020   ACCESSION NUMBER(S): [id number]   ORDERING CLINICIAN: DUY JO   TECHNIQUE: Grayscale and color Doppler imaging of the kidneys   FINDINGS: The right kidney measures 11.8 cm. There is a 2.9 cm simple cyst upper pole right kidney and a 9 mm simple cyst lower pole right kidney.   The left kidney measures 10.6 cm  .     No renal stones are identified.  The renal cortical thickness and echogenicity is normal.  No hydronephrosis is identified.   There is a Rodríguez catheter within the urinary bladder. There is a large amount of solid-appearing material within the urinary bladder adjacent to the Rodríguez catheter. No vascularity is identified within this material. Incidental note is made of air within the urinary bladder.       Right renal cyst. Nonspecific appearance left kidney. Large amount of material within the urinary bladder with a differential to include clot or tumor.   MACRO: none   Signed by: Stephane Taylor 8/31/2024 11:01 AM Dictation workstation:   [id number]                      Assessment/Plan   Assessment & Plan  Chest pain, unspecified type    64-year-old had recent gross hematuria.  Per the chart it  had just been present for 1 day and not for a few weeks.  His blood thinners have been held and his urine is cleared to a light red.  Continue to hold blood thinners for now if possible.    Ultrasound identified debris versus a tumor in his bladder.  He will require cystoscopy, however hopefully this may be done as an outpatient after his urine has cleared.   Bladder subsequently flushed of clots    He has anemia and he received a transfusion.  His hemoglobin was 8.6 today.    He has DANNY and history of CKD.  Creatinine decreased to 1.2.    He has BPH and does report a history of difficulty voiding.     He has had 3 negative urine cultures.  He is not a good historian, however he denies any dysuria.                nt.      Gabe Odell MD

## 2024-09-02 NOTE — PROGRESS NOTES
Subjective Data:  Patient is currently sitting in a chair but no active chest pain tightness with underlying Rodríguez catheter with hematuria.  Patient with a multiple blood transfusion.  Currently H&H is stable at the moment.  Overnight Events:    None  Objective   Last Recorded Vitals  /78 (BP Location: Left arm, Patient Position: Sitting)   Pulse 67   Temp 37.1 °C (98.8 °F) (Temporal)   Resp 16   Wt 90.7 kg (200 lb)   SpO2 98%     Intake/Output Summary (Last 24 hours) at 9/2/2024 1254  Last data filed at 9/2/2024 0948  Gross per 24 hour   Intake 1304.99 ml   Output 3100 ml   Net -1795.01 ml     Physical Exam:  HEENT: Normocephalic/atraumatic pupils equal react light  Neck exam mild JVD, no bruit  Lung exam clear to auscultation.  Cardiac exam is regular rhythm S1-S2, soft systolic murmur heard.   Abdomen soft nontender, nondistended  Extremities no clubbing, cyanosis, trace edema  Neuro exam grossly intact.  Image Results  US renal complete  Narrative: Interpreted By:  Stephane Taylor,   STUDY:  US RENAL COMPLETE; 8/31/2024 7:51 am      INDICATION:  Signs/Symptoms:Acute renal failure.      COMPARISON:  12/05/2020      ACCESSION NUMBER(S):  [id number]      ORDERING CLINICIAN:  DUY JO      TECHNIQUE:  Grayscale and color Doppler imaging of the kidneys      FINDINGS:  The right kidney measures 11.8 cm. There is a 2.9 cm simple cyst  upper pole right kidney and a 9 mm simple cyst lower pole right  kidney.      The left kidney measures 10.6 cm  .          No renal stones are identified.  The renal cortical thickness and  echogenicity is normal.  No hydronephrosis is identified.      There is a Rodríguez catheter within the urinary bladder. There is a  large amount of solid-appearing material within the urinary bladder  adjacent to the Rodríguez catheter. No vascularity is identified within  this material. Incidental note is made of air within the urinary  bladder.      Impression: Right renal  cyst.  Nonspecific appearance left kidney.  Large amount of material within the urinary bladder with a  differential to include clot or tumor.      MACRO:  none      Signed by: Stephane Taylor 8/31/2024 11:01 AM  Dictation workstation:   [id number]    Last Labs:  CBC - 9/2/2024:  5:15 AM  6.7 8.6 264    27.4      CMP - 9/2/2024:  5:15 AM  8.5 6.2 18 --- 0.4   _ 3.4 11 168      PTT - No results in last year.  1.1   11.8 _     Inpatient Medications:  Scheduled medications   Medication Dose Route Frequency    amLODIPine  5 mg oral Daily    ARIPiprazole  10 mg oral Daily    carvedilol  25 mg oral BID    donepezil  10 mg oral Nightly    isosorbide mononitrate ER  30 mg oral Daily    pantoprazole  20 mg oral Daily before breakfast    polyethylene glycol  17 g oral Daily     Principal Problem:    Chest pain, unspecified type    Assessment/Plan   64-year-old male patient with atypical chest pain now with hematuria with DANNY.  Also history of CVA, hypertension hyperlipidemia.  1.  Chest pain: Patient with atypical chest pain at the moment.  Started patient on Imdur 30 mg tablet p.o. daily for underlying angina symptoms.  Currently only medical therapy for underlying chest pain.  Outpatient stress test will be arranged.  2.  Hypertension: Started on carvedilol 25 mg tablet twice daily.  Which helps with controlling heart rate as well as blood pressure.  Also patient was started on amlodipine 5 mg tablet p.o. daily.  3.  History of CVA: Patient was on an blood thinner.  Patient was on Eliquis due to history of CVA.  At the moment patient with hematuria with a multiple blood transfusion with a low H&H.  Due to underlying hematuria.  Stopped anticoagulation.  Start patient on aspirin 81 mg tablet p.o. daily if tolerable.  4.  GERD disorders: Currently patient is on Protonix 40 mg tablet p.o. daily.  5.  Prophylaxis: SCDs at the moment.    Pt. care time is spent includes review of diagnostic tests, labs, radiographs, EKGs, old  echoes, cardiac work-up and coordination of care. Assessment, impression and plans are reflected in the note above as well as the orders.    Code Status:  Full Code  I spent 50 minutes in the professional and overall care of this patient.  Davy Richey MD

## 2024-09-03 LAB
ANION GAP SERPL CALC-SCNC: 8 MMOL/L
ATRIAL RATE: 64 BPM
BASOPHILS # BLD AUTO: 0.03 X10*3/UL (ref 0–0.1)
BASOPHILS NFR BLD AUTO: 0.4 %
BUN SERPL-MCNC: 11 MG/DL (ref 8–25)
CALCIUM SERPL-MCNC: 8.6 MG/DL (ref 8.5–10.4)
CHLORIDE SERPL-SCNC: 107 MMOL/L (ref 97–107)
CO2 SERPL-SCNC: 25 MMOL/L (ref 24–31)
CREAT SERPL-MCNC: 1.2 MG/DL (ref 0.4–1.6)
EGFRCR SERPLBLD CKD-EPI 2021: 67 ML/MIN/1.73M*2
EOSINOPHIL # BLD AUTO: 0.29 X10*3/UL (ref 0–0.7)
EOSINOPHIL NFR BLD AUTO: 4.1 %
ERYTHROCYTE [DISTWIDTH] IN BLOOD BY AUTOMATED COUNT: 15.9 % (ref 11.5–14.5)
GLUCOSE SERPL-MCNC: 109 MG/DL (ref 65–99)
HCT VFR BLD AUTO: 27.6 % (ref 41–52)
HGB BLD-MCNC: 8.7 G/DL (ref 13.5–17.5)
IMM GRANULOCYTES # BLD AUTO: 0.02 X10*3/UL (ref 0–0.7)
IMM GRANULOCYTES NFR BLD AUTO: 0.3 % (ref 0–0.9)
LYMPHOCYTES # BLD AUTO: 1.71 X10*3/UL (ref 1.2–4.8)
LYMPHOCYTES NFR BLD AUTO: 24.2 %
MCH RBC QN AUTO: 29.7 PG (ref 26–34)
MCHC RBC AUTO-ENTMCNC: 31.5 G/DL (ref 32–36)
MCV RBC AUTO: 94 FL (ref 80–100)
MONOCYTES # BLD AUTO: 1.07 X10*3/UL (ref 0.1–1)
MONOCYTES NFR BLD AUTO: 15.2 %
NEUTROPHILS # BLD AUTO: 3.94 X10*3/UL (ref 1.2–7.7)
NEUTROPHILS NFR BLD AUTO: 55.8 %
NRBC BLD-RTO: 0 /100 WBCS (ref 0–0)
P AXIS: 42 DEGREES
P OFFSET: 188 MS
P ONSET: 130 MS
PLATELET # BLD AUTO: 260 X10*3/UL (ref 150–450)
POTASSIUM SERPL-SCNC: 3.3 MMOL/L (ref 3.4–5.1)
PR INTERVAL: 154 MS
Q ONSET: 207 MS
QRS COUNT: 11 BEATS
QRS DURATION: 98 MS
QT INTERVAL: 472 MS
QTC CALCULATION(BAZETT): 486 MS
QTC FREDERICIA: 482 MS
R AXIS: -13 DEGREES
RBC # BLD AUTO: 2.93 X10*6/UL (ref 4.5–5.9)
SODIUM SERPL-SCNC: 140 MMOL/L (ref 133–145)
T AXIS: 42 DEGREES
T OFFSET: 443 MS
VENTRICULAR RATE: 64 BPM
WBC # BLD AUTO: 7.1 X10*3/UL (ref 4.4–11.3)

## 2024-09-03 PROCEDURE — 1200000002 HC GENERAL ROOM WITH TELEMETRY DAILY

## 2024-09-03 PROCEDURE — 99233 SBSQ HOSP IP/OBS HIGH 50: CPT | Performed by: INTERNAL MEDICINE

## 2024-09-03 PROCEDURE — 2500000001 HC RX 250 WO HCPCS SELF ADMINISTERED DRUGS (ALT 637 FOR MEDICARE OP): Performed by: INTERNAL MEDICINE

## 2024-09-03 PROCEDURE — 2500000002 HC RX 250 W HCPCS SELF ADMINISTERED DRUGS (ALT 637 FOR MEDICARE OP, ALT 636 FOR OP/ED): Performed by: INTERNAL MEDICINE

## 2024-09-03 PROCEDURE — 2500000004 HC RX 250 GENERAL PHARMACY W/ HCPCS (ALT 636 FOR OP/ED): Performed by: INTERNAL MEDICINE

## 2024-09-03 PROCEDURE — 2500000001 HC RX 250 WO HCPCS SELF ADMINISTERED DRUGS (ALT 637 FOR MEDICARE OP)

## 2024-09-03 PROCEDURE — 85025 COMPLETE CBC W/AUTO DIFF WBC: CPT | Performed by: INTERNAL MEDICINE

## 2024-09-03 PROCEDURE — 36415 COLL VENOUS BLD VENIPUNCTURE: CPT | Performed by: INTERNAL MEDICINE

## 2024-09-03 PROCEDURE — 80048 BASIC METABOLIC PNL TOTAL CA: CPT | Performed by: INTERNAL MEDICINE

## 2024-09-03 PROCEDURE — 99231 SBSQ HOSP IP/OBS SF/LOW 25: CPT | Performed by: SURGERY

## 2024-09-03 RX ORDER — POTASSIUM CHLORIDE 1.5 G/1.58G
40 POWDER, FOR SOLUTION ORAL ONCE
Status: COMPLETED | OUTPATIENT
Start: 2024-09-03 | End: 2024-09-03

## 2024-09-03 RX ADMIN — CARVEDILOL 25 MG: 25 TABLET, FILM COATED ORAL at 09:19

## 2024-09-03 RX ADMIN — POLYETHYLENE GLYCOL 3350 17 G: 17 POWDER, FOR SOLUTION ORAL at 09:19

## 2024-09-03 RX ADMIN — PANTOPRAZOLE SODIUM 20 MG: 20 TABLET, DELAYED RELEASE ORAL at 06:13

## 2024-09-03 RX ADMIN — DONEPEZIL HYDROCHLORIDE 10 MG: 10 TABLET, FILM COATED ORAL at 20:35

## 2024-09-03 RX ADMIN — CLOPIDOGREL BISULFATE 75 MG: 75 TABLET ORAL at 09:19

## 2024-09-03 RX ADMIN — CARVEDILOL 25 MG: 25 TABLET, FILM COATED ORAL at 17:56

## 2024-09-03 RX ADMIN — AMLODIPINE BESYLATE 5 MG: 5 TABLET ORAL at 09:19

## 2024-09-03 RX ADMIN — ISOSORBIDE MONONITRATE 30 MG: 30 TABLET, EXTENDED RELEASE ORAL at 09:19

## 2024-09-03 RX ADMIN — ARIPIPRAZOLE 10 MG: 5 TABLET ORAL at 09:19

## 2024-09-03 RX ADMIN — POTASSIUM CHLORIDE 40 MEQ: 1.5 POWDER, FOR SOLUTION ORAL at 12:51

## 2024-09-03 SDOH — ECONOMIC STABILITY: HOUSING INSECURITY: AT ANY TIME IN THE PAST 12 MONTHS, WERE YOU HOMELESS OR LIVING IN A SHELTER (INCLUDING NOW)?: NO

## 2024-09-03 SDOH — ECONOMIC STABILITY: INCOME INSECURITY: IN THE LAST 12 MONTHS, WAS THERE A TIME WHEN YOU WERE NOT ABLE TO PAY THE MORTGAGE OR RENT ON TIME?: NO

## 2024-09-03 SDOH — SOCIAL STABILITY: SOCIAL INSECURITY: WITHIN THE LAST YEAR, HAVE YOU BEEN HUMILIATED OR EMOTIONALLY ABUSED IN OTHER WAYS BY YOUR PARTNER OR EX-PARTNER?: NO

## 2024-09-03 SDOH — SOCIAL STABILITY: SOCIAL INSECURITY
WITHIN THE LAST YEAR, HAVE TO BEEN RAPED OR FORCED TO HAVE ANY KIND OF SEXUAL ACTIVITY BY YOUR PARTNER OR EX-PARTNER?: NO

## 2024-09-03 SDOH — ECONOMIC STABILITY: FOOD INSECURITY: WITHIN THE PAST 12 MONTHS, THE FOOD YOU BOUGHT JUST DIDN'T LAST AND YOU DIDN'T HAVE MONEY TO GET MORE.: NEVER TRUE

## 2024-09-03 SDOH — SOCIAL STABILITY: SOCIAL INSECURITY: WITHIN THE LAST YEAR, HAVE YOU BEEN AFRAID OF YOUR PARTNER OR EX-PARTNER?: NO

## 2024-09-03 SDOH — HEALTH STABILITY: MENTAL HEALTH
HOW OFTEN DO YOU NEED TO HAVE SOMEONE HELP YOU WHEN YOU READ INSTRUCTIONS, PAMPHLETS, OR OTHER WRITTEN MATERIAL FROM YOUR DOCTOR OR PHARMACY?: ALWAYS

## 2024-09-03 SDOH — SOCIAL STABILITY: SOCIAL INSECURITY
WITHIN THE LAST YEAR, HAVE YOU BEEN KICKED, HIT, SLAPPED, OR OTHERWISE PHYSICALLY HURT BY YOUR PARTNER OR EX-PARTNER?: NO

## 2024-09-03 SDOH — ECONOMIC STABILITY: INCOME INSECURITY: IN THE PAST 12 MONTHS, HAS THE ELECTRIC, GAS, OIL, OR WATER COMPANY THREATENED TO SHUT OFF SERVICE IN YOUR HOME?: NO

## 2024-09-03 SDOH — ECONOMIC STABILITY: FOOD INSECURITY: WITHIN THE PAST 12 MONTHS, YOU WORRIED THAT YOUR FOOD WOULD RUN OUT BEFORE YOU GOT MONEY TO BUY MORE.: NEVER TRUE

## 2024-09-03 SDOH — ECONOMIC STABILITY: TRANSPORTATION INSECURITY
IN THE PAST 12 MONTHS, HAS THE LACK OF TRANSPORTATION KEPT YOU FROM MEDICAL APPOINTMENTS OR FROM GETTING MEDICATIONS?: NO

## 2024-09-03 SDOH — ECONOMIC STABILITY: INCOME INSECURITY: HOW HARD IS IT FOR YOU TO PAY FOR THE VERY BASICS LIKE FOOD, HOUSING, MEDICAL CARE, AND HEATING?: NOT HARD AT ALL

## 2024-09-03 SDOH — ECONOMIC STABILITY: HOUSING INSECURITY: IN THE PAST 12 MONTHS, HOW MANY TIMES HAVE YOU MOVED WHERE YOU WERE LIVING?: 1

## 2024-09-03 SDOH — ECONOMIC STABILITY: TRANSPORTATION INSECURITY
IN THE PAST 12 MONTHS, HAS LACK OF TRANSPORTATION KEPT YOU FROM MEETINGS, WORK, OR FROM GETTING THINGS NEEDED FOR DAILY LIVING?: NO

## 2024-09-03 ASSESSMENT — ACTIVITIES OF DAILY LIVING (ADL): LACK_OF_TRANSPORTATION: NO

## 2024-09-03 ASSESSMENT — PAIN SCALES - GENERAL
PAINLEVEL_OUTOF10: 0 - NO PAIN

## 2024-09-03 ASSESSMENT — COGNITIVE AND FUNCTIONAL STATUS - GENERAL
MOVING FROM LYING ON BACK TO SITTING ON SIDE OF FLAT BED WITH BEDRAILS: A LITTLE
WALKING IN HOSPITAL ROOM: A LOT
MOBILITY SCORE: 16
TURNING FROM BACK TO SIDE WHILE IN FLAT BAD: A LITTLE
DRESSING REGULAR UPPER BODY CLOTHING: A LITTLE
DRESSING REGULAR UPPER BODY CLOTHING: A LITTLE
WALKING IN HOSPITAL ROOM: A LOT
CLIMB 3 TO 5 STEPS WITH RAILING: A LOT
TOILETING: A LITTLE
MOVING FROM LYING ON BACK TO SITTING ON SIDE OF FLAT BED WITH BEDRAILS: A LITTLE
DRESSING REGULAR LOWER BODY CLOTHING: A LITTLE
MOBILITY SCORE: 16
DAILY ACTIVITIY SCORE: 19
CLIMB 3 TO 5 STEPS WITH RAILING: A LOT
MOVING TO AND FROM BED TO CHAIR: A LITTLE
HELP NEEDED FOR BATHING: A LITTLE
DAILY ACTIVITIY SCORE: 20
MOVING TO AND FROM BED TO CHAIR: A LITTLE
DRESSING REGULAR LOWER BODY CLOTHING: A LITTLE
TOILETING: A LITTLE
HELP NEEDED FOR BATHING: A LITTLE
STANDING UP FROM CHAIR USING ARMS: A LITTLE
TURNING FROM BACK TO SIDE WHILE IN FLAT BAD: A LITTLE
PERSONAL GROOMING: A LITTLE
STANDING UP FROM CHAIR USING ARMS: A LITTLE

## 2024-09-03 ASSESSMENT — PAIN - FUNCTIONAL ASSESSMENT
PAIN_FUNCTIONAL_ASSESSMENT: 0-10

## 2024-09-03 NOTE — CARE PLAN
Problem: Skin  Goal: Decreased wound size/increased tissue granulation at next dressing change  Outcome: Progressing  Flowsheets (Taken 9/3/2024 0257)  Decreased wound size/increased tissue granulation at next dressing change: Promote sleep for wound healing  Goal: Participates in plan/prevention/treatment measures  Outcome: Progressing  Flowsheets (Taken 9/3/2024 0257)  Participates in plan/prevention/treatment measures: Elevate heels  Goal: Prevent/manage excess moisture  Outcome: Progressing  Flowsheets (Taken 9/3/2024 0257)  Prevent/manage excess moisture:   Cleanse incontinence/protect with barrier cream   Monitor for/manage infection if present  Goal: Prevent/minimize sheer/friction injuries  Outcome: Progressing  Flowsheets (Taken 9/3/2024 0257)  Prevent/minimize sheer/friction injuries:   Use pull sheet   HOB 30 degrees or less  Goal: Promote/optimize nutrition  Outcome: Progressing  Flowsheets (Taken 9/3/2024 0257)  Promote/optimize nutrition:   Monitor/record intake including meals   Offer water/supplements/favorite foods  Goal: Promote skin healing  Outcome: Progressing  Flowsheets (Taken 9/3/2024 0257)  Promote skin healing:   Assess skin/pad under line(s)/device(s)   Protective dressings over bony prominences     Problem: Safety - Adult  Goal: Free from fall injury  Outcome: Progressing     Problem: Pain - Adult  Goal: Verbalizes/displays adequate comfort level or baseline comfort level  Outcome: Progressing     Problem: Discharge Planning  Goal: Discharge to home or other facility with appropriate resources  Outcome: Progressing     Problem: Chronic Conditions and Co-morbidities  Goal: Patient's chronic conditions and co-morbidity symptoms are monitored and maintained or improved  Outcome: Progressing   The patient's goals for the shift include  REST    The clinical goals for the shift include monitor labs and sims catheter, promote rest, safety      09/03/24 at 2:58 AM - Peg English RN

## 2024-09-03 NOTE — PROGRESS NOTES
Subjective Data:  Patient stable cardiac wise no active chest pain tightness.  Ambulate hematuria.  Currently on Plavix 75 mg once a day.  Not on any Eliquis or other anticoagulation.  Overnight Events:    None  Objective   Last Recorded Vitals  /65 (BP Location: Right arm, Patient Position: Sitting)   Pulse 58   Temp 36.4 °C (97.5 °F) (Temporal)   Resp 16   Wt 90.7 kg (200 lb)   SpO2 99%     Intake/Output Summary (Last 24 hours) at 9/3/2024 1519  Last data filed at 9/3/2024 1220  Gross per 24 hour   Intake 340 ml   Output 1375 ml   Net -1035 ml     Physical Exam:  HEENT: Normocephalic/atraumatic pupils equal react light  Neck exam mild JVD, no bruit  Lung exam few crackles at the bases  Cardiac exam is regular rhythm S1-S2, soft systolic murmur heard.   Abdomen soft nontender, nondistended  Extremities no clubbing, cyanosis, trace edema  Neuro exam grossly intact.  Image Results  ECG 12 lead  Sinus rhythm with Premature atrial complexes  Minimal voltage criteria for LVH, may be normal variant  Possible Anterior infarct (cited on or before 25-OCT-2023)  Abnormal ECG  When compared with ECG of 08-AUG-2024 10:14,  Premature atrial complexes are now Present  T wave inversion no longer evident in Lateral leads    Last Labs:  CBC - 9/3/2024:  4:27 AM  7.1 8.7 260    27.6      CMP - 9/3/2024:  4:27 AM  8.6 6.2 18 --- 0.4   _ 3.4 11 168      PTT - No results in last year.  1.1   11.8 _     Inpatient Medications:  Scheduled medications   Medication Dose Route Frequency    amLODIPine  5 mg oral Daily    ARIPiprazole  10 mg oral Daily    carvedilol  25 mg oral BID    clopidogrel  75 mg oral Daily    donepezil  10 mg oral Nightly    isosorbide mononitrate ER  30 mg oral Daily    pantoprazole  20 mg oral Daily before breakfast    polyethylene glycol  17 g oral Daily     Principal Problem:    Chest pain, unspecified type    Assessment/Plan   64-year-old patient with history of hypertension hyperlipidemia.  Also noted  hematuria with  1.  Hypertension: Patient currently on amlodipine 5 mg tablet p.o. daily to control blood pressure.  Also patient currently on carvedilol 25 mg tablet p.o. twice daily  2.  CAD: Patient currently on Imdur 30 mg p.o. daily.  3.  History of CVA: Patient currently back on Plavix 75 mg tablet once a day.  4.  GERD disorders: Currently on Protonix.    Pt. care time is spent includes review of diagnostic tests, labs, radiographs, EKGs, old echoes, cardiac work-up and coordination of care. Assessment, impression and plans are reflected in the note above as well as the orders.    Code Status:  Full Code  I spent 45 minutes in the professional and overall care of this patient.  Davy Richey MD

## 2024-09-03 NOTE — CARE PLAN
The patient's goals for the shift include      The clinical goals for the shift include monitor labs and sims catheter, promote rest, safety      Problem: Skin  Goal: Decreased wound size/increased tissue granulation at next dressing change  Outcome: Progressing  Goal: Participates in plan/prevention/treatment measures  Outcome: Progressing  Goal: Prevent/manage excess moisture  Outcome: Progressing  Goal: Prevent/minimize sheer/friction injuries  Outcome: Progressing  Goal: Promote/optimize nutrition  Outcome: Progressing  Goal: Promote skin healing  Outcome: Progressing     Problem: Pain - Adult  Goal: Verbalizes/displays adequate comfort level or baseline comfort level  Outcome: Progressing     Problem: Safety - Adult  Goal: Free from fall injury  Outcome: Progressing     Problem: Discharge Planning  Goal: Discharge to home or other facility with appropriate resources  Outcome: Progressing     Problem: Chronic Conditions and Co-morbidities  Goal: Patient's chronic conditions and co-morbidity symptoms are monitored and maintained or improved  Outcome: Progressing

## 2024-09-03 NOTE — PROGRESS NOTES
09/03/24 1315   Discharge Planning   Living Arrangements Other (Comment)  (Assisted Living)   Support Systems Family members   Assistance Needed Patient is independent of ADLs per daughter-in-law and dependent for IADLs.   Type of Residence Assisted living   Do you have animals or pets at home? No   Care Facility Name [facility]   Who is requesting discharge planning? Provider   Type of Post Acute Facility Services Assisted living   Expected Discharge Disposition [facility]   Does the patient need discharge transport arranged? Yes   RoundTrip coordination needed? Yes   Has discharge transport been arranged? No   Financial Resource Strain   How hard is it for you to pay for the very basics like food, housing, medical care, and heating? Not hard   Housing Stability   In the last 12 months, was there a time when you were not able to pay the mortgage or rent on time? N   In the past 12 months, how many times have you moved where you were living? 0   At any time in the past 12 months, were you homeless or living in a shelter (including now)? N   Transportation Needs   In the past 12 months, has lack of transportation kept you from medical appointments or from getting medications? no   In the past 12 months, has lack of transportation kept you from meetings, work, or from getting things needed for daily living? No   Patient Choice   Provider Choice list and CMS website (https://medicare.gov/care-compare#search) for post-acute Quality and Resource Measure Data were provided and reviewed with: Family   Patient / Family choosing to utilize agency / facility established prior to hospitalization Yes     MSW called patient's family. His step-son is his guardian and unavailable at work, but daughter-in-law was able to be reached. She reports patient is independent of ADLs at [facility] and gets himself around. They prefer for him to return to [facility], but is SNF is needed request [facility] as he  was there following his recent hip surgery. Patient is active with therapy at [place] and plan is for him to resume the same upon discharge.      Patient will need transportation via [facility] upon discharge. Nurse updated to the same.

## 2024-09-03 NOTE — PROGRESS NOTES
"Fernando Sweet is a 64 y.o. male on day 4 of admission presenting with Chest pain, unspecified type.    Subjective   Feels well. No complaints.           Objective     Physical Exam  Awake, alert  Abdomen soft, ND, NT  Rodríguez - urine clear      Last Recorded Vitals  Blood pressure 131/65, pulse 58, temperature 36.4 °C (97.5 °F), temperature source Temporal, resp. rate 16, height 1.753 m (5' 9\"), weight 90.7 kg (200 lb), SpO2 99%.  Intake/Output last 3 Shifts:  I/O last 3 completed shifts:  In: 480 (5.3 mL/kg) [P.O.:480]  Out: 3500 (38.6 mL/kg) [Urine:3500 (1.1 mL/kg/hr)]  Weight: 90.7 kg     Relevant Results  Scheduled medications  amLODIPine, 5 mg, oral, Daily  ARIPiprazole, 10 mg, oral, Daily  carvedilol, 25 mg, oral, BID  clopidogrel, 75 mg, oral, Daily  donepezil, 10 mg, oral, Nightly  isosorbide mononitrate ER, 30 mg, oral, Daily  pantoprazole, 20 mg, oral, Daily before breakfast  polyethylene glycol, 17 g, oral, Daily  potassium chloride, 40 mEq, oral, Once      Continuous medications     PRN medications  PRN medications: acetaminophen, melatonin, nitroglycerin, oxyCODONE-acetaminophen, oxygen    Results for orders placed or performed during the hospital encounter of 08/30/24 (from the past 24 hour(s))   CBC and Auto Differential   Result Value Ref Range    WBC 7.1 4.4 - 11.3 x10*3/uL    nRBC 0.0 0.0 - 0.0 /100 WBCs    RBC 2.93 (L) 4.50 - 5.90 x10*6/uL    Hemoglobin 8.7 (L) 13.5 - 17.5 g/dL    Hematocrit 27.6 (L) 41.0 - 52.0 %    MCV 94 80 - 100 fL    MCH 29.7 26.0 - 34.0 pg    MCHC 31.5 (L) 32.0 - 36.0 g/dL    RDW 15.9 (H) 11.5 - 14.5 %    Platelets 260 150 - 450 x10*3/uL    Neutrophils % 55.8 40.0 - 80.0 %    Immature Granulocytes %, Automated 0.3 0.0 - 0.9 %    Lymphocytes % 24.2 13.0 - 44.0 %    Monocytes % 15.2 2.0 - 10.0 %    Eosinophils % 4.1 0.0 - 6.0 %    Basophils % 0.4 0.0 - 2.0 %    Neutrophils Absolute 3.94 1.20 - 7.70 x10*3/uL    Immature Granulocytes Absolute, Automated 0.02 0.00 - 0.70 x10*3/uL    " Lymphocytes Absolute 1.71 1.20 - 4.80 x10*3/uL    Monocytes Absolute 1.07 (H) 0.10 - 1.00 x10*3/uL    Eosinophils Absolute 0.29 0.00 - 0.70 x10*3/uL    Basophils Absolute 0.03 0.00 - 0.10 x10*3/uL   Basic Metabolic Panel   Result Value Ref Range    Glucose 109 (H) 65 - 99 mg/dL    Sodium 140 133 - 145 mmol/L    Potassium 3.3 (L) 3.4 - 5.1 mmol/L    Chloride 107 97 - 107 mmol/L    Bicarbonate 25 24 - 31 mmol/L    Urea Nitrogen 11 8 - 25 mg/dL    Creatinine 1.20 0.40 - 1.60 mg/dL    eGFR 67 >60 mL/min/1.73m*2    Calcium 8.6 8.5 - 10.4 mg/dL    Anion Gap 8 <=19 mmol/L       ECG 12 lead    Result Date: 9/3/2024  Sinus rhythm with Premature atrial complexes Minimal voltage criteria for LVH, may be normal variant Possible Anterior infarct (cited on or before 25-OCT-2023) Abnormal ECG When compared with ECG of 08-AUG-2024 10:14, Premature atrial complexes are now Present T wave inversion no longer evident in Lateral leads    US renal complete    Result Date: 8/31/2024  Interpreted By:  Stephane Taylor, STUDY: US RENAL COMPLETE; 8/31/2024 7:51 am   INDICATION: Signs/Symptoms:Acute renal failure.   COMPARISON: 12/05/2020   ACCESSION NUMBER(S): [id number]   ORDERING CLINICIAN: DUY JO   TECHNIQUE: Grayscale and color Doppler imaging of the kidneys   FINDINGS: The right kidney measures 11.8 cm. There is a 2.9 cm simple cyst upper pole right kidney and a 9 mm simple cyst lower pole right kidney.   The left kidney measures 10.6 cm  .     No renal stones are identified.  The renal cortical thickness and echogenicity is normal.  No hydronephrosis is identified.   There is a Rodríguez catheter within the urinary bladder. There is a large amount of solid-appearing material within the urinary bladder adjacent to the Rodríguez catheter. No vascularity is identified within this material. Incidental note is made of air within the urinary bladder.       Right renal cyst. Nonspecific appearance left kidney. Large amount of material  within the urinary bladder with a differential to include clot or tumor.   MACRO: none   Signed by: Stephane Taylor 8/31/2024 11:01 AM Dictation workstation:   [id number]    NM Lung perfusion with spect    Result Date: 8/30/2024  Interpreted By:  Hung Salvador, STUDY: NM LUNG PERFUSION WITH SPECT;  8/30/2024 6:00 pm   INDICATION: Signs/Symptoms:concern for PE.   COMPARISON: Chest x-ray from 08/30/2024   ACCESSION NUMBER(S): [id number]   ORDERING CLINICIAN: JESSE LY   TECHNIQUE: DIVISION OF NUCLEAR MEDICINE PERFUSION LUNG SCANS   Multiple perfusion images of the lungs were acquired after the intravenous administration of 4.0 mCi of Tc-99m macroaggregated albumin (MAA). In addition, SPECT  of the chest was performed.   FINDINGS: Perfusion images of both lungs demonstrate mild heterogeneity throughout the lung fields bilaterally. There are no distinct segmental perfusion defects seen.       There is nonspecific  heterogeneity in perfusion of both lungs indicating low probability for acute pulmonary embolism.     The interpretation above is based on modified PIOPED II and PISAPED criteria.   This study was analyzed and interpreted at [place], [place].   Signed by: Hung Salvador 8/30/2024 6:08 PM Dictation workstation:   [id number]    XR chest 1 view    Result Date: 8/30/2024  Interpreted By:  Ángel Tinajero, STUDY: XR CHEST 1 VIEW; 8/30/2024 2:57 pm   INDICATION: Signs/Symptoms:chest pain   COMPARISON: CT scan from March 2022.   ACCESSION NUMBER(S): [id number]   ORDERING CLINICIAN: MAX SILVA   FINDINGS: The study is limited due to rotation and poor inspiratory effort, with resultant crowding of the pulmonary vasculature. Sternal wires present. The cardiomediastinal silhouette is within normal limits for the technique. Mild bilateral perihilar interstitial infiltrates are noted. There is no pneumothorax, confluent infiltrates or significant effusion. Plate and screws fuse the lower  lumbar spine anteriorly; degenerative changes also involve the thoracic spine.       Allowing for the aforementioned limitation, mild pulmonary vascular congestion.   Signed by: Ángel Tinajero 8/30/2024 3:24 PM Dictation workstation:   [id number]    XR knee left 4+ views    Result Date: 8/9/2024  Interpreted By:  Ninfa Cortez, STUDY: XR KNEE LEFT 4+ VIEWS 8/9/2024 1:17 pm   INDICATION: Signs/Symptoms:Pain, fall   COMPARISON: None available.   ACCESSION NUMBER(S): [id number]   ORDERING CLINICIAN: CARLOTTA CUNNINGHAM   TECHNIQUE: Four views of the left knee   FINDINGS: No fracture, dislocation or acute bony abnormality with the regional soft tissues unremarkable. Medial compartment is mildly narrowed.       No fracture or dislocation of left knee.   Mild medial compartmental narrowing.   Signed by: Ninfa Cortez 8/9/2024 2:10 PM Dictation workstation:   [id number]    ECG 12 lead    Result Date: 8/9/2024  Normal sinus rhythm Cannot rule out Anterior infarct (cited on or before 25-OCT-2023) Abnormal ECG When compared with ECG of 25-OCT-2023 13:28, QRS axis Shifted right Questionable change in initial forces of Anteroseptal leads Nonspecific T wave abnormality, worse in Inferior leads T wave inversion no longer evident in Anterior leads Confirmed by Sidney Green ([id number]) on 8/9/2024 1:02:31 PM    XR chest 1 view    Result Date: 8/8/2024  Interpreted By:  Jeffrey Conway, STUDY: XR CHEST 1 VIEW;  8/8/2024 7:21 pm   INDICATION: Signs/Symptoms:subcutaneous emphysema.   COMPARISON: Chest x-ray from 10/25/2023. CT scan chest from 03/28/2022.   ACCESSION NUMBER(S): [id number]   ORDERING CLINICIAN: SAMIRA OCONNOR   TECHNIQUE: Single AP portable view of the chest was obtained.   FINDINGS: MEDIASTINUM/ LUNGS/ CADY: Previous CABG. Sternotomy wires are intact. Cardiomegaly. No blunting of costophrenic sulci. No gross masslike opacity in either lung worrisome for tumor or pneumonia. Mild stable eventration of the right  diaphragm. Stable surgical clips at the base of the neck on the right. . No abnormal opacity in either lung worrisome for tumor or pneumonia. No pneumothorax. No tracheal deviation. No abnormal hilar fullness or gross mass on either side.   BONES: No lytic or blastic destructive bone lesion.  Previous distal cervical spine anterior plate and screw fusion.  There is mild-to-moderate disc space narrowing and endplate osteophytosis throughout the thoracic spine.   UPPER ABDOMEN: Grossly intact.       Multiple stable findings as described.  No significant or acute interval change.   MACRO: None   Signed by: Jeffrey Conway 8/8/2024 8:56 PM Dictation workstation:   [id number]    FL fluoro images no charge    Result Date: 8/8/2024  These images are not reportable by radiology and will not be interpreted by  Radiologists.    XR hip left with pelvis when performed 2 or 3 views    Result Date: 8/8/2024  Interpreted By:  Harry Booker, STUDY: XR HIP LEFT WITH PELVIS WHEN PERFORMED 2 OR 3 VIEWS;  8/8/2024 11:22 am   INDICATION: Signs/Symptoms:pain after fall.   COMPARISON: None.   ACCESSION NUMBER(S): [id number]   ORDERING CLINICIAN: TITUS SIMON   FINDINGS: There is a comminuted nondisplaced intertrochanteric fracture of the left femur. Mild degenerative changes are present.       Nondisplaced comminuted intertrochanteric fracture of the left femur.   MACRO: None   Signed by: Harry Booker 8/8/2024 11:27 AM Dictation workstation:   [id number]    XR lumbar spine 2-3 views    Result Date: 8/8/2024  Interpreted By:  Harry Booker, STUDY: XR LUMBAR SPINE 2-3 VIEWS;  8/8/2024 11:22 am   INDICATION: Signs/Symptoms:pain after fall.   COMPARISON: None.   ACCESSION NUMBER(S): [id number]   ORDERING CLINICIAN: TITUS SIMON   FINDINGS: No acute fracture or dislocation of the lumbar spine. Moderate to severe multilevel degenerative disc height loss with endplate osteophyte formation. 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   Atherosclerotic calcifications are present.       No definite acute lumbar spine fracture. Moderate to severe degenerative changes.   MACRO: None   Signed by: Harry Booker 8/8/2024 11:26 AM Dictation workstation:   [id number]    CT head wo IV contrast    Result Date: 8/8/2024  Interpreted By:  Harry Booker, STUDY: CT HEAD WO IV CONTRAST  8/8/2024 11:07 am   INDICATION: Signs/Symptoms:fall, unknown if hit head   COMPARISON: 10/25/2023   ACCESSION NUMBER(S): [id number]   ORDERING CLINICIAN: TITUS SIMON   TECHNIQUE: Contiguous axial CT images of the brain were obtained without IV contrast.   FINDINGS: The ventricles, cisterns and sulci are prominent, consistent with moderate diffuse volume loss. Areas of white matter low attenuation are nonspecific but likely related to chronic microvascular disease. Unchanged left MCA distribution encephalomalacia. Interval development of focal right parietal periventricular encephalomalacia. There is intracranial atherosclerosis.   Gray-white differentiation is preserved. No acute intracranial hemorrhage or mass effect. No midline shift. Patent basal cisterns. No extraaxial fluid collections.   The calvaria is intact. The visualized paranasal sinuses and mastoid air cells are clear.       No acute intracranial pathology.   Unchanged left MCA distribution encephalomalacia. Interval development of right parietal encephalomalacia which is otherwise not acute.   Signed by: Harry Booker 8/8/2024 11:13 AM Dictation workstation:   [id number]                This patient has a urinary catheter   Reason for the urinary catheter remaining today? Urine catheter unnecessary, will be removed today               Assessment/Plan   Assessment & Plan  Chest pain, unspecified type    Hematuria.   Resolved.  Voiding trial once ready for discharge.    Will need outpatient cystoscopy.              I spent 15 minutes in the  professional and overall care of this patient.      Zahra Meyers MD

## 2024-09-03 NOTE — PROGRESS NOTES
Fernando Sweet is a 64 y.o. male on day 4 of admission presenting with Chest pain, unspecified type.    Subjective   Patient was seen and examined.  Lying in the bed.  Hard of hearing.  Denies any headache or dizziness.  Still has hematuria.       Objective     Physical Exam  HEENT:  Head externally atraumatic,  extraocular movements intact, oral mucosa moist  Neck:  Supple, no JVP, no palpable adenopathy or thyromegaly.  No carotid bruit.  Chest:  Clear to auscultation and resonant.  Heart:  Regular rate and rhythm, no murmur or gallop could be appreciated.  Abdomen:  Soft, nontender, bowel sounds present, normoactive, no palpable hepatosplenomegaly.  Extremities: Lower extremity mild edema, pulses present, no cyanosis or clubbing.  CNS:  Patient alert, oriented to time, place and person.    No new deficit.  Cranial nerves 2-12 grossly intact  Skin:  No active rash.  Musculoskeletal:  No  apparent joint swelling or erythema, range of movement normal.  Last Recorded Vitals  Heart Rate:  [58-67]   Temp:  [36.4 °C (97.5 °F)-37 °C (98.6 °F)]   Resp:  [15-20]   BP: (128-170)/(63-90)   SpO2:  [97 %-99 %]     Intake/Output last 3 Shifts:  I/O last 3 completed shifts:  In: 480 (5.3 mL/kg) [P.O.:480]  Out: 1975 (21.8 mL/kg) [Urine:1975 (0.6 mL/kg/hr)]  Weight: 90.7 kg     Relevant Results  No results found for the last 90 days.    Results for orders placed or performed during the hospital encounter of 08/30/24 (from the past 24 hour(s))   CBC and Auto Differential   Result Value Ref Range    WBC 7.1 4.4 - 11.3 x10*3/uL    nRBC 0.0 0.0 - 0.0 /100 WBCs    RBC 2.93 (L) 4.50 - 5.90 x10*6/uL    Hemoglobin 8.7 (L) 13.5 - 17.5 g/dL    Hematocrit 27.6 (L) 41.0 - 52.0 %    MCV 94 80 - 100 fL    MCH 29.7 26.0 - 34.0 pg    MCHC 31.5 (L) 32.0 - 36.0 g/dL    RDW 15.9 (H) 11.5 - 14.5 %    Platelets 260 150 - 450 x10*3/uL    Neutrophils % 55.8 40.0 - 80.0 %    Immature Granulocytes %, Automated 0.3 0.0 - 0.9 %    Lymphocytes % 24.2 13.0 -  44.0 %    Monocytes % 15.2 2.0 - 10.0 %    Eosinophils % 4.1 0.0 - 6.0 %    Basophils % 0.4 0.0 - 2.0 %    Neutrophils Absolute 3.94 1.20 - 7.70 x10*3/uL    Immature Granulocytes Absolute, Automated 0.02 0.00 - 0.70 x10*3/uL    Lymphocytes Absolute 1.71 1.20 - 4.80 x10*3/uL    Monocytes Absolute 1.07 (H) 0.10 - 1.00 x10*3/uL    Eosinophils Absolute 0.29 0.00 - 0.70 x10*3/uL    Basophils Absolute 0.03 0.00 - 0.10 x10*3/uL   Basic Metabolic Panel   Result Value Ref Range    Glucose 109 (H) 65 - 99 mg/dL    Sodium 140 133 - 145 mmol/L    Potassium 3.3 (L) 3.4 - 5.1 mmol/L    Chloride 107 97 - 107 mmol/L    Bicarbonate 25 24 - 31 mmol/L    Urea Nitrogen 11 8 - 25 mg/dL    Creatinine 1.20 0.40 - 1.60 mg/dL    eGFR 67 >60 mL/min/1.73m*2    Calcium 8.6 8.5 - 10.4 mg/dL    Anion Gap 8 <=19 mmol/L        Current Facility-Administered Medications:     acetaminophen (Tylenol) tablet 650 mg, 650 mg, oral, q6h PRN, Jose Rivera MD    amLODIPine (Norvasc) tablet 5 mg, 5 mg, oral, Daily, Jose Rivera MD, 5 mg at 09/03/24 0919    ARIPiprazole (Abilify) tablet 10 mg, 10 mg, oral, Daily, Jose Rivera MD, 10 mg at 09/03/24 0919    carvedilol (Coreg) tablet 25 mg, 25 mg, oral, BID, Stephane Hernandez [facility], 25 mg at 09/03/24 1756    clopidogrel (Plavix) tablet 75 mg, 75 mg, oral, Daily, Jose Rivera MD, 75 mg at 09/03/24 0919    donepezil (Aricept) tablet 10 mg, 10 mg, oral, Nightly, Jose Rivera MD, 10 mg at 09/02/24 2100    isosorbide mononitrate ER (Imdur) 24 hr tablet 30 mg, 30 mg, oral, Daily, Jose Rivera MD, 30 mg at 09/03/24 0919    melatonin tablet 5 mg, 5 mg, oral, Nightly PRN, Jose Rivera MD    nitroglycerin (Nitrostat) SL tablet 0.4 mg, 0.4 mg, sublingual, q5 min PRN, Jose Rivera MD    oxyCODONE-acetaminophen (Percocet) 5-325 mg per tablet 1 tablet, 1 tablet, oral, q6h PRN, Jose Rivera MD, 1 tablet at 08/31/24 0527    oxygen (O2) therapy, , inhalation,  Continuous PRN - O2/gases, Jose Rivera MD    pantoprazole (ProtoNix) EC tablet 20 mg, 20 mg, oral, Daily before breakfast, Jose Rivera MD, 20 mg at 09/03/24 0613    polyethylene glycol (Glycolax, Miralax) packet 17 g, 17 g, oral, Daily, Jose Rivera MD, 17 g at 09/03/24 0919   Assessment/Plan   Principal Problem:    Chest pain, unspecified type  History of left hip fracture status post surgery  Hypertension  Coronary artery disease  History of CVA  GERD  Hematuria  Urinary retention    Plan: Continue current medication.  Supportive care.  Give physical therapy for therapy metathesis BMP.  Corrected.  Monitor input output.  Acute renal failure resolved.  We will treat DVT, fall, aspiration and decubitus, DVT precautions.  Cardiology and neurology input appreciated.  Patient medically appeared to be stable.  Discharge soon.         Jose Rivera MD

## 2024-09-04 LAB
ANION GAP SERPL CALC-SCNC: 7 MMOL/L
BASOPHILS # BLD AUTO: 0.03 X10*3/UL (ref 0–0.1)
BASOPHILS NFR BLD AUTO: 0.4 %
BUN SERPL-MCNC: 13 MG/DL (ref 8–25)
CALCIUM SERPL-MCNC: 8.4 MG/DL (ref 8.5–10.4)
CHLORIDE SERPL-SCNC: 108 MMOL/L (ref 97–107)
CO2 SERPL-SCNC: 26 MMOL/L (ref 24–31)
CREAT SERPL-MCNC: 1.2 MG/DL (ref 0.4–1.6)
EGFRCR SERPLBLD CKD-EPI 2021: 67 ML/MIN/1.73M*2
EOSINOPHIL # BLD AUTO: 0.31 X10*3/UL (ref 0–0.7)
EOSINOPHIL NFR BLD AUTO: 4 %
ERYTHROCYTE [DISTWIDTH] IN BLOOD BY AUTOMATED COUNT: 15.7 % (ref 11.5–14.5)
GLUCOSE SERPL-MCNC: 104 MG/DL (ref 65–99)
HCT VFR BLD AUTO: 27.6 % (ref 41–52)
HGB BLD-MCNC: 8.7 G/DL (ref 13.5–17.5)
IMM GRANULOCYTES # BLD AUTO: 0.02 X10*3/UL (ref 0–0.7)
IMM GRANULOCYTES NFR BLD AUTO: 0.3 % (ref 0–0.9)
LYMPHOCYTES # BLD AUTO: 1.79 X10*3/UL (ref 1.2–4.8)
LYMPHOCYTES NFR BLD AUTO: 23.3 %
MCH RBC QN AUTO: 29.8 PG (ref 26–34)
MCHC RBC AUTO-ENTMCNC: 31.5 G/DL (ref 32–36)
MCV RBC AUTO: 95 FL (ref 80–100)
MONOCYTES # BLD AUTO: 1.11 X10*3/UL (ref 0.1–1)
MONOCYTES NFR BLD AUTO: 14.4 %
NEUTROPHILS # BLD AUTO: 4.43 X10*3/UL (ref 1.2–7.7)
NEUTROPHILS NFR BLD AUTO: 57.6 %
NRBC BLD-RTO: 0 /100 WBCS (ref 0–0)
PLATELET # BLD AUTO: 239 X10*3/UL (ref 150–450)
POTASSIUM SERPL-SCNC: 3.6 MMOL/L (ref 3.4–5.1)
RBC # BLD AUTO: 2.92 X10*6/UL (ref 4.5–5.9)
SODIUM SERPL-SCNC: 141 MMOL/L (ref 133–145)
WBC # BLD AUTO: 7.7 X10*3/UL (ref 4.4–11.3)

## 2024-09-04 PROCEDURE — 2500000001 HC RX 250 WO HCPCS SELF ADMINISTERED DRUGS (ALT 637 FOR MEDICARE OP): Performed by: INTERNAL MEDICINE

## 2024-09-04 PROCEDURE — 2500000002 HC RX 250 W HCPCS SELF ADMINISTERED DRUGS (ALT 637 FOR MEDICARE OP, ALT 636 FOR OP/ED): Performed by: INTERNAL MEDICINE

## 2024-09-04 PROCEDURE — 36415 COLL VENOUS BLD VENIPUNCTURE: CPT | Performed by: INTERNAL MEDICINE

## 2024-09-04 PROCEDURE — 99407 BEHAV CHNG SMOKING > 10 MIN: CPT | Performed by: INTERNAL MEDICINE

## 2024-09-04 PROCEDURE — 2500000004 HC RX 250 GENERAL PHARMACY W/ HCPCS (ALT 636 FOR OP/ED): Performed by: INTERNAL MEDICINE

## 2024-09-04 PROCEDURE — 1200000002 HC GENERAL ROOM WITH TELEMETRY DAILY

## 2024-09-04 PROCEDURE — 84132 ASSAY OF SERUM POTASSIUM: CPT | Performed by: INTERNAL MEDICINE

## 2024-09-04 PROCEDURE — 99222 1ST HOSP IP/OBS MODERATE 55: CPT | Performed by: INTERNAL MEDICINE

## 2024-09-04 PROCEDURE — 82374 ASSAY BLOOD CARBON DIOXIDE: CPT | Performed by: INTERNAL MEDICINE

## 2024-09-04 PROCEDURE — 2500000001 HC RX 250 WO HCPCS SELF ADMINISTERED DRUGS (ALT 637 FOR MEDICARE OP)

## 2024-09-04 PROCEDURE — 85025 COMPLETE CBC W/AUTO DIFF WBC: CPT | Performed by: INTERNAL MEDICINE

## 2024-09-04 RX ORDER — TAMSULOSIN HYDROCHLORIDE 0.4 MG/1
0.4 CAPSULE ORAL DAILY
Qty: 30 CAPSULE | Refills: 1 | Status: SHIPPED | OUTPATIENT
Start: 2024-09-04 | End: 2024-11-03

## 2024-09-04 RX ADMIN — NITROGLYCERIN 0.4 MG: 0.4 TABLET SUBLINGUAL at 14:42

## 2024-09-04 RX ADMIN — CARVEDILOL 25 MG: 25 TABLET, FILM COATED ORAL at 17:33

## 2024-09-04 RX ADMIN — AMLODIPINE BESYLATE 5 MG: 5 TABLET ORAL at 10:02

## 2024-09-04 RX ADMIN — PANTOPRAZOLE SODIUM 20 MG: 20 TABLET, DELAYED RELEASE ORAL at 06:45

## 2024-09-04 RX ADMIN — POLYETHYLENE GLYCOL 3350 17 G: 17 POWDER, FOR SOLUTION ORAL at 10:02

## 2024-09-04 RX ADMIN — ISOSORBIDE MONONITRATE 30 MG: 30 TABLET, EXTENDED RELEASE ORAL at 10:02

## 2024-09-04 RX ADMIN — ARIPIPRAZOLE 10 MG: 5 TABLET ORAL at 10:02

## 2024-09-04 RX ADMIN — DONEPEZIL HYDROCHLORIDE 10 MG: 10 TABLET, FILM COATED ORAL at 21:08

## 2024-09-04 RX ADMIN — CARVEDILOL 25 MG: 25 TABLET, FILM COATED ORAL at 10:02

## 2024-09-04 RX ADMIN — CLOPIDOGREL BISULFATE 75 MG: 75 TABLET ORAL at 10:02

## 2024-09-04 RX ADMIN — NITROGLYCERIN 0.4 MG: 0.4 TABLET SUBLINGUAL at 14:49

## 2024-09-04 ASSESSMENT — PAIN SCALES - GENERAL
PAINLEVEL_OUTOF10: 0 - NO PAIN
PAINLEVEL_OUTOF10: 0 - NO PAIN

## 2024-09-04 ASSESSMENT — COGNITIVE AND FUNCTIONAL STATUS - GENERAL
HELP NEEDED FOR BATHING: A LITTLE
MOBILITY SCORE: 16
STANDING UP FROM CHAIR USING ARMS: A LITTLE
DRESSING REGULAR UPPER BODY CLOTHING: A LITTLE
CLIMB 3 TO 5 STEPS WITH RAILING: A LOT
MOVING FROM LYING ON BACK TO SITTING ON SIDE OF FLAT BED WITH BEDRAILS: A LITTLE
DAILY ACTIVITIY SCORE: 20
TURNING FROM BACK TO SIDE WHILE IN FLAT BAD: A LITTLE
TOILETING: A LITTLE
MOVING TO AND FROM BED TO CHAIR: A LITTLE
DRESSING REGULAR LOWER BODY CLOTHING: A LITTLE
WALKING IN HOSPITAL ROOM: A LOT

## 2024-09-04 ASSESSMENT — PAIN - FUNCTIONAL ASSESSMENT: PAIN_FUNCTIONAL_ASSESSMENT: 0-10

## 2024-09-04 NOTE — CARE PLAN
Problem: Skin  Goal: Decreased wound size/increased tissue granulation at next dressing change  Outcome: Progressing  Flowsheets (Taken 9/4/2024 0007)  Decreased wound size/increased tissue granulation at next dressing change: Promote sleep for wound healing  Goal: Participates in plan/prevention/treatment measures  Outcome: Progressing  Flowsheets (Taken 9/4/2024 0007)  Participates in plan/prevention/treatment measures: Elevate heels  Goal: Prevent/manage excess moisture  Outcome: Progressing  Flowsheets (Taken 9/4/2024 0007)  Prevent/manage excess moisture:   Monitor for/manage infection if present   Cleanse incontinence/protect with barrier cream  Goal: Prevent/minimize sheer/friction injuries  Outcome: Progressing  Flowsheets (Taken 9/4/2024 0007)  Prevent/minimize sheer/friction injuries:   Use pull sheet   HOB 30 degrees or less  Goal: Promote/optimize nutrition  Outcome: Progressing  Flowsheets (Taken 9/4/2024 0007)  Promote/optimize nutrition:   Monitor/record intake including meals   Offer water/supplements/favorite foods   Consume > 50% meals/supplements  Goal: Promote skin healing  Outcome: Progressing  Flowsheets (Taken 9/4/2024 0007)  Promote skin healing: Assess skin/pad under line(s)/device(s)     Problem: Pain - Adult  Goal: Verbalizes/displays adequate comfort level or baseline comfort level  Outcome: Progressing     Problem: Safety - Adult  Goal: Free from fall injury  Outcome: Progressing     Problem: Discharge Planning  Goal: Discharge to home or other facility with appropriate resources  Outcome: Progressing     Problem: Chronic Conditions and Co-morbidities  Goal: Patient's chronic conditions and co-morbidity symptoms are monitored and maintained or improved  Outcome: Progressing   The patient's goals for the shift include  rest    The clinical goals for the shift include sims care, maintain patient safety, encourage activity, promote rest, monitor output      09/04/24 at 12:10 AM Brendan Ruvalcaba  Courtney English RN

## 2024-09-04 NOTE — PROGRESS NOTES
Fernando Sweet is a 64 y.o. male on day 5 of admission presenting with Chest pain, unspecified type.    Subjective   Patient seen and examined.  Doing better.  Rodríguez catheter has been removed.       Objective     Physical Exam  HEENT:  Head externally atraumatic,  extraocular movements intact, oral mucosa moist  Neck:  Supple, no JVP, no palpable adenopathy or thyromegaly.  No carotid bruit.  Chest:  Clear to auscultation and resonant.  Heart:  Regular rate and rhythm, no murmur or gallop could be appreciated.  Abdomen:  Soft, nontender, bowel sounds present, normoactive, no palpable hepatosplenomegaly.  Extremities:  No edema, pulses present, no cyanosis or clubbing.  CNS:  Patient alert, oriented to time, place and person.    No new deficit.  Cranial nerves 2-12 grossly intact.  Hard of hearing.  Skin:  No active rash.  Musculoskeletal:  No  apparent joint swelling or erythema, range of movement normal.  Last Recorded Vitals  Heart Rate:  [60-78]   Temp:  [36.5 °C (97.7 °F)-37.2 °C (99 °F)]   Resp:  [15-17]   BP: (110-154)/(63-80)   SpO2:  [97 %-99 %]     Intake/Output last 3 Shifts:  I/O last 3 completed shifts:  In: 250 (2.8 mL/kg) [P.O.:250]  Out: 1850 (20.4 mL/kg) [Urine:1850 (0.6 mL/kg/hr)]  Weight: 90.7 kg     Relevant Results  No results found for the last 90 days.    Results for orders placed or performed during the hospital encounter of 08/30/24 (from the past 24 hour(s))   CBC and Auto Differential   Result Value Ref Range    WBC 7.7 4.4 - 11.3 x10*3/uL    nRBC 0.0 0.0 - 0.0 /100 WBCs    RBC 2.92 (L) 4.50 - 5.90 x10*6/uL    Hemoglobin 8.7 (L) 13.5 - 17.5 g/dL    Hematocrit 27.6 (L) 41.0 - 52.0 %    MCV 95 80 - 100 fL    MCH 29.8 26.0 - 34.0 pg    MCHC 31.5 (L) 32.0 - 36.0 g/dL    RDW 15.7 (H) 11.5 - 14.5 %    Platelets 239 150 - 450 x10*3/uL    Neutrophils % 57.6 40.0 - 80.0 %    Immature Granulocytes %, Automated 0.3 0.0 - 0.9 %    Lymphocytes % 23.3 13.0 - 44.0 %    Monocytes % 14.4 2.0 - 10.0 %     Eosinophils % 4.0 0.0 - 6.0 %    Basophils % 0.4 0.0 - 2.0 %    Neutrophils Absolute 4.43 1.20 - 7.70 x10*3/uL    Immature Granulocytes Absolute, Automated 0.02 0.00 - 0.70 x10*3/uL    Lymphocytes Absolute 1.79 1.20 - 4.80 x10*3/uL    Monocytes Absolute 1.11 (H) 0.10 - 1.00 x10*3/uL    Eosinophils Absolute 0.31 0.00 - 0.70 x10*3/uL    Basophils Absolute 0.03 0.00 - 0.10 x10*3/uL   Basic Metabolic Panel   Result Value Ref Range    Glucose 104 (H) 65 - 99 mg/dL    Sodium 141 133 - 145 mmol/L    Potassium 3.6 3.4 - 5.1 mmol/L    Chloride 108 (H) 97 - 107 mmol/L    Bicarbonate 26 24 - 31 mmol/L    Urea Nitrogen 13 8 - 25 mg/dL    Creatinine 1.20 0.40 - 1.60 mg/dL    eGFR 67 >60 mL/min/1.73m*2    Calcium 8.4 (L) 8.5 - 10.4 mg/dL    Anion Gap 7 <=19 mmol/L        Current Facility-Administered Medications:     acetaminophen (Tylenol) tablet 650 mg, 650 mg, oral, q6h PRN, Jose Rivera MD    amLODIPine (Norvasc) tablet 5 mg, 5 mg, oral, Daily, Jose Rivera MD, 5 mg at 09/04/24 1002    ARIPiprazole (Abilify) tablet 10 mg, 10 mg, oral, Daily, Jose Rivera MD, 10 mg at 09/04/24 1002    carvedilol (Coreg) tablet 25 mg, 25 mg, oral, BID, Stephane Hernandez [facility], 25 mg at 09/04/24 1002    clopidogrel (Plavix) tablet 75 mg, 75 mg, oral, Daily, Jose Rivera MD, 75 mg at 09/04/24 1002    donepezil (Aricept) tablet 10 mg, 10 mg, oral, Nightly, Jose Rivera MD, 10 mg at 09/03/24 2035    isosorbide mononitrate ER (Imdur) 24 hr tablet 30 mg, 30 mg, oral, Daily, Jose Rivera MD, 30 mg at 09/04/24 1002    melatonin tablet 5 mg, 5 mg, oral, Nightly PRN, Jose Rivera MD    nitroglycerin (Nitrostat) SL tablet 0.4 mg, 0.4 mg, sublingual, q5 min PRN, Jose Rivera MD    oxyCODONE-acetaminophen (Percocet) 5-325 mg per tablet 1 tablet, 1 tablet, oral, q6h PRN, Jose Rivera MD, 1 tablet at 08/31/24 0527    oxygen (O2) therapy, , inhalation, Continuous PRN - O2/gases, Jose Rivera,  MD    pantoprazole (ProtoNix) EC tablet 20 mg, 20 mg, oral, Daily before breakfast, Jose Rivera MD, 20 mg at 09/04/24 0645    polyethylene glycol (Glycolax, Miralax) packet 17 g, 17 g, oral, Daily, Jose Rivera MD, 17 g at 09/04/24 1002   Assessment/Plan   Principal Problem:    Chest pain, unspecified type  .  Hematuria  Coronary artery disease  History of CVA  GERD    Plan: Continue current medication.  Patient medically stable.  If patient can pass voiding trial patient can be discharged.  Rodríguez catheter has been removed.  Patient to follow-up with urology as an outpatient.         Jose Rivera MD

## 2024-09-04 NOTE — PROGRESS NOTES
Subjective Data:  Patient resting comfortable.  No chest pain or tightness.  No active chest pain tightness.  Patient is hard of hearing.  Still on and off hematuria.  Overnight Events:    None  Objective   Last Recorded Vitals  /75 (BP Location: Left arm, Patient Position: Lying)   Pulse 78   Temp 36.6 °C (97.9 °F) (Temporal)   Resp 16   Wt 90.7 kg (200 lb)   SpO2 99%     Intake/Output Summary (Last 24 hours) at 9/4/2024 1114  Last data filed at 9/4/2024 1005  Gross per 24 hour   Intake 390 ml   Output 1550 ml   Net -1160 ml     Physical Exam:  HEENT: Normocephalic/atraumatic pupils equal react light  Neck exam mild JVD, no bruit  Lung exam clear to auscultation  Cardiac exam is regular rhythm S1-S2, soft systolic murmur heard.   Abdomen soft nontender, nondistended  Extremities no clubbing, cyanosis, edema  Neuro exam grossly intact.  Image Results  ECG 12 lead  Sinus rhythm with Premature atrial complexes  Minimal voltage criteria for LVH, may be normal variant  Possible Anterior infarct (cited on or before 25-OCT-2023)  Abnormal ECG  When compared with ECG of 08-AUG-2024 10:14,  Premature atrial complexes are now Present  T wave inversion no longer evident in Lateral leads    Last Labs:  CBC - 9/4/2024:  5:42 AM  7.7 8.7 239    27.6      CMP - 9/4/2024:  5:42 AM  8.4 6.2 18 --- 0.4   _ 3.4 11 168      PTT - No results in last year.  1.1   11.8 _     Inpatient Medications:  Scheduled medications   Medication Dose Route Frequency    amLODIPine  5 mg oral Daily    ARIPiprazole  10 mg oral Daily    carvedilol  25 mg oral BID    clopidogrel  75 mg oral Daily    donepezil  10 mg oral Nightly    isosorbide mononitrate ER  30 mg oral Daily    pantoprazole  20 mg oral Daily before breakfast    polyethylene glycol  17 g oral Daily     Principal Problem:    Chest pain, unspecified type    Assessment/Plan   64-year-old patient with a history of atypical chest pain, history of hematuria, history of CVA and GERD  disorders.  Multiple comorbid condition.  1.  Hypertension: Patient currently on amlodipine 5 mg tablet p.o. daily to control blood pressure.  Also patient currently on carvedilol 25 mg tablet p.o. twice daily.  Blood pressure is stable at the moment.  Range around 130/80.    2.  CAD: Patient currently on Imdur 30 mg p.o. daily.  No active chest pain tightness.    3.  History of CVA: Patient currently back on Plavix 75 mg tablet once a day.  May add aspirin 81 mg once a day if tolerable from GI perspective as well as urology perspective    4.  GERD disorders: Currently on Protonix.    5.  Smoking cessation: Smoking counseling was done.    Code Status:  Full Code  I spent 45 minutes in the professional and overall care of this patient.  Davy Richey MD

## 2024-09-04 NOTE — PROGRESS NOTES
09/04/24 0949   Discharge Planning   Expected Discharge Disposition [facility]   Does the patient need discharge transport arranged? Yes   RoundTrip coordination needed? Yes   Has discharge transport been arranged? No     Patient is a resident of [facility] and active with their therapy. Patient to return to [facility] upon discharge. He will need transport via [facility] at discharge.    2:27 PM  Patient is being discharged this date to return to [facility], assisted living. Transportation scheduled for 5:30 PM. Patient's daughter-in-law Maria De Jesus made aware.     If patient fails void trial and requires sims catheter, patient will need home care for sims management. Maria De Jesus is agreeable to the same. If home care is needed please place internal referral for  Home Care.     4:16 PM  Notified by nurse patient is not being discharged this date. Transportation cancelled. Nurse to update family.   Nurse updated and MSW requested he call report prior to patient leaving.     MSW did call [facility] and leave a message advising patient will return and transport at 5:30 PM. Provided contact information if they have any questions.     3:34 PM Spoke with Cait at [facility]. She confirmed they are able to take the patient back with the sims catheter with home care to follow and outpatient urology follow up.     If patient does not require sims upon discharge then patient will not need home care.

## 2024-09-04 NOTE — DISCHARGE SUMMARY
Discharge Diagnosis  Chest pain, unspecified type    Issues Requiring Follow-Up  Hematuria  Urinary retention      Discharge Meds     Medication List      ASK your doctor about these medications     acetaminophen 325 mg tablet; Commonly known as: Tylenol   amLODIPine 5 mg tablet; Commonly known as: Norvasc; Take 1 tablet (5 mg)   by mouth once daily.   ARIPiprazole 10 mg tablet; Commonly known as: Abilify   aspirin 81 mg EC tablet; Take 1 tablet (81 mg) by mouth once daily.   carvedilol 3.125 mg tablet; Commonly known as: Coreg; Take 1 tablet   (3.125 mg) by mouth 2 times a day with meals.   clopidogrel 75 mg tablet; Commonly known as: Plavix; Take 1 tablet (75   mg) by mouth once daily.   donepezil 10 mg tablet; Commonly known as: Aricept   isosorbide mononitrate ER 30 mg 24 hr tablet; Commonly known as: Imdur;   Take 1 tablet (30 mg) by mouth once daily.   levoFLOXacin 500 mg tablet; Commonly known as: Levaquin; Ask about:   Should I take this medication?   lisinopril 20 mg tablet   melatonin 5 mg tablet   nitroglycerin 0.4 mg SL tablet; Commonly known as: Nitrostat; Place 1   tablet (0.4 mg) under the tongue every 5 minutes if needed for chest pain.   CALL 911 IF PAIN NOT RELIEVED AFTER 3 DOSES   oxyCODONE-acetaminophen 5-325 mg tablet; Commonly known as: Percocet;   Take 1 tablet by mouth every 6 hours if needed for severe pain (7 - 10).   pantoprazole 20 mg EC tablet; Commonly known as: ProtoNix       Test Results Pending At Discharge  Pending Labs       Order Current Status    Urinalysis with Reflex Culture and Microscopic Collected (08/31/24 6077)    Urinalysis with Reflex Culture and Microscopic In process            Hospital Course   The patient was admitted with a complaint of chest pain.  Cardiac no negative.  Patient found to have acute renal failure.  Patient with urinary retention.  Patient had Rodríguez placed.  After episode of hematuria.  Hemoglobin dropped down.  Patient was given a blood transfusion.   Hemoglobin hematocrit stabilized now.  Patient was seen by cardiology.  Patient was also seen by urology.  Urology has recommended voiding trial before discharge.  Otherwise patient medically stable.  Patient is on Plavix and aspirin that was put on hold.  It has been restarted.  Patient can be discharged to assisted living    Pertinent Physical Exam At Time of Discharge  Physical Exam    Outpatient Follow-Up  No future appointments.      Jose Rivera MD

## 2024-09-05 VITALS
TEMPERATURE: 98.2 F | HEART RATE: 58 BPM | WEIGHT: 200 LBS | DIASTOLIC BLOOD PRESSURE: 73 MMHG | RESPIRATION RATE: 18 BRPM | HEIGHT: 69 IN | OXYGEN SATURATION: 99 % | BODY MASS INDEX: 29.62 KG/M2 | SYSTOLIC BLOOD PRESSURE: 137 MMHG

## 2024-09-05 PROCEDURE — 99232 SBSQ HOSP IP/OBS MODERATE 35: CPT

## 2024-09-05 PROCEDURE — 2500000001 HC RX 250 WO HCPCS SELF ADMINISTERED DRUGS (ALT 637 FOR MEDICARE OP)

## 2024-09-05 PROCEDURE — 2500000002 HC RX 250 W HCPCS SELF ADMINISTERED DRUGS (ALT 637 FOR MEDICARE OP, ALT 636 FOR OP/ED): Performed by: INTERNAL MEDICINE

## 2024-09-05 PROCEDURE — 2500000001 HC RX 250 WO HCPCS SELF ADMINISTERED DRUGS (ALT 637 FOR MEDICARE OP): Performed by: INTERNAL MEDICINE

## 2024-09-05 RX ADMIN — CARVEDILOL 25 MG: 25 TABLET, FILM COATED ORAL at 10:46

## 2024-09-05 RX ADMIN — ISOSORBIDE MONONITRATE 30 MG: 30 TABLET, EXTENDED RELEASE ORAL at 10:46

## 2024-09-05 RX ADMIN — PANTOPRAZOLE SODIUM 20 MG: 20 TABLET, DELAYED RELEASE ORAL at 07:02

## 2024-09-05 RX ADMIN — CLOPIDOGREL BISULFATE 75 MG: 75 TABLET ORAL at 10:46

## 2024-09-05 RX ADMIN — AMLODIPINE BESYLATE 5 MG: 5 TABLET ORAL at 10:46

## 2024-09-05 RX ADMIN — ARIPIPRAZOLE 10 MG: 5 TABLET ORAL at 10:46

## 2024-09-05 ASSESSMENT — COGNITIVE AND FUNCTIONAL STATUS - GENERAL
MOVING FROM LYING ON BACK TO SITTING ON SIDE OF FLAT BED WITH BEDRAILS: A LITTLE
DAILY ACTIVITIY SCORE: 20
MOBILITY SCORE: 16
MOVING TO AND FROM BED TO CHAIR: A LITTLE
CLIMB 3 TO 5 STEPS WITH RAILING: A LOT
STANDING UP FROM CHAIR USING ARMS: A LITTLE
DRESSING REGULAR UPPER BODY CLOTHING: A LITTLE
TURNING FROM BACK TO SIDE WHILE IN FLAT BAD: A LITTLE
HELP NEEDED FOR BATHING: A LITTLE
TOILETING: A LITTLE
DRESSING REGULAR LOWER BODY CLOTHING: A LITTLE
WALKING IN HOSPITAL ROOM: A LOT

## 2024-09-05 ASSESSMENT — PAIN SCALES - GENERAL
PAINLEVEL_OUTOF10: 0 - NO PAIN
PAINLEVEL_OUTOF10: 0 - NO PAIN

## 2024-09-05 NOTE — PROGRESS NOTES
"Fernando Sweet is a 64 y.o. male on day 6 of admission presenting with Chest pain, unspecified type.    Subjective      Patient alert and oriented x 3.  He is sitting upright in bed.  Expressing a desire to go home.  No complaints.    Objective     Physical Exam  Constitutional:       Appearance: Normal appearance. He is obese.   HENT:      Head: Normocephalic.   Cardiovascular:      Rate and Rhythm: Normal rate and regular rhythm.      Pulses: Normal pulses.      Heart sounds: Normal heart sounds. No murmur heard.     No friction rub. No gallop.   Pulmonary:      Effort: Pulmonary effort is normal.      Breath sounds: Normal breath sounds.   Musculoskeletal:      Right lower leg: Edema present.      Left lower leg: No edema.   Skin:     General: Skin is warm and dry.   Neurological:      Mental Status: He is alert and oriented to person, place, and time.         Last Recorded Vitals  Blood pressure 137/73, pulse 58, temperature 36.8 °C (98.2 °F), temperature source Temporal, resp. rate 18, height 1.753 m (5' 9\"), weight 90.7 kg (200 lb), SpO2 99%.  Intake/Output last 3 Shifts:  I/O last 3 completed shifts:  In: 390 (4.3 mL/kg) [P.O.:390]  Out: 1425 (15.7 mL/kg) [Urine:1425 (0.4 mL/kg/hr)]  Weight: 90.7 kg     Relevant Results    No results found for this or any previous visit (from the past 24 hour(s)).             Assessment/Plan   Assessment & Plan  Chest pain, unspecified type    9/4 :64-year-old patient with a history of atypical chest pain, history of hematuria, history of CVA and GERD disorders.  Multiple comorbid condition.  1.  Hypertension: Patient currently on amlodipine 5 mg tablet p.o. daily to control blood pressure.  Also patient currently on carvedilol 25 mg tablet p.o. twice daily.  Blood pressure is stable at the moment.  Range around 130/80.  2.  CAD: Patient currently on Imdur 30 mg p.o. daily.  No active chest pain tightness.   3.  History of CVA: Patient currently back on Plavix 75 mg tablet once " a day.  May add aspirin 81 mg once a day if tolerable from GI perspective as well as urology perspective   4.  GERD disorders: Currently on Protonix.   5.  Smoking cessation: Smoking counseling was done.    9/5: As above.  Patient denies chest pain, shortness of breath, heart palpitations.  Patient was originally admitted for chest pain.  He denies any further episodes since.  High-sensitivity troponins at time of admission were elevated however flat.  His creatinine was significantly elevated at that time as well.  Troponin elevation most likely a result of his acute kidney injury.  EKG on admission showing normal sinus rhythm with PACs.  No evidence of ST elevation or acute ischemia.  Low suspicion for acute coronary process.  He remains on Imdur, amlodipine, carvedilol, and Plavix.  Continue his medications in the outpatient setting.  Patient states he does not follow with a cardiologist.  I will give him Dr. Richey's information for outpatient follow-up if needed.Blood pressures are stable with most recent reading 137/73.He remains on room air with pulse oximetry at 99%.  Lab work significant for sodium 141, potassium 3.6, creatinine 1.20, white blood cell 7.7, hemoglobin 8.7, hematocrit 27.6.  Patient's kidney function is markedly improved from time of his admission.  He has been without chest pain since time of admission.  Overall he is stable from a cardiac perspective he should follow-up with Dr. Richey on an as-needed basis if chest pain continues.  Will sign off at this time.          I spent 20 minutes in the professional and overall care of this patient.      Amber Restrepo PA-C

## 2024-09-05 NOTE — PROGRESS NOTES
09/05/24 0859   Discharge Planning   Expected Discharge Disposition [facility]   Does the patient need discharge transport arranged? Yes   RoundTrip coordination needed? Yes   Has discharge transport been arranged? No     Patient is a resident of [facility], assisted living and can return when medically appropriate. [facility] confirmed they can accept patient with a sims if needed, and confirmed home care would be needed. Plan for  Home Care if discharged with a sims; otherwise, patient has no need to return to [facility].     9:10 AM Patient will not be discharging with a sims. Patient to return to [facility] upon discharge. No home care needed at this time.

## 2024-09-05 NOTE — CARE PLAN
The patient's goals for the shift include  monitor labs and vitals, maintain patient safety, needs to pee    The clinical goals for the shift include oob with meals      09/05/24 at 12:24 AM - AMAIRANI LEMOS RN

## 2024-09-10 LAB
ATRIAL RATE: 64 BPM
P AXIS: 42 DEGREES
P OFFSET: 188 MS
P ONSET: 130 MS
PR INTERVAL: 154 MS
Q ONSET: 207 MS
QRS COUNT: 11 BEATS
QRS DURATION: 98 MS
QT INTERVAL: 472 MS
QTC CALCULATION(BAZETT): 486 MS
QTC FREDERICIA: 482 MS
R AXIS: -13 DEGREES
T AXIS: 42 DEGREES
T OFFSET: 443 MS
VENTRICULAR RATE: 64 BPM

## 2024-09-13 NOTE — DOCUMENTATION CLARIFICATION NOTE
"    PATIENT:               VANDANA REGALADO  ACCT #:                  [id number]  MRN:                       [id number]  :                       1960  ADMIT DATE:       2024 2:35 PM  DISCH DATE:        2024 3:39 PM  RESPONDING PROVIDER #:        [id number]          PROVIDER RESPONSE TEXT:    Chest pain likely a result of GERD    CDI QUERY TEXT:    Clarification    Instruction:    Based on your assessment of the patient and the clinical information, please provide the requested documentation by clicking on the appropriate radio button and enter any additional information if prompted.    Question: Please further clarify the most likely etiology of chest pain on admission after final work up    When answering this query, please exercise your independent professional judgment. The fact that a question is being asked, does not imply that any particular answer is desired or expected.    The patient's clinical indicators include:  Clinical Information: 64y.o. M presents from nursing facility via EMS for chest pain. Per H/P, admitted with chest pain, unspecified type, severe Anemia, DANNY on CKD stage V.   VS: 36.7, 59, 18, 154/80, 99 percent on RA     H/P: \" Chest pain, unspecified type. Severe anemia. Acute on chronic renal failure \"     Cardiology Consult: Complain of epigastric burning pain.  Patient is symptoms lasted about 2 hours prior to coming to ER.  Worsening symptoms. Patient now with chest pain with flat troponin.     Progress Note: \" Chest pain: Patient with atypical chest pain at the moment.  Started patient on Imdur 30 mg tablet p.o. daily for underlying angina symptoms.  Currently only medical therapy for underlying chest pain.  Outpatient stress test will be arranged. \"     Progress Note: \" 64-year-old had recent gross hematuria. He has anemia and he received a transfusion. \"     Discharge Summary: \" The patient was admitted with a complaint of chest pain.  Cardiac no negative.  Patient " "found to have acute renal failure. Patient with urinary retention. Patient had Rodríguez placed. \"    Clinical Indicators:  8/30 ProBNP: 658  8/30 at 1440 Troponin T- 35  8/30 at 1623 Troponin T- 32  8/31 at 0141 Troponin T- 27    8/30 RBC 2.44, WBC 7.7, Plts 334  8/31 RBC 2.27, WBC 7.0, Plts 300  9/1   RBC 2.83, WBC 5.3, Plts 192  9/2   RBC 2.91, WBC 6.7, Plts 264  9/3   RBC 2.93, WBC 7.1, Plts 260    Treatment:  8/30-9/1 NS x5.5L  9/2-9/4 Plavix 75mg po x3 doses  8/30 Pepcid 20mg IV x1 dose  9/4 Nitroglycerin 0.4mg sublingual x2 doses  8/31-9/5 Protonix 20mg po x6 doses    Risk Factors: PMHx: CKD, HTN, CAD, prior CVA, Iron deficiency anemia, Depression, A fib, HLD, GERD, Hypothyroidism, Bipolar disorder, CHF, hard of hearing, BPH, Osteoarthritis, former smoker.  BMI: 29.53  Options provided:  -- Chest pain likely a referred pain resulting from severe anemia  -- Chest pain likely related to Unstable Angina  -- Chest pain likely a result of GERD  -- Chest pain a result of, Please provide additional details here  -- Other - I will add my own diagnosis  -- Refer to Clinical Documentation Reviewer    Query created by: Isamar Barnes on 9/13/2024 8:21 AM      Electronically signed by:  PEDRO FARLEY MD 9/13/2024 9:05 AM          "

## 2024-09-16 ENCOUNTER — APPOINTMENT (OUTPATIENT)
Dept: UROLOGY | Facility: CLINIC | Age: 64
End: 2024-09-16
Payer: MEDICARE

## 2025-06-09 NOTE — CARE PLAN
Problem: Fall/Injury  Goal: Not fall by end of shift  8/10/2024 1046 by Pamela Song RN  Outcome: Progressing  8/10/2024 1040 by Pamela Song RN  Outcome: Progressing  8/10/2024 1036 by Pamela Song RN  Outcome: Progressing  Goal: Be free from injury by end of the shift  8/10/2024 1046 by Pamela Song RN  Outcome: Progressing  8/10/2024 1040 by Pamela Song RN  Outcome: Progressing  8/10/2024 1036 by Pamela Song RN  Outcome: Progressing  Goal: Verbalize understanding of personal risk factors for fall in the hospital  8/10/2024 1046 by Pamela Song RN  Outcome: Progressing  8/10/2024 1040 by Pamela Song, SABA  Outcome: Progressing  8/10/2024 1036 by Pamela Song, SABA  Outcome: Progressing  Goal: Verbalize understanding of risk factor reduction measures to prevent injury from fall in the home  8/10/2024 1046 by Pamela Song RN  Outcome: Progressing  8/10/2024 1040 by Pamela Song, SABA  Outcome: Progressing  8/10/2024 1036 by Pamela Song RN  Outcome: Progressing  Goal: Use assistive devices by end of the shift  8/10/2024 1046 by Pamela Song, SABA  Outcome: Progressing  8/10/2024 1040 by Pamela Song, SABA  Outcome: Progressing  8/10/2024 1036 by Pamela Song, SABA  Outcome: Progressing  Goal: Pace activities to prevent fatigue by end of the shift  8/10/2024 1046 by Pamela Song, SABA  Outcome: Progressing  8/10/2024 1040 by Pamela Song, SABA  Outcome: Progressing  8/10/2024 1036 by Pamela Song RN  Outcome: Progressing   The patient's goals for the shift include pain control    The clinical goals for the shift include safety awareness, pain control     good balance

## (undated) DEVICE — KIT, TABLE, HANA

## (undated) DEVICE — ADHESIVE, SKIN, DERMABOND ADVANCED, 15CM, PEN-STYLE

## (undated) DEVICE — SUTURE, MONOCRYL, 3-0, 27 IN, PS-2, UNDYED

## (undated) DEVICE — DRESSING, NON-ADHERENT, TELFA, OUCHLESS, 3 X 8 IN, STERILE

## (undated) DEVICE — SOLUTION, IRRIGATION, X RX SODIUM CHL 0.9%, 1000ML BTL

## (undated) DEVICE — Device

## (undated) DEVICE — DRAPE, SHEET, LARGE, 70 X 85IN, STERILE

## (undated) DEVICE — DRILL BIT, STEPPED, 6MM/9MM CANNULATED

## (undated) DEVICE — GLOVE, SURGICAL, PROTEXIS PI , 7.5, PF, LF

## (undated) DEVICE — DRESSING, TRANSPARENT, TEGADERM, 4 X 4-3/4 IN, NO LABEL

## (undated) DEVICE — PADDING, UNDERCAST, WEBRIL II, 4 IN X 4 YD, CRIMP, NS

## (undated) DEVICE — BANDAGE, GAUZE, COTTON, STERILE, BULKEE II, 4.5IN X 4.1YD

## (undated) DEVICE — GLOVE, SURGICAL, PROTEXIS PI BLUE W/NEUTHERA, 7.5, PF, LF

## (undated) DEVICE — DRAPE, ISOLATION, ANTIMICROBIAL, W/POUCH, IOBAN 2, STERI DRAPE, 125 X 83 IN, DISPOSABLE, STERILE

## (undated) DEVICE — SPONGE, LAP, XRAY DECT, 18IN X 18IN, W/MASTER DMT, STERILE

## (undated) DEVICE — DRAPE, SHEET, UTILITY, NON ABSORBENT, 18 X 26 IN, LF

## (undated) DEVICE — DRILL BIT, 4.2MM 3-FLUTED QC 330MM 100MM CALB

## (undated) DEVICE — GUIDEWIRE, 3.2 X 400

## (undated) DEVICE — SUTURE, VICRYL, 3-0, 27 IN, CT-1, UNDYED

## (undated) DEVICE — DRAPE, C-ARM IMAGE

## (undated) DEVICE — DRAPE, LEGGINGS, 28.5 X 43 IN, DISPOSABLE, LF, STERILE

## (undated) DEVICE — SUTURE, VICRYL, 0, 36 IN, CT-1, UNDYED